# Patient Record
Sex: FEMALE | Race: ASIAN | NOT HISPANIC OR LATINO | URBAN - METROPOLITAN AREA
[De-identification: names, ages, dates, MRNs, and addresses within clinical notes are randomized per-mention and may not be internally consistent; named-entity substitution may affect disease eponyms.]

---

## 2020-01-08 ENCOUNTER — EMERGENCY (EMERGENCY)
Facility: HOSPITAL | Age: 85
LOS: 1 days | Discharge: HOME | End: 2020-01-08
Attending: EMERGENCY MEDICINE | Admitting: EMERGENCY MEDICINE
Payer: MEDICAID

## 2020-01-08 VITALS
TEMPERATURE: 97 F | OXYGEN SATURATION: 100 % | HEART RATE: 62 BPM | DIASTOLIC BLOOD PRESSURE: 67 MMHG | RESPIRATION RATE: 16 BRPM | SYSTOLIC BLOOD PRESSURE: 189 MMHG

## 2020-01-08 DIAGNOSIS — Z95.5 PRESENCE OF CORONARY ANGIOPLASTY IMPLANT AND GRAFT: Chronic | ICD-10-CM

## 2020-01-08 LAB
ALBUMIN SERPL ELPH-MCNC: 4.4 G/DL — SIGNIFICANT CHANGE UP (ref 3.3–5)
ALP SERPL-CCNC: 80 U/L — SIGNIFICANT CHANGE UP (ref 40–120)
ALT FLD-CCNC: 10 U/L — SIGNIFICANT CHANGE UP (ref 4–33)
ANION GAP SERPL CALC-SCNC: 14 MMO/L — SIGNIFICANT CHANGE UP (ref 7–14)
APPEARANCE UR: CLEAR — SIGNIFICANT CHANGE UP
AST SERPL-CCNC: 18 U/L — SIGNIFICANT CHANGE UP (ref 4–32)
BASOPHILS # BLD AUTO: 0.11 K/UL — SIGNIFICANT CHANGE UP (ref 0–0.2)
BASOPHILS NFR BLD AUTO: 1.1 % — SIGNIFICANT CHANGE UP (ref 0–2)
BILIRUB SERPL-MCNC: 0.4 MG/DL — SIGNIFICANT CHANGE UP (ref 0.2–1.2)
BILIRUB UR-MCNC: NEGATIVE — SIGNIFICANT CHANGE UP
BLOOD UR QL VISUAL: NEGATIVE — SIGNIFICANT CHANGE UP
BUN SERPL-MCNC: 16 MG/DL — SIGNIFICANT CHANGE UP (ref 7–23)
CALCIUM SERPL-MCNC: 9.6 MG/DL — SIGNIFICANT CHANGE UP (ref 8.4–10.5)
CHLORIDE SERPL-SCNC: 106 MMOL/L — SIGNIFICANT CHANGE UP (ref 98–107)
CO2 SERPL-SCNC: 22 MMOL/L — SIGNIFICANT CHANGE UP (ref 22–31)
COLOR SPEC: COLORLESS — SIGNIFICANT CHANGE UP
CREAT SERPL-MCNC: 0.71 MG/DL — SIGNIFICANT CHANGE UP (ref 0.5–1.3)
EOSINOPHIL # BLD AUTO: 0.32 K/UL — SIGNIFICANT CHANGE UP (ref 0–0.5)
EOSINOPHIL NFR BLD AUTO: 3.1 % — SIGNIFICANT CHANGE UP (ref 0–6)
GLUCOSE SERPL-MCNC: 102 MG/DL — HIGH (ref 70–99)
GLUCOSE UR-MCNC: NEGATIVE — SIGNIFICANT CHANGE UP
HCT VFR BLD CALC: 35.7 % — SIGNIFICANT CHANGE UP (ref 34.5–45)
HGB BLD-MCNC: 11.6 G/DL — SIGNIFICANT CHANGE UP (ref 11.5–15.5)
IMM GRANULOCYTES NFR BLD AUTO: 0.3 % — SIGNIFICANT CHANGE UP (ref 0–1.5)
KETONES UR-MCNC: NEGATIVE — SIGNIFICANT CHANGE UP
LEUKOCYTE ESTERASE UR-ACNC: NEGATIVE — SIGNIFICANT CHANGE UP
LYMPHOCYTES # BLD AUTO: 2.86 K/UL — SIGNIFICANT CHANGE UP (ref 1–3.3)
LYMPHOCYTES # BLD AUTO: 27.5 % — SIGNIFICANT CHANGE UP (ref 13–44)
MCHC RBC-ENTMCNC: 30.1 PG — SIGNIFICANT CHANGE UP (ref 27–34)
MCHC RBC-ENTMCNC: 32.5 % — SIGNIFICANT CHANGE UP (ref 32–36)
MCV RBC AUTO: 92.7 FL — SIGNIFICANT CHANGE UP (ref 80–100)
MONOCYTES # BLD AUTO: 1.08 K/UL — HIGH (ref 0–0.9)
MONOCYTES NFR BLD AUTO: 10.4 % — SIGNIFICANT CHANGE UP (ref 2–14)
NEUTROPHILS # BLD AUTO: 5.99 K/UL — SIGNIFICANT CHANGE UP (ref 1.8–7.4)
NEUTROPHILS NFR BLD AUTO: 57.6 % — SIGNIFICANT CHANGE UP (ref 43–77)
NITRITE UR-MCNC: NEGATIVE — SIGNIFICANT CHANGE UP
NRBC # FLD: 0.02 K/UL — SIGNIFICANT CHANGE UP (ref 0–0)
PH UR: 7 — SIGNIFICANT CHANGE UP (ref 5–8)
PLATELET # BLD AUTO: 239 K/UL — SIGNIFICANT CHANGE UP (ref 150–400)
PMV BLD: 12.9 FL — SIGNIFICANT CHANGE UP (ref 7–13)
POTASSIUM SERPL-MCNC: 4 MMOL/L — SIGNIFICANT CHANGE UP (ref 3.5–5.3)
POTASSIUM SERPL-SCNC: 4 MMOL/L — SIGNIFICANT CHANGE UP (ref 3.5–5.3)
PROT SERPL-MCNC: 7.9 G/DL — SIGNIFICANT CHANGE UP (ref 6–8.3)
PROT UR-MCNC: NEGATIVE — SIGNIFICANT CHANGE UP
RBC # BLD: 3.85 M/UL — SIGNIFICANT CHANGE UP (ref 3.8–5.2)
RBC # FLD: 13.6 % — SIGNIFICANT CHANGE UP (ref 10.3–14.5)
SODIUM SERPL-SCNC: 142 MMOL/L — SIGNIFICANT CHANGE UP (ref 135–145)
SP GR SPEC: 1.01 — SIGNIFICANT CHANGE UP (ref 1–1.04)
TROPONIN T, HIGH SENSITIVITY: 11 NG/L — SIGNIFICANT CHANGE UP (ref ?–14)
UROBILINOGEN FLD QL: NORMAL — SIGNIFICANT CHANGE UP
WBC # BLD: 10.39 K/UL — SIGNIFICANT CHANGE UP (ref 3.8–10.5)
WBC # FLD AUTO: 10.39 K/UL — SIGNIFICANT CHANGE UP (ref 3.8–10.5)

## 2020-01-08 PROCEDURE — 93010 ELECTROCARDIOGRAM REPORT: CPT

## 2020-01-08 PROCEDURE — 99284 EMERGENCY DEPT VISIT MOD MDM: CPT | Mod: 25

## 2020-01-08 PROCEDURE — 70450 CT HEAD/BRAIN W/O DYE: CPT | Mod: 26

## 2020-01-08 PROCEDURE — 71046 X-RAY EXAM CHEST 2 VIEWS: CPT | Mod: 26

## 2020-01-08 RX ORDER — SODIUM CHLORIDE 9 MG/ML
1000 INJECTION INTRAMUSCULAR; INTRAVENOUS; SUBCUTANEOUS ONCE
Refills: 0 | Status: COMPLETED | OUTPATIENT
Start: 2020-01-08 | End: 2020-01-08

## 2020-01-08 RX ORDER — MECLIZINE HCL 12.5 MG
25 TABLET ORAL ONCE
Refills: 0 | Status: COMPLETED | OUTPATIENT
Start: 2020-01-08 | End: 2020-01-08

## 2020-01-08 RX ADMIN — Medication 25 MILLIGRAM(S): at 18:30

## 2020-01-08 RX ADMIN — SODIUM CHLORIDE 1000 MILLILITER(S): 9 INJECTION INTRAMUSCULAR; INTRAVENOUS; SUBCUTANEOUS at 18:32

## 2020-01-08 NOTE — ED PROVIDER NOTE - PROGRESS NOTE DETAILS
Farnaz, pgy3: CT head negative for acute pathology, rp trop stable, pt sx resolving, stable for dc w/ meclizine ppx

## 2020-01-08 NOTE — ED ADULT TRIAGE NOTE - CHIEF COMPLAINT QUOTE
Hx of CAD, DM. Pt c/o generalized weakness, denies chest pain, SOB, n/v/d, dysuria, fever, chills, cough.

## 2020-01-08 NOTE — ED ADULT NURSE NOTE - NSIMPLEMENTINTERV_GEN_ALL_ED
Implemented All Fall Risk Interventions:  [place] to call system. Call bell, personal items and telephone within reach. Instruct patient to call for assistance. Room bathroom lighting operational. Non-slip footwear when patient is off stretcher. Physically safe environment: no spills, clutter or unnecessary equipment. Stretcher in lowest position, wheels locked, appropriate side rails in place. Provide visual cue, wrist band, yellow gown, etc. Monitor gait and stability. Monitor for mental status changes and reorient to person, place, and time. Review medications for side effects contributing to fall risk. Reinforce activity limits and safety measures with patient and family.

## 2020-01-08 NOTE — ED PROVIDER NOTE - OBJECTIVE STATEMENT
83 y/o F PMH CAD s/p stent x 6, HTN, DM c/o room spinning dizziness since this am. Notes sxs improved, currently asymptomatic laying in bed. Notes sxs worse with movement. Deneis fever, chills, vsion changes, ha, rhinorrhea, sore throat, CP, SOB, cough, abdominal pain, n/v/d, numbness/tingling/weakness.

## 2020-01-08 NOTE — ED ADULT NURSE NOTE - OBJECTIVE STATEMENT
Received pt in room 12, pt A&Ox2-3, unable to recall , per son, pt is intermittently forgetful, respirations even and unlabored b/l. Pt mainly Setsw[place] speaking, requesting son to translate. Per son, pt with generalized weakness, difficulty ambulating since this AM. Also reports high BP at home, systolic 190s at home. Pt also endorsing room spinning. Denies n/v, fever, chills, blurry vision, h/a. Abdomen soft, nondistended, nontender. PMHx diabetes, htn, CAD. Awaiting MD ocampo. Son at bedside. Will continue to monitor.

## 2020-01-08 NOTE — ED PROVIDER NOTE - ATTENDING CONTRIBUTION TO CARE
agree with PA note    "83 y/o F PMH CAD s/p stent x 6, HTN, DM c/o room spinning dizziness since this am. Notes sxs improved, currently asymptomatic laying in bed. Notes sxs worse with movement. Deneis fever, chills, vsion changes, ha, rhinorrhea, sore throat, CP, SOB, cough, abdominal pain, n/v/d, numbness/tingling/weakness."    PE: at present no symptoms; PERRL; CTAB/L; s1 s2 no m/r/g abd soft/NT/ND Neuro: gait stable; no nystagmus; no skew; FTN wnl; no drift    given age of 84 will get head CT but symptoms resolved and no findings of cerebellar pathology so likely will d/c home

## 2020-01-08 NOTE — ED PROVIDER NOTE - NSFOLLOWUPINSTRUCTIONS_ED_ALL_ED_FT
Please follow up with your primary care physician    We are sending medicine to your pharmacy that will help with dizziness if you symptoms return

## 2020-01-08 NOTE — ED PROVIDER NOTE - PATIENT PORTAL LINK FT
You can access the FollowMyHealth Patient Portal offered by [facility] by registering at the following website: [email or web address]. By joining [facility]’s FollowMyHealth portal, you will also be able to view your health information using other applications (apps) compatible with our system.

## 2020-01-09 VITALS
DIASTOLIC BLOOD PRESSURE: 75 MMHG | OXYGEN SATURATION: 100 % | HEART RATE: 60 BPM | RESPIRATION RATE: 18 BRPM | TEMPERATURE: 98 F | SYSTOLIC BLOOD PRESSURE: 182 MMHG

## 2020-01-09 LAB — TROPONIN T, HIGH SENSITIVITY: 8 NG/L — SIGNIFICANT CHANGE UP (ref ?–14)

## 2020-01-09 RX ORDER — MECLIZINE HCL 12.5 MG
1 TABLET ORAL
Qty: 15 | Refills: 0
Start: 2020-01-09 | End: 2020-01-13

## 2020-01-09 NOTE — ED ADULT NURSE REASSESSMENT NOTE - NS ED NURSE REASSESS COMMENT FT1
Patient resting comfortably at this time. Family at bedside to translate. Denies dizziness/HA at this time. Reports still feeling weak. VS as noted. Awaiting further orders, will continue to monitor.

## 2020-01-10 LAB
BACTERIA UR CULT: SIGNIFICANT CHANGE UP
SPECIMEN SOURCE: SIGNIFICANT CHANGE UP

## 2022-08-19 ENCOUNTER — INPATIENT (INPATIENT)
Facility: HOSPITAL | Age: 87
LOS: 3 days | Discharge: HOME | End: 2022-08-23
Attending: STUDENT IN AN ORGANIZED HEALTH CARE EDUCATION/TRAINING PROGRAM | Admitting: STUDENT IN AN ORGANIZED HEALTH CARE EDUCATION/TRAINING PROGRAM

## 2022-08-19 VITALS
TEMPERATURE: 98 F | SYSTOLIC BLOOD PRESSURE: 124 MMHG | OXYGEN SATURATION: 100 % | RESPIRATION RATE: 18 BRPM | DIASTOLIC BLOOD PRESSURE: 64 MMHG | HEART RATE: 69 BPM

## 2022-08-19 DIAGNOSIS — Z95.5 PRESENCE OF CORONARY ANGIOPLASTY IMPLANT AND GRAFT: Chronic | ICD-10-CM

## 2022-08-19 PROCEDURE — 93010 ELECTROCARDIOGRAM REPORT: CPT

## 2022-08-19 PROCEDURE — 99285 EMERGENCY DEPT VISIT HI MDM: CPT | Mod: 25

## 2022-08-19 NOTE — ED ADULT TRIAGE NOTE - TEMPERATURE IN CELSIUS (DEGREES C)
36.6 A-T Advancement Flap Text: The defect edges were debeveled with a #15 scalpel blade.  Given the location of the defect, shape of the defect and the proximity to free margins an A-T advancement flap was deemed most appropriate.  Using a sterile surgical marker, an appropriate advancement flap was drawn incorporating the defect and placing the expected incisions within the relaxed skin tension lines where possible.    The area thus outlined was incised deep to adipose tissue with a #15 scalpel blade.  The skin margins were undermined to an appropriate distance in all directions utilizing iris scissors.

## 2022-08-19 NOTE — ED ADULT TRIAGE NOTE - CHIEF COMPLAINT QUOTE
Pt c/o left-sided chest pain that started 1 hr PTA. Denies dizziness, SOB, headache. PMH CAD, 6x stents, DM2, HTN

## 2022-08-20 DIAGNOSIS — I25.10 ATHEROSCLEROTIC HEART DISEASE OF NATIVE CORONARY ARTERY WITHOUT ANGINA PECTORIS: ICD-10-CM

## 2022-08-20 DIAGNOSIS — Z86.79 PERSONAL HISTORY OF OTHER DISEASES OF THE CIRCULATORY SYSTEM: ICD-10-CM

## 2022-08-20 DIAGNOSIS — I10 ESSENTIAL (PRIMARY) HYPERTENSION: ICD-10-CM

## 2022-08-20 DIAGNOSIS — I21.4 NON-ST ELEVATION (NSTEMI) MYOCARDIAL INFARCTION: ICD-10-CM

## 2022-08-20 DIAGNOSIS — Z29.9 ENCOUNTER FOR PROPHYLACTIC MEASURES, UNSPECIFIED: ICD-10-CM

## 2022-08-20 DIAGNOSIS — E78.5 HYPERLIPIDEMIA, UNSPECIFIED: ICD-10-CM

## 2022-08-20 DIAGNOSIS — E11.9 TYPE 2 DIABETES MELLITUS WITHOUT COMPLICATIONS: ICD-10-CM

## 2022-08-20 LAB
ALBUMIN SERPL ELPH-MCNC: 5 G/DL — SIGNIFICANT CHANGE UP (ref 3.3–5)
ALP SERPL-CCNC: 92 U/L — SIGNIFICANT CHANGE UP (ref 40–120)
ALT FLD-CCNC: 19 U/L — SIGNIFICANT CHANGE UP (ref 4–33)
ANION GAP SERPL CALC-SCNC: 13 MMOL/L — SIGNIFICANT CHANGE UP (ref 7–14)
APTT BLD: 137 SEC — SIGNIFICANT CHANGE UP (ref 27–36.3)
APTT BLD: 26.5 SEC — LOW (ref 27–36.3)
APTT BLD: 54.7 SEC — HIGH (ref 27–36.3)
AST SERPL-CCNC: 84 U/L — HIGH (ref 4–32)
BASOPHILS # BLD AUTO: 0.09 K/UL — SIGNIFICANT CHANGE UP (ref 0–0.2)
BASOPHILS NFR BLD AUTO: 0.8 % — SIGNIFICANT CHANGE UP (ref 0–2)
BILIRUB SERPL-MCNC: 0.4 MG/DL — SIGNIFICANT CHANGE UP (ref 0.2–1.2)
BUN SERPL-MCNC: 14 MG/DL — SIGNIFICANT CHANGE UP (ref 7–23)
CALCIUM SERPL-MCNC: 10.6 MG/DL — HIGH (ref 8.4–10.5)
CHLORIDE SERPL-SCNC: 100 MMOL/L — SIGNIFICANT CHANGE UP (ref 98–107)
CK MB BLD-MCNC: 6.9 % — HIGH (ref 0–2.5)
CK MB CFR SERPL CALC: 19 NG/ML — HIGH
CK SERPL-CCNC: 276 U/L — HIGH (ref 25–170)
CO2 SERPL-SCNC: 25 MMOL/L — SIGNIFICANT CHANGE UP (ref 22–31)
CREAT SERPL-MCNC: 0.84 MG/DL — SIGNIFICANT CHANGE UP (ref 0.5–1.3)
EGFR: 68 ML/MIN/1.73M2 — SIGNIFICANT CHANGE UP
EOSINOPHIL # BLD AUTO: 0.39 K/UL — SIGNIFICANT CHANGE UP (ref 0–0.5)
EOSINOPHIL NFR BLD AUTO: 3.5 % — SIGNIFICANT CHANGE UP (ref 0–6)
GLUCOSE SERPL-MCNC: 136 MG/DL — HIGH (ref 70–99)
HCT VFR BLD CALC: 34.4 % — LOW (ref 34.5–45)
HCT VFR BLD CALC: 39.6 % — SIGNIFICANT CHANGE UP (ref 34.5–45)
HGB BLD-MCNC: 11.6 G/DL — SIGNIFICANT CHANGE UP (ref 11.5–15.5)
HGB BLD-MCNC: 13 G/DL — SIGNIFICANT CHANGE UP (ref 11.5–15.5)
IANC: 5.06 K/UL — SIGNIFICANT CHANGE UP (ref 1.8–7.4)
IMM GRANULOCYTES NFR BLD AUTO: 0.5 % — SIGNIFICANT CHANGE UP (ref 0–1.5)
INR BLD: 1.05 RATIO — SIGNIFICANT CHANGE UP (ref 0.88–1.16)
LYMPHOCYTES # BLD AUTO: 37.3 % — SIGNIFICANT CHANGE UP (ref 13–44)
LYMPHOCYTES # BLD AUTO: 4.16 K/UL — HIGH (ref 1–3.3)
MCHC RBC-ENTMCNC: 30.1 PG — SIGNIFICANT CHANGE UP (ref 27–34)
MCHC RBC-ENTMCNC: 30.1 PG — SIGNIFICANT CHANGE UP (ref 27–34)
MCHC RBC-ENTMCNC: 32.8 GM/DL — SIGNIFICANT CHANGE UP (ref 32–36)
MCHC RBC-ENTMCNC: 33.7 GM/DL — SIGNIFICANT CHANGE UP (ref 32–36)
MCV RBC AUTO: 89.1 FL — SIGNIFICANT CHANGE UP (ref 80–100)
MCV RBC AUTO: 91.7 FL — SIGNIFICANT CHANGE UP (ref 80–100)
MONOCYTES # BLD AUTO: 1.4 K/UL — HIGH (ref 0–0.9)
MONOCYTES NFR BLD AUTO: 12.5 % — SIGNIFICANT CHANGE UP (ref 2–14)
NEUTROPHILS # BLD AUTO: 5.06 K/UL — SIGNIFICANT CHANGE UP (ref 1.8–7.4)
NEUTROPHILS NFR BLD AUTO: 45.4 % — SIGNIFICANT CHANGE UP (ref 43–77)
NRBC # BLD: 0 /100 WBCS — SIGNIFICANT CHANGE UP (ref 0–0)
NRBC # BLD: 0 /100 WBCS — SIGNIFICANT CHANGE UP (ref 0–0)
NRBC # FLD: 0 K/UL — SIGNIFICANT CHANGE UP (ref 0–0)
NRBC # FLD: 0 K/UL — SIGNIFICANT CHANGE UP (ref 0–0)
NT-PROBNP SERPL-SCNC: 305 PG/ML — HIGH
PLATELET # BLD AUTO: 265 K/UL — SIGNIFICANT CHANGE UP (ref 150–400)
PLATELET # BLD AUTO: 285 K/UL — SIGNIFICANT CHANGE UP (ref 150–400)
POTASSIUM SERPL-MCNC: 5.1 MMOL/L — SIGNIFICANT CHANGE UP (ref 3.5–5.3)
POTASSIUM SERPL-SCNC: 5.1 MMOL/L — SIGNIFICANT CHANGE UP (ref 3.5–5.3)
PROT SERPL-MCNC: 9.2 G/DL — HIGH (ref 6–8.3)
PROTHROM AB SERPL-ACNC: 12.2 SEC — SIGNIFICANT CHANGE UP (ref 10.5–13.4)
RBC # BLD: 3.86 M/UL — SIGNIFICANT CHANGE UP (ref 3.8–5.2)
RBC # BLD: 4.32 M/UL — SIGNIFICANT CHANGE UP (ref 3.8–5.2)
RBC # FLD: 13.1 % — SIGNIFICANT CHANGE UP (ref 10.3–14.5)
RBC # FLD: 13.1 % — SIGNIFICANT CHANGE UP (ref 10.3–14.5)
SARS-COV-2 RNA SPEC QL NAA+PROBE: SIGNIFICANT CHANGE UP
SODIUM SERPL-SCNC: 138 MMOL/L — SIGNIFICANT CHANGE UP (ref 135–145)
TROPONIN T, HIGH SENSITIVITY RESULT: 1370 NG/L — CRITICAL HIGH
TROPONIN T, HIGH SENSITIVITY RESULT: 1911 NG/L — CRITICAL HIGH
WBC # BLD: 10.91 K/UL — HIGH (ref 3.8–10.5)
WBC # BLD: 11.16 K/UL — HIGH (ref 3.8–10.5)
WBC # FLD AUTO: 10.91 K/UL — HIGH (ref 3.8–10.5)
WBC # FLD AUTO: 11.16 K/UL — HIGH (ref 3.8–10.5)

## 2022-08-20 PROCEDURE — 99223 1ST HOSP IP/OBS HIGH 75: CPT

## 2022-08-20 PROCEDURE — 93306 TTE W/DOPPLER COMPLETE: CPT | Mod: 26

## 2022-08-20 PROCEDURE — 71045 X-RAY EXAM CHEST 1 VIEW: CPT | Mod: 26

## 2022-08-20 RX ORDER — GLUCAGON INJECTION, SOLUTION 0.5 MG/.1ML
1 INJECTION, SOLUTION SUBCUTANEOUS ONCE
Refills: 0 | Status: DISCONTINUED | OUTPATIENT
Start: 2022-08-20 | End: 2022-08-23

## 2022-08-20 RX ORDER — SODIUM CHLORIDE 9 MG/ML
1000 INJECTION, SOLUTION INTRAVENOUS
Refills: 0 | Status: DISCONTINUED | OUTPATIENT
Start: 2022-08-20 | End: 2022-08-23

## 2022-08-20 RX ORDER — ATENOLOL 25 MG/1
100 TABLET ORAL DAILY
Refills: 0 | Status: DISCONTINUED | OUTPATIENT
Start: 2022-08-20 | End: 2022-08-23

## 2022-08-20 RX ORDER — AMLODIPINE BESYLATE 2.5 MG/1
10 TABLET ORAL DAILY
Refills: 0 | Status: DISCONTINUED | OUTPATIENT
Start: 2022-08-20 | End: 2022-08-23

## 2022-08-20 RX ORDER — TICAGRELOR 90 MG/1
90 TABLET ORAL
Refills: 0 | Status: DISCONTINUED | OUTPATIENT
Start: 2022-08-21 | End: 2022-08-23

## 2022-08-20 RX ORDER — HEPARIN SODIUM 5000 [USP'U]/ML
3800 INJECTION INTRAVENOUS; SUBCUTANEOUS EVERY 6 HOURS
Refills: 0 | Status: DISCONTINUED | OUTPATIENT
Start: 2022-08-20 | End: 2022-08-22

## 2022-08-20 RX ORDER — PANTOPRAZOLE SODIUM 20 MG/1
40 TABLET, DELAYED RELEASE ORAL
Refills: 0 | Status: DISCONTINUED | OUTPATIENT
Start: 2022-08-20 | End: 2022-08-23

## 2022-08-20 RX ORDER — ATORVASTATIN CALCIUM 80 MG/1
40 TABLET, FILM COATED ORAL AT BEDTIME
Refills: 0 | Status: DISCONTINUED | OUTPATIENT
Start: 2022-08-20 | End: 2022-08-20

## 2022-08-20 RX ORDER — DEXTROSE 50 % IN WATER 50 %
12.5 SYRINGE (ML) INTRAVENOUS ONCE
Refills: 0 | Status: DISCONTINUED | OUTPATIENT
Start: 2022-08-20 | End: 2022-08-23

## 2022-08-20 RX ORDER — ASPIRIN/CALCIUM CARB/MAGNESIUM 324 MG
324 TABLET ORAL ONCE
Refills: 0 | Status: COMPLETED | OUTPATIENT
Start: 2022-08-20 | End: 2022-08-20

## 2022-08-20 RX ORDER — INSULIN LISPRO 100/ML
VIAL (ML) SUBCUTANEOUS AT BEDTIME
Refills: 0 | Status: DISCONTINUED | OUTPATIENT
Start: 2022-08-20 | End: 2022-08-23

## 2022-08-20 RX ORDER — ATORVASTATIN CALCIUM 80 MG/1
80 TABLET, FILM COATED ORAL AT BEDTIME
Refills: 0 | Status: DISCONTINUED | OUTPATIENT
Start: 2022-08-20 | End: 2022-08-23

## 2022-08-20 RX ORDER — DEXTROSE 50 % IN WATER 50 %
15 SYRINGE (ML) INTRAVENOUS ONCE
Refills: 0 | Status: DISCONTINUED | OUTPATIENT
Start: 2022-08-20 | End: 2022-08-23

## 2022-08-20 RX ORDER — ISOSORBIDE MONONITRATE 60 MG/1
30 TABLET, EXTENDED RELEASE ORAL DAILY
Refills: 0 | Status: DISCONTINUED | OUTPATIENT
Start: 2022-08-20 | End: 2022-08-23

## 2022-08-20 RX ORDER — DEXTROSE 50 % IN WATER 50 %
25 SYRINGE (ML) INTRAVENOUS ONCE
Refills: 0 | Status: DISCONTINUED | OUTPATIENT
Start: 2022-08-20 | End: 2022-08-23

## 2022-08-20 RX ORDER — HEPARIN SODIUM 5000 [USP'U]/ML
INJECTION INTRAVENOUS; SUBCUTANEOUS
Qty: 25000 | Refills: 0 | Status: DISCONTINUED | OUTPATIENT
Start: 2022-08-20 | End: 2022-08-22

## 2022-08-20 RX ORDER — TICAGRELOR 90 MG/1
180 TABLET ORAL ONCE
Refills: 0 | Status: COMPLETED | OUTPATIENT
Start: 2022-08-20 | End: 2022-08-20

## 2022-08-20 RX ORDER — INSULIN LISPRO 100/ML
VIAL (ML) SUBCUTANEOUS
Refills: 0 | Status: DISCONTINUED | OUTPATIENT
Start: 2022-08-20 | End: 2022-08-23

## 2022-08-20 RX ORDER — LOSARTAN POTASSIUM 100 MG/1
100 TABLET, FILM COATED ORAL DAILY
Refills: 0 | Status: DISCONTINUED | OUTPATIENT
Start: 2022-08-20 | End: 2022-08-23

## 2022-08-20 RX ORDER — HEPARIN SODIUM 5000 [USP'U]/ML
3800 INJECTION INTRAVENOUS; SUBCUTANEOUS ONCE
Refills: 0 | Status: COMPLETED | OUTPATIENT
Start: 2022-08-20 | End: 2022-08-20

## 2022-08-20 RX ORDER — ASPIRIN/CALCIUM CARB/MAGNESIUM 324 MG
81 TABLET ORAL DAILY
Refills: 0 | Status: DISCONTINUED | OUTPATIENT
Start: 2022-08-21 | End: 2022-08-23

## 2022-08-20 RX ADMIN — HEPARIN SODIUM 0 UNIT(S)/HR: 5000 INJECTION INTRAVENOUS; SUBCUTANEOUS at 12:53

## 2022-08-20 RX ADMIN — HEPARIN SODIUM 3800 UNIT(S): 5000 INJECTION INTRAVENOUS; SUBCUTANEOUS at 05:18

## 2022-08-20 RX ADMIN — ATORVASTATIN CALCIUM 80 MILLIGRAM(S): 80 TABLET, FILM COATED ORAL at 22:15

## 2022-08-20 RX ADMIN — TICAGRELOR 180 MILLIGRAM(S): 90 TABLET ORAL at 05:47

## 2022-08-20 RX ADMIN — ISOSORBIDE MONONITRATE 30 MILLIGRAM(S): 60 TABLET, EXTENDED RELEASE ORAL at 11:48

## 2022-08-20 RX ADMIN — Medication 1: at 18:07

## 2022-08-20 RX ADMIN — Medication 324 MILLIGRAM(S): at 03:47

## 2022-08-20 RX ADMIN — Medication 10 MILLIGRAM(S): at 17:10

## 2022-08-20 RX ADMIN — HEPARIN SODIUM 750 UNIT(S)/HR: 5000 INJECTION INTRAVENOUS; SUBCUTANEOUS at 05:20

## 2022-08-20 RX ADMIN — HEPARIN SODIUM 550 UNIT(S)/HR: 5000 INJECTION INTRAVENOUS; SUBCUTANEOUS at 20:49

## 2022-08-20 RX ADMIN — PANTOPRAZOLE SODIUM 40 MILLIGRAM(S): 20 TABLET, DELAYED RELEASE ORAL at 11:48

## 2022-08-20 RX ADMIN — HEPARIN SODIUM 550 UNIT(S)/HR: 5000 INJECTION INTRAVENOUS; SUBCUTANEOUS at 14:02

## 2022-08-20 NOTE — H&P ADULT - NS ATTEND AMEND GEN_ALL_CORE FT
Patient is an 85yo F with PMH of CAD s/p PCI with WAYNE x6 (latest 2019 at [facility]), HTN, HLD, and T2DM who presented with midsternal chest pain.    #NSTEMI - Troponin significantly elevated to 1911, repeat trended downward to 1370. s/p Brilinta load, aspirin load in ED. Heparin gtt for ACS. Start maintenance Brilinta BID and aspirin daily. Statin. Telemetry monitoring. TTE. Cardiology following – planning for further ischemic work-up. Stat ECG and cardiac enzymes if chest pain returns or worsens.   #Mild leukocytosis – likely reactive in setting of ACS – trend for now. Trend fever curve.   #HTN – goal normotension. Adjust for GDMT.  #HLD – high-intensity statin.  #Mild hypercalcemia – trend for now.   #Elevated liver enzymes - AST elevated to 84, ALT normal range at 19 – likely a reflection of ACS; CK also 507 and trended downward to 276  #T2DM – fingersticks qachs, goal 140-180. Weight-based basal and pre-prandial insulin with sliding scale.    Discussed with patient and kevin Rodriguez at bedside.

## 2022-08-20 NOTE — ED ADULT NURSE NOTE - OBJECTIVE STATEMENT
pt a&ox4 ambulatory at baseline with assist c/o chest pain that began this afternoon with no inciting factors. pt states did not taken anything at home. pt on blood thinners (unable to name). abdomen soft and nondistended. pt skin in tact. no swelling to BLE. pt denies sob, nausea, vomiting, dizziness, headache at this time. pt denies sick contacts. pt pmhx 6 stents. pt normal sinus on monitor. 20g to the RAC. labs collected and sent. pt medicated as per md orders. pt respirations even and unlabored with no accessory muscle use. pt in NAD and resting in stretcher.

## 2022-08-20 NOTE — ED PROVIDER NOTE - PROGRESS NOTE DETAILS
Kelsi Manley PGY2: Trop 1911. Pt w/o pain on reassessment. Rpt EKG stable. Cards consulted. ASA/Brilinta/Heparin ordered. Likely will require tele admission.

## 2022-08-20 NOTE — H&P ADULT - PROBLEM SELECTOR PLAN 4
- holding home medications: Metformin 500 mg QD  - insulin coverage sliding scale   - FS AC & HS.  - Diet: Consistent Carbs.  - f/u AM HbA1c.

## 2022-08-20 NOTE — H&P ADULT - ASSESSMENT
85 y/o female with PMH of CAD s/p 6 stents in 11/2019 at [facility], HLD, HTN, DM2 and CHF presents to ED c/o chest pain. Patient admitted for management of NSTEMI.     In ED, EKG x 2 showed SR 66 and then SB 53, ST-T wave abnormalities in I, AVL, V4-6. Troponin 1911,

## 2022-08-20 NOTE — H&P ADULT - HISTORY OF PRESENT ILLNESS
85 y/o female with PMH of CAD s/p 6 stents in 11/2019 at [facility], HLD, HTN, DM2 and CHF presents to ED c/o chest pain. Patient states chest pain was at rest, midsternal and non-radiating. States she had similar chest pain 3 years ago and underwent cardiac cath and received 6 stents. She follows with cardiologist Dr Gladys Valdivia [phone]. Patient denies fever/chills, fatigue, weakness, weight change, abdominal pain, n/d, change in bowel movements, dysuria, change in urination, lower extremity edema, hemiparesis, change in gait, appetite changes, rashes.     In ED, EKG x 2 showed SR 66 and then SB 53, ST-T wave abnormalities in I, AVL, V4-6. Troponin 1911, 
fall precautions

## 2022-08-20 NOTE — H&P ADULT - NSICDXPASTMEDICALHX_GEN_ALL_CORE_FT
PAST MEDICAL HISTORY:  CAD (coronary artery disease) s/p stent x 6    DM (diabetes mellitus)     H/O CHF     HLD (hyperlipidemia)     HTN (hypertension)

## 2022-08-20 NOTE — ED PROVIDER NOTE - ATTENDING CONTRIBUTION TO CARE
87 y/o F with h/o HTN, hyperlipidemia, DM, CAD s/p stents here with chest pain.  Pt is Macedonian-speaking, son at bedside to provide translation per request.  Pt reports L sided non radiating chest pain tonight at rest, approx 1 hour PTA.  No associated cough, fever, sob, back pain, abd pain, n/v, leg pain or swelling.  Did not take any meds PTA.  Well appearing, sitting comfortably in stretcher, awake and alert, nontoxic.  AF/VSS.  Lungs cta bl.  Cards nl S1/S2, RRR, no MRG.  Abd soft ntnd.  No pedal edema or calf tenderness.  No focal neuro deficits.  Plan for ekg, labs incl trop, cxr, asa, admit tele - concern for acs in this high risk patient, sxs not suggestive of PE or aortic catastrophe.  Do not suspect ptx esophageal rupture, r/o infection/pna.

## 2022-08-20 NOTE — CONSULT NOTE ADULT - PROBLEM SELECTOR RECOMMENDATION 9
Troponin 1191  Admit to telemetry  Heparin gtt ACS protocol  Aspirin 324 mg load then Aspirin 81mg daily  Brilinta 180mg load then Brilinta 90mg BID  Lipitor 80mg now then Lipitor 80mg daily  Trend Cardiac Enzymes to include Troponin, CK, CK-MB q8h x 3 with serial ECG  In the event of chest pain STAT troponin, CK, CK-MB and ECG  CBC, BMP, Mag Phos, PT/PTT daily  Hga1c, TSH, Lipid panel, T+S now  TTE to evaluate LV function  NPO for possible ischemic evaluation/cardiac cath  Cardiology to follow

## 2022-08-20 NOTE — H&P ADULT - PROBLEM SELECTOR PLAN 3
Patient with history of CHF on Torsemide from home.  No sign of CHF exacerbation - Pro   Continue w/ BB, ACE,   Strict I&Os, monitor daily weights, 1500 cc fluid restriction, sodium restriction.

## 2022-08-20 NOTE — ED PROVIDER NOTE - NSICDXPASTMEDICALHX_GEN_ALL_CORE_FT
PAST MEDICAL HISTORY:  CAD (coronary artery disease) s/p stent x 6    DM (diabetes mellitus)     HTN (hypertension)

## 2022-08-20 NOTE — H&P ADULT - PROBLEM/PLAN-6
Per Cammy's request, I made a call out to [facility]. Their house supervisor stated there is a wait list and it may be a few days.  She still took the information and stated she would contact the specialist.     BW DISPLAY PLAN FREE TEXT

## 2022-08-20 NOTE — H&P ADULT - PROBLEM SELECTOR PLAN 1
Patient with chest pain- Troponin 1911, , EKG with ST-T wave abnormalities found to have NSTEMI  Cardiology consulted and appreciate recommendations  Admit to telemetry, serial cardiac enzymes, serial EKGs.  NSTEMI ACS hep gtt, Brilinta load  Atorvastatin 80 mg QHS  NPOp MN for cardiac cath as per cardiology  Serial EKGs and cardiac enzymes  If EKG changes TWI/STD/GAY or continued chest pain Call  cards for urgent cath c/s.  Avoid morphine, nitro with inferior ischemia  No caffeine  CXR, TTE, ordered

## 2022-08-20 NOTE — CHART NOTE - NSCHARTNOTEFT_GEN_A_CORE
Spoke to cardiology team- no plan for cardiac cath at this time, so patient can eat. Follow recommendations in consult.

## 2022-08-20 NOTE — H&P ADULT - PROBLEM SELECTOR PLAN 5
- HTN  Stable  - DASH diet  - c/w home meds w/ holding parameters - Atenolol, Amlodipine  - monitor BP & titrate BP meds PRN.

## 2022-08-20 NOTE — ED PROVIDER NOTE - OBJECTIVE STATEMENT
87 y/o F, PMH of HLD, HTN, DM2, CHF, and CAD s/p stents, presents to ED c/o non-exertional, non-radiating, non-pleuritic, midsternal chest pain that began 1 hr pta. Pt denies f/c, SOB, abd pain, n/v/d, diaphoresis, urinary sx, weakness/numbness, lightheadedness/dizziness, LE edema, or any other symptoms at this time.

## 2022-08-20 NOTE — ED ADULT NURSE REASSESSMENT NOTE - NS ED NURSE REASSESS COMMENT FT1
pt a&ox4. pt endorses relief of chest pain after ASA. pt respirations even and unlabored. pt sinus connor on monitor. pt in NAD and resting in stretcher at this time.
pt elevated trop. 2nd access obtained. 20g to the to LAC. heparin started as per ACS protocol (7.5 mL/hr). pt respirations even and unlabored. pt sinus connor on monitor. pt in NAD and resting in stretcher at this time.
report received from night shift RN. pt is A&Ox4 with no complaints at this time. pt is Japanese speaking but can make needs known. comfort measures provided. ambulatory with assist at this time. respirations even and unlabored. remains on monitor, sinus connor noted. heparin running at 7.5 as ordered. VS as noted in flowsheet. report given to [id number] RN.

## 2022-08-20 NOTE — CONSULT NOTE ADULT - SUBJECTIVE AND OBJECTIVE BOX
HISTORY OF PRESENT ILLNESS:      Allergies    No Known Allergies    Intolerances    	    MEDICATIONS:  heparin   Injectable 3800 Unit(s) IV Push every 6 hours PRN  heparin  Infusion.  Unit(s)/Hr IV Continuous <Continuous>                  PAST MEDICAL & SURGICAL HISTORY:  CAD (coronary artery disease)  s/p stent x 6      HTN (hypertension)      DM (diabetes mellitus)      S/P primary angioplasty with coronary stent          FAMILY HISTORY:      SOCIAL HISTORY:    [ ] Non-smoker  [ ] Smoker  [ ] Alcohol    REVIEW OF SYSTEMS:  See HPI, otherwise complete 10 point review of systems negative    PHYSICAL EXAM:  T(C): 36.9 (08-20-22 @ 04:38), Max: 36.9 (08-20-22 @ 04:38)  HR: 56 (08-20-22 @ 04:38) (56 - 98)  BP: 123/60 (08-20-22 @ 04:38) (123/60 - 136/65)  RR: 16 (08-20-22 @ 04:38) (16 - 18)  SpO2: 100% (08-20-22 @ 04:38) (98% - 100%)  Wt(kg): --  I&O's Summary      Appearance: No Acute Distress	  HEENT:  Normal oral mucosa, PERRL, EOMI	  Cardiovascular: Normal S1 S2, No JVD, No murmurs/rubs/gallops  Respiratory: Lungs clear to auscultation bilaterally  Gastrointestinal:  Soft, Non-tender, + BS	  Skin: No rashes, No ecchymoses, No cyanosis	  Neurologic: Non-focal  Extremities: No clubbing, cyanosis or edema  Vascular: Peripheral pulses palpable 2+ bilaterally  Psychiatry: A & O x 3, Mood & affect appropriate    LABS:	 	    CBC Full  -  ( 20 Aug 2022 03:40 )  WBC Count : 11.16 K/uL  Hemoglobin : 13.0 g/dL  Hematocrit : 39.6 %  Platelet Count - Automated : 285 K/uL  Mean Cell Volume : 91.7 fL  Mean Cell Hemoglobin : 30.1 pg  Mean Cell Hemoglobin Concentration : 32.8 gm/dL  Auto Neutrophil # : 5.06 K/uL  Auto Lymphocyte # : 4.16 K/uL  Auto Monocyte # : 1.40 K/uL  Auto Eosinophil # : 0.39 K/uL  Auto Basophil # : 0.09 K/uL  Auto Neutrophil % : 45.4 %  Auto Lymphocyte % : 37.3 %  Auto Monocyte % : 12.5 %  Auto Eosinophil % : 3.5 %  Auto Basophil % : 0.8 %    08-20    138  |  100  |  14  ----------------------------<  136<H>  5.1   |  25  |  0.84    Ca    10.6<H>      20 Aug 2022 03:40    TPro  9.2<H>  /  Alb  5.0  /  TBili  0.4  /  DBili  x   /  AST  84<H>  /  ALT  19  /  AlkPhos  92  08-20      proBNP: Serum Pro-Brain Natriuretic Peptide: 305 pg/mL (08-20 @ 03:40)    Lipid Profile:   HgA1c:   TSH:       CARDIAC MARKERS:            TELEMETRY: 	    ECG:  	  RADIOLOGY:  OTHER: 	    PREVIOUS DIAGNOSTIC TESTING:    [ ] Echocardiogram:  [ ] Catheterization:  [ ] Stress Test:  	  	       HISTORY OF PRESENT ILLNESS:  This is an 86 year old woman with past medical history of CAD WAYNE X 6 (2019 [facility]), HTN, HLD, T2DM    Allergies    No Known Allergies    Intolerances    	    MEDICATIONS:  heparin   Injectable 3800 Unit(s) IV Push every 6 hours PRN  heparin  Infusion.  Unit(s)/Hr IV Continuous <Continuous>                  PAST MEDICAL & SURGICAL HISTORY:  CAD (coronary artery disease)  s/p stent x 6      HTN (hypertension)      DM (diabetes mellitus)      S/P primary angioplasty with coronary stent          FAMILY HISTORY:      SOCIAL HISTORY:    [ ] Non-smoker  [ ] Smoker  [ ] Alcohol    REVIEW OF SYSTEMS:  See HPI, otherwise complete 10 point review of systems negative    PHYSICAL EXAM:  T(C): 36.9 (08-20-22 @ 04:38), Max: 36.9 (08-20-22 @ 04:38)  HR: 56 (08-20-22 @ 04:38) (56 - 98)  BP: 123/60 (08-20-22 @ 04:38) (123/60 - 136/65)  RR: 16 (08-20-22 @ 04:38) (16 - 18)  SpO2: 100% (08-20-22 @ 04:38) (98% - 100%)  Wt(kg): --  I&O's Summary      Appearance: No Acute Distress	  HEENT:  Normal oral mucosa, PERRL, EOMI	  Cardiovascular: Normal S1 S2, No JVD, No murmurs/rubs/gallops  Respiratory: Lungs clear to auscultation bilaterally  Gastrointestinal:  Soft, Non-tender, + BS	  Skin: No rashes, No ecchymoses, No cyanosis	  Neurologic: Non-focal  Extremities: No clubbing, cyanosis or edema  Vascular: Peripheral pulses palpable 2+ bilaterally  Psychiatry: A & O x 3, Mood & affect appropriate    LABS:	 	    CBC Full  -  ( 20 Aug 2022 03:40 )  WBC Count : 11.16 K/uL  Hemoglobin : 13.0 g/dL  Hematocrit : 39.6 %  Platelet Count - Automated : 285 K/uL  Mean Cell Volume : 91.7 fL  Mean Cell Hemoglobin : 30.1 pg  Mean Cell Hemoglobin Concentration : 32.8 gm/dL  Auto Neutrophil # : 5.06 K/uL  Auto Lymphocyte # : 4.16 K/uL  Auto Monocyte # : 1.40 K/uL  Auto Eosinophil # : 0.39 K/uL  Auto Basophil # : 0.09 K/uL  Auto Neutrophil % : 45.4 %  Auto Lymphocyte % : 37.3 %  Auto Monocyte % : 12.5 %  Auto Eosinophil % : 3.5 %  Auto Basophil % : 0.8 %    08-20    138  |  100  |  14  ----------------------------<  136<H>  5.1   |  25  |  0.84    Ca    10.6<H>      20 Aug 2022 03:40    TPro  9.2<H>  /  Alb  5.0  /  TBili  0.4  /  DBili  x   /  AST  84<H>  /  ALT  19  /  AlkPhos  92  08-20      proBNP: Serum Pro-Brain Natriuretic Peptide: 305 pg/mL (08-20 @ 03:40)    Lipid Profile:   HgA1c:   TSH:       CARDIAC MARKERS:            TELEMETRY: 	    ECG:  	  RADIOLOGY:  OTHER: 	    PREVIOUS DIAGNOSTIC TESTING:    [ ] Echocardiogram:  [ ] Catheterization:  [ ] Stress Test:  	  	       HISTORY OF PRESENT ILLNESS:  This is an 86 year old woman with past medical history of CAD WAYNE X 6 (2019 [facility]), HTN, HLD, T2DM presents to ED with chest pain.  Cardiology called on consult for chest pain.  Patient reports developing midsternal chest pressure that is non radiating at 9:30pm while at rest and was relieved after she received aspirin in the ED.  She reports this is similar to chest pain she experienced 3 years ago.  She reports she sees Dr. Gladys Valdivia and last saw her 3 months ago.  She reports she has had previous echocardiogram but is unaware of the results.  On assessment patient is SB 50s ECG shows T wave depressions in lateral leads.  /56, SATing 96% on 2L NC Lung sounds clear in all fields, no JVD, no pedal edema.  Troponins 1191 pBNP 304.  Patient received Aspirin load, Brilinta Load and Heparin.  Patient denies fever, chills, chest pain, SOB, Abdominal pain, N/V/D.    Allergies    No Known Allergies    Intolerances    	    MEDICATIONS:  heparin   Injectable 3800 Unit(s) IV Push every 6 hours PRN  heparin  Infusion.  Unit(s)/Hr IV Continuous <Continuous>                  PAST MEDICAL & SURGICAL HISTORY:  CAD (coronary artery disease)  s/p stent x 6      HTN (hypertension)      DM (diabetes mellitus)      S/P primary angioplasty with coronary stent          FAMILY HISTORY:      SOCIAL HISTORY:    [ ] Non-smoker  [ ] Smoker  [ ] Alcohol    REVIEW OF SYSTEMS:  See HPI, otherwise complete 10 point review of systems negative    PHYSICAL EXAM:  T(C): 36.9 (08-20-22 @ 04:38), Max: 36.9 (08-20-22 @ 04:38)  HR: 56 (08-20-22 @ 04:38) (56 - 98)  BP: 123/60 (08-20-22 @ 04:38) (123/60 - 136/65)  RR: 16 (08-20-22 @ 04:38) (16 - 18)  SpO2: 100% (08-20-22 @ 04:38) (98% - 100%)  Wt(kg): --  I&O's Summary      Appearance: No Acute Distress	  HEENT:  Normal oral mucosa, PERRL, EOMI	  Cardiovascular: Normal S1 S2, No JVD, No murmurs/rubs/gallops  Respiratory: Lungs clear to auscultation bilaterally  Gastrointestinal:  Soft, Non-tender, + BS	  Skin: No rashes, No ecchymoses, No cyanosis	  Neurologic: Non-focal  Extremities: No clubbing, cyanosis or edema  Vascular: Peripheral pulses palpable 2+ bilaterally  Psychiatry: A & O x 3, Mood & affect appropriate    LABS:	 	    CBC Full  -  ( 20 Aug 2022 03:40 )  WBC Count : 11.16 K/uL  Hemoglobin : 13.0 g/dL  Hematocrit : 39.6 %  Platelet Count - Automated : 285 K/uL  Mean Cell Volume : 91.7 fL  Mean Cell Hemoglobin : 30.1 pg  Mean Cell Hemoglobin Concentration : 32.8 gm/dL  Auto Neutrophil # : 5.06 K/uL  Auto Lymphocyte # : 4.16 K/uL  Auto Monocyte # : 1.40 K/uL  Auto Eosinophil # : 0.39 K/uL  Auto Basophil # : 0.09 K/uL  Auto Neutrophil % : 45.4 %  Auto Lymphocyte % : 37.3 %  Auto Monocyte % : 12.5 %  Auto Eosinophil % : 3.5 %  Auto Basophil % : 0.8 %    08-20    138  |  100  |  14  ----------------------------<  136<H>  5.1   |  25  |  0.84    Ca    10.6<H>      20 Aug 2022 03:40    TPro  9.2<H>  /  Alb  5.0  /  TBili  0.4  /  DBili  x   /  AST  84<H>  /  ALT  19  /  AlkPhos  92  08-20      proBNP: Serum Pro-Brain Natriuretic Peptide: 305 pg/mL (08-20 @ 03:40)    Lipid Profile:   HgA1c:   TSH:       CARDIAC MARKERS:            TELEMETRY: 	    ECG:  	  RADIOLOGY:  OTHER: 	    PREVIOUS DIAGNOSTIC TESTING:    [ ] Echocardiogram:  [ ] Catheterization:  [ ] Stress Test:  	  	       HISTORY OF PRESENT ILLNESS:  This is an 86 year old woman with past medical history of CAD WAYNE X 6 (2019 [facility]), HTN, HLD, T2DM presents to ED with chest pain.  Cardiology called on consult for chest pain.  Patient reports developing midsternal chest pressure that is non radiating at 9:30pm while at rest and was relieved after she received aspirin in the ED.  She reports this is similar to chest pain she experienced 3 years ago.  She reports she sees Dr. Gladys Valdivia and last saw her 3 months ago.  She reports she has had previous echocardiogram but is unaware of the results.  On assessment patient is SB 50s ECG shows T wave depressions in lateral leads.  /56, SATing 96% on 2L NC Lung sounds clear in all fields, no JVD, no pedal edema.  Troponins 1191 pBNP 304.  Patient received Aspirin load, Brilinta Load and Heparin.  Patient denies fever, chills, chest pain, SOB, Abdominal pain, N/V/D.    Allergies    No Known Allergies    Intolerances    	    MEDICATIONS:  heparin   Injectable 3800 Unit(s) IV Push every 6 hours PRN  heparin  Infusion.  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 No palpitations  GASTROINTESTINAL: No abdominal or epigastric pain. No nausea, vomiting.  GENITOURINARY: No dysuria, frequency or hematuria  NEUROLOGICAL: No numbness or weakness  SKIN: No itching, burning, rashes, or lesions   All other review of systems is negative unless indicated above.    PHYSICAL EXAM:  T(C): 36.9 (08-20-22 @ 04:38), Max: 36.9 (08-20-22 @ 04:38)  HR: 56 (08-20-22 @ 04:38) (56 - 98)  BP: 123/60 (08-20-22 @ 04:38) (123/60 - 136/65)  RR: 16 (08-20-22 @ 04:38) (16 - 18)  SpO2: 100% (08-20-22 @ 04:38) (98% - 100%)  Wt(kg): --  I&O's Summary      Appearance: No Acute Distress	  HEENT:  Normal oral mucosa, PERRL, EOMI	  Cardiovascular: Bradycardia, No JVD, No murmurs/rubs/gallops  Respiratory: Lungs clear to auscultation bilaterally  Gastrointestinal:  Soft, Non-tender, + BS	  Skin: No rashes, No ecchymoses, No cyanosis	  Neurologic: Non-focal  Extremities: No clubbing, cyanosis or edema  Vascular: Peripheral pulses palpable 2+ bilaterally  Psychiatry: A & O x 3, Mood & affect appropriate    LABS:	 	    CBC Full  -  ( 20 Aug 2022 03:40 )  WBC Count : 11.16 K/uL  Hemoglobin : 13.0 g/dL  Hematocrit : 39.6 %  Platelet Count - Automated : 285 K/uL  Mean Cell Volume : 91.7 fL  Mean Cell Hemoglobin : 30.1 pg  Mean Cell Hemoglobin Concentration : 32.8 gm/dL  Auto Neutrophil # : 5.06 K/uL  Auto Lymphocyte # : 4.16 K/uL  Auto Monocyte # : 1.40 K/uL  Auto Eosinophil # : 0.39 K/uL  Auto Basophil # : 0.09 K/uL  Auto Neutrophil % : 45.4 %  Auto Lymphocyte % : 37.3 %  Auto Monocyte % : 12.5 %  Auto Eosinophil % : 3.5 %  Auto Basophil % : 0.8 %    08-20    138  |  100  |  14  ----------------------------<  136<H>  5.1   |  25  |  0.84    Ca    10.6<H>      20 Aug 2022 03:40    TPro  9.2<H>  /  Alb  5.0  /  TBili  0.4  /  DBili  x   /  AST  84<H>  /  ALT  19  /  AlkPhos  92  08-20      proBNP: Serum Pro-Brain Natriuretic Peptide: 305 pg/mL (08-20 @ 03:40)    Lipid Profile:   HgA1c:   TSH:       CARDIAC MARKERS:            TELEMETRY: 	  SB  ECG:  	SR 66bpm St depression lateral leads  RADIOLOGY:  OTHER:

## 2022-08-20 NOTE — H&P ADULT - PROBLEM SELECTOR PLAN 2
Patient with PMH of CAD s/p 6 stents in 11/2019 at [facility]  Cardiac enzymes Trop 1911,   TTE - ordered  Continue with Aspirin 81 mg  Continue with atorvastatin 80 mg  check lipid profile.

## 2022-08-20 NOTE — ED PROVIDER NOTE - CLINICAL SUMMARY MEDICAL DECISION MAKING FREE TEXT BOX
87 y/o F, PMH of HLD, HTN, DM2, CHF, and CAD s/p stents, presents to ED c/o chest pain 1hr pta.  EKG is non-ischemic.   Plan to r/o ACS. Will check basic labs, trops, bnp, and CXR. Pt is moderate/high risk. Will likely need CDU vs. Admission for stress. Reassess.

## 2022-08-21 ENCOUNTER — TRANSCRIPTION ENCOUNTER (OUTPATIENT)
Age: 87
End: 2022-08-21

## 2022-08-21 LAB
A1C WITH ESTIMATED AVERAGE GLUCOSE RESULT: 6.9 % — HIGH (ref 4–5.6)
ALBUMIN SERPL ELPH-MCNC: 4.7 G/DL — SIGNIFICANT CHANGE UP (ref 3.3–5)
ALP SERPL-CCNC: 90 U/L — SIGNIFICANT CHANGE UP (ref 40–120)
ALT FLD-CCNC: 17 U/L — SIGNIFICANT CHANGE UP (ref 4–33)
ANION GAP SERPL CALC-SCNC: 15 MMOL/L — HIGH (ref 7–14)
APTT BLD: 51.3 SEC — HIGH (ref 27–36.3)
APTT BLD: 58.1 SEC — HIGH (ref 27–36.3)
APTT BLD: 58.3 SEC — HIGH (ref 27–36.3)
AST SERPL-CCNC: 39 U/L — HIGH (ref 4–32)
BILIRUB SERPL-MCNC: 0.5 MG/DL — SIGNIFICANT CHANGE UP (ref 0.2–1.2)
BUN SERPL-MCNC: 16 MG/DL — SIGNIFICANT CHANGE UP (ref 7–23)
CALCIUM SERPL-MCNC: 9.8 MG/DL — SIGNIFICANT CHANGE UP (ref 8.4–10.5)
CHLORIDE SERPL-SCNC: 102 MMOL/L — SIGNIFICANT CHANGE UP (ref 98–107)
CHOLEST SERPL-MCNC: 141 MG/DL — SIGNIFICANT CHANGE UP
CK SERPL-CCNC: 156 U/L — SIGNIFICANT CHANGE UP (ref 25–170)
CO2 SERPL-SCNC: 22 MMOL/L — SIGNIFICANT CHANGE UP (ref 22–31)
CREAT SERPL-MCNC: 0.79 MG/DL — SIGNIFICANT CHANGE UP (ref 0.5–1.3)
EGFR: 74 ML/MIN/1.73M2 — SIGNIFICANT CHANGE UP
ESTIMATED AVERAGE GLUCOSE: 151 — SIGNIFICANT CHANGE UP
GLUCOSE SERPL-MCNC: 135 MG/DL — HIGH (ref 70–99)
HCT VFR BLD CALC: 36.8 % — SIGNIFICANT CHANGE UP (ref 34.5–45)
HDLC SERPL-MCNC: 36 MG/DL — LOW
HGB BLD-MCNC: 12.3 G/DL — SIGNIFICANT CHANGE UP (ref 11.5–15.5)
LIPID PNL WITH DIRECT LDL SERPL: 41 MG/DL — SIGNIFICANT CHANGE UP
MAGNESIUM SERPL-MCNC: 2.2 MG/DL — SIGNIFICANT CHANGE UP (ref 1.6–2.6)
MCHC RBC-ENTMCNC: 29.9 PG — SIGNIFICANT CHANGE UP (ref 27–34)
MCHC RBC-ENTMCNC: 33.4 GM/DL — SIGNIFICANT CHANGE UP (ref 32–36)
MCV RBC AUTO: 89.5 FL — SIGNIFICANT CHANGE UP (ref 80–100)
NON HDL CHOLESTEROL: 105 MG/DL — SIGNIFICANT CHANGE UP
NRBC # BLD: 0 /100 WBCS — SIGNIFICANT CHANGE UP (ref 0–0)
NRBC # FLD: 0 K/UL — SIGNIFICANT CHANGE UP (ref 0–0)
PHOSPHATE SERPL-MCNC: 3.6 MG/DL — SIGNIFICANT CHANGE UP (ref 2.5–4.5)
PLATELET # BLD AUTO: 282 K/UL — SIGNIFICANT CHANGE UP (ref 150–400)
POTASSIUM SERPL-MCNC: 3.5 MMOL/L — SIGNIFICANT CHANGE UP (ref 3.5–5.3)
POTASSIUM SERPL-SCNC: 3.5 MMOL/L — SIGNIFICANT CHANGE UP (ref 3.5–5.3)
PROT SERPL-MCNC: 8.4 G/DL — HIGH (ref 6–8.3)
RBC # BLD: 4.11 M/UL — SIGNIFICANT CHANGE UP (ref 3.8–5.2)
RBC # FLD: 13 % — SIGNIFICANT CHANGE UP (ref 10.3–14.5)
SODIUM SERPL-SCNC: 139 MMOL/L — SIGNIFICANT CHANGE UP (ref 135–145)
TRIGL SERPL-MCNC: 318 MG/DL — HIGH
TROPONIN T, HIGH SENSITIVITY RESULT: 500 NG/L — CRITICAL HIGH
TSH SERPL-MCNC: 13.11 UIU/ML — HIGH (ref 0.27–4.2)
WBC # BLD: 9.89 K/UL — SIGNIFICANT CHANGE UP (ref 3.8–10.5)
WBC # FLD AUTO: 9.89 K/UL — SIGNIFICANT CHANGE UP (ref 3.8–10.5)

## 2022-08-21 PROCEDURE — 99233 SBSQ HOSP IP/OBS HIGH 50: CPT

## 2022-08-21 RX ORDER — TICAGRELOR 90 MG/1
1 TABLET ORAL
Qty: 60 | Refills: 0
Start: 2022-08-21 | End: 2022-09-19

## 2022-08-21 RX ADMIN — Medication 10 MILLIGRAM(S): at 07:55

## 2022-08-21 RX ADMIN — TICAGRELOR 90 MILLIGRAM(S): 90 TABLET ORAL at 17:11

## 2022-08-21 RX ADMIN — AMLODIPINE BESYLATE 10 MILLIGRAM(S): 2.5 TABLET ORAL at 07:55

## 2022-08-21 RX ADMIN — ISOSORBIDE MONONITRATE 30 MILLIGRAM(S): 60 TABLET, EXTENDED RELEASE ORAL at 11:19

## 2022-08-21 RX ADMIN — Medication 1: at 09:34

## 2022-08-21 RX ADMIN — ATORVASTATIN CALCIUM 80 MILLIGRAM(S): 80 TABLET, FILM COATED ORAL at 22:06

## 2022-08-21 RX ADMIN — LOSARTAN POTASSIUM 100 MILLIGRAM(S): 100 TABLET, FILM COATED ORAL at 07:56

## 2022-08-21 RX ADMIN — TICAGRELOR 90 MILLIGRAM(S): 90 TABLET ORAL at 07:55

## 2022-08-21 RX ADMIN — Medication 81 MILLIGRAM(S): at 11:19

## 2022-08-21 RX ADMIN — HEPARIN SODIUM 650 UNIT(S)/HR: 5000 INJECTION INTRAVENOUS; SUBCUTANEOUS at 10:01

## 2022-08-21 RX ADMIN — HEPARIN SODIUM 650 UNIT(S)/HR: 5000 INJECTION INTRAVENOUS; SUBCUTANEOUS at 20:31

## 2022-08-21 RX ADMIN — HEPARIN SODIUM 650 UNIT(S)/HR: 5000 INJECTION INTRAVENOUS; SUBCUTANEOUS at 08:19

## 2022-08-21 RX ADMIN — ATENOLOL 100 MILLIGRAM(S): 25 TABLET ORAL at 07:56

## 2022-08-21 RX ADMIN — HEPARIN SODIUM 650 UNIT(S)/HR: 5000 INJECTION INTRAVENOUS; SUBCUTANEOUS at 16:41

## 2022-08-21 RX ADMIN — PANTOPRAZOLE SODIUM 40 MILLIGRAM(S): 20 TABLET, DELAYED RELEASE ORAL at 07:56

## 2022-08-21 RX ADMIN — HEPARIN SODIUM 650 UNIT(S)/HR: 5000 INJECTION INTRAVENOUS; SUBCUTANEOUS at 03:33

## 2022-08-21 RX ADMIN — Medication 3: at 17:13

## 2022-08-21 NOTE — DISCHARGE NOTE PROVIDER - CARE PROVIDER_API CALL
Dagoberto Preciado)  Cardiovascular Disease; Nuclear Cardiology  [phone] [place] [address]  Phone: [phone]  Fax: [phone]  Follow Up Time:

## 2022-08-21 NOTE — PATIENT PROFILE ADULT - FALL HARM RISK - HARM RISK INTERVENTIONS

## 2022-08-21 NOTE — DISCHARGE NOTE PROVIDER - HOSPITAL COURSE
85 y/o female with PMH of CAD s/p 6 stents in 11/2019 at [facility], HLD, HTN, DM2 and CHF presents to ED c/o chest pain. Patient admitted for management of NSTEMI.    NSTEMI (non-ST elevation myocardial infarction).    Patient with chest pain- Troponin 1911, , EKG with ST-T wave abnormalities found to have NSTEMI  Cardiology following and appreciate recommendations  Monitor on telemetry  C/w ASA, Brilinta, heparin drip, atorvastatin  [facility] 8/22: mLAD 30%, ostial Cx 95%, mid Cx 70%, distal Cx 100%  Ranexa 500mg PO BID started  Troponin now downtrending  TTE reviewed- normal LV systolic function  Okay to d/c home as chest pain was not reproducible with ambulation twice today, and 12-lead EKG showed no acute or ischemic changes     CAD (coronary artery disease).   Patient with PMH of CAD s/p 6 stents in 11/2019 at [facility]  Cardiac enzymes Trop 1911,   TTE reviewed  Continue with Aspirin, Brilinta, atorvastatin 80 mg, Imdur.     H/O CHF.    Patient with history of CHF on Torsemide from home.  No sign of CHF exacerbation - Pro   TTE with normal LV systolic function, grade 1 diastolic dysfunction  Continue w/ BB, losartan, torsemide  monitor daily weights, 1500 cc fluid restriction, sodium restriction.    DM (diabetes mellitus).    - holding home medications: Metformin 500 mg QD  - low insulin coverage sliding scale   - FS AC & HS  - Diet: Consistent Carb  - A1c 6.9%.    HTN (hypertension).   - DASH diet  - c/w home meds w/ holding parameters - Losartan, Amlodipine, Atenolol  - monitor BP & titrate BP meds PRN.    HLD (hyperlipidemia).    Continue with Atorvastatin 80 mg  DASH diet  Lipid panel reviewed.    Patient is medically cleared for discharge home as discussed with Dr. Davidson on 8/23/22  Reviewed discharge medications with patient; All new medications requiring new prescription sent to pharmacy of patients choice. Reviewed need for prescription for previous home medicaitons and new prescriptions sent if requested. Patient in agreement and understands.

## 2022-08-21 NOTE — PROGRESS NOTE ADULT - ASSESSMENT
86F PMH CAD WAYNE X 6 (2019), HTN , HLD, T2DM present with NSTEMI currently chest pain free    #NSTEMI   Troponin 1191-->500  Admit to telemetry  Heparin gtt ACS protocol- please ensure therapeutic PTT  s/p Aspirin 324 mg load, continue Aspirin 81mg daily  s/p Brilinta 180mg load, continue Brilinta 90mg BID  Lipitor 80mg   -continue home and losartan  In the event of chest pain STAT troponin, CK, CK-MB and ECG  CBC, BMP, Mag Phos, PT/PTT daily  TTE 8/20 -grossly normal LV systolic function  -plan for potential LHC tomorrow

## 2022-08-21 NOTE — DISCHARGE NOTE PROVIDER - NSDCACTIVITY_GEN_ALL_CORE
No heavy lifting/straining/Follow Instructions Provided by your Surgical Team No heavy lifting/straining

## 2022-08-21 NOTE — DISCHARGE NOTE PROVIDER - NSDCCPCAREPLAN_GEN_ALL_CORE_FT
PRINCIPAL DISCHARGE DIAGNOSIS  Diagnosis: NSTEMI (non-ST elevation myocardial infarction)  Assessment and Plan of Treatment: You presented to the hospital with chest pain, and you were seen by a cardiologist in the hospital. You were diagnosed with a type of heart attack. Please take all of your medications as prescribed, and follow up with your cardiologist in 1-2 weeks. Please return to the ER if you have chest pain, trouble breathing, palpitations.   No heavy lifting for one week. No strenuous activity for 3 weeks. Monitor site of procedure and notify your doctor for any redness, swelling, discharge/bleeding. No driving for 24 hours. You may shower but no baths or swimming for one week. If chest pain, shortness of breath, or dizziness please return to the emergency room.      SECONDARY DISCHARGE DIAGNOSES  Diagnosis: HTN (hypertension)  Assessment and Plan of Treatment: Continue blood pressure medication regimen as directed. Monitor for any visual changes, headaches or dizziness.  Monitor blood pressure regularly.  Follow up with your primary care provider for further management for high blood pressure.    Diagnosis: HLD (hyperlipidemia)  Assessment and Plan of Treatment: Continue prescribed medications to control your cholesterol levels and a DASH (Low fat/salt) diet. Follow up with your primary care provider upon discharge for further management and monitoring of cholesterol levels.    Diagnosis: CAD (coronary artery disease)  Assessment and Plan of Treatment: Please continue all medications as prescribed for your heart disease and follow up with your cardiologist

## 2022-08-21 NOTE — DISCHARGE NOTE PROVIDER - NSDCMRMEDTOKEN_GEN_ALL_CORE_FT
AMLODIPINE BESYLATE 10MG TABLETS: TAKE 1 TABLET BY MOUTH DAILY  ASPIRIN 81MG CHEWABLE TABLETS:   ATENOLOL 100MG TABLETS:   ATORVASTATIN 40MG TABLETS:   CLOPIDOGREL 75MG TABLETS:   FAMOTIDINE 40MG TABLETS:   ISOSORBIDE MONONITRATE 30MG ER TABS:   LOSARTAN 100MG TABLETS:   METFORMIN 500MG TABLETS:   OYSCO 500/D TABLETS: TAKE 1 TABLET BY MOUTH TWICE DAILY WITH MEALS  ticagrelor 90 mg oral tablet: 1 tab(s) orally 2 times a day  Price check only please  TORSEMIDE 10MG TABLETS: TAKE 1 TABLET BY MOUTH DAILY   AMLODIPINE BESYLATE 10MG TABLETS: TAKE 1 TABLET BY MOUTH DAILY  aspirin 81 mg oral delayed release tablet: 1 tab(s) orally once a day  atenolol 100 mg oral tablet: 1 tab(s) orally once a day  atorvastatin 80 mg oral tablet: 1 tab(s) orally once a day (at bedtime)  isosorbide mononitrate 30 mg oral tablet, extended release: 1 tab(s) orally once a day  LOSARTAN 100MG TABLETS:   METFORMIN 500MG TABLETS:   OYSCO 500/D TABLETS: TAKE 1 TABLET BY MOUTH TWICE DAILY WITH MEALS  pantoprazole 40 mg oral delayed release tablet: 1 tab(s) orally once a day (before a meal)  ranolazine 500 mg oral tablet, extended release: 1 tab(s) orally 2 times a day  ticagrelor 90 mg oral tablet: 1 tab(s) orally 2 times a day  Price check only please  TORSEMIDE 10MG TABLETS: TAKE 1 TABLET BY MOUTH DAILY   AMLODIPINE BESYLATE 10MG TABLETS: TAKE 1 TABLET BY MOUTH DAILY  aspirin 81 mg oral delayed release tablet: 1 tab(s) orally once a day  atenolol 100 mg oral tablet: 1 tab(s) orally once a day  atorvastatin 80 mg oral tablet: 1 tab(s) orally once a day (at bedtime)  isosorbide mononitrate 30 mg oral tablet, extended release: 1 tab(s) orally once a day  losartan 100 mg oral tablet: 1 tab(s) orally once a day  metFORMIN 500 mg oral tablet: 1 tab(s) orally 2 times a day  OYSCO 500/D TABLETS: TAKE 1 TABLET BY MOUTH TWICE DAILY WITH MEALS  pantoprazole 40 mg oral delayed release tablet: 1 tab(s) orally once a day (before a meal)  ranolazine 500 mg oral tablet, extended release: 1 tab(s) orally 2 times a day  TORSEMIDE 10MG TABLETS: TAKE 1 TABLET BY MOUTH DAILY

## 2022-08-21 NOTE — DISCHARGE NOTE PROVIDER - NSDCFUADDINST_GEN_ALL_CORE_FT
No heavy lifting for one week. No strenuous activity for 3 weeks. Monitor site of procedure and notify your doctor for any redness, swelling, discharge/bleeding. No driving for 24 hours. You may shower but no baths or swimming for one week. If chest pain, shortness of breath, or dizziness please return to the emergency room.

## 2022-08-22 LAB
ANION GAP SERPL CALC-SCNC: 10 MMOL/L — SIGNIFICANT CHANGE UP (ref 7–14)
APTT BLD: 56.7 SEC — HIGH (ref 27–36.3)
BLD GP AB SCN SERPL QL: NEGATIVE — SIGNIFICANT CHANGE UP
BUN SERPL-MCNC: 18 MG/DL — SIGNIFICANT CHANGE UP (ref 7–23)
CALCIUM SERPL-MCNC: 9.5 MG/DL — SIGNIFICANT CHANGE UP (ref 8.4–10.5)
CHLORIDE SERPL-SCNC: 105 MMOL/L — SIGNIFICANT CHANGE UP (ref 98–107)
CO2 SERPL-SCNC: 24 MMOL/L — SIGNIFICANT CHANGE UP (ref 22–31)
CREAT SERPL-MCNC: 0.83 MG/DL — SIGNIFICANT CHANGE UP (ref 0.5–1.3)
EGFR: 69 ML/MIN/1.73M2 — SIGNIFICANT CHANGE UP
GLUCOSE BLDC GLUCOMTR-MCNC: 116 MG/DL — HIGH (ref 70–99)
GLUCOSE BLDC GLUCOMTR-MCNC: 144 MG/DL — HIGH (ref 70–99)
GLUCOSE BLDC GLUCOMTR-MCNC: 177 MG/DL — HIGH (ref 70–99)
GLUCOSE SERPL-MCNC: 156 MG/DL — HIGH (ref 70–99)
HCT VFR BLD CALC: 36.4 % — SIGNIFICANT CHANGE UP (ref 34.5–45)
HGB BLD-MCNC: 11.9 G/DL — SIGNIFICANT CHANGE UP (ref 11.5–15.5)
INR BLD: 1.03 RATIO — SIGNIFICANT CHANGE UP (ref 0.88–1.16)
MAGNESIUM SERPL-MCNC: 2.3 MG/DL — SIGNIFICANT CHANGE UP (ref 1.6–2.6)
MCHC RBC-ENTMCNC: 29.6 PG — SIGNIFICANT CHANGE UP (ref 27–34)
MCHC RBC-ENTMCNC: 32.7 GM/DL — SIGNIFICANT CHANGE UP (ref 32–36)
MCV RBC AUTO: 90.5 FL — SIGNIFICANT CHANGE UP (ref 80–100)
NRBC # BLD: 0 /100 WBCS — SIGNIFICANT CHANGE UP (ref 0–0)
NRBC # FLD: 0 K/UL — SIGNIFICANT CHANGE UP (ref 0–0)
PHOSPHATE SERPL-MCNC: 2.9 MG/DL — SIGNIFICANT CHANGE UP (ref 2.5–4.5)
PLATELET # BLD AUTO: 269 K/UL — SIGNIFICANT CHANGE UP (ref 150–400)
POTASSIUM SERPL-MCNC: 3.5 MMOL/L — SIGNIFICANT CHANGE UP (ref 3.5–5.3)
POTASSIUM SERPL-SCNC: 3.5 MMOL/L — SIGNIFICANT CHANGE UP (ref 3.5–5.3)
PROTHROM AB SERPL-ACNC: 11.9 SEC — SIGNIFICANT CHANGE UP (ref 10.5–13.4)
RBC # BLD: 4.02 M/UL — SIGNIFICANT CHANGE UP (ref 3.8–5.2)
RBC # FLD: 13.3 % — SIGNIFICANT CHANGE UP (ref 10.3–14.5)
RH IG SCN BLD-IMP: POSITIVE — SIGNIFICANT CHANGE UP
SODIUM SERPL-SCNC: 139 MMOL/L — SIGNIFICANT CHANGE UP (ref 135–145)
WBC # BLD: 10.21 K/UL — SIGNIFICANT CHANGE UP (ref 3.8–10.5)
WBC # FLD AUTO: 10.21 K/UL — SIGNIFICANT CHANGE UP (ref 3.8–10.5)

## 2022-08-22 PROCEDURE — 99233 SBSQ HOSP IP/OBS HIGH 50: CPT

## 2022-08-22 PROCEDURE — 93458 L HRT ARTERY/VENTRICLE ANGIO: CPT | Mod: 26

## 2022-08-22 RX ORDER — RANOLAZINE 500 MG/1
500 TABLET, FILM COATED, EXTENDED RELEASE ORAL
Refills: 0 | Status: DISCONTINUED | OUTPATIENT
Start: 2022-08-22 | End: 2022-08-23

## 2022-08-22 RX ORDER — RANOLAZINE 500 MG/1
500 TABLET, FILM COATED, EXTENDED RELEASE ORAL
Refills: 0 | Status: DISCONTINUED | OUTPATIENT
Start: 2022-08-22 | End: 2022-08-22

## 2022-08-22 RX ADMIN — TICAGRELOR 90 MILLIGRAM(S): 90 TABLET ORAL at 18:00

## 2022-08-22 RX ADMIN — ATENOLOL 100 MILLIGRAM(S): 25 TABLET ORAL at 05:45

## 2022-08-22 RX ADMIN — Medication 10 MILLIGRAM(S): at 05:45

## 2022-08-22 RX ADMIN — HEPARIN SODIUM 650 UNIT(S)/HR: 5000 INJECTION INTRAVENOUS; SUBCUTANEOUS at 08:22

## 2022-08-22 RX ADMIN — LOSARTAN POTASSIUM 100 MILLIGRAM(S): 100 TABLET, FILM COATED ORAL at 05:44

## 2022-08-22 RX ADMIN — ATORVASTATIN CALCIUM 80 MILLIGRAM(S): 80 TABLET, FILM COATED ORAL at 21:29

## 2022-08-22 RX ADMIN — AMLODIPINE BESYLATE 10 MILLIGRAM(S): 2.5 TABLET ORAL at 05:44

## 2022-08-22 RX ADMIN — TICAGRELOR 90 MILLIGRAM(S): 90 TABLET ORAL at 05:44

## 2022-08-22 RX ADMIN — PANTOPRAZOLE SODIUM 40 MILLIGRAM(S): 20 TABLET, DELAYED RELEASE ORAL at 05:44

## 2022-08-22 NOTE — PROGRESS NOTE ADULT - ASSESSMENT
85 y/o female with PMH of CAD s/p 6 stents in 11/2019 at [facility], HLD, HTN, DM2 and CHF presents to ED c/o chest pain. Patient admitted for management of NSTEMI.

## 2022-08-22 NOTE — PROGRESS NOTE ADULT - ASSESSMENT
86F PMH CAD WAYNE X 6 (2019), HTN , HLD, T2DM present with NSTEMI currently chest pain free  TTE grossly normal  Patient s/p [facility] today, with known stents in cirucmlex, ostial. Plan to medically manage with antianginals; if symptoms with exertion, would be more aggressive and would need complex PCI @ [facility]     #NSTEMI   Troponin 1191-->500  TTE 8/20 -grossly normal LV systolic function  Heparin gtt ACS protocol for 48h until tomorrow   s/p Aspirin 324 mg load, continue Aspirin 81mg daily  s/p Brilinta 180mg load, continue Brilinta 90mg BID  Lipitor 80mg   Start ranexa 500mg bid; Ambulate tomorrow to see if chest pain is reproducible on exertion. If so, would consider transfer to [facility] for consideration complex PCI   -continue home and losartan   86F PMH CAD WAYNE X 6 (2019), HTN , HLD, T2DM present with NSTEMI currently chest pain free  TTE grossly normal  Patient s/p [facility] today, with known stents in cirucmlex, ostial. Plan to medically manage with antianginals; if symptoms with exertion, would be more aggressive and would need complex PCI @ [facility]     #NSTEMI   Troponin 1191-->500  TTE 8/20 -grossly normal LV systolic function  Heparin gtt ACS protocol for 48h until tomorrow   s/p Aspirin 324 mg load, continue Aspirin 81mg daily  s/p Brilinta 180mg load, continue Brilinta 90mg BID  Lipitor 80mg   Start ranexa 500mg bid; Ambulate tomorrow to see if chest pain is reproducible on exertion. If so, would consider transfer to [facility] for consideration complex PCI   -continue home and losartan    This patient was seen and examined personally by me and the plan was discussed with the fellow and/or resident above. Amendments were made as necessary to the above. Agree with the excellent note and plan above. Trending angina symptoms on med mgmt.    Harjeet Bush MD, MPhil, [facility]  Cardiologist, [facility]  ; [facility]  Email: [email or web address] on-service contact/call information, go to [email or web address] and use "[employer]" to login.  Outpatient Cardiology appointments, call [phone] to arrange with a colleague; I do not have outpatient Cardiology clinic.

## 2022-08-22 NOTE — CHART NOTE - NSCHARTNOTEFT_GEN_A_CORE
Nutrition Consult X BMI < 19    Pt 87 yo female with PMHx of CAD s/p 6 stents in 11/2019 at [facility], HLD, HTN, DM2 and CHF presented with chest pain - per chart review. Of note, Pt had similar chest pain 3 years ago and underwent cardiac cath and received 6 stents.  RDN attempted to assess, but Pt out of the unit at time of visit. Pt went down for Cardiac cath, per nurse. Pt NPO past might night reported.   Will reattempt to visit Pt at a later time as able. RDN remains available.

## 2022-08-22 NOTE — CHART NOTE - NSCHARTNOTEFT_GEN_A_CORE
[id number] 86[facility]    Patient status post cath via R groin. Dressing clean, dry, intact. Pulses present. Without hematoma. Patient resting comfortable in nad. Will continue to monitor.

## 2022-08-23 ENCOUNTER — TRANSCRIPTION ENCOUNTER (OUTPATIENT)
Age: 87
End: 2022-08-23

## 2022-08-23 VITALS
RESPIRATION RATE: 18 BRPM | DIASTOLIC BLOOD PRESSURE: 63 MMHG | HEART RATE: 78 BPM | OXYGEN SATURATION: 99 % | TEMPERATURE: 98 F | SYSTOLIC BLOOD PRESSURE: 106 MMHG

## 2022-08-23 LAB
ANION GAP SERPL CALC-SCNC: 14 MMOL/L — SIGNIFICANT CHANGE UP (ref 7–14)
BASOPHILS # BLD AUTO: 0.06 K/UL — SIGNIFICANT CHANGE UP (ref 0–0.2)
BASOPHILS NFR BLD AUTO: 0.6 % — SIGNIFICANT CHANGE UP (ref 0–2)
BUN SERPL-MCNC: 17 MG/DL — SIGNIFICANT CHANGE UP (ref 7–23)
CALCIUM SERPL-MCNC: 9.5 MG/DL — SIGNIFICANT CHANGE UP (ref 8.4–10.5)
CHLORIDE SERPL-SCNC: 107 MMOL/L — SIGNIFICANT CHANGE UP (ref 98–107)
CO2 SERPL-SCNC: 21 MMOL/L — LOW (ref 22–31)
CREAT SERPL-MCNC: 0.89 MG/DL — SIGNIFICANT CHANGE UP (ref 0.5–1.3)
EGFR: 64 ML/MIN/1.73M2 — SIGNIFICANT CHANGE UP
EOSINOPHIL # BLD AUTO: 0.32 K/UL — SIGNIFICANT CHANGE UP (ref 0–0.5)
EOSINOPHIL NFR BLD AUTO: 3.2 % — SIGNIFICANT CHANGE UP (ref 0–6)
GLUCOSE BLDC GLUCOMTR-MCNC: 123 MG/DL — HIGH (ref 70–99)
GLUCOSE BLDC GLUCOMTR-MCNC: 225 MG/DL — HIGH (ref 70–99)
GLUCOSE SERPL-MCNC: 121 MG/DL — HIGH (ref 70–99)
HCT VFR BLD CALC: 36.8 % — SIGNIFICANT CHANGE UP (ref 34.5–45)
HGB BLD-MCNC: 12.1 G/DL — SIGNIFICANT CHANGE UP (ref 11.5–15.5)
IANC: 6.01 K/UL — SIGNIFICANT CHANGE UP (ref 1.8–7.4)
IMM GRANULOCYTES NFR BLD AUTO: 0.6 % — SIGNIFICANT CHANGE UP (ref 0–1.5)
LYMPHOCYTES # BLD AUTO: 2.24 K/UL — SIGNIFICANT CHANGE UP (ref 1–3.3)
LYMPHOCYTES # BLD AUTO: 22.3 % — SIGNIFICANT CHANGE UP (ref 13–44)
MAGNESIUM SERPL-MCNC: 2.2 MG/DL — SIGNIFICANT CHANGE UP (ref 1.6–2.6)
MCHC RBC-ENTMCNC: 30.3 PG — SIGNIFICANT CHANGE UP (ref 27–34)
MCHC RBC-ENTMCNC: 32.9 GM/DL — SIGNIFICANT CHANGE UP (ref 32–36)
MCV RBC AUTO: 92.2 FL — SIGNIFICANT CHANGE UP (ref 80–100)
MONOCYTES # BLD AUTO: 1.35 K/UL — HIGH (ref 0–0.9)
MONOCYTES NFR BLD AUTO: 13.4 % — SIGNIFICANT CHANGE UP (ref 2–14)
NEUTROPHILS # BLD AUTO: 6.01 K/UL — SIGNIFICANT CHANGE UP (ref 1.8–7.4)
NEUTROPHILS NFR BLD AUTO: 59.9 % — SIGNIFICANT CHANGE UP (ref 43–77)
NRBC # BLD: 0 /100 WBCS — SIGNIFICANT CHANGE UP (ref 0–0)
NRBC # FLD: 0 K/UL — SIGNIFICANT CHANGE UP (ref 0–0)
PHOSPHATE SERPL-MCNC: 3.2 MG/DL — SIGNIFICANT CHANGE UP (ref 2.5–4.5)
PLATELET # BLD AUTO: 250 K/UL — SIGNIFICANT CHANGE UP (ref 150–400)
POTASSIUM SERPL-MCNC: 4 MMOL/L — SIGNIFICANT CHANGE UP (ref 3.5–5.3)
POTASSIUM SERPL-SCNC: 4 MMOL/L — SIGNIFICANT CHANGE UP (ref 3.5–5.3)
RBC # BLD: 3.99 M/UL — SIGNIFICANT CHANGE UP (ref 3.8–5.2)
RBC # FLD: 13.7 % — SIGNIFICANT CHANGE UP (ref 10.3–14.5)
SODIUM SERPL-SCNC: 142 MMOL/L — SIGNIFICANT CHANGE UP (ref 135–145)
WBC # BLD: 10.04 K/UL — SIGNIFICANT CHANGE UP (ref 3.8–10.5)
WBC # FLD AUTO: 10.04 K/UL — SIGNIFICANT CHANGE UP (ref 3.8–10.5)

## 2022-08-23 PROCEDURE — 99233 SBSQ HOSP IP/OBS HIGH 50: CPT

## 2022-08-23 PROCEDURE — 99239 HOSP IP/OBS DSCHRG MGMT >30: CPT

## 2022-08-23 RX ORDER — ATENOLOL 25 MG/1
1 TABLET ORAL
Qty: 0 | Refills: 0 | DISCHARGE
Start: 2022-08-23

## 2022-08-23 RX ORDER — PANTOPRAZOLE SODIUM 20 MG/1
1 TABLET, DELAYED RELEASE ORAL
Qty: 30 | Refills: 0
Start: 2022-08-23 | End: 2022-09-21

## 2022-08-23 RX ORDER — METFORMIN HYDROCHLORIDE 850 MG/1
0 TABLET ORAL
Qty: 0 | Refills: 1 | DISCHARGE

## 2022-08-23 RX ORDER — ATORVASTATIN CALCIUM 80 MG/1
1 TABLET, FILM COATED ORAL
Qty: 30 | Refills: 0
Start: 2022-08-23 | End: 2022-09-21

## 2022-08-23 RX ORDER — ATENOLOL 25 MG/1
0 TABLET ORAL
Qty: 0 | Refills: 1 | DISCHARGE

## 2022-08-23 RX ORDER — ASPIRIN/CALCIUM CARB/MAGNESIUM 324 MG
1 TABLET ORAL
Qty: 30 | Refills: 0
Start: 2022-08-23 | End: 2022-09-21

## 2022-08-23 RX ORDER — CLOPIDOGREL BISULFATE 75 MG/1
0 TABLET, FILM COATED ORAL
Qty: 0 | Refills: 0 | DISCHARGE

## 2022-08-23 RX ORDER — ASPIRIN/CALCIUM CARB/MAGNESIUM 324 MG
0 TABLET ORAL
Qty: 0 | Refills: 0 | DISCHARGE

## 2022-08-23 RX ORDER — LOSARTAN POTASSIUM 100 MG/1
0 TABLET, FILM COATED ORAL
Qty: 0 | Refills: 0 | DISCHARGE

## 2022-08-23 RX ORDER — FAMOTIDINE 10 MG/ML
0 INJECTION INTRAVENOUS
Qty: 0 | Refills: 1 | DISCHARGE

## 2022-08-23 RX ORDER — TICAGRELOR 90 MG/1
1 TABLET ORAL
Qty: 60 | Refills: 0
Start: 2022-08-23 | End: 2022-09-21

## 2022-08-23 RX ORDER — RANOLAZINE 500 MG/1
1 TABLET, FILM COATED, EXTENDED RELEASE ORAL
Qty: 60 | Refills: 0
Start: 2022-08-23 | End: 2022-09-21

## 2022-08-23 RX ORDER — ISOSORBIDE MONONITRATE 60 MG/1
0 TABLET, EXTENDED RELEASE ORAL
Qty: 0 | Refills: 0 | DISCHARGE

## 2022-08-23 RX ORDER — ISOSORBIDE MONONITRATE 60 MG/1
1 TABLET, EXTENDED RELEASE ORAL
Qty: 0 | Refills: 0 | DISCHARGE
Start: 2022-08-23

## 2022-08-23 RX ORDER — ATORVASTATIN CALCIUM 80 MG/1
0 TABLET, FILM COATED ORAL
Qty: 0 | Refills: 0 | DISCHARGE

## 2022-08-23 RX ADMIN — RANOLAZINE 500 MILLIGRAM(S): 500 TABLET, FILM COATED, EXTENDED RELEASE ORAL at 12:24

## 2022-08-23 RX ADMIN — AMLODIPINE BESYLATE 10 MILLIGRAM(S): 2.5 TABLET ORAL at 05:07

## 2022-08-23 RX ADMIN — Medication 2: at 09:23

## 2022-08-23 RX ADMIN — LOSARTAN POTASSIUM 100 MILLIGRAM(S): 100 TABLET, FILM COATED ORAL at 05:07

## 2022-08-23 RX ADMIN — Medication 10 MILLIGRAM(S): at 05:08

## 2022-08-23 RX ADMIN — PANTOPRAZOLE SODIUM 40 MILLIGRAM(S): 20 TABLET, DELAYED RELEASE ORAL at 05:08

## 2022-08-23 RX ADMIN — TICAGRELOR 90 MILLIGRAM(S): 90 TABLET ORAL at 17:03

## 2022-08-23 RX ADMIN — ATENOLOL 100 MILLIGRAM(S): 25 TABLET ORAL at 05:08

## 2022-08-23 RX ADMIN — RANOLAZINE 500 MILLIGRAM(S): 500 TABLET, FILM COATED, EXTENDED RELEASE ORAL at 01:11

## 2022-08-23 RX ADMIN — TICAGRELOR 90 MILLIGRAM(S): 90 TABLET ORAL at 05:08

## 2022-08-23 RX ADMIN — Medication 81 MILLIGRAM(S): at 12:24

## 2022-08-23 NOTE — DISCHARGE NOTE NURSING/CASE MANAGEMENT/SOCIAL WORK - NSDCPEFALRISK_GEN_ALL_CORE
For information on Fall & Injury Prevention, visit: [email or web address] OR  [email or web address] OR  https://www.cdc.gov/steadi/patient.html

## 2022-08-23 NOTE — PROGRESS NOTE ADULT - PROBLEM SELECTOR PLAN 2
Patient with PMH of CAD s/p 6 stents in 11/2019 at [facility]  Cardiac enzymes Trop 1911,   TTE reviewed  Continue with Aspirin, Brilinta, atorvastatin 80 mg, Imdur
Patient with PMH of CAD s/p 6 stents in 11/2019 at [facility]  Cardiac enzymes Trop 1911,   TTE - ordered  Continue with Aspirin 81 mg  Continue with atorvastatin 80 mg
Patient with PMH of CAD s/p 6 stents in 11/2019 at [facility]  Cardiac enzymes Trop 1911,   TTE reviewed  Continue with Aspirin, Brilinta, atorvastatin 80 mg, Imdur

## 2022-08-23 NOTE — PROGRESS NOTE ADULT - PROBLEM SELECTOR PLAN 5
- HTN  Stable  - DASH diet  - c/w home meds w/ holding parameters - Atenolol, Amlodipine  - monitor BP & titrate BP meds PRN.
- HTN  Stable  - DASH diet  - c/w home meds w/ holding parameters - Losartan, Amlodipine, Atenolol  - monitor BP & titrate BP meds PRN.
- DASH diet  - c/w home meds w/ holding parameters - Losartan, Amlodipine, Atenolol  - monitor BP & titrate BP meds PRN

## 2022-08-23 NOTE — PROGRESS NOTE ADULT - ASSESSMENT
86F PMH CAD WAYNE X 6 (2019), HTN , HLD, T2DM present with NSTEMI currently chest pain free  TTE grossly normal  Patient s/p LHC today, patent LM, LAD stents, Lcx ostia 90% occlusion and midstent 70%. RCA patent. Plan to medically manage with antianginals; if symptoms with exertion, would be more aggressive and would need complex PCI @ [facility]     #NSTEMI   Troponin 1191-->500  TTE 8/20 -grossly normal LV systolic function  Heparin gtt ACS protocol x48h, can be discontinued today   s/p Aspirin 324 mg load, continue Aspirin 81mg daily  s/p Brilinta 180mg load, continue Brilinta 90mg BID  Lipitor 80mg   Start ranexa 500mg bid; Ambulate today to see if chest pain is reproducible on exertion. If so, would consider transfer to [facility] for consideration complex PCI   -continue home and losartan   86F PMH CAD WAYNE X 6 (2019), HTN , HLD, T2DM present with NSTEMI currently chest pain free  TTE grossly normal  Patient s/p C today, patent LM, LAD stents, Lcx ostia 90% occlusion and midstent 70%. RCA patent. Plan to medically manage with antianginals; if symptoms with exertion, would be more aggressive and would need complex PCI @ [facility]     #NSTEMI   Troponin 1191-->500  TTE 8/20 -grossly normal LV systolic function  Heparin gtt ACS protocol x48h, can be discontinued today   s/p Aspirin 324 mg load, continue Aspirin 81mg daily  s/p Brilinta 180mg load, continue Brilinta 90mg BID  Lipitor 80mg   Start ranexa 500mg bid; Ambulate today to see if chest pain is reproducible on exertion. If so, would consider transfer to [facility] for consideration complex PCI   -continue home and losartan    This patient was seen and examined personally by me and the plan was discussed with the fellow and/or resident above. Amendments were made as necessary to the above. Agree with the excellent note and plan above.     Harjeet Bush MD, MPhil, [facility]  Cardiologist, [facility]  ; [facility] [place]  Email: [email or web address] Surgery on-service contact/call information, go to [email or web address] and use "[employer]" to login.  Outpatient Cardiology appointments, call [phone] to arrange with a colleague; I do not have outpatient Cardiology clinic.

## 2022-08-23 NOTE — PROGRESS NOTE ADULT - PROBLEM SELECTOR PLAN 1
Patient with chest pain- Troponin 1911, , EKG with ST-T wave abnormalities found to have NSTEMI  Cardiology consulted and appreciate recommendations  Admit to telemetry, serial cardiac enzymes, serial EKGs.  NSTEMI ACS hep gtt, Brilinta load  Atorvastatin 80 mg QHS  NPOp MN for cardiac cath as per cardiology  Serial EKGs and cardiac enzymes  If EKG changes TWI/STD/GAY or continued chest pain call cardiology urgently  No caffeine  CXR, TTE, ordered  -cath planned for 8/22
Patient with chest pain- Troponin 1911, , EKG with ST-T wave abnormalities found to have NSTEMI  Cardiology following and appreciate recommendations  Monitor on telemetry  C/w ASA, Brilinta, heparin drip, atorvastatin  [facility] 8/22: mLAD 30%, ostial Cx 95%, mid Cx 70%, distal Cx 100%  Ranexa 500mg PO BID started  Troponin now downtrending  Repeat EKG this morning with no ischemic changes  TTE reviewed- normal LV systolic function  Okay to d/c home today if chest pain is not reproducible with walking this morning
Patient with chest pain- Troponin 1911, , EKG with ST-T wave abnormalities found to have NSTEMI  Cardiology consulted and appreciate recommendations  Monitor on telemetry  C/w ASA, Brilinta, heparin drip, atorvastatin  NPO for cardiac cath today  Troponin now downtrending  If EKG changes TWI/STD/GAY or continued chest pain call cardiology urgently  TTE reviewed- normal LV sytolic function

## 2022-08-23 NOTE — PROGRESS NOTE ADULT - PROBLEM SELECTOR PLAN 7
DVT prophylaxis - Patient on Heparin gtt for NSTEMI
DVT prophylaxis - Patient on Heparin gtt for NSTEMI
DVT prophylaxis: Heparin drip d/alexa  D/c home today

## 2022-08-23 NOTE — PROGRESS NOTE ADULT - SUBJECTIVE AND OBJECTIVE BOX
Brooks Nunez MD  Cardiology Fellow  [phone]  All Cardiology service information can be found 24/7 on [email or web address], password: [email or web address]    Patient seen and examined at bedside.  s/p LHC yesterday, R groin access  Patient with patent LM, LAD stents, Lcx ostia 90% occlusion and midstent 70%. RCA patent  NSR overnight  Plan to trend anginal sx on medical management, and if refractory symptoms would refer to [facility] for complex PCI  Interview conducted in [facility] using phone . I tried to inform patient of the above, and to examine groin site for hematoma/complications, but she deferred until her son gets in later this morning    Review Of Systems: No chest pain, shortness of breath, or palpitations            Current Meds:  amLODIPine   Tablet 10 milliGRAM(s) Oral daily  aspirin enteric coated 81 milliGRAM(s) Oral daily  ATENolol  Tablet 100 milliGRAM(s) Oral daily  atorvastatin 80 milliGRAM(s) Oral at bedtime  dextrose 5%. 1000 milliLiter(s) IV Continuous <Continuous>  dextrose 5%. 1000 milliLiter(s) IV Continuous <Continuous>  dextrose 50% Injectable 25 Gram(s) IV Push once  dextrose 50% Injectable 12.5 Gram(s) IV Push once  dextrose 50% Injectable 25 Gram(s) IV Push once  dextrose Oral Gel 15 Gram(s) Oral once PRN  glucagon  Injectable 1 milliGRAM(s) IntraMuscular once  insulin lispro (ADMELOG) corrective regimen sliding scale   SubCutaneous three times a day before meals  insulin lispro (ADMELOG) corrective regimen sliding scale   SubCutaneous at bedtime  isosorbide   mononitrate ER Tablet (IMDUR) 30 milliGRAM(s) Oral daily  losartan 100 milliGRAM(s) Oral daily  pantoprazole    Tablet 40 milliGRAM(s) Oral before breakfast  ranolazine 500 milliGRAM(s) Oral two times a day  ticagrelor 90 milliGRAM(s) Oral two times a day  torsemide 10 milliGRAM(s) Oral daily      Vitals:  T(F): 98 (08-23), Max: 98.3 (08-22)  HR: 65 (08-23) (58 - 66)  BP: 116/60 (08-23) (116/60 - 126/63)  RR: 20 (08-23)  SpO2: 100% (08-23)  I&O's Summary      Physical Exam:  Appearance: No Acute Distress	  HEENT:  Normal oral mucosa, PERRL, EOMI	  Cardiovascular: Bradycardia, No JVD, No murmurs/rubs/gallops  Respiratory: Lungs clear to auscultation bilaterally  Gastrointestinal:  Soft, Non-tender, + BS	  Skin: No rashes, No ecchymoses, No cyanosis	  Neurologic: Non-focal  Extremities: No clubbing, cyanosis or edema  Vascular: Peripheral pulses palpable 2+ bilaterally  Psychiatry: A & O x 3, Mood & affect appropriate                          11.9   10.21 )-----------( 269      ( 22 Aug 2022 06:41 )             36.4     08-22    139  |  105  |  18  ----------------------------<  156<H>  3.5   |  24  |  0.83    Ca    9.5      22 Aug 2022 06:41  Phos  2.9     08-22  Mg     2.30     08-22      PT/INR - ( 22 Aug 2022 06:41 )   PT: 11.9 sec;   INR: 1.03 ratio         PTT - ( 22 Aug 2022 06:41 )  PTT:56.7 sec  CARDIAC MARKERS ( 21 Aug 2022 02:49 )  500 ng/L / x     / x     / 156 U/L / x     / x      CARDIAC MARKERS ( 20 Aug 2022 11:26 )  1370 ng/L / x     / x     / 276 U/L / x     / 19.0 ng/mL  CARDIAC MARKERS ( 20 Aug 2022 03:40 )  1911 ng/L / x     / x     / 507 U/L / x     / 31.9 ng/mL      Serum Pro-Brain Natriuretic Peptide: 305 pg/mL (08-20 @ 03:40)        TELEMETRY: 	  SB  ECG:  	SR 66bpm St depression lateral leads    DIMENSIONS:  Dimensions:     Normal Values:  LA:     3.7 cm    2.0 - 4.0 cm  Ao:     3.4 cm    2.0 - 3.8 cm  SEPTUM: 0.8 cm    0.6 - 1.2 cm  PWT:  0.8 cm    0.6 - 1.1 cm  LVIDd:  4.3 cm    3.0 - 5.6 cm  LVIDs:  2.8 cm    1.8 - 4.0 cm  Derived Variables:  LVMI: 66 g/m2  RWT: 0.37  Fractional short: 35 %  Ejection Fraction (Modified Rainey Rule): 64 %  ------------------------------------------------------------------------  OBSERVATIONS:  Mitral Valve: Mitral annular calcification, otherwise  normal mitral valve. Mild-moderate mitral regurgitation.  Aortic Root: Normal aortic root.  Aortic Valve: Calcified trileaflet aortic valve with normal  opening. Mild aortic regurgitation.  Left Atrium: Mildly dilated left atrium.  LA volume index =  36 cc/m2.  Left Ventricle: Endocardium not well visualized; grossly  normal left ventricular systolic function. Normal left  ventricular internal dimensions and wall thicknesses. Mild  diastolic dysfunction (Stage I).  Right Heart: Normal right atrium. The right ventricle is  not well visualized; grossly normal right ventricular  systolic function. Normal tricuspid valve. Mild tricuspid  regurgitation. Normal pulmonic valve. Mild pulmonic  regurgitation.  Pericardium/PleuraNormal pericardium with no pericardial  effusion.  ------------------------------------------------------------------------  CONCLUSIONS:  1. Mitral annular calcification, otherwise normal mitral  valve. Mild-moderate mitral regurgitation.  2. Calcified trileaflet aortic valve with normal opening.  Mild aortic regurgitation.  3. Mildly dilated left atrium.  LA volume index = 36 cc/m2.  4. Normal left ventricular internal dimensions and wall  thicknesses.  5. Endocardium not well visualized; grossly normal left  ventricular systolic function.  6. Mild diastolic dysfunction (Stage I).  7. The right ventricle is not well visualized; grossly  normal right ventricular systolic function.  
Patient seen and examined at bedside.    Overnight Events: No acute events overnight. Denies chest pain or SOB      REVIEW OF SYSTEMS:  Constitutional:     [ x] negative [ ] fevers [ ] chills [ ] weight loss [ ] weight gain  HEENT:                  [ x] negative [ ] dry eyes [ ] eye irritation [ ] postnasal drip [ ] nasal congestion  CV:                         [x ] negative  [ ] chest pain [ ] orthopnea [ ] palpitations [ ] murmur  Resp:                     [ x] negative [ ] cough [ ] shortness of breath [ ] dyspnea [ ] wheezing [ ] sputum [ ]hemoptysis  GI:                          [ x] negative [ ] nausea [ ] vomiting [ ] diarrhea [ ] constipation [ ] abd pain [ ] dysphagia   :                        [ x] negative [ ] dysuria [ ] nocturia [ ] hematuria [ ] increased urinary frequency  Musculoskeletal: [ x] negative [ ] back pain [ ] myalgias [ ] arthralgias [ ] fracture  Skin:                       [ x] negative [ ] rash [ ] itch  Neurological:        [ x] negative [ ] headache [ ] dizziness [ ] syncope [ ] weakness [ ] numbness  Psychiatric:           [ x] negative [ ] anxiety [ ] depression  Endocrine:            [ x] negative [ ] diabetes [ ] thyroid problem  Heme/Lymph:      [ x] negative [ ] anemia [ ] bleeding problem  Allergic/Immune: [x ] negative [ ] itchy eyes [ ] nasal discharge [ ] hives [ ] angioedema    [x ] All other systems negative  [ ] Unable to assess ROS due to    Current Meds:  amLODIPine   Tablet 10 milliGRAM(s) Oral daily  aspirin enteric coated 81 milliGRAM(s) Oral daily  ATENolol  Tablet 100 milliGRAM(s) Oral daily  atorvastatin 80 milliGRAM(s) Oral at bedtime  dextrose 5%. 1000 milliLiter(s) IV Continuous <Continuous>  dextrose 5%. 1000 milliLiter(s) IV Continuous <Continuous>  dextrose 50% Injectable 25 Gram(s) IV Push once  dextrose 50% Injectable 12.5 Gram(s) IV Push once  dextrose 50% Injectable 25 Gram(s) IV Push once  dextrose Oral Gel 15 Gram(s) Oral once PRN  glucagon  Injectable 1 milliGRAM(s) IntraMuscular once  heparin   Injectable 3800 Unit(s) IV Push every 6 hours PRN  heparin  Infusion.  Unit(s)/Hr IV Continuous <Continuous>  insulin lispro (ADMELOG) corrective regimen sliding scale   SubCutaneous three times a day before meals  insulin lispro (ADMELOG) corrective regimen sliding scale   SubCutaneous at bedtime  isosorbide   mononitrate ER Tablet (IMDUR) 30 milliGRAM(s) Oral daily  losartan 100 milliGRAM(s) Oral daily  pantoprazole    Tablet 40 milliGRAM(s) Oral before breakfast  ticagrelor 90 milliGRAM(s) Oral two times a day  torsemide 10 milliGRAM(s) Oral daily      PAST MEDICAL & SURGICAL HISTORY:  CAD (coronary artery disease)  s/p stent x 6      HTN (hypertension)      DM (diabetes mellitus)      HLD (hyperlipidemia)      H/O CHF      S/P primary angioplasty with coronary stent          Vitals:  T(F): 98.1 (-), Max: 98.5 ()  HR: 74 () (54 - 74)  BP: 147/65 (-) (113/55 - 147/65)  RR: 18 (-)  SpO2: 99% ()  I&O's Summary      Physical Exam:  Appearance: No acute distress; well appearing  Cardiovascular: RRR, S1, S2, no murmurs, rubs, or gallops; no edema; no JVD  Respiratory: Clear to auscultation bilaterally  Gastrointestinal: soft, non-tender, non-distended with normal bowel sounds  Ext- no edema bilaterally                          12.3   9.89  )-----------( 282      ( 21 Aug 2022 02:49 )             36.8     -    139  |  102  |  16  ----------------------------<  135<H>  3.5   |  22  |  0.79    Ca    9.8      21 Aug 2022 02:49  Phos  3.6       Mg     2.20         TPro  8.4<H>  /  Alb  4.7  /  TBili  0.5  /  DBili  x   /  AST  39<H>  /  ALT  17  /  AlkPhos  90      PT/INR - ( 20 Aug 2022 03:40 )   PT: 12.2 sec;   INR: 1.05 ratio         PTT - ( 21 Aug 2022 02:49 )  PTT:51.3 sec  CARDIAC MARKERS ( 21 Aug 2022 02:49 )  x     / x     / 156 U/L / x     / x      CARDIAC MARKERS ( 20 Aug 2022 11:26 )  x     / x     / 276 U/L / x     / 19.0 ng/mL  CARDIAC MARKERS ( 20 Aug 2022 03:40 )  x     / x     / 507 U/L / x     / 31.9 ng/mL      Serum Pro-Brain Natriuretic Peptide: 305 pg/mL ( @ 03:40)    Total Cholesterol: 141  LDL: --  HDL: 36  T    TTE  CONCLUSIONS:  1. Mitral annular calcification, otherwise normal mitral  valve. Mild-moderate mitral regurgitation.  2. Calcified trileaflet aortic valve with normal opening.  Mild aortic regurgitation.  3. Mildly dilated left atrium.  LA volume index = 36 cc/m2.  4. Normal left ventricular internal dimensions and wall  thicknesses.  5. Endocardium not well visualized; grossly normal left  ventricular systolic function.  6. Mild diastolic dysfunction (Stage I).  7. The right ventricle is not well visualized; grossly  normal right ventricular systolic function.      
Brooks Nunez MD  Cardiology Fellow  [phone]  All Cardiology service information can be found 24/7 on [email or web address], password: [email or web address]    Patient seen and examined at bedside.  TTE with grossly normal LV systolic function  Underwent LHC today - preliminary report w/ known stents in ostium, circumflex  Plan to adjust with antianginal therapies, if chest pain inducible with exertion then would prioritize more complex PCI at [facility] for left main     Review Of Systems: No chest pain, shortness of breath, or palpitations            Current Meds:  amLODIPine   Tablet 10 milliGRAM(s) Oral daily  aspirin enteric coated 81 milliGRAM(s) Oral daily  ATENolol  Tablet 100 milliGRAM(s) Oral daily  atorvastatin 80 milliGRAM(s) Oral at bedtime  dextrose 5%. 1000 milliLiter(s) IV Continuous <Continuous>  dextrose 5%. 1000 milliLiter(s) IV Continuous <Continuous>  dextrose 50% Injectable 25 Gram(s) IV Push once  dextrose 50% Injectable 12.5 Gram(s) IV Push once  dextrose 50% Injectable 25 Gram(s) IV Push once  dextrose Oral Gel 15 Gram(s) Oral once PRN  glucagon  Injectable 1 milliGRAM(s) IntraMuscular once  insulin lispro (ADMELOG) corrective regimen sliding scale   SubCutaneous three times a day before meals  insulin lispro (ADMELOG) corrective regimen sliding scale   SubCutaneous at bedtime  isosorbide   mononitrate ER Tablet (IMDUR) 30 milliGRAM(s) Oral daily  losartan 100 milliGRAM(s) Oral daily  pantoprazole    Tablet 40 milliGRAM(s) Oral before breakfast  ticagrelor 90 milliGRAM(s) Oral two times a day  torsemide 10 milliGRAM(s) Oral daily      Vitals:  T(F): 97.7 (08-22), Max: 98.1 (08-21)  HR: 58 (08-22) (58 - 65)  BP: 126/62 (08-22) (124/59 - 131/54)  RR: 18 (08-22)  SpO2: 100% (08-22)  I&O's Summary      Physical Exam:  Appearance: No Acute Distress	  HEENT:  Normal oral mucosa, PERRL, EOMI	  Cardiovascular: Bradycardia, No JVD, No murmurs/rubs/gallops  Respiratory: Lungs clear to auscultation bilaterally  Gastrointestinal:  Soft, Non-tender, + BS	  Skin: No rashes, No ecchymoses, No cyanosis	  Neurologic: Non-focal  Extremities: No clubbing, cyanosis or edema  Vascular: Peripheral pulses palpable 2+ bilaterally  Psychiatry: A & O x 3, Mood & affect appropriate                          11.9   10.21 )-----------( 269      ( 22 Aug 2022 06:41 )             36.4     08-22    139  |  105  |  18  ----------------------------<  156<H>  3.5   |  24  |  0.83    Ca    9.5      22 Aug 2022 06:41  Phos  2.9     08-22  Mg     2.30     08-22    TPro  8.4<H>  /  Alb  4.7  /  TBili  0.5  /  DBili  x   /  AST  39<H>  /  ALT  17  /  AlkPhos  90  08-21    PT/INR - ( 22 Aug 2022 06:41 )   PT: 11.9 sec;   INR: 1.03 ratio         PTT - ( 22 Aug 2022 06:41 )  PTT:56.7 sec  CARDIAC MARKERS ( 21 Aug 2022 02:49 )  500 ng/L / x     / x     / 156 U/L / x     / x      CARDIAC MARKERS ( 20 Aug 2022 11:26 )  1370 ng/L / x     / x     / 276 U/L / x     / 19.0 ng/mL  CARDIAC MARKERS ( 20 Aug 2022 03:40 )  1911 ng/L / x     / x     / 507 U/L / x     / 31.9 ng/mL      Serum Pro-Brain Natriuretic Peptide: 305 pg/mL (08-20 @ 03:40)          New ECG(s): Personally reviewed    TELEMETRY: 	  SB  ECG:  	SR 66bpm St depression lateral leads    DIMENSIONS:  Dimensions:     Normal Values:  LA:     3.7 cm    2.0 - 4.0 cm  Ao:     3.4 cm    2.0 - 3.8 cm  SEPTUM: 0.8 cm    0.6 - 1.2 cm  PWT:  0.8 cm    0.6 - 1.1 cm  LVIDd:  4.3 cm    3.0 - 5.6 cm  LVIDs:  2.8 cm    1.8 - 4.0 cm  Derived Variables:  LVMI: 66 g/m2  RWT: 0.37  Fractional short: 35 %  Ejection Fraction (Modified Rainey Rule): 64 %  ------------------------------------------------------------------------  OBSERVATIONS:  Mitral Valve: Mitral annular calcification, otherwise  normal mitral valve. Mild-moderate mitral regurgitation.  Aortic Root: Normal aortic root.  Aortic Valve: Calcified trileaflet aortic valve with normal  opening. Mild aortic regurgitation.  Left Atrium: Mildly dilated left atrium.  LA volume index =  36 cc/m2.  Left Ventricle: Endocardium not well visualized; grossly  normal left ventricular systolic function. Normal left  ventricular internal dimensions and wall thicknesses. Mild  diastolic dysfunction (Stage I).  Right Heart: Normal right atrium. The right ventricle is  not well visualized; grossly normal right ventricular  systolic function. Normal tricuspid valve. Mild tricuspid  regurgitation. Normal pulmonic valve. Mild pulmonic  regurgitation.  Pericardium/PleuraNormal pericardium with no pericardial  effusion.  ------------------------------------------------------------------------  CONCLUSIONS:  1. Mitral annular calcification, otherwise normal mitral  valve. Mild-moderate mitral regurgitation.  2. Calcified trileaflet aortic valve with normal opening.  Mild aortic regurgitation.  3. Mildly dilated left atrium.  LA volume index = 36 cc/m2.  4. Normal left ventricular internal dimensions and wall  thicknesses.  5. Endocardium not well visualized; grossly normal left  ventricular systolic function.  6. Mild diastolic dysfunction (Stage I).  7. The right ventricle is not well visualized; grossly  normal right ventricular systolic function.  
Roseanna Davidson  [facility] Medicine  Pager #[phone]  Available on Microsoft Teams    Patient is a 86y old  Female who presents with a chief complaint of Chest pain (23 Aug 2022 07:17)      SUBJECTIVE / OVERNIGHT EVENTS: Patient seen and examined at bedside. Patient's son at bedside. Patient states that this morning while laying in bed she felt some chest discomfort, and felt better once she sit up. Instructed patient and her son to walk around for at least 10-15 minutes this morning to if chest pain returns.     ADDITIONAL REVIEW OF SYSTEMS:    MEDICATIONS  (STANDING):  amLODIPine   Tablet 10 milliGRAM(s) Oral daily  aspirin enteric coated 81 milliGRAM(s) Oral daily  ATENolol  Tablet 100 milliGRAM(s) Oral daily  atorvastatin 80 milliGRAM(s) Oral at bedtime  dextrose 5%. 1000 milliLiter(s) (100 mL/Hr) IV Continuous <Continuous>  dextrose 5%. 1000 milliLiter(s) (50 mL/Hr) IV Continuous <Continuous>  dextrose 50% Injectable 25 Gram(s) IV Push once  dextrose 50% Injectable 12.5 Gram(s) IV Push once  dextrose 50% Injectable 25 Gram(s) IV Push once  glucagon  Injectable 1 milliGRAM(s) IntraMuscular once  insulin lispro (ADMELOG) corrective regimen sliding scale   SubCutaneous three times a day before meals  insulin lispro (ADMELOG) corrective regimen sliding scale   SubCutaneous at bedtime  isosorbide   mononitrate ER Tablet (IMDUR) 30 milliGRAM(s) Oral daily  losartan 100 milliGRAM(s) Oral daily  pantoprazole    Tablet 40 milliGRAM(s) Oral before breakfast  ranolazine 500 milliGRAM(s) Oral two times a day  ticagrelor 90 milliGRAM(s) Oral two times a day  torsemide 10 milliGRAM(s) Oral daily    MEDICATIONS  (PRN):  dextrose Oral Gel 15 Gram(s) Oral once PRN Blood Glucose LESS THAN 70 milliGRAM(s)/deciliter      CAPILLARY BLOOD GLUCOSE      POCT Blood Glucose.: 123 mg/dL (23 Aug 2022 12:47)  POCT Blood Glucose.: 225 mg/dL (23 Aug 2022 09:12)  POCT Blood Glucose.: 177 mg/dL (22 Aug 2022 21:39)  POCT Blood Glucose.: 144 mg/dL (22 Aug 2022 18:32)  POCT Blood Glucose.: 116 mg/dL (22 Aug 2022 15:05)    I&O's Summary      PHYSICAL EXAM:    Vital Signs Last 24 Hrs  T(C): 36.7 (23 Aug 2022 12:20), Max: 36.8 (22 Aug 2022 21:44)  T(F): 98 (23 Aug 2022 12:20), Max: 98.3 (22 Aug 2022 21:44)  HR: 70 (23 Aug 2022 12:20) (63 - 70)  BP: 102/53 (23 Aug 2022 12:20) (102/53 - 126/63)  BP(mean): --  RR: 18 (23 Aug 2022 12:20) (18 - 20)  SpO2: 99% (23 Aug 2022 12:20) (99% - 100%)    Parameters below as of 23 Aug 2022 12:20  Patient On (Oxygen Delivery Method): room air    CONSTITUTIONAL: NAD, well-developed, well-groomed  EYES: Conjunctiva and sclera clear  ENMT: Moist oral mucosa  RESPIRATORY: Normal respiratory effort; lungs are clear to auscultation bilaterally  CARDIOVASCULAR: Regular rate and rhythm, normal S1 and S2, no murmur/rub/gallop; No lower extremity edema  ABDOMEN: Soft, nontender to palpation, normoactive bowel sounds  PSYCH: A+O to person, place, and time; affect appropriate  NEUROLOGY: No focal deficits  SKIN: No rashes; no palpable lesions    LABS:                        12.1   10.04 )-----------( 250      ( 23 Aug 2022 06:03 )             36.8     08-23    142  |  107  |  17  ----------------------------<  121<H>  4.0   |  21<L>  |  0.89    Ca    9.5      23 Aug 2022 06:03  Phos  3.2     08-23  Mg     2.20     08-23      PT/INR - ( 22 Aug 2022 06:41 )   PT: 11.9 sec;   INR: 1.03 ratio         PTT - ( 22 Aug 2022 06:41 )  PTT:56.7 sec      RADIOLOGY & ADDITIONAL TESTS:    Cardiac catheterization 8/22/2022    Diagnostic Findings:     Coronary Angiography   The coronary circulation is right dominant.      LM   Left main artery: Patent stent.      LAD   Proximal left anterior descending: There is a 10 % stenosis within the  stented segment.  Mid left anterior descending: There is a 30 % stenosis in the proximal  third portion of the segment within the stented segment.    CX   Ostial circumflex: There is a 95 % stenosis in the ostium portion of  the segment. JESSICA Flow 3.  Mid circumflex: There is a 70 % stenosis in the middle third portion  of the segment within the stented segment. JESSICA Flow 3.  Distal circumflex: There is a 100 % stenosis after branch to OM2. JESSICA  Flow 0.  Second obtuse marginal: There is an 80 % stenosis in the distal third  portion of the segment. JESSICA Flow 3.    RCA   Right coronary artery: Patent stents in proximal, mid and distal  segments.    Left Heart Cath   Left ventricular function was not assessed.      Results Reviewed: Yes  Imaging Personally Reviewed:  Electrocardiogram Personally Reviewed:    COORDINATION OF CARE:  Care Discussed with Consultants/Other Providers [Y/N]:  Prior or Outpatient Records Reviewed [Y/N]:  
Roseanna Davidson  [facility] Medicine  Pager #[phone]  Available on Microsoft Teams    Patient is a 86y old  Female who presents with a chief complaint of Chest pain (21 Aug 2022 21:09)      SUBJECTIVE / OVERNIGHT EVENTS: Patient seen and examined at bedside. Patient's son at bedside. Patient feels well. Denies chest pain or SOB. Waiting for cardiac cath today. Has been NPO since midnight.    ADDITIONAL REVIEW OF SYSTEMS:    MEDICATIONS  (STANDING):  amLODIPine   Tablet 10 milliGRAM(s) Oral daily  aspirin enteric coated 81 milliGRAM(s) Oral daily  ATENolol  Tablet 100 milliGRAM(s) Oral daily  atorvastatin 80 milliGRAM(s) Oral at bedtime  dextrose 5%. 1000 milliLiter(s) (100 mL/Hr) IV Continuous <Continuous>  dextrose 5%. 1000 milliLiter(s) (50 mL/Hr) IV Continuous <Continuous>  dextrose 50% Injectable 25 Gram(s) IV Push once  dextrose 50% Injectable 12.5 Gram(s) IV Push once  dextrose 50% Injectable 25 Gram(s) IV Push once  glucagon  Injectable 1 milliGRAM(s) IntraMuscular once  insulin lispro (ADMELOG) corrective regimen sliding scale   SubCutaneous three times a day before meals  insulin lispro (ADMELOG) corrective regimen sliding scale   SubCutaneous at bedtime  isosorbide   mononitrate ER Tablet (IMDUR) 30 milliGRAM(s) Oral daily  losartan 100 milliGRAM(s) Oral daily  pantoprazole    Tablet 40 milliGRAM(s) Oral before breakfast  ticagrelor 90 milliGRAM(s) Oral two times a day  torsemide 10 milliGRAM(s) Oral daily    MEDICATIONS  (PRN):  dextrose Oral Gel 15 Gram(s) Oral once PRN Blood Glucose LESS THAN 70 milliGRAM(s)/deciliter      CAPILLARY BLOOD GLUCOSE      POCT Blood Glucose.: 132 mg/dL (22 Aug 2022 10:20)  POCT Blood Glucose.: 150 mg/dL (22 Aug 2022 07:29)  POCT Blood Glucose.: 167 mg/dL (21 Aug 2022 21:06)  POCT Blood Glucose.: 272 mg/dL (21 Aug 2022 17:11)    I&O's Summary      PHYSICAL EXAM:    Vital Signs Last 24 Hrs  T(C): 36.5 (22 Aug 2022 10:20), Max: 36.7 (21 Aug 2022 17:02)  T(F): 97.7 (22 Aug 2022 10:20), Max: 98.1 (21 Aug 2022 17:02)  HR: 58 (22 Aug 2022 10:20) (58 - 65)  BP: 126/62 (22 Aug 2022 10:20) (124/59 - 131/54)  BP(mean): --  RR: 18 (22 Aug 2022 10:20) (18 - 18)  SpO2: 100% (22 Aug 2022 10:20) (100% - 100%)    Parameters below as of 22 Aug 2022 10:20  Patient On (Oxygen Delivery Method): room air    CONSTITUTIONAL: NAD, well-developed, well-groomed  EYES: Conjunctiva and sclera clear  ENMT: Moist oral mucosa  RESPIRATORY: Normal respiratory effort; lungs are clear to auscultation bilaterally  CARDIOVASCULAR: Regular rate and rhythm, normal S1 and S2, no murmur/rub/gallop; No lower extremity edema  ABDOMEN: Soft, nontender to palpation, normoactive bowel sounds  PSYCH: A+O to person, place, and time; affect appropriate  NEUROLOGY: CN 2-12 are intact and symmetric; no gross sensory deficits   SKIN: No rashes; no palpable lesions    LABS:                        11.9   10.21 )-----------( 269      ( 22 Aug 2022 06:41 )             36.4     08-22    139  |  105  |  18  ----------------------------<  156<H>  3.5   |  24  |  0.83    Ca    9.5      22 Aug 2022 06:41  Phos  2.9     08-22  Mg     2.30     08-22    TPro  8.4<H>  /  Alb  4.7  /  TBili  0.5  /  DBili  x   /  AST  39<H>  /  ALT  17  /  AlkPhos  90  08-21    PT/INR - ( 22 Aug 2022 06:41 )   PT: 11.9 sec;   INR: 1.03 ratio         PTT - ( 22 Aug 2022 06:41 )  PTT:56.7 sec  CARDIAC MARKERS ( 21 Aug 2022 02:49 )  x     / x     / 156 U/L / x     / x          RADIOLOGY & ADDITIONAL TESTS: No new imaging  Results Reviewed: Yes  Imaging Personally Reviewed:  Electrocardiogram Personally Reviewed:    COORDINATION OF CARE:  Care Discussed with Consultants/Other Providers [Y/N]:  Prior or Outpatient Records Reviewed [Y/N]:  
Darin Schwartz MD  Hospitalist  [facility]: [phone]  [facility]: [phone]    PATIENT:  SCHUYLER LAWLER  [id number]    CHIEF COMPLAINT:  Patient is a 86y old  Female who presents with a chief complaint of Chest pain (21 Aug 2022 16:15)      INTERVAL HISTORY/OVERNIGHT EVENTS:  Son at bedside and patient requests him to translate. Denies chest pain, SOB, belly pain, n/.v. No bleeding. Troponin downtrending No tele events.    REVIEW OF SYSTEMS:  CONSTITUTIONAL: No new weakness, fevers or chills  EYES/ENT: No visual changes;  No vertigo or throat pain   NECK: No pain or stiffness  RESPIRATORY: No cough, wheezing, hemoptysis; No shortness of breath  CARDIOVASCULAR: No chest pain or palpitations  GASTROINTESTINAL: No abdominal or epigastric pain. No nausea, vomiting, or hematemesis; No diarrhea or constipation. No melena or hematochezia.  GENITOURINARY: No dysuria, frequency or hematuria  NEUROLOGICAL: No new numbness or weakness      MEDICATIONS:  MEDICATIONS  (STANDING):  amLODIPine   Tablet 10 milliGRAM(s) Oral daily  aspirin enteric coated 81 milliGRAM(s) Oral daily  ATENolol  Tablet 100 milliGRAM(s) Oral daily  atorvastatin 80 milliGRAM(s) Oral at bedtime  dextrose 5%. 1000 milliLiter(s) (100 mL/Hr) IV Continuous <Continuous>  dextrose 5%. 1000 milliLiter(s) (50 mL/Hr) IV Continuous <Continuous>  dextrose 50% Injectable 25 Gram(s) IV Push once  dextrose 50% Injectable 12.5 Gram(s) IV Push once  dextrose 50% Injectable 25 Gram(s) IV Push once  glucagon  Injectable 1 milliGRAM(s) IntraMuscular once  heparin  Infusion.  Unit(s)/Hr (7.5 mL/Hr) IV Continuous <Continuous>  insulin lispro (ADMELOG) corrective regimen sliding scale   SubCutaneous three times a day before meals  insulin lispro (ADMELOG) corrective regimen sliding scale   SubCutaneous at bedtime  isosorbide   mononitrate ER Tablet (IMDUR) 30 milliGRAM(s) Oral daily  losartan 100 milliGRAM(s) Oral daily  pantoprazole    Tablet 40 milliGRAM(s) Oral before breakfast  ticagrelor 90 milliGRAM(s) Oral two times a day  torsemide 10 milliGRAM(s) Oral daily    MEDICATIONS  (PRN):  dextrose Oral Gel 15 Gram(s) Oral once PRN Blood Glucose LESS THAN 70 milliGRAM(s)/deciliter  heparin   Injectable 3800 Unit(s) IV Push every 6 hours PRN For aPTT less than 40      ALLERGIES:  Allergies    No Known Allergies    Intolerances        OBJECTIVE:  T(C): 36.7 (21 Aug 2022 17:02), Max: 36.7 (21 Aug 2022 00:14)  T(F): 98.1 (21 Aug 2022 17:02), Max: 98.1 (21 Aug 2022 00:14)  HR: 62 (21 Aug 2022 17:02) (59 - 74)  BP: 131/54 (21 Aug 2022 17:02) (111/66 - 147/65)  RR: 18 (21 Aug 2022 17:02) (18 - 20)  SpO2: 100% (21 Aug 2022 17:02) (98% - 100%)    O2 Parameters below as of 21 Aug 2022 17:02  Patient On (Oxygen Delivery Method): room air    POCT Blood Glucose.: 167 mg/dL (21 Aug 2022 21:06)  POCT Blood Glucose.: 272 mg/dL (21 Aug 2022 17:11)  POCT Blood Glucose.: 120 mg/dL (21 Aug 2022 12:46)  POCT Blood Glucose.: 151 mg/dL (21 Aug 2022 08:49)  POCT Blood Glucose.: 131 mg/dL (20 Aug 2022 22:24)    CAPILLARY BLOOD GLUCOSE      POCT Blood Glucose.: 167 mg/dL (21 Aug 2022 21:06)    I&O's Summary    Daily     Daily     PHYSICAL EXAMINATION:  General: WN/WD NAD  HEENT: PERRL, EOMI, moist mucous membranes  Neurology: A&Ox3, nonfocal, TIMMONS x 4  Respiratory: CTA B/L, normal respiratory effort, no wheezes, crackles, rales  CV: RRR, S1S2, no orthopnea - lies completely flat  Abdominal: Soft, NT, ND +BS  Extremities: No edema, + peripheral pulses    LABS:                          12.3   9.89  )-----------( 282      ( 21 Aug 2022 02:49 )             36.8     08-21    139  |  102  |  16  ----------------------------<  135<H>  3.5   |  22  |  0.79    Ca    9.8      21 Aug 2022 02:49  Phos  3.6     08-21  Mg     2.20     08-21    TPro  8.4<H>  /  Alb  4.7  /  TBili  0.5  /  DBili  x   /  AST  39<H>  /  ALT  17  /  AlkPhos  90  08-21    LIVER FUNCTIONS - ( 21 Aug 2022 02:49 )  Alb: 4.7 g/dL / Pro: 8.4 g/dL / ALK PHOS: 90 U/L / ALT: 17 U/L / AST: 39 U/L / GGT: x           PT/INR - ( 20 Aug 2022 03:40 )   PT: 12.2 sec;   INR: 1.05 ratio         PTT - ( 21 Aug 2022 15:57 )  PTT:58.3 sec  Creatine Kinase, Serum: 156 U/L (08-21 @ 02:49)    CARDIAC MARKERS ( 21 Aug 2022 02:49 )  x     / x     / 156 U/L / x     / x      CARDIAC MARKERS ( 20 Aug 2022 11:26 )  x     / x     / 276 U/L / x     / 19.0 ng/mL  CARDIAC MARKERS ( 20 Aug 2022 03:40 )  x     / x     / 507 U/L / x     / 31.9 ng/mL    TELEMETRY: sinus

## 2022-08-23 NOTE — PROGRESS NOTE ADULT - PROBLEM SELECTOR PLAN 6
Continue with Atorvastatin 80 mg  DASH diet  Lipid panel reviewed
Continue with Atorvastatin 80 mg  DASH diet  Lipid panel reviewed
Continue with Atorvastatin 80 mg  DASH diet  Check lipid profile in am.

## 2022-08-23 NOTE — PROGRESS NOTE ADULT - PROBLEM SELECTOR PLAN 4
- holding home medications: Metformin 500 mg QD  - low insulin coverage sliding scale   - FS AC & HS.  - Diet: Consistent Carb  - A1c 6.9%
- holding home medications: Metformin 500 mg QD  - insulin coverage sliding scale   - FS AC & HS.  - Diet: Consistent Carbs.  - f/u AM HbA1c.
- holding home medications: Metformin 500 mg QD  - low insulin coverage sliding scale   - FS AC & HS  - Diet: Consistent Carb  - A1c 6.9%

## 2022-08-23 NOTE — PROGRESS NOTE ADULT - PROBLEM SELECTOR PLAN 3
Patient with history of CHF on Torsemide from home.  No sign of CHF exacerbation - Pro   TTE with normal LV systolic function, grade 1 diastolic dysfunction  Continue w/ BB, losartan, torsemide  Strict I&Os, monitor daily weights, 1500 cc fluid restriction, sodium restriction.
Patient with history of CHF on Torsemide from home.  No sign of CHF exacerbation - Pro   TTE with normal LV systolic function, grade 1 diastolic dysfunction  Continue w/ BB, losartan, torsemide  Strict I&Os, monitor daily weights, 1500 cc fluid restriction, sodium restriction
Patient with history of CHF on Torsemide from home.  No sign of CHF exacerbation - Pro   Continue w/ BB, ACE,   Strict I&Os, monitor daily weights, 1500 cc fluid restriction, sodium restriction.

## 2022-08-23 NOTE — DISCHARGE NOTE NURSING/CASE MANAGEMENT/SOCIAL WORK - PATIENT PORTAL LINK FT
You can access the FollowMyHealth Patient Portal offered by [facility] by registering at the following website: [email or web address]. By joining [facility]’s FollowMyHealth portal, you will also be able to view your health information using other applications (apps) compatible with our system.

## 2022-08-23 NOTE — PROGRESS NOTE ADULT - ASSESSMENT
85 y/o female with PMH of CAD s/p 6 stents in 11/2019 at [facility], HLD, HTN, DM2 and CHF presents to ED c/o chest pain. Patient admitted for management of NSTEMI.

## 2022-08-27 ENCOUNTER — EMERGENCY (EMERGENCY)
Facility: HOSPITAL | Age: 87
LOS: 1 days | Discharge: HOME | End: 2022-08-27
Attending: STUDENT IN AN ORGANIZED HEALTH CARE EDUCATION/TRAINING PROGRAM
Payer: MEDICAID

## 2022-08-27 VITALS
RESPIRATION RATE: 17 BRPM | DIASTOLIC BLOOD PRESSURE: 57 MMHG | HEART RATE: 61 BPM | SYSTOLIC BLOOD PRESSURE: 121 MMHG | TEMPERATURE: 98 F | OXYGEN SATURATION: 97 %

## 2022-08-27 VITALS
WEIGHT: 130.07 LBS | RESPIRATION RATE: 16 BRPM | OXYGEN SATURATION: 98 % | SYSTOLIC BLOOD PRESSURE: 97 MMHG | HEIGHT: 60 IN | HEART RATE: 50 BPM | DIASTOLIC BLOOD PRESSURE: 62 MMHG

## 2022-08-27 LAB
ALBUMIN SERPL ELPH-MCNC: 3.6 G/DL — SIGNIFICANT CHANGE UP (ref 3.5–5)
ALP SERPL-CCNC: 88 U/L — SIGNIFICANT CHANGE UP (ref 40–120)
ALT FLD-CCNC: 30 U/L DA — SIGNIFICANT CHANGE UP (ref 10–60)
ANION GAP SERPL CALC-SCNC: 10 MMOL/L — SIGNIFICANT CHANGE UP (ref 5–17)
APPEARANCE UR: CLEAR — SIGNIFICANT CHANGE UP
AST SERPL-CCNC: 37 U/L — SIGNIFICANT CHANGE UP (ref 10–40)
BACTERIA # UR AUTO: ABNORMAL /HPF
BASOPHILS # BLD AUTO: 0.1 K/UL — SIGNIFICANT CHANGE UP (ref 0–0.2)
BASOPHILS NFR BLD AUTO: 0.7 % — SIGNIFICANT CHANGE UP (ref 0–2)
BILIRUB SERPL-MCNC: 0.8 MG/DL — SIGNIFICANT CHANGE UP (ref 0.2–1.2)
BILIRUB UR-MCNC: NEGATIVE — SIGNIFICANT CHANGE UP
BUN SERPL-MCNC: 25 MG/DL — HIGH (ref 7–18)
CALCIUM SERPL-MCNC: 10.1 MG/DL — SIGNIFICANT CHANGE UP (ref 8.4–10.5)
CHLORIDE SERPL-SCNC: 101 MMOL/L — SIGNIFICANT CHANGE UP (ref 96–108)
CO2 SERPL-SCNC: 22 MMOL/L — SIGNIFICANT CHANGE UP (ref 22–31)
COLOR SPEC: YELLOW — SIGNIFICANT CHANGE UP
COMMENT - URINE: SIGNIFICANT CHANGE UP
CREAT SERPL-MCNC: 1.49 MG/DL — HIGH (ref 0.5–1.3)
DIFF PNL FLD: ABNORMAL
EGFR: 34 ML/MIN/1.73M2 — LOW
EOSINOPHIL # BLD AUTO: 0.27 K/UL — SIGNIFICANT CHANGE UP (ref 0–0.5)
EOSINOPHIL NFR BLD AUTO: 1.8 % — SIGNIFICANT CHANGE UP (ref 0–6)
EPI CELLS # UR: SIGNIFICANT CHANGE UP /HPF
GLUCOSE SERPL-MCNC: 110 MG/DL — HIGH (ref 70–99)
GLUCOSE UR QL: NEGATIVE — SIGNIFICANT CHANGE UP
HCT VFR BLD CALC: 35.5 % — SIGNIFICANT CHANGE UP (ref 34.5–45)
HGB BLD-MCNC: 11.8 G/DL — SIGNIFICANT CHANGE UP (ref 11.5–15.5)
IMM GRANULOCYTES NFR BLD AUTO: 0.8 % — SIGNIFICANT CHANGE UP (ref 0–1.5)
KETONES UR-MCNC: ABNORMAL
LEUKOCYTE ESTERASE UR-ACNC: ABNORMAL
LYMPHOCYTES # BLD AUTO: 18.2 % — SIGNIFICANT CHANGE UP (ref 13–44)
LYMPHOCYTES # BLD AUTO: 2.77 K/UL — SIGNIFICANT CHANGE UP (ref 1–3.3)
MAGNESIUM SERPL-MCNC: 2.3 MG/DL — SIGNIFICANT CHANGE UP (ref 1.6–2.6)
MANUAL SMEAR VERIFICATION: SIGNIFICANT CHANGE UP
MCHC RBC-ENTMCNC: 30.3 PG — SIGNIFICANT CHANGE UP (ref 27–34)
MCHC RBC-ENTMCNC: 33.2 GM/DL — SIGNIFICANT CHANGE UP (ref 32–36)
MCV RBC AUTO: 91.3 FL — SIGNIFICANT CHANGE UP (ref 80–100)
MONOCYTES # BLD AUTO: 1.89 K/UL — HIGH (ref 0–0.9)
MONOCYTES NFR BLD AUTO: 12.4 % — SIGNIFICANT CHANGE UP (ref 2–14)
NEUTROPHILS # BLD AUTO: 10.1 K/UL — HIGH (ref 1.8–7.4)
NEUTROPHILS NFR BLD AUTO: 66.1 % — SIGNIFICANT CHANGE UP (ref 43–77)
NITRITE UR-MCNC: NEGATIVE — SIGNIFICANT CHANGE UP
NRBC # BLD: 0 /100 WBCS — SIGNIFICANT CHANGE UP (ref 0–0)
PH UR: 8 — SIGNIFICANT CHANGE UP (ref 5–8)
PLAT MORPH BLD: NORMAL — SIGNIFICANT CHANGE UP
PLATELET # BLD AUTO: 268 K/UL — SIGNIFICANT CHANGE UP (ref 150–400)
PLATELET COUNT - ESTIMATE: NORMAL — SIGNIFICANT CHANGE UP
POTASSIUM SERPL-MCNC: 5.9 MMOL/L — HIGH (ref 3.5–5.3)
POTASSIUM SERPL-SCNC: 5.9 MMOL/L — HIGH (ref 3.5–5.3)
PROT SERPL-MCNC: 8.5 G/DL — HIGH (ref 6–8.3)
PROT UR-MCNC: NEGATIVE — SIGNIFICANT CHANGE UP
RBC # BLD: 3.89 M/UL — SIGNIFICANT CHANGE UP (ref 3.8–5.2)
RBC # FLD: 13.5 % — SIGNIFICANT CHANGE UP (ref 10.3–14.5)
RBC BLD AUTO: NORMAL — SIGNIFICANT CHANGE UP
RBC CASTS # UR COMP ASSIST: ABNORMAL /HPF (ref 0–2)
SARS-COV-2 RNA SPEC QL NAA+PROBE: SIGNIFICANT CHANGE UP
SODIUM SERPL-SCNC: 133 MMOL/L — LOW (ref 135–145)
SP GR SPEC: 1.01 — SIGNIFICANT CHANGE UP (ref 1.01–1.02)
TROPONIN I, HIGH SENSITIVITY RESULT: 138.6 NG/L — HIGH
TROPONIN I, HIGH SENSITIVITY RESULT: 162.1 NG/L — HIGH
UROBILINOGEN FLD QL: NEGATIVE — SIGNIFICANT CHANGE UP
WBC # BLD: 15.25 K/UL — HIGH (ref 3.8–10.5)
WBC # FLD AUTO: 15.25 K/UL — HIGH (ref 3.8–10.5)
WBC UR QL: SIGNIFICANT CHANGE UP /HPF (ref 0–5)

## 2022-08-27 PROCEDURE — 87186 SC STD MICRODIL/AGAR DIL: CPT

## 2022-08-27 PROCEDURE — 83735 ASSAY OF MAGNESIUM: CPT

## 2022-08-27 PROCEDURE — 87635 SARS-COV-2 COVID-19 AMP PRB: CPT

## 2022-08-27 PROCEDURE — 82962 GLUCOSE BLOOD TEST: CPT

## 2022-08-27 PROCEDURE — 84484 ASSAY OF TROPONIN QUANT: CPT

## 2022-08-27 PROCEDURE — 99285 EMERGENCY DEPT VISIT HI MDM: CPT | Mod: 25

## 2022-08-27 PROCEDURE — 71046 X-RAY EXAM CHEST 2 VIEWS: CPT | Mod: 26

## 2022-08-27 PROCEDURE — 85025 COMPLETE CBC W/AUTO DIFF WBC: CPT

## 2022-08-27 PROCEDURE — 80053 COMPREHEN METABOLIC PANEL: CPT

## 2022-08-27 PROCEDURE — 87086 URINE CULTURE/COLONY COUNT: CPT

## 2022-08-27 PROCEDURE — 71046 X-RAY EXAM CHEST 2 VIEWS: CPT

## 2022-08-27 PROCEDURE — 93005 ELECTROCARDIOGRAM TRACING: CPT

## 2022-08-27 PROCEDURE — 99285 EMERGENCY DEPT VISIT HI MDM: CPT

## 2022-08-27 PROCEDURE — 36415 COLL VENOUS BLD VENIPUNCTURE: CPT

## 2022-08-27 PROCEDURE — 81001 URINALYSIS AUTO W/SCOPE: CPT

## 2022-08-27 NOTE — ED PROVIDER NOTE - OBJECTIVE STATEMENT
86-year-old female hx of HTN, HLD, CAD w/stents, presenting with dizziness/low blood pressure this morning. Was admitted to [facility] a few weeks ago for NSTEMI, monitored until downtrending and then discharged. Had been doing fine but has not been able to sleep for a few days. This morning while resting she became lightheaded, son took her BP and noted it was 60/40 so brought her here, now it is 107/80, pt's symptoms resolved but she is concerned that she cannot sleep. No chest pain or shortness of breath. No other symptoms.

## 2022-08-27 NOTE — ED PROVIDER NOTE - CLINICAL SUMMARY MEDICAL DECISION MAKING FREE TEXT BOX
86-year-old female hx of HTN, HLD, CAD w/stents, presenting with dizziness/low blood pressure this morning 86-year-old female hx of HTN, HLD, CAD w/stents, presenting with dizziness/low blood pressure this morning now resolved, will infectious/metabolic/cardiac workup and reassess.

## 2022-08-27 NOTE — ED PROVIDER NOTE - CONSTITUTIONAL, MLM
normal... Encourage pt. to perform ADL's and educate him on how to identify safety risk factors. Encourage pt. to remain compliant with medications. Continue with assessment and treatment regimen. Reinforce the importance of adherence to follow up care post DC Reinforce the importance of adherence to follow up care post DC Discuss the discharge planning process with patient and family. Ensure patient understands the discharge process and identifies strategies to help prevent relapse. Assist patient to work on strategies for appropriate outpatient services. Discuss the discharge planning process with patient and family. Ensure patient understands the discharge process and identifies strategies to help prevent relapse. Assist patient to work on strategies for appropriate outpatient services. Encourage pt. to perform ADL's and educate him on how to identify safety risk factors. Encourage pt. to remain compliant with medications. Continue with assessment and treatment regimen. Reinforce the importance of adherence to follow up care post DC Assess pt's readiness for discharge. Pt is ready for discharge, assessent ongoing. Well appearing, awake, alert, oriented to person, place, time/situation and in no apparent distress.

## 2022-08-27 NOTE — ED PROVIDER NOTE - PATIENT PORTAL LINK FT
You can access the FollowMyHealth Patient Portal offered by [facility] by registering at the following website: [email or web address]. By joining [employer]’s FollowMyHealth portal, you will also be able to view your health information using other applications (apps) compatible with our system.

## 2022-09-21 NOTE — ED POST DISCHARGE NOTE - RESULT SUMMARY
+ urine culture, pt called and denies urinary complaints.  pt advised to follow up with PMD or return to ED for any worsening or concerning symptoms.

## 2023-01-05 NOTE — ED ADULT TRIAGE NOTE - WILL THE PATIENT ACCEPT THE PFIZER COVID-19 VACCINE IF ELIGIBLE AND IT IS AVAILABLE?
2023  EMPLOYEE INFORMATION: EMPLOYER INFORMATION:   NAME: Charis Perez [employer]   : 1968    DATE OF INJURY/EVENT: 2022           [facility]   Treating Provider: Emerita Haney PA-C  Time In:  2:17 PM Time Out:  3:20 PM      DIAGNOSIS:   1. Thumb pain, right    2. Right hand pain      STATUS: This injury is determined to be WORK RELATED.    RETURN TO WORK:Employee may return to work with restrictions.   Return Date: 2023            RESTRICTIONS: Restrictions are to be followed at work and at home.  Restrictions are in effect until next follow-up visit.  Motion has significantly improved, strength steady progress with therapy.   Wear thumb spica splint while at work, may start to transition out per comfort level.   No lifting more than 5 lbs with right hand.   May try light gripping, twisting, and turning of the hand but not firm repetition.    Continue with occupational therapy.        TREATMENT PLAN:   Medications for this injury/condition: Continue with medications as prescribed if needed.   Referral/Consult: None  Diagnostic Testing: None       Instructions: Continue with home exercises per therapy.   May use heat as needed.     NEXT RETURN VISIT:  2023 at 2:30pm W/ Emerita Haney PA-C  Thank you for the privilege of providing medical care for this injury/condition.  If there are any questions, please call the occupational health clinic at Dept: [phone].      Electronically signed on 2023 at 3:20 PM by:   Emerita Haney PA-C   [facility]    The physician below agrees with the restrictions placed on the patient by the provider above.  Georges Marcelino MD         No

## 2023-05-18 NOTE — ED ADULT NURSE NOTE - CHIEF COMPLAINT QUOTE
Pt c/o left-sided chest pain that started 1 hr PTA. Denies dizziness, SOB, headache. PMH CAD, 6x stents, DM2, HTN  Drysol Counseling:  I discussed with the patient the risks of drysol/aluminum chloride including but not limited to skin rash, itching, irritation, burning.

## 2024-03-13 NOTE — ED POST DISCHARGE NOTE - REASON FOR FOLLOW-UP
Results:  E (median liver stiffness measurement):  5.2    kPa  CAP (controlled attenuation parameter):  134   dB/m    Interpretation:  This was a technically adequate study. 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    Shmuel Hirsch MD  Hepatology   
Culture Follow-up

## 2024-03-14 ENCOUNTER — INPATIENT (INPATIENT)
Facility: HOSPITAL | Age: 89
LOS: 21 days | Discharge: HOME HEALTH | End: 2024-04-05
Attending: HOSPITALIST | Admitting: HOSPITALIST
Payer: MEDICAID

## 2024-03-14 VITALS
RESPIRATION RATE: 17 BRPM | DIASTOLIC BLOOD PRESSURE: 83 MMHG | SYSTOLIC BLOOD PRESSURE: 143 MMHG | HEART RATE: 116 BPM | OXYGEN SATURATION: 93 % | TEMPERATURE: 101 F

## 2024-03-14 DIAGNOSIS — A41.9 SEPSIS, UNSPECIFIED ORGANISM: ICD-10-CM

## 2024-03-14 DIAGNOSIS — I25.10 ATHEROSCLEROTIC HEART DISEASE OF NATIVE CORONARY ARTERY WITHOUT ANGINA PECTORIS: ICD-10-CM

## 2024-03-14 DIAGNOSIS — J18.9 PNEUMONIA, UNSPECIFIED ORGANISM: ICD-10-CM

## 2024-03-14 DIAGNOSIS — D64.9 ANEMIA, UNSPECIFIED: ICD-10-CM

## 2024-03-14 DIAGNOSIS — Z95.5 PRESENCE OF CORONARY ANGIOPLASTY IMPLANT AND GRAFT: Chronic | ICD-10-CM

## 2024-03-14 DIAGNOSIS — Z86.79 PERSONAL HISTORY OF OTHER DISEASES OF THE CIRCULATORY SYSTEM: ICD-10-CM

## 2024-03-14 DIAGNOSIS — J10.1 INFLUENZA DUE TO OTHER IDENTIFIED INFLUENZA VIRUS WITH OTHER RESPIRATORY MANIFESTATIONS: ICD-10-CM

## 2024-03-14 DIAGNOSIS — E78.5 HYPERLIPIDEMIA, UNSPECIFIED: ICD-10-CM

## 2024-03-14 DIAGNOSIS — E11.9 TYPE 2 DIABETES MELLITUS WITHOUT COMPLICATIONS: ICD-10-CM

## 2024-03-14 DIAGNOSIS — Z91.89 OTHER SPECIFIED PERSONAL RISK FACTORS, NOT ELSEWHERE CLASSIFIED: ICD-10-CM

## 2024-03-14 DIAGNOSIS — Z29.9 ENCOUNTER FOR PROPHYLACTIC MEASURES, UNSPECIFIED: ICD-10-CM

## 2024-03-14 DIAGNOSIS — I10 ESSENTIAL (PRIMARY) HYPERTENSION: ICD-10-CM

## 2024-03-14 LAB
ADD ON TEST-SPECIMEN IN LAB: SIGNIFICANT CHANGE UP
ALBUMIN SERPL ELPH-MCNC: 4.1 G/DL — SIGNIFICANT CHANGE UP (ref 3.3–5)
ALP SERPL-CCNC: 115 U/L — SIGNIFICANT CHANGE UP (ref 40–120)
ALT FLD-CCNC: 71 U/L — HIGH (ref 4–33)
ANION GAP SERPL CALC-SCNC: 13 MMOL/L — SIGNIFICANT CHANGE UP (ref 7–14)
APPEARANCE UR: CLEAR — SIGNIFICANT CHANGE UP
APTT BLD: 32.9 SEC — SIGNIFICANT CHANGE UP (ref 24.5–35.6)
AST SERPL-CCNC: 84 U/L — HIGH (ref 4–32)
BACTERIA # UR AUTO: NEGATIVE /HPF — SIGNIFICANT CHANGE UP
BASE EXCESS BLDV CALC-SCNC: -2.3 MMOL/L — LOW (ref -2–3)
BASOPHILS # BLD AUTO: 0 K/UL — SIGNIFICANT CHANGE UP (ref 0–0.2)
BASOPHILS NFR BLD AUTO: 0 % — SIGNIFICANT CHANGE UP (ref 0–2)
BILIRUB SERPL-MCNC: 0.3 MG/DL — SIGNIFICANT CHANGE UP (ref 0.2–1.2)
BILIRUB UR-MCNC: NEGATIVE — SIGNIFICANT CHANGE UP
BLOOD GAS ARTERIAL COMPREHENSIVE RESULT: SIGNIFICANT CHANGE UP
BLOOD GAS VENOUS COMPREHENSIVE RESULT: SIGNIFICANT CHANGE UP
BUN SERPL-MCNC: 13 MG/DL — SIGNIFICANT CHANGE UP (ref 7–23)
CALCIUM SERPL-MCNC: 8.5 MG/DL — SIGNIFICANT CHANGE UP (ref 8.4–10.5)
CHLORIDE BLDV-SCNC: 103 MMOL/L — SIGNIFICANT CHANGE UP (ref 96–108)
CHLORIDE SERPL-SCNC: 99 MMOL/L — SIGNIFICANT CHANGE UP (ref 98–107)
CO2 BLDV-SCNC: 25.1 MMOL/L — SIGNIFICANT CHANGE UP (ref 22–26)
CO2 SERPL-SCNC: 22 MMOL/L — SIGNIFICANT CHANGE UP (ref 22–31)
COLOR SPEC: YELLOW — SIGNIFICANT CHANGE UP
CREAT SERPL-MCNC: 1.03 MG/DL — SIGNIFICANT CHANGE UP (ref 0.5–1.3)
DIFF PNL FLD: ABNORMAL
EGFR: 53 ML/MIN/1.73M2 — LOW
EOSINOPHIL # BLD AUTO: 0 K/UL — SIGNIFICANT CHANGE UP (ref 0–0.5)
EOSINOPHIL NFR BLD AUTO: 0 % — SIGNIFICANT CHANGE UP (ref 0–6)
FLUAV AG NPH QL: DETECTED
FLUBV AG NPH QL: SIGNIFICANT CHANGE UP
GAS PNL BLDV: 135 MMOL/L — LOW (ref 136–145)
GIANT PLATELETS BLD QL SMEAR: PRESENT — SIGNIFICANT CHANGE UP
GLUCOSE BLDC GLUCOMTR-MCNC: 238 MG/DL — HIGH (ref 70–99)
GLUCOSE BLDC GLUCOMTR-MCNC: 270 MG/DL — HIGH (ref 70–99)
GLUCOSE BLDC GLUCOMTR-MCNC: 273 MG/DL — HIGH (ref 70–99)
GLUCOSE BLDV-MCNC: 266 MG/DL — HIGH (ref 70–99)
GLUCOSE SERPL-MCNC: 260 MG/DL — HIGH (ref 70–99)
GLUCOSE UR QL: >=1000 MG/DL
HCO3 BLDV-SCNC: 24 MMOL/L — SIGNIFICANT CHANGE UP (ref 22–29)
HCT VFR BLD CALC: 32.3 % — LOW (ref 34.5–45)
HCT VFR BLDA CALC: 33 % — LOW (ref 34.5–46.5)
HGB BLD CALC-MCNC: 11.1 G/DL — LOW (ref 11.7–16.1)
HGB BLD-MCNC: 10.9 G/DL — LOW (ref 11.5–15.5)
IANC: 7 K/UL — SIGNIFICANT CHANGE UP (ref 1.8–7.4)
INR BLD: 1.23 RATIO — HIGH (ref 0.85–1.18)
KETONES UR-MCNC: NEGATIVE MG/DL — SIGNIFICANT CHANGE UP
LACTATE BLDV-MCNC: 2.4 MMOL/L — HIGH (ref 0.5–2)
LEUKOCYTE ESTERASE UR-ACNC: NEGATIVE — SIGNIFICANT CHANGE UP
LYMPHOCYTES # BLD AUTO: 18.2 % — SIGNIFICANT CHANGE UP (ref 13–44)
LYMPHOCYTES # BLD AUTO: 2.2 K/UL — SIGNIFICANT CHANGE UP (ref 1–3.3)
MCHC RBC-ENTMCNC: 30.4 PG — SIGNIFICANT CHANGE UP (ref 27–34)
MCHC RBC-ENTMCNC: 33.7 GM/DL — SIGNIFICANT CHANGE UP (ref 32–36)
MCV RBC AUTO: 90 FL — SIGNIFICANT CHANGE UP (ref 80–100)
MONOCYTES # BLD AUTO: 1.42 K/UL — HIGH (ref 0–0.9)
MONOCYTES NFR BLD AUTO: 11.7 % — SIGNIFICANT CHANGE UP (ref 2–14)
NEUTROPHILS # BLD AUTO: 8.48 K/UL — HIGH (ref 1.8–7.4)
NEUTROPHILS NFR BLD AUTO: 67.9 % — SIGNIFICANT CHANGE UP (ref 43–77)
NEUTS BAND # BLD: 2.2 % — SIGNIFICANT CHANGE UP (ref 0–6)
NITRITE UR-MCNC: NEGATIVE — SIGNIFICANT CHANGE UP
PCO2 BLDV: 45 MMHG — SIGNIFICANT CHANGE UP (ref 39–52)
PH BLDV: 7.33 — SIGNIFICANT CHANGE UP (ref 7.32–7.43)
PH UR: 6.5 — SIGNIFICANT CHANGE UP (ref 5–8)
PLAT MORPH BLD: ABNORMAL
PLATELET # BLD AUTO: 174 K/UL — SIGNIFICANT CHANGE UP (ref 150–400)
PLATELET COUNT - ESTIMATE: NORMAL — SIGNIFICANT CHANGE UP
PO2 BLDV: 30 MMHG — SIGNIFICANT CHANGE UP (ref 25–45)
POTASSIUM BLDV-SCNC: 3.4 MMOL/L — LOW (ref 3.5–5.1)
POTASSIUM SERPL-MCNC: 3.6 MMOL/L — SIGNIFICANT CHANGE UP (ref 3.5–5.3)
POTASSIUM SERPL-SCNC: 3.6 MMOL/L — SIGNIFICANT CHANGE UP (ref 3.5–5.3)
PROCALCITONIN SERPL-MCNC: 0.34 NG/ML — HIGH (ref 0.02–0.1)
PROT SERPL-MCNC: 7.7 G/DL — SIGNIFICANT CHANGE UP (ref 6–8.3)
PROT UR-MCNC: 300 MG/DL
PROTHROM AB SERPL-ACNC: 13.7 SEC — HIGH (ref 9.5–13)
RBC # BLD: 3.59 M/UL — LOW (ref 3.8–5.2)
RBC # FLD: 14 % — SIGNIFICANT CHANGE UP (ref 10.3–14.5)
RBC BLD AUTO: NORMAL — SIGNIFICANT CHANGE UP
RBC CASTS # UR COMP ASSIST: 9 /HPF — HIGH (ref 0–4)
RSV RNA NPH QL NAA+NON-PROBE: SIGNIFICANT CHANGE UP
SAO2 % BLDV: 50 % — LOW (ref 67–88)
SARS-COV-2 RNA SPEC QL NAA+PROBE: SIGNIFICANT CHANGE UP
SODIUM SERPL-SCNC: 134 MMOL/L — LOW (ref 135–145)
SP GR SPEC: 1.02 — SIGNIFICANT CHANGE UP (ref 1–1.03)
SQUAMOUS # UR AUTO: 2 /HPF — SIGNIFICANT CHANGE UP (ref 0–5)
TROPONIN T, HIGH SENSITIVITY RESULT: 95 NG/L — CRITICAL HIGH
TSH SERPL-MCNC: 1.6 UIU/ML — SIGNIFICANT CHANGE UP (ref 0.27–4.2)
UROBILINOGEN FLD QL: 0.2 MG/DL — SIGNIFICANT CHANGE UP (ref 0.2–1)
WBC # BLD: 12.1 K/UL — HIGH (ref 3.8–10.5)
WBC # FLD AUTO: 12.1 K/UL — HIGH (ref 3.8–10.5)
WBC UR QL: 2 /HPF — SIGNIFICANT CHANGE UP (ref 0–5)

## 2024-03-14 PROCEDURE — 71045 X-RAY EXAM CHEST 1 VIEW: CPT | Mod: 26

## 2024-03-14 PROCEDURE — 99223 1ST HOSP IP/OBS HIGH 75: CPT

## 2024-03-14 PROCEDURE — 99285 EMERGENCY DEPT VISIT HI MDM: CPT

## 2024-03-14 RX ORDER — LANOLIN ALCOHOL/MO/W.PET/CERES
3 CREAM (GRAM) TOPICAL AT BEDTIME
Refills: 0 | Status: DISCONTINUED | OUTPATIENT
Start: 2024-03-14 | End: 2024-03-16

## 2024-03-14 RX ORDER — RIVAROXABAN 15 MG-20MG
1 KIT ORAL
Refills: 0 | DISCHARGE

## 2024-03-14 RX ORDER — RIVAROXABAN 15 MG-20MG
2.5 KIT ORAL
Refills: 0 | Status: DISCONTINUED | OUTPATIENT
Start: 2024-03-14 | End: 2024-03-17

## 2024-03-14 RX ORDER — CEFTRIAXONE 500 MG/1
1000 INJECTION, POWDER, FOR SOLUTION INTRAMUSCULAR; INTRAVENOUS ONCE
Refills: 0 | Status: COMPLETED | OUTPATIENT
Start: 2024-03-14 | End: 2024-03-14

## 2024-03-14 RX ORDER — SODIUM CHLORIDE 9 MG/ML
1000 INJECTION, SOLUTION INTRAVENOUS
Refills: 0 | Status: DISCONTINUED | OUTPATIENT
Start: 2024-03-14 | End: 2024-03-19

## 2024-03-14 RX ORDER — ACETAMINOPHEN 500 MG
650 TABLET ORAL EVERY 6 HOURS
Refills: 0 | Status: DISCONTINUED | OUTPATIENT
Start: 2024-03-14 | End: 2024-03-22

## 2024-03-14 RX ORDER — SODIUM CHLORIDE 9 MG/ML
1000 INJECTION, SOLUTION INTRAVENOUS ONCE
Refills: 0 | Status: COMPLETED | OUTPATIENT
Start: 2024-03-14 | End: 2024-03-14

## 2024-03-14 RX ORDER — LEVOTHYROXINE SODIUM 125 MCG
1 TABLET ORAL
Refills: 0 | DISCHARGE

## 2024-03-14 RX ORDER — AMLODIPINE BESYLATE 2.5 MG/1
0 TABLET ORAL
Qty: 0 | Refills: 0 | DISCHARGE

## 2024-03-14 RX ORDER — LEVOTHYROXINE SODIUM 125 MCG
25 TABLET ORAL DAILY
Refills: 0 | Status: DISCONTINUED | OUTPATIENT
Start: 2024-03-14 | End: 2024-03-18

## 2024-03-14 RX ORDER — IPRATROPIUM/ALBUTEROL SULFATE 18-103MCG
3 AEROSOL WITH ADAPTER (GRAM) INHALATION EVERY 6 HOURS
Refills: 0 | Status: DISCONTINUED | OUTPATIENT
Start: 2024-03-14 | End: 2024-04-05

## 2024-03-14 RX ORDER — AMLODIPINE BESYLATE 2.5 MG/1
1 TABLET ORAL
Refills: 0 | DISCHARGE

## 2024-03-14 RX ORDER — DEXTROSE 50 % IN WATER 50 %
25 SYRINGE (ML) INTRAVENOUS ONCE
Refills: 0 | Status: DISCONTINUED | OUTPATIENT
Start: 2024-03-14 | End: 2024-03-19

## 2024-03-14 RX ORDER — DEXTROSE 50 % IN WATER 50 %
12.5 SYRINGE (ML) INTRAVENOUS ONCE
Refills: 0 | Status: DISCONTINUED | OUTPATIENT
Start: 2024-03-14 | End: 2024-03-19

## 2024-03-14 RX ORDER — GLUCAGON INJECTION, SOLUTION 0.5 MG/.1ML
1 INJECTION, SOLUTION SUBCUTANEOUS ONCE
Refills: 0 | Status: DISCONTINUED | OUTPATIENT
Start: 2024-03-14 | End: 2024-03-19

## 2024-03-14 RX ORDER — ACETAMINOPHEN 500 MG
1000 TABLET ORAL ONCE
Refills: 0 | Status: COMPLETED | OUTPATIENT
Start: 2024-03-14 | End: 2024-03-14

## 2024-03-14 RX ORDER — AZITHROMYCIN 500 MG/1
500 TABLET, FILM COATED ORAL ONCE
Refills: 0 | Status: COMPLETED | OUTPATIENT
Start: 2024-03-14 | End: 2024-03-14

## 2024-03-14 RX ORDER — LOSARTAN POTASSIUM 100 MG/1
1 TABLET, FILM COATED ORAL
Qty: 0 | Refills: 0 | DISCHARGE

## 2024-03-14 RX ORDER — DEXTROSE 50 % IN WATER 50 %
15 SYRINGE (ML) INTRAVENOUS ONCE
Refills: 0 | Status: DISCONTINUED | OUTPATIENT
Start: 2024-03-14 | End: 2024-03-19

## 2024-03-14 RX ORDER — FAMOTIDINE 10 MG/ML
1 INJECTION INTRAVENOUS
Refills: 0 | DISCHARGE

## 2024-03-14 RX ORDER — INSULIN LISPRO 100/ML
VIAL (ML) SUBCUTANEOUS
Refills: 0 | Status: DISCONTINUED | OUTPATIENT
Start: 2024-03-14 | End: 2024-03-17

## 2024-03-14 RX ORDER — ATORVASTATIN CALCIUM 80 MG/1
80 TABLET, FILM COATED ORAL AT BEDTIME
Refills: 0 | Status: DISCONTINUED | OUTPATIENT
Start: 2024-03-14 | End: 2024-03-26

## 2024-03-14 RX ORDER — IBUPROFEN 200 MG
400 TABLET ORAL ONCE
Refills: 0 | Status: COMPLETED | OUTPATIENT
Start: 2024-03-14 | End: 2024-03-14

## 2024-03-14 RX ORDER — AZITHROMYCIN 500 MG/1
500 TABLET, FILM COATED ORAL EVERY 24 HOURS
Refills: 0 | Status: COMPLETED | OUTPATIENT
Start: 2024-03-15 | End: 2024-03-16

## 2024-03-14 RX ORDER — LOSARTAN POTASSIUM 100 MG/1
100 TABLET, FILM COATED ORAL DAILY
Refills: 0 | Status: DISCONTINUED | OUTPATIENT
Start: 2024-03-14 | End: 2024-03-15

## 2024-03-14 RX ORDER — METFORMIN HYDROCHLORIDE 850 MG/1
1 TABLET ORAL
Qty: 0 | Refills: 1 | DISCHARGE

## 2024-03-14 RX ORDER — FAMOTIDINE 10 MG/ML
20 INJECTION INTRAVENOUS DAILY
Refills: 0 | Status: DISCONTINUED | OUTPATIENT
Start: 2024-03-14 | End: 2024-03-26

## 2024-03-14 RX ORDER — INFLUENZA VIRUS VACCINE 15; 15; 15; 15 UG/.5ML; UG/.5ML; UG/.5ML; UG/.5ML
0.7 SUSPENSION INTRAMUSCULAR ONCE
Refills: 0 | Status: DISCONTINUED | OUTPATIENT
Start: 2024-03-14 | End: 2024-04-05

## 2024-03-14 RX ORDER — RANOLAZINE 500 MG/1
500 TABLET, FILM COATED, EXTENDED RELEASE ORAL
Refills: 0 | Status: DISCONTINUED | OUTPATIENT
Start: 2024-03-14 | End: 2024-03-26

## 2024-03-14 RX ORDER — ATENOLOL 25 MG/1
100 TABLET ORAL DAILY
Refills: 0 | Status: DISCONTINUED | OUTPATIENT
Start: 2024-03-14 | End: 2024-03-17

## 2024-03-14 RX ORDER — CEFTRIAXONE 500 MG/1
1000 INJECTION, POWDER, FOR SOLUTION INTRAMUSCULAR; INTRAVENOUS EVERY 24 HOURS
Refills: 0 | Status: DISCONTINUED | OUTPATIENT
Start: 2024-03-15 | End: 2024-03-17

## 2024-03-14 RX ORDER — ASPIRIN/CALCIUM CARB/MAGNESIUM 324 MG
81 TABLET ORAL DAILY
Refills: 0 | Status: DISCONTINUED | OUTPATIENT
Start: 2024-03-14 | End: 2024-03-19

## 2024-03-14 RX ORDER — ISOSORBIDE MONONITRATE 60 MG/1
30 TABLET, EXTENDED RELEASE ORAL DAILY
Refills: 0 | Status: DISCONTINUED | OUTPATIENT
Start: 2024-03-14 | End: 2024-03-15

## 2024-03-14 RX ADMIN — CEFTRIAXONE 100 MILLIGRAM(S): 500 INJECTION, POWDER, FOR SOLUTION INTRAMUSCULAR; INTRAVENOUS at 11:07

## 2024-03-14 RX ADMIN — Medication 75 MILLIGRAM(S): at 19:03

## 2024-03-14 RX ADMIN — Medication 400 MILLIGRAM(S): at 14:19

## 2024-03-14 RX ADMIN — AZITHROMYCIN 255 MILLIGRAM(S): 500 TABLET, FILM COATED ORAL at 12:16

## 2024-03-14 RX ADMIN — RANOLAZINE 500 MILLIGRAM(S): 500 TABLET, FILM COATED, EXTENDED RELEASE ORAL at 19:03

## 2024-03-14 RX ADMIN — Medication 3: at 18:25

## 2024-03-14 RX ADMIN — SODIUM CHLORIDE 1000 MILLILITER(S): 9 INJECTION, SOLUTION INTRAVENOUS at 10:57

## 2024-03-14 RX ADMIN — Medication 81 MILLIGRAM(S): at 19:03

## 2024-03-14 RX ADMIN — Medication 400 MILLIGRAM(S): at 10:57

## 2024-03-14 NOTE — H&P ADULT - ASSESSMENT
Patient is an 89yo F with PMH significant for CAD s/p PCI x6 11/2019 at [facility], HTN, HLD, T2DM, and HFpEF who presented with fever and generalized weakness of 2 days’ duration, admitted with sepsis and acute hypoxic respiratory failure due to influenza.

## 2024-03-14 NOTE — ED PROVIDER NOTE - CLINICAL SUMMARY MEDICAL DECISION MAKING FREE TEXT BOX
Amber: Consider flu/COVID or bacterial PNA. Check sepsis labs (UA, UCx, BCx, lactate, RVP). Give antipyretics (if needed). Give IVF and ABx. Admit.

## 2024-03-14 NOTE — ED PROVIDER NOTE - ATTENDING CONTRIBUTION TO CARE
I performed a face-to-face evaluation of the patient and performed a history and physical examination. I agree with the history and physical examination. If this was a PA visit, I personally saw the patient with the PA and performed a substantive portion of the visit including all aspects of the medical decision making.    Consider flu/COVID or bacterial PNA. Check sepsis labs (UA, UCx, BCx, lactate, RVP). Give antipyretics (if needed). Give IVF and ABx. Admit.

## 2024-03-14 NOTE — ED ADULT NURSE NOTE - NSFALLRISKINTERV_ED_ALL_ED
Assistance OOB with selected safe patient handling equipment if applicable/Assistance with ambulation/Communicate fall risk and risk factors to all staff, patient, and family/Monitor gait and stability/Provide visual cue: yellow wristband, yellow gown, etc/Reinforce activity limits and safety measures with patient and family/Call bell, personal items and telephone in reach/Instruct patient to call for assistance before getting out of bed/chair/stretcher/Non-slip footwear applied when patient is off stretcher/[place] to call system/Physically safe environment - no spills, clutter or unnecessary equipment/Purposeful Proactive Rounding/Room/bathroom lighting operational, light cord in reach

## 2024-03-14 NOTE — H&P ADULT - PROBLEM SELECTOR PLAN 1
#Influenza A  - requiring supplemental O2 via 2L NC – wean as tolerated  - febrile, tachycardic, with leukocytosis  - CXR: clear lungs  - offer supportive care – caution with IVF in pt with CHF  - start oseltamivir  - given 1 dose each ceftriaxone and azithromycin in ED, but no overt evidence of bacterial PNA – will hold for now – check procalcitonin  - f/u BCx  - trend fever curve #Influenza A  - requiring supplemental O2 via 2L NC – wean as tolerated  - febrile, tachycardic, with leukocytosis  - CXR: clear lungs  - offer supportive care – caution with IVF in pt with CHF  - start oseltamivir  - procalcitonin elevated - will continue empiric CAP coverage  - check MRSA/MSSA PCR, legionella Ag/Cx, strep urine Ag, sputum Cx  - f/u BCx  - trend fever curve

## 2024-03-14 NOTE — H&P ADULT - HISTORY OF PRESENT ILLNESS
Patient is an 87yo F with PMH significant for CAD s/p PCI x6 11/2019 at [facility], HTN, HLD, T2DM, and HFpEF who presented with fever and generalized weakness of 2 days’ duration. She has multiple family members with influenza. She began to develop dyspnea on the morning of presentation.     The patient offers no acute complaints. Son at bedside states she has been having difficulty breathing, weakness, and fever for 1 day.     Vital signs significant for: Tmax 40.2C, HR up to 122, hypertensive up to 163/72, RR 17-20, SpO2 93% on RA initially now 100% on 2L NC  Labs significant for: WBC 12.10, Hb 10.9, Na 134, lactate 2.4, AST 84, ALT 71, TSH 1.60, ABG likely reflects venous blood sample: pH 7.29, pCO2 49, pO2 36. UA: 300 protein, mod blood, 9 RBCs, 2 WBCs, neg bacteria. Influenza A positive.   Imaging significant for: CXR: clear lungs

## 2024-03-14 NOTE — ED PROVIDER NOTE - OBJECTIVE STATEMENT
Amber: DM (metformin), CAD (6 stents). PM non-[facility]. Has 3 family members w/ Flu. P/w F and general weakness 1-2 days. This AM, unable to support her own weight 2/2 general weakness. F to 104 rectally here. SOB this AM. No recent travel. Mild hypoxia here.     Translation by  phone was offered, but patient refused this translation service. Prefers family/friends translate.

## 2024-03-14 NOTE — H&P ADULT - NSHPLABSRESULTS_GEN_ALL_CORE
LABS:                        10.9   12.10 )-----------( 174      ( 14 Mar 2024 10:50 )             32.3     03-14    134<L>  |  99  |  13  ----------------------------<  260<H>  3.6   |  22  |  1.03    Ca    8.5      14 Mar 2024 10:50    TPro  7.7  /  Alb  4.1  /  TBili  0.3  /  DBili  x   /  AST  84<H>  /  ALT  71<H>  /  AlkPhos  115  03-14    PT/INR - ( 14 Mar 2024 10:50 )   PT: 13.7 sec;   INR: 1.23 ratio         PTT - ( 14 Mar 2024 10:50 )  PTT:32.9 sec      Urinalysis Basic - ( 14 Mar 2024 10:58 )    Color: Yellow / Appearance: Clear / S.018 / pH: x  Gluc: x / Ketone: Negative mg/dL  / Bili: Negative / Urobili: 0.2 mg/dL   Blood: x / Protein: 300 mg/dL / Nitrite: Negative   Leuk Esterase: Negative / RBC: 9 /HPF / WBC 2 /HPF   Sq Epi: x / Non Sq Epi: 2 /HPF / Bacteria: Negative /HPF

## 2024-03-14 NOTE — ED PROVIDER NOTE - PROGRESS NOTE DETAILS
Amber:  I independently interpreted the EKG. My interpretation of the EKG: No hyper-acute T waves, , no malignant dysrhythmia, new TWI V3. Hermilo PIMENTEL, EM/IM PGY-3: Pt >95% on RA while awake, however when asleep intermittently desaturates to 88% while snoring, likely with sleep Apnea but unlikely to be hypoxic from her influenza infection, will keep on 2L while sleeping

## 2024-03-14 NOTE — ED PROVIDER NOTE - PHYSICAL EXAMINATION
Ill-appearing, well nourished, drowsy but arousable, oriented to person, place, in no apparent distress.    Airway patent. Neck supple.    Eyes without scleral injection. No jaundice.    Strong pulse.    Respirations unlabored. Mild (B) wheezing.    Abdomen soft, non-tender, no guarding.    MSK: Spine appears normal, range of motion is not limited, no muscle or joint tenderness. No LE edema.     Alert and oriented, no gross motor or sensory deficits.    Skin: normal color for race, warm, dry and intact. No evidence of rash.    No SI/HI.

## 2024-03-14 NOTE — H&P ADULT - TIME BILLING
Time-based billing (NON-critical care).     80 minutes spent on total encounter; more than 50% of the visit was spent counseling and / or coordinating care by the attending physician.  The necessity of the time spent during the encounter on this date of service was due to:     review of laboratory data, radiology results, consultants' recommendations, documentation in [place], discussion with patient/son

## 2024-03-14 NOTE — ED ADULT NURSE NOTE - OBJECTIVE STATEMENT
Pt received to rm 5   , awake and alert, A&OX2-3, ambulatory at baseline. hx of stent , and DM2 on metformin C/o pt Yi speaking son at bedisde to transalte. pt has had fever and flu like symptoms over the past few days. pt son states that family at home is also sick.  pt denies any urinary complanits. pt rectal temp was 104.3 MD tucker man at bedside made aware. pt placed on cardiac monitor. Sinus Tach. MD aware. pt placed on 2L NC , 100% o2 .  Respirations even and unlabored. Denies CP, SOB, N/V, HA, dizziness, palpitations, blurry vision. 20G IV placed to  right AC   . Bed in lowest position, call bell within reach. Safety maintained.

## 2024-03-14 NOTE — H&P ADULT - NSHPPHYSICALEXAM_GEN_ALL_CORE
PHYSICAL EXAM:  Vital Signs Last 24 Hrs  T(C): 37.6 (14 Mar 2024 15:05), Max: 40.2 (14 Mar 2024 11:15)  T(F): 99.6 (14 Mar 2024 15:05), Max: 104.3 (14 Mar 2024 11:15)  HR: 108 (14 Mar 2024 12:33) (108 - 122)  BP: 122/64 (14 Mar 2024 12:33) (122/64 - 163/72)  BP(mean): --  RR: 20 (14 Mar 2024 12:33) (17 - 20)  SpO2: 100% (14 Mar 2024 12:33) (93% - 100%)    Parameters below as of 14 Mar 2024 12:33  Patient On (Oxygen Delivery Method): nasal cannula  O2 Flow (L/min): 2    CONSTITUTIONAL: NAD, well-groomed  EYES: PERRLA; conjunctiva and sclera clear  ENMT: Moist oral mucosa, no pharyngeal injection or exudates; normal dentition  NECK: Supple, no palpable masses; no thyromegaly  RESPIRATORY: Normal respiratory effort; lungs are clear to auscultation bilaterally  CARDIOVASCULAR: Regular rate and rhythm, normal S1 and S2, no murmur/rub/gallop; No lower extremity edema; Peripheral pulses are 2+ bilaterally  ABDOMEN: Nontender to palpation, normoactive bowel sounds, no rebound/guarding; No hepatosplenomegaly  MUSCULOSKELETAL: no clubbing or cyanosis of digits; no joint swelling or tenderness to palpation  PSYCH: not answering orientation questions  NEUROLOGY: not cooperative with neurologic exam, falls back asleep quickly  SKIN: No rashes; no palpable lesions

## 2024-03-14 NOTE — H&P ADULT - PROBLEM SELECTOR PLAN 3
- s/p PCI x6 at [facility] 11/2019  - resume home aspirin, statin, Xarelto 2.5mg po BID  - no acute evidence of ischemia  - check troponin

## 2024-03-15 LAB
A1C WITH ESTIMATED AVERAGE GLUCOSE RESULT: 7.7 % — HIGH (ref 4–5.6)
ALBUMIN SERPL ELPH-MCNC: 3.1 G/DL — LOW (ref 3.3–5)
ALP SERPL-CCNC: 92 U/L — SIGNIFICANT CHANGE UP (ref 40–120)
ALT FLD-CCNC: 43 U/L — HIGH (ref 4–33)
ANION GAP SERPL CALC-SCNC: 11 MMOL/L — SIGNIFICANT CHANGE UP (ref 7–14)
AST SERPL-CCNC: 54 U/L — HIGH (ref 4–32)
BASOPHILS # BLD AUTO: 0.02 K/UL — SIGNIFICANT CHANGE UP (ref 0–0.2)
BASOPHILS NFR BLD AUTO: 0.2 % — SIGNIFICANT CHANGE UP (ref 0–2)
BILIRUB SERPL-MCNC: <0.2 MG/DL — SIGNIFICANT CHANGE UP (ref 0.2–1.2)
BUN SERPL-MCNC: 18 MG/DL — SIGNIFICANT CHANGE UP (ref 7–23)
CALCIUM SERPL-MCNC: 7.9 MG/DL — LOW (ref 8.4–10.5)
CHLORIDE SERPL-SCNC: 104 MMOL/L — SIGNIFICANT CHANGE UP (ref 98–107)
CO2 SERPL-SCNC: 20 MMOL/L — LOW (ref 22–31)
CREAT SERPL-MCNC: 1.02 MG/DL — SIGNIFICANT CHANGE UP (ref 0.5–1.3)
CULTURE RESULTS: SIGNIFICANT CHANGE UP
EGFR: 54 ML/MIN/1.73M2 — LOW
EOSINOPHIL # BLD AUTO: 0.01 K/UL — SIGNIFICANT CHANGE UP (ref 0–0.5)
EOSINOPHIL NFR BLD AUTO: 0.1 % — SIGNIFICANT CHANGE UP (ref 0–6)
ESTIMATED AVERAGE GLUCOSE: 174 — SIGNIFICANT CHANGE UP
FERRITIN SERPL-MCNC: 318 NG/ML — SIGNIFICANT CHANGE UP (ref 13–330)
GLUCOSE BLDC GLUCOMTR-MCNC: 232 MG/DL — HIGH (ref 70–99)
GLUCOSE BLDC GLUCOMTR-MCNC: 264 MG/DL — HIGH (ref 70–99)
GLUCOSE BLDC GLUCOMTR-MCNC: 278 MG/DL — HIGH (ref 70–99)
GLUCOSE BLDC GLUCOMTR-MCNC: 310 MG/DL — HIGH (ref 70–99)
GLUCOSE SERPL-MCNC: 306 MG/DL — HIGH (ref 70–99)
HCT VFR BLD CALC: 31 % — LOW (ref 34.5–45)
HGB BLD-MCNC: 10 G/DL — LOW (ref 11.5–15.5)
IANC: 6.93 K/UL — SIGNIFICANT CHANGE UP (ref 1.8–7.4)
IMM GRANULOCYTES NFR BLD AUTO: 0.7 % — SIGNIFICANT CHANGE UP (ref 0–0.9)
IRON SATN MFR SERPL: 13 UG/DL — LOW (ref 30–160)
IRON SATN MFR SERPL: 6 % — LOW (ref 14–50)
LACTATE BLDV-MCNC: 1.7 MMOL/L — SIGNIFICANT CHANGE UP (ref 0.5–2)
LYMPHOCYTES # BLD AUTO: 1.41 K/UL — SIGNIFICANT CHANGE UP (ref 1–3.3)
LYMPHOCYTES # BLD AUTO: 13.6 % — SIGNIFICANT CHANGE UP (ref 13–44)
MCHC RBC-ENTMCNC: 30.3 PG — SIGNIFICANT CHANGE UP (ref 27–34)
MCHC RBC-ENTMCNC: 32.3 GM/DL — SIGNIFICANT CHANGE UP (ref 32–36)
MCV RBC AUTO: 93.9 FL — SIGNIFICANT CHANGE UP (ref 80–100)
MONOCYTES # BLD AUTO: 1.9 K/UL — HIGH (ref 0–0.9)
MONOCYTES NFR BLD AUTO: 18.4 % — HIGH (ref 2–14)
MRSA PCR RESULT.: SIGNIFICANT CHANGE UP
NEUTROPHILS # BLD AUTO: 6.93 K/UL — SIGNIFICANT CHANGE UP (ref 1.8–7.4)
NEUTROPHILS NFR BLD AUTO: 67 % — SIGNIFICANT CHANGE UP (ref 43–77)
NRBC # BLD: 0 /100 WBCS — SIGNIFICANT CHANGE UP (ref 0–0)
NRBC # FLD: 0 K/UL — SIGNIFICANT CHANGE UP (ref 0–0)
PLATELET # BLD AUTO: 155 K/UL — SIGNIFICANT CHANGE UP (ref 150–400)
POTASSIUM SERPL-MCNC: 3.7 MMOL/L — SIGNIFICANT CHANGE UP (ref 3.5–5.3)
POTASSIUM SERPL-SCNC: 3.7 MMOL/L — SIGNIFICANT CHANGE UP (ref 3.5–5.3)
PROT SERPL-MCNC: 6.4 G/DL — SIGNIFICANT CHANGE UP (ref 6–8.3)
RBC # BLD: 3.3 M/UL — LOW (ref 3.8–5.2)
RBC # FLD: 14.1 % — SIGNIFICANT CHANGE UP (ref 10.3–14.5)
S AUREUS DNA NOSE QL NAA+PROBE: DETECTED
SODIUM SERPL-SCNC: 135 MMOL/L — SIGNIFICANT CHANGE UP (ref 135–145)
SPECIMEN SOURCE: SIGNIFICANT CHANGE UP
TIBC SERPL-MCNC: 215 UG/DL — LOW (ref 220–430)
UIBC SERPL-MCNC: 202 UG/DL — SIGNIFICANT CHANGE UP (ref 110–370)
WBC # BLD: 10.34 K/UL — SIGNIFICANT CHANGE UP (ref 3.8–10.5)
WBC # FLD AUTO: 10.34 K/UL — SIGNIFICANT CHANGE UP (ref 3.8–10.5)

## 2024-03-15 PROCEDURE — 99232 SBSQ HOSP IP/OBS MODERATE 35: CPT

## 2024-03-15 RX ORDER — MUPIROCIN 20 MG/G
1 OINTMENT TOPICAL
Refills: 0 | Status: COMPLETED | OUTPATIENT
Start: 2024-03-15 | End: 2024-03-20

## 2024-03-15 RX ORDER — SODIUM CHLORIDE 9 MG/ML
500 INJECTION, SOLUTION INTRAVENOUS
Refills: 0 | Status: DISCONTINUED | OUTPATIENT
Start: 2024-03-15 | End: 2024-03-15

## 2024-03-15 RX ORDER — INSULIN LISPRO 100/ML
VIAL (ML) SUBCUTANEOUS AT BEDTIME
Refills: 0 | Status: DISCONTINUED | OUTPATIENT
Start: 2024-03-15 | End: 2024-03-17

## 2024-03-15 RX ADMIN — AZITHROMYCIN 255 MILLIGRAM(S): 500 TABLET, FILM COATED ORAL at 00:16

## 2024-03-15 RX ADMIN — Medication 3 MILLILITER(S): at 04:51

## 2024-03-15 RX ADMIN — ATORVASTATIN CALCIUM 80 MILLIGRAM(S): 80 TABLET, FILM COATED ORAL at 00:16

## 2024-03-15 RX ADMIN — Medication 3 MILLILITER(S): at 08:02

## 2024-03-15 RX ADMIN — ATORVASTATIN CALCIUM 80 MILLIGRAM(S): 80 TABLET, FILM COATED ORAL at 21:09

## 2024-03-15 RX ADMIN — Medication 2: at 09:01

## 2024-03-15 RX ADMIN — Medication 3 MILLILITER(S): at 14:57

## 2024-03-15 RX ADMIN — RIVAROXABAN 2.5 MILLIGRAM(S): KIT at 00:16

## 2024-03-15 RX ADMIN — CEFTRIAXONE 100 MILLIGRAM(S): 500 INJECTION, POWDER, FOR SOLUTION INTRAMUSCULAR; INTRAVENOUS at 00:16

## 2024-03-15 RX ADMIN — Medication 30 MILLIGRAM(S): at 18:29

## 2024-03-15 RX ADMIN — RIVAROXABAN 2.5 MILLIGRAM(S): KIT at 17:27

## 2024-03-15 RX ADMIN — Medication 2: at 22:47

## 2024-03-15 RX ADMIN — CEFTRIAXONE 100 MILLIGRAM(S): 500 INJECTION, POWDER, FOR SOLUTION INTRAMUSCULAR; INTRAVENOUS at 23:23

## 2024-03-15 RX ADMIN — Medication 25 MICROGRAM(S): at 05:24

## 2024-03-15 RX ADMIN — Medication 3 MILLILITER(S): at 22:14

## 2024-03-15 RX ADMIN — Medication 3: at 17:26

## 2024-03-15 RX ADMIN — RANOLAZINE 500 MILLIGRAM(S): 500 TABLET, FILM COATED, EXTENDED RELEASE ORAL at 18:29

## 2024-03-15 RX ADMIN — MUPIROCIN 1 APPLICATION(S): 20 OINTMENT TOPICAL at 21:12

## 2024-03-15 RX ADMIN — Medication 10 MILLIGRAM(S): at 05:23

## 2024-03-15 RX ADMIN — LOSARTAN POTASSIUM 100 MILLIGRAM(S): 100 TABLET, FILM COATED ORAL at 05:24

## 2024-03-15 RX ADMIN — ATENOLOL 100 MILLIGRAM(S): 25 TABLET ORAL at 05:24

## 2024-03-15 RX ADMIN — FAMOTIDINE 20 MILLIGRAM(S): 10 INJECTION INTRAVENOUS at 15:27

## 2024-03-15 RX ADMIN — Medication 81 MILLIGRAM(S): at 15:27

## 2024-03-15 RX ADMIN — RANOLAZINE 500 MILLIGRAM(S): 500 TABLET, FILM COATED, EXTENDED RELEASE ORAL at 05:24

## 2024-03-15 RX ADMIN — SODIUM CHLORIDE 100 MILLILITER(S): 9 INJECTION, SOLUTION INTRAVENOUS at 12:14

## 2024-03-15 RX ADMIN — Medication 30 MILLIGRAM(S): at 05:24

## 2024-03-15 NOTE — CHART NOTE - NSCHARTNOTEFT_GEN_A_CORE
Late chart note entry:     Notified by RN patient found sitting on floor with son and PCA at bedside. Upon arrival to room, patient on the floor. Son at bedside states he watched patient lower herself to the floor to sit down. Assisted patient back to bed. No traumatic skin findings observed. Manual BP taken reading 80s/40s mmHg. Per son translation at bedside, patient denies dizziness, lightheadedness. Discussed plan with Dr. Elliott also present at bedside. Stopped Losartan and Torsemide. Started IVF. TTE ordered. Close monitoring of BP discussed with RN.     Notified approx 2hr later by RN patient with new wheezing and complaint of dyspnea. Patient currently satting well (SaO2 >94%) on 1LNC, weaned down from 2L this AM. Rpt BP is 109/55. Stopped IVF. RT called to administer standing Duoneb tx due at that time. Assessed patient at bedside. Posterior lungs with mild faint diffuse wheezing. No significant upper and lower extremity edema noted. Will continue Duonebs ATC and monitor for improvement off IVF. Discussed with Dr. Elliott.     Blanca Santamaria PA-C  Department of Medicine   In-house pager #[phone] Late chart note entry:     Notified by RN patient found sitting on floor with son and PCA at bedside. Upon arrival to room, patient on the floor. Son at bedside states he watched patient lower herself to the floor to sit down. Assisted patient back to bed. No traumatic skin findings observed. Manual BP taken reading 80s/40s mmHg. Per son translation at bedside, patient denies dizziness, lightheadedness. Discussed plan with Dr. Elliott also present at bedside. Stopped Losartan and Torsemide. Started IVF. TTE ordered. Close monitoring of BP discussed with RN. Plan to obtain orthostatics once BP stabilizes.     Notified approx 2hr later by RN patient with new wheezing and complaint of dyspnea. Patient currently satting well (SaO2 >94%) on 1LNC, weaned down from 2L this AM. Rpt BP is 109/55. Stopped IVF. RT called to administer standing Duoneb tx due at that time. Assessed patient at bedside. Posterior lungs with mild faint diffuse wheezing. No significant upper and lower extremity edema noted. Will continue Duonebs ATC and monitor for improvement off IVF. Discussed with Dr. Elliott.     Blanca Santamaria PA-C  Department of Medicine   In-house pager #[phone]

## 2024-03-15 NOTE — PROGRESS NOTE ADULT - ASSESSMENT
Patient is an 87yo F with PMH significant for CAD s/p PCI x6 11/2019 at [facility], HTN, HLD, T2DM, and HFpEF who presented with fever and generalized weakness of 2 days’ duration, admitted with sepsis and acute hypoxic respiratory failure due to influenza.

## 2024-03-15 NOTE — PROGRESS NOTE ADULT - PROBLEM SELECTOR PLAN 1
#Influenza A  - requiring supplemental O2 via 2L NC – wean as tolerated  - febrile, tachycardic, with leukocytosis  - CXR: clear lungs  - offer supportive care – caution with IVF in pt with CHF  - start oseltamivir  - procalcitonin elevated - will continue empiric CAP coverage  - MRSA PCR negative, MSSA PCR positive, f/u legionella Ag/Cx, strep urine Ag, sputum Cx  - f/u BCx  - trend fever curve

## 2024-03-15 NOTE — PROGRESS NOTE ADULT - SUBJECTIVE AND OBJECTIVE BOX
Lionel Elliott MD  [facility] Division of [facility] Medicine  Pager: [phone]  Available via MS Teams    SUBJECTIVE / OVERNIGHT EVENTS:    patient just states she feels weak. Denies all other complaints     MEDICATIONS  (STANDING):  albuterol/ipratropium for Nebulization 3 milliLiter(s) Nebulizer every 6 hours  aspirin enteric coated 81 milliGRAM(s) Oral daily  atenolol  Tablet 100 milliGRAM(s) Oral daily  atorvastatin 80 milliGRAM(s) Oral at bedtime  azithromycin  IVPB 500 milliGRAM(s) IV Intermittent every 24 hours  cefTRIAXone   IVPB 1000 milliGRAM(s) IV Intermittent every 24 hours  dextrose 5%. 1000 milliLiter(s) (50 mL/Hr) IV Continuous <Continuous>  dextrose 5%. 1000 milliLiter(s) (100 mL/Hr) IV Continuous <Continuous>  dextrose 50% Injectable 25 Gram(s) IV Push once  dextrose 50% Injectable 12.5 Gram(s) IV Push once  dextrose 50% Injectable 25 Gram(s) IV Push once  famotidine    Tablet 20 milliGRAM(s) Oral daily  glucagon  Injectable 1 milliGRAM(s) IntraMuscular once  influenza  Vaccine (HIGH DOSE) 0.7 milliLiter(s) IntraMuscular once  insulin lispro (ADMELOG) corrective regimen sliding scale   SubCutaneous three times a day before meals  levothyroxine 25 MICROGram(s) Oral daily  oseltamivir 30 milliGRAM(s) Oral two times a day  ranolazine 500 milliGRAM(s) Oral two times a day  rivaroxaban 2.5 milliGRAM(s) Oral two times a day    MEDICATIONS  (PRN):  acetaminophen     Tablet .. 650 milliGRAM(s) Oral every 6 hours PRN Temp greater or equal to 38C (100.4F), Mild Pain (1 - 3)  dextrose Oral Gel 15 Gram(s) Oral once PRN Blood Glucose LESS THAN 70 milliGRAM(s)/deciliter  melatonin 3 milliGRAM(s) Oral at bedtime PRN Insomnia      I&O's Summary      PHYSICAL EXAM:  Vital Signs Last 24 Hrs  T(C): 37.1 (15 Mar 2024 13:10), Max: 37.4 (14 Mar 2024 17:50)  T(F): 98.7 (15 Mar 2024 13:10), Max: 99.3 (14 Mar 2024 17:50)  HR: 70 (15 Mar 2024 14:57) (63 - 94)  BP: 111/54 (15 Mar 2024 14:13) (101/40 - 132/72)  BP(mean): 68 (14 Mar 2024 21:00) (68 - 68)  RR: 22 (15 Mar 2024 14:13) (18 - 22)  SpO2: 100% (15 Mar 2024 14:57) (96% - 100%)    Parameters below as of 15 Mar 2024 14:57  Patient On (Oxygen Delivery Method): nasal cannula      CONSTITUTIONAL: NAD   EYES: conjunctiva and sclera clear  ENMT: Moist oral mucosa   NECK: Supple   RESPIRATORY: Normal respiratory effort; lungs are clear to auscultation bilaterally  CARDIOVASCULAR: Regular rate and rhythm, normal S1 and S2, no murmur/rub/gallop; Peripheral pulses are 2+ bilaterally  ABDOMEN: Nontender to palpation, normoactive bowel sounds, no rebound/guarding   MUSCULOSKELETAL: No lower extremity edema   PSYCH: A+O to person, place, and time; affect appropriate  NEUROLOGY: moves all extremities   SKIN: No rashes    LABS:                        10.0   10.34 )-----------( 155      ( 15 Mar 2024 06:40 )             31.0     03-15    135  |  104  |  18  ----------------------------<  306<H>  3.7   |  20<L>  |  1.02    Ca    7.9<L>      15 Mar 2024 06:40    TPro  6.4  /  Alb  3.1<L>  /  TBili  <0.2  /  DBili  x   /  AST  54<H>  /  ALT  43<H>  /  AlkPhos  92  03-15    PT/INR - ( 14 Mar 2024 10:50 )   PT: 13.7 sec;   INR: 1.23 ratio         PTT - ( 14 Mar 2024 10:50 )  PTT:32.9 sec      Urinalysis Basic - ( 15 Mar 2024 06:40 )    Color: x / Appearance: x / SG: x / pH: x  Gluc: 306 mg/dL / Ketone: x  / Bili: x / Urobili: x   Blood: x / Protein: x / Nitrite: x   Leuk Esterase: x / RBC: x / WBC x   Sq Epi: x / Non Sq Epi: x / Bacteria: x        Culture - Blood (collected 14 Mar 2024 10:58)  Source: .Blood Blood-Peripheral  Preliminary Report (15 Mar 2024 15:01):    No growth at 24 hours    Culture - Blood (collected 14 Mar 2024 10:40)  Source: .Blood Blood-Peripheral  Preliminary Report (15 Mar 2024 15:01):    No growth at 24 hours          RADIOLOGY & ADDITIONAL TESTS:  Other Results Reviewed Today: BMP with stable Cr, CBC with stable Hg     COORDINATION OF CARE:  Communication: discussed plan of care with ACP

## 2024-03-16 ENCOUNTER — RESULT REVIEW (OUTPATIENT)
Age: 89
End: 2024-03-16

## 2024-03-16 LAB
ALBUMIN SERPL ELPH-MCNC: 3.3 G/DL — SIGNIFICANT CHANGE UP (ref 3.3–5)
ALP SERPL-CCNC: 91 U/L — SIGNIFICANT CHANGE UP (ref 40–120)
ALT FLD-CCNC: 35 U/L — HIGH (ref 4–33)
ANION GAP SERPL CALC-SCNC: 13 MMOL/L — SIGNIFICANT CHANGE UP (ref 7–14)
AST SERPL-CCNC: 46 U/L — HIGH (ref 4–32)
BILIRUB SERPL-MCNC: 0.2 MG/DL — SIGNIFICANT CHANGE UP (ref 0.2–1.2)
BUN SERPL-MCNC: 19 MG/DL — SIGNIFICANT CHANGE UP (ref 7–23)
CALCIUM SERPL-MCNC: 8.3 MG/DL — LOW (ref 8.4–10.5)
CHLORIDE SERPL-SCNC: 104 MMOL/L — SIGNIFICANT CHANGE UP (ref 98–107)
CO2 SERPL-SCNC: 19 MMOL/L — LOW (ref 22–31)
CREAT SERPL-MCNC: 0.95 MG/DL — SIGNIFICANT CHANGE UP (ref 0.5–1.3)
EGFR: 58 ML/MIN/1.73M2 — LOW
GLUCOSE BLDC GLUCOMTR-MCNC: 203 MG/DL — HIGH (ref 70–99)
GLUCOSE BLDC GLUCOMTR-MCNC: 221 MG/DL — HIGH (ref 70–99)
GLUCOSE BLDC GLUCOMTR-MCNC: 242 MG/DL — HIGH (ref 70–99)
GLUCOSE BLDC GLUCOMTR-MCNC: 252 MG/DL — HIGH (ref 70–99)
GLUCOSE SERPL-MCNC: 232 MG/DL — HIGH (ref 70–99)
HCT VFR BLD CALC: 30.2 % — LOW (ref 34.5–45)
HGB BLD-MCNC: 9.8 G/DL — LOW (ref 11.5–15.5)
MAGNESIUM SERPL-MCNC: 2.4 MG/DL — SIGNIFICANT CHANGE UP (ref 1.6–2.6)
MCHC RBC-ENTMCNC: 29.3 PG — SIGNIFICANT CHANGE UP (ref 27–34)
MCHC RBC-ENTMCNC: 32.5 GM/DL — SIGNIFICANT CHANGE UP (ref 32–36)
MCV RBC AUTO: 90.1 FL — SIGNIFICANT CHANGE UP (ref 80–100)
NRBC # BLD: 0 /100 WBCS — SIGNIFICANT CHANGE UP (ref 0–0)
NRBC # FLD: 0 K/UL — SIGNIFICANT CHANGE UP (ref 0–0)
PHOSPHATE SERPL-MCNC: 1.8 MG/DL — LOW (ref 2.5–4.5)
PLATELET # BLD AUTO: 141 K/UL — LOW (ref 150–400)
POTASSIUM SERPL-MCNC: 3.8 MMOL/L — SIGNIFICANT CHANGE UP (ref 3.5–5.3)
POTASSIUM SERPL-SCNC: 3.8 MMOL/L — SIGNIFICANT CHANGE UP (ref 3.5–5.3)
PROT SERPL-MCNC: 6.8 G/DL — SIGNIFICANT CHANGE UP (ref 6–8.3)
RBC # BLD: 3.35 M/UL — LOW (ref 3.8–5.2)
RBC # FLD: 14.3 % — SIGNIFICANT CHANGE UP (ref 10.3–14.5)
SODIUM SERPL-SCNC: 136 MMOL/L — SIGNIFICANT CHANGE UP (ref 135–145)
WBC # BLD: 8.28 K/UL — SIGNIFICANT CHANGE UP (ref 3.8–10.5)
WBC # FLD AUTO: 8.28 K/UL — SIGNIFICANT CHANGE UP (ref 3.8–10.5)

## 2024-03-16 PROCEDURE — 99232 SBSQ HOSP IP/OBS MODERATE 35: CPT

## 2024-03-16 PROCEDURE — 93306 TTE W/DOPPLER COMPLETE: CPT | Mod: 26

## 2024-03-16 RX ORDER — LANOLIN ALCOHOL/MO/W.PET/CERES
6 CREAM (GRAM) TOPICAL AT BEDTIME
Refills: 0 | Status: DISCONTINUED | OUTPATIENT
Start: 2024-03-16 | End: 2024-03-17

## 2024-03-16 RX ORDER — POTASSIUM PHOSPHATE, MONOBASIC POTASSIUM PHOSPHATE, DIBASIC 236; 224 MG/ML; MG/ML
15 INJECTION, SOLUTION INTRAVENOUS ONCE
Refills: 0 | Status: COMPLETED | OUTPATIENT
Start: 2024-03-16 | End: 2024-03-16

## 2024-03-16 RX ADMIN — Medication 3: at 17:49

## 2024-03-16 RX ADMIN — Medication 3 MILLILITER(S): at 15:35

## 2024-03-16 RX ADMIN — ATORVASTATIN CALCIUM 80 MILLIGRAM(S): 80 TABLET, FILM COATED ORAL at 21:04

## 2024-03-16 RX ADMIN — RANOLAZINE 500 MILLIGRAM(S): 500 TABLET, FILM COATED, EXTENDED RELEASE ORAL at 17:51

## 2024-03-16 RX ADMIN — RANOLAZINE 500 MILLIGRAM(S): 500 TABLET, FILM COATED, EXTENDED RELEASE ORAL at 05:16

## 2024-03-16 RX ADMIN — Medication 3 MILLILITER(S): at 21:05

## 2024-03-16 RX ADMIN — Medication 3 MILLILITER(S): at 03:40

## 2024-03-16 RX ADMIN — Medication 25 MICROGRAM(S): at 05:16

## 2024-03-16 RX ADMIN — RIVAROXABAN 2.5 MILLIGRAM(S): KIT at 05:16

## 2024-03-16 RX ADMIN — Medication 2: at 08:28

## 2024-03-16 RX ADMIN — RIVAROXABAN 2.5 MILLIGRAM(S): KIT at 17:50

## 2024-03-16 RX ADMIN — Medication 6 MILLIGRAM(S): at 21:04

## 2024-03-16 RX ADMIN — AZITHROMYCIN 255 MILLIGRAM(S): 500 TABLET, FILM COATED ORAL at 00:01

## 2024-03-16 RX ADMIN — Medication 3 MILLILITER(S): at 08:07

## 2024-03-16 RX ADMIN — Medication 2: at 12:22

## 2024-03-16 RX ADMIN — FAMOTIDINE 20 MILLIGRAM(S): 10 INJECTION INTRAVENOUS at 12:22

## 2024-03-16 RX ADMIN — Medication 81 MILLIGRAM(S): at 12:21

## 2024-03-16 RX ADMIN — MUPIROCIN 1 APPLICATION(S): 20 OINTMENT TOPICAL at 22:20

## 2024-03-16 RX ADMIN — POTASSIUM PHOSPHATE, MONOBASIC POTASSIUM PHOSPHATE, DIBASIC 62.5 MILLIMOLE(S): 236; 224 INJECTION, SOLUTION INTRAVENOUS at 20:55

## 2024-03-16 RX ADMIN — Medication 30 MILLIGRAM(S): at 17:51

## 2024-03-16 RX ADMIN — Medication 30 MILLIGRAM(S): at 05:16

## 2024-03-16 RX ADMIN — ATENOLOL 100 MILLIGRAM(S): 25 TABLET ORAL at 05:16

## 2024-03-16 RX ADMIN — MUPIROCIN 1 APPLICATION(S): 20 OINTMENT TOPICAL at 08:30

## 2024-03-16 NOTE — PROGRESS NOTE ADULT - ASSESSMENT
Patient is an 87yo F with PMH significant for CAD s/p PCI x6 11/2019 at [facility], HTN, HLD, T2DM, and HFpEF who presented with fever and generalized weakness of 2 days’ duration, admitted with sepsis and acute hypoxic respiratory failure due to influenza.

## 2024-03-16 NOTE — PROGRESS NOTE ADULT - PROBLEM SELECTOR PLAN 1
#Influenza A  - febrile, tachycardic, with leukocytosis on admission   - requiring supplemental O2 via 2L NC – wean as tolerated  - CXR: clear lungs  - MRSA PCR negative, MSSA PCR positive  - BCx NGTD   - offer supportive care – caution with IVF in pt with CHF  - continue oseltamivir  - continue ceftriaxone, s/p 3 days of azithro 500 mg daily   - trend fever curve

## 2024-03-16 NOTE — PROGRESS NOTE ADULT - SUBJECTIVE AND OBJECTIVE BOX
Lionel Elliott MD  [facility] Division of [facility] Medicine  Pager: [phone]  Available via MS Teams    SUBJECTIVE / OVERNIGHT EVENTS:    Complaints of cold sores on lips and cough     MEDICATIONS  (STANDING):  albuterol/ipratropium for Nebulization 3 milliLiter(s) Nebulizer every 6 hours  aspirin enteric coated 81 milliGRAM(s) Oral daily  atenolol  Tablet 100 milliGRAM(s) Oral daily  atorvastatin 80 milliGRAM(s) Oral at bedtime  cefTRIAXone   IVPB 1000 milliGRAM(s) IV Intermittent every 24 hours  dextrose 5%. 1000 milliLiter(s) (50 mL/Hr) IV Continuous <Continuous>  dextrose 5%. 1000 milliLiter(s) (100 mL/Hr) IV Continuous <Continuous>  dextrose 50% Injectable 25 Gram(s) IV Push once  dextrose 50% Injectable 12.5 Gram(s) IV Push once  dextrose 50% Injectable 25 Gram(s) IV Push once  famotidine    Tablet 20 milliGRAM(s) Oral daily  glucagon  Injectable 1 milliGRAM(s) IntraMuscular once  influenza  Vaccine (HIGH DOSE) 0.7 milliLiter(s) IntraMuscular once  insulin lispro (ADMELOG) corrective regimen sliding scale   SubCutaneous three times a day before meals  insulin lispro (ADMELOG) corrective regimen sliding scale   SubCutaneous at bedtime  levothyroxine 25 MICROGram(s) Oral daily  mupirocin 2% Ointment 1 Application(s) Both Nostrils two times a day  oseltamivir 30 milliGRAM(s) Oral two times a day  ranolazine 500 milliGRAM(s) Oral two times a day  rivaroxaban 2.5 milliGRAM(s) Oral two times a day    MEDICATIONS  (PRN):  acetaminophen     Tablet .. 650 milliGRAM(s) Oral every 6 hours PRN Temp greater or equal to 38C (100.4F), Mild Pain (1 - 3)  dextrose Oral Gel 15 Gram(s) Oral once PRN Blood Glucose LESS THAN 70 milliGRAM(s)/deciliter  melatonin 3 milliGRAM(s) Oral at bedtime PRN Insomnia      I&O's Summary      PHYSICAL EXAM:  Vital Signs Last 24 Hrs  T(C): 36.8 (16 Mar 2024 11:00), Max: 37 (16 Mar 2024 01:16)  T(F): 98.3 (16 Mar 2024 11:00), Max: 98.6 (16 Mar 2024 01:16)  HR: 62 (16 Mar 2024 11:00) (62 - 74)  BP: 121/45 (16 Mar 2024 11:00) (108/50 - 134/70)  BP(mean): --  RR: 18 (16 Mar 2024 11:00) (18 - 20)  SpO2: 100% (16 Mar 2024 11:00) (91% - 100%)    Parameters below as of 16 Mar 2024 11:00  Patient On (Oxygen Delivery Method): room air      CONSTITUTIONAL: NAD   EYES: conjunctiva and sclera clear  ENMT: Moist oral mucosa   NECK: Supple   RESPIRATORY: rhonchi throughout lung fields   CARDIOVASCULAR: Regular rate and rhythm, normal S1 and S2, no murmur/rub/gallop; Peripheral pulses are 2+ bilaterally  ABDOMEN: Nontender to palpation, normoactive bowel sounds, no rebound/guarding   MUSCULOSKELETAL: No lower extremity edema   PSYCH: A+O to person, place, and time; affect appropriate  NEUROLOGY: moves all extremities   SKIN: No rashes    LABS:                        9.8    8.28  )-----------( 141      ( 16 Mar 2024 05:55 )             30.2     03-16    136  |  104  |  19  ----------------------------<  232<H>  3.8   |  19<L>  |  0.95    Ca    8.3<L>      16 Mar 2024 05:55  Phos  1.8     03-16  Mg     2.40     03-16    TPro  6.8  /  Alb  3.3  /  TBili  0.2  /  DBili  x   /  AST  46<H>  /  ALT  35<H>  /  AlkPhos  91  03-16          Urinalysis Basic - ( 16 Mar 2024 05:55 )    Color: x / Appearance: x / SG: x / pH: x  Gluc: 232 mg/dL / Ketone: x  / Bili: x / Urobili: x   Blood: x / Protein: x / Nitrite: x   Leuk Esterase: x / RBC: x / WBC x   Sq Epi: x / Non Sq Epi: x / Bacteria: x        Culture - Urine (collected 14 Mar 2024 12:06)  Source: Clean Catch Clean Catch (Midstream)  Final Report (15 Mar 2024 23:23):    <10,000 CFU/mL Normal Urogenital Cheli    Culture - Blood (collected 14 Mar 2024 10:58)  Source: .Blood Blood-Peripheral  Preliminary Report (16 Mar 2024 15:11):    No growth at 48 Hours    Culture - Blood (collected 14 Mar 2024 10:40)  Source: .Blood Blood-Peripheral  Preliminary Report (16 Mar 2024 15:11):    No growth at 48 Hours          RADIOLOGY & ADDITIONAL TESTS:  Other Results Reviewed Today: BMP with stable Cr, CBC with stable Hg     COORDINATION OF CARE:  Communication: discussed plan of care with ACP

## 2024-03-17 LAB
ALBUMIN SERPL ELPH-MCNC: 3.7 G/DL — SIGNIFICANT CHANGE UP (ref 3.3–5)
ALP SERPL-CCNC: 103 U/L — SIGNIFICANT CHANGE UP (ref 40–120)
ALT FLD-CCNC: 33 U/L — SIGNIFICANT CHANGE UP (ref 4–33)
ANION GAP SERPL CALC-SCNC: 12 MMOL/L — SIGNIFICANT CHANGE UP (ref 7–14)
APTT BLD: >200 SEC — CRITICAL HIGH (ref 24.5–35.6)
AST SERPL-CCNC: 37 U/L — HIGH (ref 4–32)
BASOPHILS # BLD AUTO: 0.03 K/UL — SIGNIFICANT CHANGE UP (ref 0–0.2)
BASOPHILS NFR BLD AUTO: 0.4 % — SIGNIFICANT CHANGE UP (ref 0–2)
BILIRUB SERPL-MCNC: 0.2 MG/DL — SIGNIFICANT CHANGE UP (ref 0.2–1.2)
BLOOD GAS ARTERIAL COMPREHENSIVE RESULT: SIGNIFICANT CHANGE UP
BLOOD GAS ARTERIAL COMPREHENSIVE RESULT: SIGNIFICANT CHANGE UP
BUN SERPL-MCNC: 17 MG/DL — SIGNIFICANT CHANGE UP (ref 7–23)
CALCIUM SERPL-MCNC: 8.5 MG/DL — SIGNIFICANT CHANGE UP (ref 8.4–10.5)
CHLORIDE SERPL-SCNC: 99 MMOL/L — SIGNIFICANT CHANGE UP (ref 98–107)
CO2 SERPL-SCNC: 22 MMOL/L — SIGNIFICANT CHANGE UP (ref 22–31)
CREAT SERPL-MCNC: 0.84 MG/DL — SIGNIFICANT CHANGE UP (ref 0.5–1.3)
EGFR: 68 ML/MIN/1.73M2 — SIGNIFICANT CHANGE UP
EOSINOPHIL # BLD AUTO: 0.01 K/UL — SIGNIFICANT CHANGE UP (ref 0–0.5)
EOSINOPHIL NFR BLD AUTO: 0.1 % — SIGNIFICANT CHANGE UP (ref 0–6)
GAS PNL BLDA: SIGNIFICANT CHANGE UP
GLUCOSE BLDC GLUCOMTR-MCNC: 228 MG/DL — HIGH (ref 70–99)
GLUCOSE BLDC GLUCOMTR-MCNC: 263 MG/DL — HIGH (ref 70–99)
GLUCOSE BLDC GLUCOMTR-MCNC: 276 MG/DL — HIGH (ref 70–99)
GLUCOSE BLDC GLUCOMTR-MCNC: 326 MG/DL — HIGH (ref 70–99)
GLUCOSE BLDC GLUCOMTR-MCNC: 339 MG/DL — HIGH (ref 70–99)
GLUCOSE SERPL-MCNC: 329 MG/DL — HIGH (ref 70–99)
HCT VFR BLD CALC: 25.8 % — LOW (ref 34.5–45)
HCT VFR BLD CALC: 32.1 % — LOW (ref 34.5–45)
HGB BLD-MCNC: 10.3 G/DL — LOW (ref 11.5–15.5)
HGB BLD-MCNC: 8.9 G/DL — LOW (ref 11.5–15.5)
IANC: 5.41 K/UL — SIGNIFICANT CHANGE UP (ref 1.8–7.4)
IMM GRANULOCYTES NFR BLD AUTO: 0.7 % — SIGNIFICANT CHANGE UP (ref 0–0.9)
LYMPHOCYTES # BLD AUTO: 2.1 K/UL — SIGNIFICANT CHANGE UP (ref 1–3.3)
LYMPHOCYTES # BLD AUTO: 24.7 % — SIGNIFICANT CHANGE UP (ref 13–44)
MAGNESIUM SERPL-MCNC: 2.1 MG/DL — SIGNIFICANT CHANGE UP (ref 1.6–2.6)
MCHC RBC-ENTMCNC: 29.3 PG — SIGNIFICANT CHANGE UP (ref 27–34)
MCHC RBC-ENTMCNC: 29.8 PG — SIGNIFICANT CHANGE UP (ref 27–34)
MCHC RBC-ENTMCNC: 32.1 GM/DL — SIGNIFICANT CHANGE UP (ref 32–36)
MCHC RBC-ENTMCNC: 34.5 GM/DL — SIGNIFICANT CHANGE UP (ref 32–36)
MCV RBC AUTO: 86.3 FL — SIGNIFICANT CHANGE UP (ref 80–100)
MCV RBC AUTO: 91.2 FL — SIGNIFICANT CHANGE UP (ref 80–100)
MONOCYTES # BLD AUTO: 0.89 K/UL — SIGNIFICANT CHANGE UP (ref 0–0.9)
MONOCYTES NFR BLD AUTO: 10.5 % — SIGNIFICANT CHANGE UP (ref 2–14)
NEUTROPHILS # BLD AUTO: 5.41 K/UL — SIGNIFICANT CHANGE UP (ref 1.8–7.4)
NEUTROPHILS NFR BLD AUTO: 63.6 % — SIGNIFICANT CHANGE UP (ref 43–77)
NRBC # BLD: 0 /100 WBCS — SIGNIFICANT CHANGE UP (ref 0–0)
NRBC # BLD: 0 /100 WBCS — SIGNIFICANT CHANGE UP (ref 0–0)
NRBC # FLD: 0 K/UL — SIGNIFICANT CHANGE UP (ref 0–0)
NRBC # FLD: 0.03 K/UL — HIGH (ref 0–0)
PHOSPHATE SERPL-MCNC: 4.6 MG/DL — HIGH (ref 2.5–4.5)
PLATELET # BLD AUTO: 166 K/UL — SIGNIFICANT CHANGE UP (ref 150–400)
PLATELET # BLD AUTO: 175 K/UL — SIGNIFICANT CHANGE UP (ref 150–400)
POTASSIUM SERPL-MCNC: 5.2 MMOL/L — SIGNIFICANT CHANGE UP (ref 3.5–5.3)
POTASSIUM SERPL-SCNC: 5.2 MMOL/L — SIGNIFICANT CHANGE UP (ref 3.5–5.3)
PROT SERPL-MCNC: 7.5 G/DL — SIGNIFICANT CHANGE UP (ref 6–8.3)
RBC # BLD: 2.99 M/UL — LOW (ref 3.8–5.2)
RBC # BLD: 3.52 M/UL — LOW (ref 3.8–5.2)
RBC # FLD: 14 % — SIGNIFICANT CHANGE UP (ref 10.3–14.5)
RBC # FLD: 14.3 % — SIGNIFICANT CHANGE UP (ref 10.3–14.5)
SODIUM SERPL-SCNC: 133 MMOL/L — LOW (ref 135–145)
WBC # BLD: 7.32 K/UL — SIGNIFICANT CHANGE UP (ref 3.8–10.5)
WBC # BLD: 8.5 K/UL — SIGNIFICANT CHANGE UP (ref 3.8–10.5)
WBC # FLD AUTO: 7.32 K/UL — SIGNIFICANT CHANGE UP (ref 3.8–10.5)
WBC # FLD AUTO: 8.5 K/UL — SIGNIFICANT CHANGE UP (ref 3.8–10.5)

## 2024-03-17 PROCEDURE — 99291 CRITICAL CARE FIRST HOUR: CPT | Mod: GC

## 2024-03-17 PROCEDURE — 71045 X-RAY EXAM CHEST 1 VIEW: CPT | Mod: 26

## 2024-03-17 RX ORDER — PROPOFOL 10 MG/ML
50 INJECTION, EMULSION INTRAVENOUS
Qty: 500 | Refills: 0 | Status: DISCONTINUED | OUTPATIENT
Start: 2024-03-17 | End: 2024-03-17

## 2024-03-17 RX ORDER — PIPERACILLIN AND TAZOBACTAM 4; .5 G/20ML; G/20ML
3.38 INJECTION, POWDER, LYOPHILIZED, FOR SOLUTION INTRAVENOUS EVERY 8 HOURS
Refills: 0 | Status: DISCONTINUED | OUTPATIENT
Start: 2024-03-17 | End: 2024-03-22

## 2024-03-17 RX ORDER — CHLORHEXIDINE GLUCONATE 213 G/1000ML
15 SOLUTION TOPICAL EVERY 12 HOURS
Refills: 0 | Status: DISCONTINUED | OUTPATIENT
Start: 2024-03-17 | End: 2024-03-25

## 2024-03-17 RX ORDER — FENTANYL CITRATE 50 UG/ML
1 INJECTION INTRAVENOUS
Qty: 2500 | Refills: 0 | Status: DISCONTINUED | OUTPATIENT
Start: 2024-03-17 | End: 2024-03-17

## 2024-03-17 RX ORDER — PIPERACILLIN AND TAZOBACTAM 4; .5 G/20ML; G/20ML
3.38 INJECTION, POWDER, LYOPHILIZED, FOR SOLUTION INTRAVENOUS ONCE
Refills: 0 | Status: COMPLETED | OUTPATIENT
Start: 2024-03-17 | End: 2024-03-17

## 2024-03-17 RX ORDER — FENTANYL CITRATE 50 UG/ML
100 INJECTION INTRAVENOUS ONCE
Refills: 0 | Status: DISCONTINUED | OUTPATIENT
Start: 2024-03-17 | End: 2024-03-17

## 2024-03-17 RX ORDER — PROPOFOL 10 MG/ML
10 INJECTION, EMULSION INTRAVENOUS
Qty: 1000 | Refills: 0 | Status: DISCONTINUED | OUTPATIENT
Start: 2024-03-17 | End: 2024-03-22

## 2024-03-17 RX ORDER — HEPARIN SODIUM 5000 [USP'U]/ML
INJECTION INTRAVENOUS; SUBCUTANEOUS
Qty: 25000 | Refills: 0 | Status: DISCONTINUED | OUTPATIENT
Start: 2024-03-17 | End: 2024-03-20

## 2024-03-17 RX ORDER — INSULIN LISPRO 100/ML
VIAL (ML) SUBCUTANEOUS EVERY 6 HOURS
Refills: 0 | Status: DISCONTINUED | OUTPATIENT
Start: 2024-03-17 | End: 2024-04-01

## 2024-03-17 RX ORDER — FUROSEMIDE 40 MG
40 TABLET ORAL ONCE
Refills: 0 | Status: COMPLETED | OUTPATIENT
Start: 2024-03-17 | End: 2024-03-17

## 2024-03-17 RX ORDER — HUMAN INSULIN 100 [IU]/ML
5 INJECTION, SUSPENSION SUBCUTANEOUS EVERY 6 HOURS
Refills: 0 | Status: DISCONTINUED | OUTPATIENT
Start: 2024-03-17 | End: 2024-03-19

## 2024-03-17 RX ORDER — HEPARIN SODIUM 5000 [USP'U]/ML
4500 INJECTION INTRAVENOUS; SUBCUTANEOUS EVERY 6 HOURS
Refills: 0 | Status: DISCONTINUED | OUTPATIENT
Start: 2024-03-17 | End: 2024-03-22

## 2024-03-17 RX ORDER — NOREPINEPHRINE BITARTRATE/D5W 8 MG/250ML
0.05 PLASTIC BAG, INJECTION (ML) INTRAVENOUS
Qty: 8 | Refills: 0 | Status: DISCONTINUED | OUTPATIENT
Start: 2024-03-17 | End: 2024-03-20

## 2024-03-17 RX ORDER — HEPARIN SODIUM 5000 [USP'U]/ML
2000 INJECTION INTRAVENOUS; SUBCUTANEOUS EVERY 6 HOURS
Refills: 0 | Status: DISCONTINUED | OUTPATIENT
Start: 2024-03-17 | End: 2024-03-22

## 2024-03-17 RX ORDER — FENTANYL CITRATE 50 UG/ML
5 INJECTION INTRAVENOUS
Qty: 2500 | Refills: 0 | Status: DISCONTINUED | OUTPATIENT
Start: 2024-03-17 | End: 2024-03-19

## 2024-03-17 RX ADMIN — PROPOFOL 3.43 MICROGRAM(S)/KG/MIN: 10 INJECTION, EMULSION INTRAVENOUS at 08:30

## 2024-03-17 RX ADMIN — PIPERACILLIN AND TAZOBACTAM 25 GRAM(S): 4; .5 INJECTION, POWDER, LYOPHILIZED, FOR SOLUTION INTRAVENOUS at 06:20

## 2024-03-17 RX ADMIN — Medication 3 MILLILITER(S): at 09:34

## 2024-03-17 RX ADMIN — Medication 5.36 MICROGRAM(S)/KG/MIN: at 08:31

## 2024-03-17 RX ADMIN — FENTANYL CITRATE 100 MICROGRAM(S): 50 INJECTION INTRAVENOUS at 04:53

## 2024-03-17 RX ADMIN — FENTANYL CITRATE 5.72 MICROGRAM(S)/KG/HR: 50 INJECTION INTRAVENOUS at 05:47

## 2024-03-17 RX ADMIN — HEPARIN SODIUM 0 UNIT(S)/HR: 5000 INJECTION INTRAVENOUS; SUBCUTANEOUS at 17:04

## 2024-03-17 RX ADMIN — CHLORHEXIDINE GLUCONATE 15 MILLILITER(S): 213 SOLUTION TOPICAL at 17:56

## 2024-03-17 RX ADMIN — HEPARIN SODIUM 900 UNIT(S)/HR: 5000 INJECTION INTRAVENOUS; SUBCUTANEOUS at 19:19

## 2024-03-17 RX ADMIN — PIPERACILLIN AND TAZOBACTAM 200 GRAM(S): 4; .5 INJECTION, POWDER, LYOPHILIZED, FOR SOLUTION INTRAVENOUS at 04:32

## 2024-03-17 RX ADMIN — Medication 1 APPLICATION(S): at 11:29

## 2024-03-17 RX ADMIN — Medication 30 MILLIGRAM(S): at 17:57

## 2024-03-17 RX ADMIN — FENTANYL CITRATE 5.72 MICROGRAM(S)/KG/HR: 50 INJECTION INTRAVENOUS at 08:31

## 2024-03-17 RX ADMIN — ATORVASTATIN CALCIUM 80 MILLIGRAM(S): 80 TABLET, FILM COATED ORAL at 21:58

## 2024-03-17 RX ADMIN — HUMAN INSULIN 5 UNIT(S): 100 INJECTION, SUSPENSION SUBCUTANEOUS at 17:57

## 2024-03-17 RX ADMIN — FENTANYL CITRATE 5.72 MICROGRAM(S)/KG/HR: 50 INJECTION INTRAVENOUS at 20:30

## 2024-03-17 RX ADMIN — MUPIROCIN 1 APPLICATION(S): 20 OINTMENT TOPICAL at 09:15

## 2024-03-17 RX ADMIN — PIPERACILLIN AND TAZOBACTAM 25 GRAM(S): 4; .5 INJECTION, POWDER, LYOPHILIZED, FOR SOLUTION INTRAVENOUS at 13:38

## 2024-03-17 RX ADMIN — Medication 2: at 17:58

## 2024-03-17 RX ADMIN — PROPOFOL 3.43 MICROGRAM(S)/KG/MIN: 10 INJECTION, EMULSION INTRAVENOUS at 19:18

## 2024-03-17 RX ADMIN — FENTANYL CITRATE 100 MICROGRAM(S): 50 INJECTION INTRAVENOUS at 05:47

## 2024-03-17 RX ADMIN — Medication 3 MILLILITER(S): at 02:01

## 2024-03-17 RX ADMIN — CHLORHEXIDINE GLUCONATE 15 MILLILITER(S): 213 SOLUTION TOPICAL at 05:47

## 2024-03-17 RX ADMIN — FENTANYL CITRATE 5.72 MICROGRAM(S)/KG/HR: 50 INJECTION INTRAVENOUS at 19:18

## 2024-03-17 RX ADMIN — Medication 30 MILLIGRAM(S): at 05:48

## 2024-03-17 RX ADMIN — Medication 5.36 MICROGRAM(S)/KG/MIN: at 19:18

## 2024-03-17 RX ADMIN — Medication 4: at 05:48

## 2024-03-17 RX ADMIN — MUPIROCIN 1 APPLICATION(S): 20 OINTMENT TOPICAL at 21:59

## 2024-03-17 RX ADMIN — FENTANYL CITRATE 100 MICROGRAM(S): 50 INJECTION INTRAVENOUS at 04:31

## 2024-03-17 RX ADMIN — PIPERACILLIN AND TAZOBACTAM 25 GRAM(S): 4; .5 INJECTION, POWDER, LYOPHILIZED, FOR SOLUTION INTRAVENOUS at 21:58

## 2024-03-17 RX ADMIN — HEPARIN SODIUM 900 UNIT(S)/HR: 5000 INJECTION INTRAVENOUS; SUBCUTANEOUS at 18:06

## 2024-03-17 RX ADMIN — Medication 40 MILLIGRAM(S): at 02:52

## 2024-03-17 RX ADMIN — CEFTRIAXONE 100 MILLIGRAM(S): 500 INJECTION, POWDER, FOR SOLUTION INTRAMUSCULAR; INTRAVENOUS at 01:46

## 2024-03-17 RX ADMIN — Medication 3: at 11:28

## 2024-03-17 RX ADMIN — Medication 3 MILLILITER(S): at 22:24

## 2024-03-17 RX ADMIN — Medication 81 MILLIGRAM(S): at 11:29

## 2024-03-17 RX ADMIN — Medication 3 MILLILITER(S): at 16:01

## 2024-03-17 RX ADMIN — HEPARIN SODIUM 1100 UNIT(S)/HR: 5000 INJECTION INTRAVENOUS; SUBCUTANEOUS at 10:00

## 2024-03-17 RX ADMIN — FAMOTIDINE 20 MILLIGRAM(S): 10 INJECTION INTRAVENOUS at 11:29

## 2024-03-17 RX ADMIN — FENTANYL CITRATE 100 MICROGRAM(S): 50 INJECTION INTRAVENOUS at 06:12

## 2024-03-17 NOTE — PROGRESS NOTE ADULT - ATTENDING COMMENTS
88 year old female hypoxemic respiratory failure in setting of influenza.  Intubated for increased work of breathing and concern for superimposed pneumonia.  Continue vent support. SAT/SBT tolerated.  Continue Zosyn. MRSA negative.  Unclear rationale for Xarelto - unable to crush so will transition to heparin until clarification obtained 88 year old female hypoxemic respiratory failure in setting of influenza.  Intubated for increased work of breathing and concern for superimposed pneumonia.  Continue vent support. SAT/SBT tolerated.  Continue Zosyn. MRSA negative.  Unclear rationale for Xarelto - unable to crush so will transition to heparin until clarification obtained.  Check TTE - new LV dysfunction on POCUS.

## 2024-03-17 NOTE — RAPID RESPONSE TEAM SUMMARY - NSADDTLFINDINGSRRT_GEN_ALL_CORE
- CXR STAT: no increased vascular markings  - gurgling respirationns despite suction  - No pedal edema appreciated b/l

## 2024-03-17 NOTE — CHART NOTE - NSCHARTNOTEFT_GEN_A_CORE
ICU Vital Signs Last 24 Hrs  T(C): 36.6 (16 Mar 2024 21:02), Max: 36.8 (16 Mar 2024 05:16)  T(F): 97.9 (16 Mar 2024 21:02), Max: 98.3 (16 Mar 2024 05:16)  HR: 71 (16 Mar 2024 21:02) (62 - 73)  BP: 149/60 (16 Mar 2024 21:02) (121/45 - 149/60)  BP(mean): --  ABP: --  ABP(mean): --  RR: 18 (16 Mar 2024 21:02) (18 - 19)  SpO2: 95% (16 Mar 2024 21:10) (91% - 100%)    O2 Parameters below as of 17 Mar 2024 02:01  Patient On (Oxygen Delivery Method): nasal cannula Medicine Subsequent Hospital Care Note- ACP    CC:   Hypoxia    HPI/Subjective: Notified by RN that patient was desaturating while on NC with expiratory wheezing.   -------------------------------------------------------------------------------------------------------------------------------------------------  Vital Signs:  Vital Signs Last 24 Hrs  T(C): 36.7 (03-17-24 @ 01:00), Max: 36.8 (03-16-24 @ 05:16)  T(F): 98.1 (03-17-24 @ 01:00), Max: 98.3 (03-16-24 @ 05:16)  HR: 55 (03-17-24 @ 03:11) (55 - 75)  BP: 79/44 (03-17-24 @ 03:11) (79/44 - 149/60)  RR: 15 (03-17-24 @ 03:11) (15 - 19)  SpO2: 99% (03-17-24 @ 03:11) (86% - 100%) on (O2)    Telemetry/Alarms:  General: Mildly distressed  Neurology: Awake,  Respiratory: wheezing, crakles,   CV: RRR, S1S2,   Extremities: No edema,     Incisions:   Tubes:  Relevant labs, radiology and Medications reviewed                        9.8  8.28 )-----------( 141      ( 16 Mar 2024 05:55 )             30.2     03-16    136  |  104  |  19  ----------------------------<  232<H>  3.8   |  19<L>  |  0.95    Ca    8.3<L>      16 Mar 2024 05:55  Phos  1.8     03-16  Mg     2.40     03-16    TPro  6.8  /  Alb  3.3  /  TBili  0.2  /  DBili  x   /  AST  46<H>  /  ALT  35<H>  /  AlkPhos  91  03-16      MEDICATIONS  (STANDING):  albuterol/ipratropium for Nebulization 3 milliLiter(s) Nebulizer every 6 hours  aspirin enteric coated 81 milliGRAM(s) Oral daily  atenolol  Tablet 100 milliGRAM(s) Oral daily  atorvastatin 80 milliGRAM(s) Oral at bedtime  dextrose 5%. 1000 milliLiter(s) (50 mL/Hr) IV Continuous <Continuous>  dextrose 5%. 1000 milliLiter(s) (100 mL/Hr) IV Continuous <Continuous>  dextrose 50% Injectable 12.5 Gram(s) IV Push once  dextrose 50% Injectable 25 Gram(s) IV Push once  dextrose 50% Injectable 25 Gram(s) IV Push once  famotidine    Tablet 20 milliGRAM(s) Oral daily  glucagon  Injectable 1 milliGRAM(s) IntraMuscular once  influenza  Vaccine (HIGH DOSE) 0.7 milliLiter(s) IntraMuscular once  insulin lispro (ADMELOG) corrective regimen sliding scale   SubCutaneous three times a day before meals  insulin lispro (ADMELOG) corrective regimen sliding scale   SubCutaneous at bedtime  levothyroxine 25 MICROGram(s) Oral daily  melatonin 6 milliGRAM(s) Oral at bedtime  mupirocin 2% Ointment 1 Application(s) Both Nostrils two times a day  oseltamivir 30 milliGRAM(s) Oral two times a day  piperacillin/tazobactam IVPB. 3.375 Gram(s) IV Intermittent once  piperacillin/tazobactam IVPB.- 3.375 Gram(s) IV Intermittent once  piperacillin/tazobactam IVPB.. 3.375 Gram(s) IV Intermittent every 8 hours  propofol Infusion 10 MICROgram(s)/kG/Min (3.43 mL/Hr) IV Continuous <Continuous>  ranolazine 500 milliGRAM(s) Oral two times a day  rivaroxaban 2.5 milliGRAM(s) Oral two times a day    MEDICATIONS  (PRN):  acetaminophen     Tablet .. 650 milliGRAM(s) Oral every 6 hours PRN Temp greater or equal to 38C (100.4F), Mild Pain (1 - 3)  benzonatate 100 milliGRAM(s) Oral three times a day PRN Cough  dextrose Oral Gel 15 Gram(s) Oral once PRN Blood Glucose LESS THAN 70 milliGRAM(s)/deciliter  guaiFENesin Oral Liquid (Sugar-Free) 100 milliGRAM(s) Oral every 6 hours PRN Cough    I&O's Summary    I reviewed the above lab results, tests, telemetry, and  EKG interpretation. .  Assessment  88y Female  w/ PAST MEDICAL & SURGICAL HISTORY:  CAD (coronary artery disease)  s/p stent x 6      HTN (hypertension)      DM (diabetes mellitus)      HLD (hyperlipidemia)      H/O CHF      S/P primary angioplasty with coronary stent      admitted with complaints of Patient is a 88y old  Female who presents with a chief complaint of influenza, sepsis, hypoxia  now with increase work of breathing.     PLAN  1) duoneb  2) chest Xray  3) Lasix IVP 40mg X 1    Disposition: Full Code, plan discussed with son at bedside.

## 2024-03-17 NOTE — RAPID RESPONSE TEAM SUMMARY - NSMEDICATIONSRRT_GEN_ALL_CORE
- Intubation planned  - Etomidate, Succinylcholine terrell-intubation  - Propofol, Fentanyl 100ug post-intubation

## 2024-03-17 NOTE — CHART NOTE - NSCHARTNOTEFT_GEN_A_CORE
MICU ACCEPT NOTE    CHIEF COMPLAINT: Patient is a 88y old  Female who presents with a chief complaint of influenza, sepsis, hypoxia (16 Mar 2024 16:18)      HPI:  Patient is an 87yo F with PMH significant for CAD s/p PCI x6 11/2019 at [facility], HTN, HLD, T2DM, and HFpEF who presented with fever and generalized weakness of 2 days’ duration. She has multiple family members with influenza. She began to develop dyspnea on the morning of presentation.     The patient offers no acute complaints. Son at bedside states she has been having difficulty breathing, weakness, and fever for 1 day.     Vital signs significant for: Tmax 40.2C, HR up to 122, hypertensive up to 163/72, RR 17-20, SpO2 93% on RA initially now 100% on 2L NC  Labs significant for: WBC 12.10, Hb 10.9, Na 134, lactate 2.4, AST 84, ALT 71, TSH 1.60, ABG likely reflects venous blood sample: pH 7.29, pCO2 49, pO2 36. UA: 300 protein, mod blood, 9 RBCs, 2 WBCs, neg bacteria. Influenza A positive.   Imaging significant for: CXR: clear lungs   (14 Mar 2024 17:00)      PAST MEDICAL & SURGICAL HISTORY:  CAD (coronary artery disease)  s/p stent x 6      HTN (hypertension)      DM (diabetes mellitus)      HLD (hyperlipidemia)      H/O CHF      S/P primary angioplasty with coronary stent          FAMILY HISTORY:  No pertinent family history in first degree relatives        SOCIAL HISTORY:  Smoking:   Substance Use:   EtOH Use:   Advance Directives:     MEDICATIONS  (STANDING):  albuterol/ipratropium for Nebulization 3 milliLiter(s) Nebulizer every 6 hours  aspirin enteric coated 81 milliGRAM(s) Oral daily  atenolol  Tablet 100 milliGRAM(s) Oral daily  atorvastatin 80 milliGRAM(s) Oral at bedtime  dextrose 5%. 1000 milliLiter(s) (50 mL/Hr) IV Continuous <Continuous>  dextrose 5%. 1000 milliLiter(s) (100 mL/Hr) IV Continuous <Continuous>  dextrose 50% Injectable 12.5 Gram(s) IV Push once  dextrose 50% Injectable 25 Gram(s) IV Push once  dextrose 50% Injectable 25 Gram(s) IV Push once  famotidine    Tablet 20 milliGRAM(s) Oral daily  glucagon  Injectable 1 milliGRAM(s) IntraMuscular once  influenza  Vaccine (HIGH DOSE) 0.7 milliLiter(s) IntraMuscular once  insulin lispro (ADMELOG) corrective regimen sliding scale   SubCutaneous three times a day before meals  insulin lispro (ADMELOG) corrective regimen sliding scale   SubCutaneous at bedtime  levothyroxine 25 MICROGram(s) Oral daily  melatonin 6 milliGRAM(s) Oral at bedtime  mupirocin 2% Ointment 1 Application(s) Both Nostrils two times a day  oseltamivir 30 milliGRAM(s) Oral two times a day  piperacillin/tazobactam IVPB. 3.375 Gram(s) IV Intermittent once  piperacillin/tazobactam IVPB.- 3.375 Gram(s) IV Intermittent once  piperacillin/tazobactam IVPB.. 3.375 Gram(s) IV Intermittent every 8 hours  propofol Infusion 10 MICROgram(s)/kG/Min (3.43 mL/Hr) IV Continuous <Continuous>  ranolazine 500 milliGRAM(s) Oral two times a day  rivaroxaban 2.5 milliGRAM(s) Oral two times a day    MEDICATIONS  (PRN):  acetaminophen     Tablet .. 650 milliGRAM(s) Oral every 6 hours PRN Temp greater or equal to 38C (100.4F), Mild Pain (1 - 3)  benzonatate 100 milliGRAM(s) Oral three times a day PRN Cough  dextrose Oral Gel 15 Gram(s) Oral once PRN Blood Glucose LESS THAN 70 milliGRAM(s)/deciliter  guaiFENesin Oral Liquid (Sugar-Free) 100 milliGRAM(s) Oral every 6 hours PRN Cough      Allergies    No Known Allergies    Intolerances        REVIEW OF SYSTEMS:    CONSTITUTIONAL:  No weakness, fevers   EYES/ENT:  No visual changes; no facial pain, no hearring difficulties   NECK/SPINE:  No pain or stiffness  RESPIRATORY:  No cough, wheezing, hemoptysis; No shortness of breath  CARDIOVASCULAR:  No chest pain or palpitations  GASTROINTESTINAL:  Tolerates food well. No abdominal or epigastric pain. No nausea/vomiting ; No diarrhea/constipation. No melena/hematochezia.  GENITOURINARY:  No dysuria, frequency or hematuria  MUSCULOSKELETAL: No concerns with ambulation, No calf tenderness, no calf swelling   NEUROLOGICAL:  No numbness/weakness  PSYCH: Mood is appropriate   SKIN:  No itching, rashes  [ ] All other systems negative  [ ] Unable to assess ROS because ________    OBJECTIVE:  ICU Vital Signs Last 24 Hrs  T(C): 36.7 (17 Mar 2024 01:00), Max: 36.8 (16 Mar 2024 05:16)  T(F): 98.1 (17 Mar 2024 01:00), Max: 98.3 (16 Mar 2024 05:16)  HR: 55 (17 Mar 2024 03:11) (55 - 75)  BP: 79/44 (17 Mar 2024 03:11) (79/44 - 149/60)  BP(mean): 53 (17 Mar 2024 03:11) (53 - 53)  ABP: --  ABP(mean): --  RR: 15 (17 Mar 2024 03:11) (15 - 19)  SpO2: 99% (17 Mar 2024 03:11) (86% - 100%)    O2 Parameters below as of 17 Mar 2024 03:11  Patient On (Oxygen Delivery Method): ventilator    O2 Concentration (%): 100          CAPILLARY BLOOD GLUCOSE      POCT Blood Glucose.: 326 mg/dL (17 Mar 2024 02:19)      PHYSICAL EXAM:  GENERAL: No acute distress, resting comfortably   HEAD:  Atraumatic, normocephalic  EYES: Eye movements are appropriate, pupils responsive to light/accomodation, conjunctiva and sclera clear  NECK: Supple, no swelling , JVD not appreciated   HEART: Regular rate and rhythm, no murmurs or other pathological heart sounds   LUNGS: Unlabored respirations. Clear to auscultation bilaterally, no crackles, wheezing, or rhonchi  ABDOMEN: Soft, nontender/ nondistended, +BS  EXTREMITIES: +2 pulses, moves appropriately w /5 strength, no edema   NEURO:  A&Ox3, CNs not assessed  PSYCH: Affect is appropriate   SKIN: No rashes or lesions   LINES:     LABS:                        10.3   8.50  )-----------( 175      ( 17 Mar 2024 02:49 )             32.1     Hgb Trend: 10.3<--, 9.8<--, 10.0<--, 10.9<--  03-16    136  |  104  |  19  ----------------------------<  232<H>  3.8   |  19<L>  |  0.95    Ca    8.3<L>      16 Mar 2024 05:55  Phos  1.8     03-16  Mg     2.40     03-16    TPro  6.8  /  Alb  3.3  /  TBili  0.2  /  DBili  x   /  AST  46<H>  /  ALT  35<H>  /  AlkPhos  91  03-16    Creatinine Trend: 0.95<--, 1.02<--, 1.03<--    Urinalysis Basic - ( 16 Mar 2024 05:55 )    Color: x / Appearance: x / SG: x / pH: x  Gluc: 232 mg/dL / Ketone: x  / Bili: x / Urobili: x   Blood: x / Protein: x / Nitrite: x   Leuk Esterase: x / RBC: x / WBC x   Sq Epi: x / Non Sq Epi: x / Bacteria: x        Venous Blood Gas:  03-15 @ 06:40  --/--/--/--/--  VBG Lactate: 1.7      MICROBIOLOGY:     RADIOLOGY & ADDITIONAL TESTS:    ASSESSMENT  EPI LAWLER is a 88y female with PMH *** in the hospital for 3d transferred to the MICU for ***.    =====Neurologic=====  Patient is AOx   - sedation:   - Titrate to RAAS -1-0    =====Cardiovascular=====  #SepticShock   Patient was admitted with a WBC ( ) and pressures of ( ) They recieved ( ) fluids and started on ( ).     Plan  - Titrate pressors for MAP > 65  - pressor:   - Cont. ab as below     =====Pulmonary=====  #AHRF (PNA)  Shortness of breath ISO of WBC. Most likely in the setting of a infectious PNA. Can also consider aspiration.  Chest X-ray showed ( )     - ventilation status:    =====GI=====  #Gastroenteritis    #Transaminitis  Elevation in transaminese in a cholestatic pattern. Most likely in the setting of sepsis. Can also consider acute pathology such as cholestitis.     Plan  - Order RUQ ultrasound  - Continue to monitor     =====Renal/=====  #Electrolytes  - fuchs:    =====Infectious Disease=====  Sepsis  WBC was ( ) on admission with a fever ( )  tmax. All in the setting of (Symptoms). Chest xray was significant for ( ), CT was noted for ( _ In terms of sources they include ( ). Patient recieved ( AB) in the ED. Blood cultures.   Antibiotic course :  Blood cultures:   MRSA:     - Continue with Antibiotic (Pip tazo, meropenum, ertapenum, ceftriaxone etc)   - FU blood cultures   - Continue shock treatment as above     =====Endocrine=====  No concerns presently.     =====Heme/Onc=====  - DVT PPX: Heparin 5000 units subq   /  Lovenox 40mg  / SCDs   / Holding     =====MICUGeneral=====  Lines:    Drips:    IVF   O2:     AB   Diet:    Pain:   DVT: MICU ACCEPT NOTE    CHIEF COMPLAINT:   MICU Consulted for : Worsening hypoxemia requiring intubation     HPI:  Patient is an 87yo F with PMH significant for CAD s/p PCI x6 11/2019 at [facility], HTN, HLD, T2DM, and HFpEF who presented with fever and generalized weakness of 2 days’ duration. She has multiple family members with influenza. She began to develop dyspnea on the morning of presentation. The patient offers no acute complaints. Son at bedside states she has been having difficulty breathing, weakness, and fever for 1 day. Vital signs significant for: Tmax 40.2C, HR up to 122, hypertensive up to 163/72, RR 17-20, SpO2 93% on RA initially now 100% on 2L NC  Labs significant for: WBC 12.10, Hb 10.9, Na 134, lactate 2.4, AST 84, ALT 71, TSH 1.60, ABG likely reflects venous blood sample: pH 7.29, pCO2 49, pO2 36. UA: 300 protein, mod blood, 9 RBCs, 2 WBCs, neg bacteria. Influenza A positive.   Imaging significant for: CXR: clear lungs    MICU      PAST MEDICAL & SURGICAL HISTORY:  CAD (coronary artery disease)  s/p stent x 6      HTN (hypertension)      DM (diabetes mellitus)      HLD (hyperlipidemia)      H/O CHF      S/P primary angioplasty with coronary stent          FAMILY HISTORY:  No pertinent family history in first degree relatives        SOCIAL HISTORY:  Smoking:   Substance Use:   EtOH Use:  k  Advance Directives:     MEDICATIONS  (STANDING):  albuterol/ipratropium for Nebulization 3 milliLiter(s) Nebulizer every 6 hours  aspirin enteric coated 81 milliGRAM(s) Oral daily  atenolol  Tablet 100 milliGRAM(s) Oral daily  atorvastatin 80 milliGRAM(s) Oral at bedtime  dextrose 5%. 1000 milliLiter(s) (50 mL/Hr) IV Continuous <Continuous>  dextrose 5%. 1000 milliLiter(s) (100 mL/Hr) IV Continuous <Continuous>  dextrose 50% Injectable 12.5 Gram(s) IV Push once  dextrose 50% Injectable 25 Gram(s) IV Push once  dextrose 50% Injectable 25 Gram(s) IV Push once  famotidine    Tablet 20 milliGRAM(s) Oral daily  glucagon  Injectable 1 milliGRAM(s) IntraMuscular once  influenza  Vaccine (HIGH DOSE) 0.7 milliLiter(s) IntraMuscular once  insulin lispro (ADMELOG) corrective regimen sliding scale   SubCutaneous three times a day before meals  insulin lispro (ADMELOG) corrective regimen sliding scale   SubCutaneous at bedtime  levothyroxine 25 MICROGram(s) Oral daily  melatonin 6 milliGRAM(s) Oral at bedtime  mupirocin 2% Ointment 1 Application(s) Both Nostrils two times a day  oseltamivir 30 milliGRAM(s) Oral two times a day  piperacillin/tazobactam IVPB. 3.375 Gram(s) IV Intermittent once  piperacillin/tazobactam IVPB.- 3.375 Gram(s) IV Intermittent once  piperacillin/tazobactam IVPB.. 3.375 Gram(s) IV Intermittent every 8 hours  propofol Infusion 10 MICROgram(s)/kG/Min (3.43 mL/Hr) IV Continuous <Continuous>  ranolazine 500 milliGRAM(s) Oral two times a day  rivaroxaban 2.5 milliGRAM(s) Oral two times a day    MEDICATIONS  (PRN):  acetaminophen     Tablet .. 650 milliGRAM(s) Oral every 6 hours PRN Temp greater or equal to 38C (100.4F), Mild Pain (1 - 3)  benzonatate 100 milliGRAM(s) Oral three times a day PRN Cough  dextrose Oral Gel 15 Gram(s) Oral once PRN Blood Glucose LESS THAN 70 milliGRAM(s)/deciliter  guaiFENesin Oral Liquid (Sugar-Free) 100 milliGRAM(s) Oral every 6 hours PRN Cough      Allergies    No Known Allergies    Intolerances        REVIEW OF SYSTEMS:    CONSTITUTIONAL:  No weakness, fevers   EYES/ENT:  No visual changes; no facial pain, no hearring difficulties   NECK/SPINE:  No pain or stiffness  RESPIRATORY:  No cough, wheezing, hemoptysis; No shortness of breath  CARDIOVASCULAR:  No chest pain or palpitations  GASTROINTESTINAL:  Tolerates food well. No abdominal or epigastric pain. No nausea/vomiting ; No diarrhea/constipation. No melena/hematochezia.  GENITOURINARY:  No dysuria, frequency or hematuria  MUSCULOSKELETAL: No concerns with ambulation, No calf tenderness, no calf swelling   NEUROLOGICAL:  No numbness/weakness  PSYCH: Mood is appropriate   SKIN:  No itching, rashes  [ ] All other systems negative  [ ] Unable to assess ROS because ________    OBJECTIVE:  ICU Vital Signs Last 24 Hrs  T(C): 36.7 (17 Mar 2024 01:00), Max: 36.8 (16 Mar 2024 05:16)  T(F): 98.1 (17 Mar 2024 01:00), Max: 98.3 (16 Mar 2024 05:16)  HR: 55 (17 Mar 2024 03:11) (55 - 75)  BP: 79/44 (17 Mar 2024 03:11) (79/44 - 149/60)  BP(mean): 53 (17 Mar 2024 03:11) (53 - 53)  ABP: --  ABP(mean): --  RR: 15 (17 Mar 2024 03:11) (15 - 19)  SpO2: 99% (17 Mar 2024 03:11) (86% - 100%)    O2 Parameters below as of 17 Mar 2024 03:11  Patient On (Oxygen Delivery Method): ventilator    O2 Concentration (%): 100          CAPILLARY BLOOD GLUCOSE      POCT Blood Glucose.: 326 mg/dL (17 Mar 2024 02:19)      PHYSICAL EXAM:  GENERAL: No acute distress, resting comfortably   HEAD:  Atraumatic, normocephalic  EYES: Eye movements are appropriate, pupils responsive to light/accomodation, conjunctiva and sclera clear  NECK: Supple, no swelling , JVD not appreciated   HEART: Regular rate and rhythm, no murmurs or other pathological heart sounds   LUNGS: Unlabored respirations. Clear to auscultation bilaterally, no crackles, wheezing, or rhonchi  ABDOMEN: Soft, nontender/ nondistended, +BS  EXTREMITIES: +2 pulses, moves appropriately w /5 strength, no edema   NEURO:  A&Ox3, CNs not assessed  PSYCH: Affect is appropriate   SKIN: No rashes or lesions   LINES:     LABS:                        10.3   8.50  )-----------( 175      ( 17 Mar 2024 02:49 )             32.1     Hgb Trend: 10.3<--, 9.8<--, 10.0<--, 10.9<--  03-16    136  |  104  |  19  ----------------------------<  232<H>  3.8   |  19<L>  |  0.95    Ca    8.3<L>      16 Mar 2024 05:55  Phos  1.8     03-16  Mg     2.40     03-16    TPro  6.8  /  Alb  3.3  /  TBili  0.2  /  DBili  x   /  AST  46<H>  /  ALT  35<H>  /  AlkPhos  91  03-16    Creatinine Trend: 0.95<--, 1.02<--, 1.03<--    Urinalysis Basic - ( 16 Mar 2024 05:55 )    Color: x / Appearance: x / SG: x / pH: x  Gluc: 232 mg/dL / Ketone: x  / Bili: x / Urobili: x   Blood: x / Protein: x / Nitrite: x   Leuk Esterase: x / RBC: x / WBC x   Sq Epi: x / Non Sq Epi: x / Bacteria: x        Venous Blood Gas:  03-15 @ 06:40  --/--/--/--/--  VBG Lactate: 1.7      MICROBIOLOGY:     RADIOLOGY & ADDITIONAL TESTS:    ASSESSMENT  EPI LAWLER is a 88y female with PMH *** in the hospital for 3d transferred to the MICU for ***.    =====Neurologic=====  Patient is AOx   - sedation:   - Titrate to RAAS -1-0    =====Cardiovascular=====  #SepticShock   Patient was admitted with a WBC ( ) and pressures of ( ) They recieved ( ) fluids and started on ( ).     Plan  - Titrate pressors for MAP > 65  - pressor:   - Cont. ab as below     =====Pulmonary=====  #AHRF (PNA)  Shortness of breath ISO of WBC. Most likely in the setting of a infectious PNA. Can also consider aspiration.  Chest X-ray showed ( )     - ventilation status:    =====GI=====  #Gastroenteritis    #Transaminitis  Elevation in transaminese in a cholestatic pattern. Most likely in the setting of sepsis. Can also consider acute pathology such as cholestitis.     Plan  - Order RUQ ultrasound  - Continue to monitor     =====Renal/=====  #Electrolytes  - fuchs:    =====Infectious Disease=====  Sepsis  WBC was ( ) on admission with a fever ( )  tmax. All in the setting of (Symptoms). Chest xray was significant for ( ), CT was noted for ( _ In terms of sources they include ( ). Patient recieved ( AB) in the ED. Blood cultures.   Antibiotic course :  Blood cultures:   MRSA:     - Continue with Antibiotic (Pip tazo, meropenum, ertapenum, ceftriaxone etc)   - FU blood cultures   - Continue shock treatment as above     =====Endocrine=====  No concerns presently.     =====Heme/Onc=====  - DVT PPX: Heparin 5000 units subq   /  Lovenox 40mg  / SCDs   / Holding     =====MICUGeneral=====  Lines:    Drips:    IVF   O2:     AB   Diet:    Pain:   DVT: MICU ACCEPT NOTE    CHIEF COMPLAINT: AHRF Sepsis 2/2 influenza   MICU Consulted for : Worsening hypoxemia requiring intubation     HPI:  Patient is an 89yo F with PMH significant for CAD s/p PCI x6 11/2019 at [facility], HTN, HLD, T2DM, and HFpEF who presented with fever and generalized weakness of 2 days’ duration. She has multiple family members with influenza. She began to develop dyspnea on the morning of presentation. The patient offers no acute complaints. Son at bedside states she has been having difficulty breathing, weakness, and fever for 1 day. Vital signs significant for: Tmax 40.2C, HR up to 122, hypertensive up to 163/72, RR 17-20, SpO2 93% on RA initially now 100% on 2L NC  Labs significant for: WBC 12.10, Hb 10.9, Na 134, lactate 2.4, AST 84, ALT 71, TSH 1.60, ABG likely reflects venous blood sample: pH 7.29, pCO2 49, pO2 36. UA: 300 protein, mod blood, 9 RBCs, 2 WBCs, neg bacteria. Influenza A positive.   Imaging significant for: CXR: clear lungs    MICU      PAST MEDICAL & SURGICAL HISTORY:  CAD (coronary artery disease)  s/p stent x 6      HTN (hypertension)      DM (diabetes mellitus)      HLD (hyperlipidemia)      H/O CHF      S/P primary angioplasty with coronary stent          FAMILY HISTORY:  No pertinent family history in first degree relatives        SOCIAL HISTORY:  Smoking:   Substance Use:   EtOH Use:  k  Advance Directives:     MEDICATIONS  (STANDING):  albuterol/ipratropium for Nebulization 3 milliLiter(s) Nebulizer every 6 hours  aspirin enteric coated 81 milliGRAM(s) Oral daily  atenolol  Tablet 100 milliGRAM(s) Oral daily  atorvastatin 80 milliGRAM(s) Oral at bedtime  dextrose 5%. 1000 milliLiter(s) (50 mL/Hr) IV Continuous <Continuous>  dextrose 5%. 1000 milliLiter(s) (100 mL/Hr) IV Continuous <Continuous>  dextrose 50% Injectable 12.5 Gram(s) IV Push once  dextrose 50% Injectable 25 Gram(s) IV Push once  dextrose 50% Injectable 25 Gram(s) IV Push once  famotidine    Tablet 20 milliGRAM(s) Oral daily  glucagon  Injectable 1 milliGRAM(s) IntraMuscular once  influenza  Vaccine (HIGH DOSE) 0.7 milliLiter(s) IntraMuscular once  insulin lispro (ADMELOG) corrective regimen sliding scale   SubCutaneous three times a day before meals  insulin lispro (ADMELOG) corrective regimen sliding scale   SubCutaneous at bedtime  levothyroxine 25 MICROGram(s) Oral daily  melatonin 6 milliGRAM(s) Oral at bedtime  mupirocin 2% Ointment 1 Application(s) Both Nostrils two times a day  oseltamivir 30 milliGRAM(s) Oral two times a day  piperacillin/tazobactam IVPB. 3.375 Gram(s) IV Intermittent once  piperacillin/tazobactam IVPB.- 3.375 Gram(s) IV Intermittent once  piperacillin/tazobactam IVPB.. 3.375 Gram(s) IV Intermittent every 8 hours  propofol Infusion 10 MICROgram(s)/kG/Min (3.43 mL/Hr) IV Continuous <Continuous>  ranolazine 500 milliGRAM(s) Oral two times a day  rivaroxaban 2.5 milliGRAM(s) Oral two times a day    MEDICATIONS  (PRN):  acetaminophen     Tablet .. 650 milliGRAM(s) Oral every 6 hours PRN Temp greater or equal to 38C (100.4F), Mild Pain (1 - 3)  benzonatate 100 milliGRAM(s) Oral three times a day PRN Cough  dextrose Oral Gel 15 Gram(s) Oral once PRN Blood Glucose LESS THAN 70 milliGRAM(s)/deciliter  guaiFENesin Oral Liquid (Sugar-Free) 100 milliGRAM(s) Oral every 6 hours PRN Cough      Allergies    No Known Allergies    Intolerances        REVIEW OF SYSTEMS:  [ ] Unable to assess ROS because intubated    OBJECTIVE:  ICU Vital Signs Last 24 Hrs  T(C): 36.7 (17 Mar 2024 01:00), Max: 36.8 (16 Mar 2024 05:16)  T(F): 98.1 (17 Mar 2024 01:00), Max: 98.3 (16 Mar 2024 05:16)  HR: 55 (17 Mar 2024 03:11) (55 - 75)  BP: 79/44 (17 Mar 2024 03:11) (79/44 - 149/60)  BP(mean): 53 (17 Mar 2024 03:11) (53 - 53)  ABP: --  ABP(mean): --  RR: 15 (17 Mar 2024 03:11) (15 - 19)  SpO2: 99% (17 Mar 2024 03:11) (86% - 100%)    O2 Parameters below as of 17 Mar 2024 03:11  Patient On (Oxygen Delivery Method): ventilator    O2 Concentration (%): 100    CAPILLARY BLOOD GLUCOSE      POCT Blood Glucose.: 326 mg/dL (17 Mar 2024 02:19)    PHYSICAL EXAM:  GENERAL: No acute distress, resting comfortably   HEAD:  Atraumatic, normocephalic  EYES: Eye movements are appropriate, pupils responsive to light/accomodation, conjunctiva and sclera clear  NECK: Supple, no swelling , JVD not appreciated   HEART: Regular rate and rhythm, no murmurs or other pathological heart sounds   LUNGS: Unlabored respirations. Clear to auscultation bilaterally, no crackles, wheezing, or rhonchi  ABDOMEN: Soft, nontender/ nondistended, +BS  EXTREMITIES: +2 pulses, moves appropriately w /5 strength, no edema   NEURO:  A&Ox3, CNs not assessed  PSYCH: Affect is appropriate   SKIN: No rashes or lesions   LINES:     LABS:                        10.3   8.50  )-----------( 175      ( 17 Mar 2024 02:49 )             32.1     Hgb Trend: 10.3<--, 9.8<--, 10.0<--, 10.9<--  03-16    136  |  104  |  19  ----------------------------<  232<H>  3.8   |  19<L>  |  0.95    Ca    8.3<L>      16 Mar 2024 05:55  Phos  1.8     03-16  Mg     2.40     03-16    TPro  6.8  /  Alb  3.3  /  TBili  0.2  /  DBili  x   /  AST  46<H>  /  ALT  35<H>  /  AlkPhos  91  03-16    Creatinine Trend: 0.95<--, 1.02<--, 1.03<--    Urinalysis Basic - ( 16 Mar 2024 05:55 )    Color: x / Appearance: x / SG: x / pH: x  Gluc: 232 mg/dL / Ketone: x  / Bili: x / Urobili: x   Blood: x / Protein: x / Nitrite: x   Leuk Esterase: x / RBC: x / WBC x   Sq Epi: x / Non Sq Epi: x / Bacteria: x        Venous Blood Gas:  03-15 @ 06:40  --/--/--/--/--  VBG Lactate: 1.7      MICROBIOLOGY:     RADIOLOGY & ADDITIONAL TESTS:    ASSESSMENT  EPI LAWLER is a 88y female with PMH *** in the hospital for 3d transferred to the MICU for ***.    =====Neurologic=====  Patient is AOx 0 iso intubation  - sedation: Prop/fent  - Titrate to RAAS -1-0    =====Cardiovascular=====  #SepticShock   Patient was admitted with a WBC ( ) and pressures of ( ) They recieved ( ) fluids and started on ( ).     Plan  - Titrate pressors for MAP > 65  - pressor:   - Cont. ab as below     =====Pulmonary=====  #AHRF (PNA)  Shortness of breath ISO of WBC. Most likely in the setting of a infectious PNA. Can also consider aspiration.  Chest X-ray showed ( )     - ventilation status:    =====GI=====  #Gastroenteritis    #Transaminitis  Elevation in transaminese in a cholestatic pattern. Most likely in the setting of sepsis. Can also consider acute pathology such as cholestitis.     Plan  - Order RUQ ultrasound  - Continue to monitor     =====Renal/=====  #Electrolytes  - fuchs:    =====Infectious Disease=====  Sepsis  WBC was ( ) on admission with a fever ( )  tmax. All in the setting of (Symptoms). Chest xray was significant for ( ), CT was noted for ( _ In terms of sources they include ( ). Patient recieved ( AB) in the ED. Blood cultures.   Antibiotic course :  Blood cultures:   MRSA:     - Continue with Antibiotic (Pip tazo, meropenum, ertapenum, ceftriaxone etc)   - FU blood cultures   - Continue shock treatment as above     =====Endocrine=====  No concerns presently.     =====Heme/Onc=====  - DVT PPX: Heparin 5000 units subq   /  Lovenox 40mg  / SCDs   / Holding     =====MICUGeneral=====  Lines:    Drips:    IVF   O2:     AB   Diet:    Pain:   DVT: MICU ACCEPT NOTE    CHIEF COMPLAINT: AHRF Sepsis 2/2 influenza   MICU Consulted for : Worsening hypoxemia requiring intubation     HPI:  Patient is an 89yo F with PMH significant for CAD s/p PCI x6 11/2019 at [facility], HTN, HLD, T2DM, and HFpEF who presented with fever and generalized weakness of 2 days’ duration. She has multiple family members with influenza. She began to develop dyspnea on the morning of presentation. The patient offers no acute complaints. Son at bedside states she has been having difficulty breathing, weakness, and fever for 1 day. Vital signs significant for: Tmax 40.2C, HR up to 122, hypertensive up to 163/72, RR 17-20, SpO2 93% on RA initially now 100% on 2L NC  Labs significant for: WBC 12.10, Hb 10.9, Na 134, lactate 2.4, AST 84, ALT 71, TSH 1.60, ABG likely reflects venous blood sample: pH 7.29, pCO2 49, pO2 36. UA: 300 protein, mod blood, 9 RBCs, 2 WBCs, neg bacteria. Influenza A positive.   Imaging significant for: CXR: clear lungs    MICU      PAST MEDICAL & SURGICAL HISTORY:  CAD (coronary artery disease)  s/p stent x 6      HTN (hypertension)  DM (diabetes mellitus)  HLD (hyperlipidemia)  H/O CHF  S/P primary angioplasty with coronary stent    FAMILY HISTORY:  No pertinent family history in first degree relatives    SOCIAL HISTORY:  Smoking:   Substance Use:   EtOH Use:  k  Advance Directives:     MEDICATIONS  (STANDING):  albuterol/ipratropium for Nebulization 3 milliLiter(s) Nebulizer every 6 hours  aspirin enteric coated 81 milliGRAM(s) Oral daily  atenolol  Tablet 100 milliGRAM(s) Oral daily  atorvastatin 80 milliGRAM(s) Oral at bedtime  dextrose 5%. 1000 milliLiter(s) (50 mL/Hr) IV Continuous <Continuous>  dextrose 5%. 1000 milliLiter(s) (100 mL/Hr) IV Continuous <Continuous>  dextrose 50% Injectable 12.5 Gram(s) IV Push once  dextrose 50% Injectable 25 Gram(s) IV Push once  dextrose 50% Injectable 25 Gram(s) IV Push once  famotidine    Tablet 20 milliGRAM(s) Oral daily  glucagon  Injectable 1 milliGRAM(s) IntraMuscular once  influenza  Vaccine (HIGH DOSE) 0.7 milliLiter(s) IntraMuscular once  insulin lispro (ADMELOG) corrective regimen sliding scale   SubCutaneous three times a day before meals  insulin lispro (ADMELOG) corrective regimen sliding scale   SubCutaneous at bedtime  levothyroxine 25 MICROGram(s) Oral daily  melatonin 6 milliGRAM(s) Oral at bedtime  mupirocin 2% Ointment 1 Application(s) Both Nostrils two times a day  oseltamivir 30 milliGRAM(s) Oral two times a day  piperacillin/tazobactam IVPB. 3.375 Gram(s) IV Intermittent once  piperacillin/tazobactam IVPB.- 3.375 Gram(s) IV Intermittent once  piperacillin/tazobactam IVPB.. 3.375 Gram(s) IV Intermittent every 8 hours  propofol Infusion 10 MICROgram(s)/kG/Min (3.43 mL/Hr) IV Continuous <Continuous>  ranolazine 500 milliGRAM(s) Oral two times a day  rivaroxaban 2.5 milliGRAM(s) Oral two times a day    MEDICATIONS  (PRN):  acetaminophen     Tablet .. 650 milliGRAM(s) Oral every 6 hours PRN Temp greater or equal to 38C (100.4F), Mild Pain (1 - 3)  benzonatate 100 milliGRAM(s) Oral three times a day PRN Cough  dextrose Oral Gel 15 Gram(s) Oral once PRN Blood Glucose LESS THAN 70 milliGRAM(s)/deciliter  guaiFENesin Oral Liquid (Sugar-Free) 100 milliGRAM(s) Oral every 6 hours PRN Cough      Allergies    No Known Allergies    Intolerances        REVIEW OF SYSTEMS:  [ ] Unable to assess ROS because intubated    OBJECTIVE:  ICU Vital Signs Last 24 Hrs  T(C): 36.7 (17 Mar 2024 01:00), Max: 36.8 (16 Mar 2024 05:16)  T(F): 98.1 (17 Mar 2024 01:00), Max: 98.3 (16 Mar 2024 05:16)  HR: 55 (17 Mar 2024 03:11) (55 - 75)  BP: 79/44 (17 Mar 2024 03:11) (79/44 - 149/60)  BP(mean): 53 (17 Mar 2024 03:11) (53 - 53)  ABP: --  ABP(mean): --  RR: 15 (17 Mar 2024 03:11) (15 - 19)  SpO2: 99% (17 Mar 2024 03:11) (86% - 100%)    O2 Parameters below as of 17 Mar 2024 03:11  Patient On (Oxygen Delivery Method): ventilator    O2 Concentration (%): 100    CAPILLARY BLOOD GLUCOSE      POCT Blood Glucose.: 326 mg/dL (17 Mar 2024 02:19)    PHYSICAL EXAM:  GENERAL: No acute distress, resting comfortably   HEAD:  Atraumatic, normocephalic  EYES: Eye movements are appropriate, pupils responsive to light/accomodation, conjunctiva and sclera clear  NECK: Supple, no swelling , JVD not appreciated   HEART: Regular rate and rhythm, no murmurs or other pathological heart sounds   LUNGS: Unlabored respirations. Clear to auscultation bilaterally, no crackles, wheezing, or rhonchi  ABDOMEN: Soft, nontender/ nondistended, +BS  EXTREMITIES: +2 pulses, moves appropriately w /5 strength, no edema   NEURO:  A&Ox3, CNs not assessed  PSYCH: Affect is appropriate   SKIN: No rashes or lesions   LINES:     LABS:                        10.3   8.50  )-----------( 175      ( 17 Mar 2024 02:49 )             32.1     Hgb Trend: 10.3<--, 9.8<--, 10.0<--, 10.9<--  03-16    136  |  104  |  19  ----------------------------<  232<H>  3.8   |  19<L>  |  0.95    Ca    8.3<L>      16 Mar 2024 05:55  Phos  1.8     03-16  Mg     2.40     03-16    TPro  6.8  /  Alb  3.3  /  TBili  0.2  /  DBili  x   /  AST  46<H>  /  ALT  35<H>  /  AlkPhos  91  03-16    Creatinine Trend: 0.95<--, 1.02<--, 1.03<--    Urinalysis Basic - ( 16 Mar 2024 05:55 )    Color: x / Appearance: x / SG: x / pH: x  Gluc: 232 mg/dL / Ketone: x  / Bili: x / Urobili: x   Blood: x / Protein: x / Nitrite: x   Leuk Esterase: x / RBC: x / WBC x   Sq Epi: x / Non Sq Epi: x / Bacteria: x        Venous Blood Gas:  03-15 @ 06:40  --/--/--/--/--  VBG Lactate: 1.7      MICROBIOLOGY:     RADIOLOGY & ADDITIONAL TESTS:    ASSESSMENT  EPI LAWLER is a 88y female with PMH *** in the hospital for 3d transferred to the MICU for ***.    =====Neurologic=====  Patient is AOx 0 iso intubation  - sedation: Prop/fent  - Titrate to RAAS -1-0    =====Cardiovascular=====  #SepticShock   Patient was admitted with a WBC 12, pressures prior to MICU were 79/44, They received fluids and started on LEVO    Plan  - Titrate pressors for MAP > 65  - pressor: Levofed   - Cont. ab as below     =====Pulmonary=====  #AHRF (PNA)  Shortness of breath ISO of WBC. Most likely in the setting of Influenza, can also consider secondary PNA .  Chest X-ray showed no acute findings   - ventilation status: Volume Control:     =====GI=====  #Transaminitis  Elevation in transaminase in a hepatic pattern. Most likely in the setting of sepsis. Can also consider acute pathology such as cholestitis.   Plan    - Continue to monitor     =====Renal/=====  No concerns presently     =====Infectious Disease=====  Sepsis  WBC was (12) on admission with out fever. In addition presented with low blood pressures. All in the setting of respiratory distress, cough etc. Chest xray not remarkable, In terms of sources they include influenza, secondary pneumonia, aspiration pna, etc. Patient received ( AB) in the ED. Blood cultures were drawn   Antibiotic course :  Blood cultures: NGTD  MRSA:     - Continue with Antibiotic (Pip tazo, meropenum, ertapenum, ceftriaxone etc)   - FU blood cultures   - Continue shock treatment as above     =====Endocrine=====  No concerns presently.     =====Heme/Onc=====  - DVT PPX: Heparin 5000 units subq   /  Lovenox 40mg  / SCDs   / Holding     =====MICUGeneral=====  Lines:    Drips:    IVF   O2:     AB   Diet:    Pain:   DVT: MICU ACCEPT NOTE    CHIEF COMPLAINT: AHRF Sepsis 2/2 influenza   MICU Consulted for : Worsening hypoxemia requiring intubation     HPI:  Patient is an 89yo F with PMH significant for CAD s/p PCI x6 11/2019 at [facility], HTN, HLD, T2DM, and HFpEF who presented with fever and generalized weakness of 2 days’ duration. She has multiple family members with influenza. She began to develop dyspnea on the morning of presentation. The patient offers no acute complaints. Son at bedside states she has been having difficulty breathing, weakness, and fever for 1 day. Vital signs significant for: Tmax 40.2C, HR up to 122, hypertensive up to 163/72, RR 17-20, SpO2 93% on RA initially now 100% on 2L NC  Labs significant for: WBC 12.10, Hb 10.9, Na 134, lactate 2.4, AST 84, ALT 71, TSH 1.60, ABG likely reflects venous blood sample: pH 7.29, pCO2 49, pO2 36. UA: 300 protein, mod blood, 9 RBCs, 2 WBCs, neg bacteria. Influenza A positive.   Imaging significant for: CXR: clear lungs    MICU      PAST MEDICAL & SURGICAL HISTORY:  CAD (coronary artery disease)  s/p stent x 6      HTN (hypertension)  DM (diabetes mellitus)  HLD (hyperlipidemia)  H/O CHF  S/P primary angioplasty with coronary stent    FAMILY HISTORY:  No pertinent family history in first degree relatives    SOCIAL HISTORY:  Smoking:   Substance Use:   EtOH Use:  k  Advance Directives:     MEDICATIONS  (STANDING):  albuterol/ipratropium for Nebulization 3 milliLiter(s) Nebulizer every 6 hours  aspirin enteric coated 81 milliGRAM(s) Oral daily  atenolol  Tablet 100 milliGRAM(s) Oral daily  atorvastatin 80 milliGRAM(s) Oral at bedtime  dextrose 5%. 1000 milliLiter(s) (50 mL/Hr) IV Continuous <Continuous>  dextrose 5%. 1000 milliLiter(s) (100 mL/Hr) IV Continuous <Continuous>  dextrose 50% Injectable 12.5 Gram(s) IV Push once  dextrose 50% Injectable 25 Gram(s) IV Push once  dextrose 50% Injectable 25 Gram(s) IV Push once  famotidine    Tablet 20 milliGRAM(s) Oral daily  glucagon  Injectable 1 milliGRAM(s) IntraMuscular once  influenza  Vaccine (HIGH DOSE) 0.7 milliLiter(s) IntraMuscular once  insulin lispro (ADMELOG) corrective regimen sliding scale   SubCutaneous three times a day before meals  insulin lispro (ADMELOG) corrective regimen sliding scale   SubCutaneous at bedtime  levothyroxine 25 MICROGram(s) Oral daily  melatonin 6 milliGRAM(s) Oral at bedtime  mupirocin 2% Ointment 1 Application(s) Both Nostrils two times a day  oseltamivir 30 milliGRAM(s) Oral two times a day  piperacillin/tazobactam IVPB. 3.375 Gram(s) IV Intermittent once  piperacillin/tazobactam IVPB.- 3.375 Gram(s) IV Intermittent once  piperacillin/tazobactam IVPB.. 3.375 Gram(s) IV Intermittent every 8 hours  propofol Infusion 10 MICROgram(s)/kG/Min (3.43 mL/Hr) IV Continuous <Continuous>  ranolazine 500 milliGRAM(s) Oral two times a day  rivaroxaban 2.5 milliGRAM(s) Oral two times a day    MEDICATIONS  (PRN):  acetaminophen     Tablet .. 650 milliGRAM(s) Oral every 6 hours PRN Temp greater or equal to 38C (100.4F), Mild Pain (1 - 3)  benzonatate 100 milliGRAM(s) Oral three times a day PRN Cough  dextrose Oral Gel 15 Gram(s) Oral once PRN Blood Glucose LESS THAN 70 milliGRAM(s)/deciliter  guaiFENesin Oral Liquid (Sugar-Free) 100 milliGRAM(s) Oral every 6 hours PRN Cough      Allergies    No Known Allergies    Intolerances        REVIEW OF SYSTEMS:  [ ] Unable to assess ROS because intubated    OBJECTIVE:  ICU Vital Signs Last 24 Hrs  T(C): 36.7 (17 Mar 2024 01:00), Max: 36.8 (16 Mar 2024 05:16)  T(F): 98.1 (17 Mar 2024 01:00), Max: 98.3 (16 Mar 2024 05:16)  HR: 55 (17 Mar 2024 03:11) (55 - 75)  BP: 79/44 (17 Mar 2024 03:11) (79/44 - 149/60)  BP(mean): 53 (17 Mar 2024 03:11) (53 - 53)  ABP: --  ABP(mean): --  RR: 15 (17 Mar 2024 03:11) (15 - 19)  SpO2: 99% (17 Mar 2024 03:11) (86% - 100%)    O2 Parameters below as of 17 Mar 2024 03:11  Patient On (Oxygen Delivery Method): ventilator    O2 Concentration (%): 100    CAPILLARY BLOOD GLUCOSE      POCT Blood Glucose.: 326 mg/dL (17 Mar 2024 02:19)    PHYSICAL EXAM:  GENERAL: No acute distress, resting comfortably   HEAD:  Atraumatic, normocephalic  EYES: Eye movements are appropriate, pupils responsive to light/accomodation, conjunctiva and sclera clear  NECK: Supple, no swelling , JVD not appreciated   HEART: Regular rate and rhythm, no murmurs or other pathological heart sounds   LUNGS: Unlabored respirations. Clear to auscultation bilaterally, no crackles, wheezing, or rhonchi  ABDOMEN: Soft, nontender/ nondistended, +BS  EXTREMITIES: +2 pulses, moves appropriately w /5 strength, no edema   NEURO:  A&Ox3, CNs not assessed  PSYCH: Affect is appropriate   SKIN: No rashes or lesions   LINES:     LABS:                        10.3   8.50  )-----------( 175      ( 17 Mar 2024 02:49 )             32.1     Hgb Trend: 10.3<--, 9.8<--, 10.0<--, 10.9<--  03-16    136  |  104  |  19  ----------------------------<  232<H>  3.8   |  19<L>  |  0.95    Ca    8.3<L>      16 Mar 2024 05:55  Phos  1.8     03-16  Mg     2.40     03-16    TPro  6.8  /  Alb  3.3  /  TBili  0.2  /  DBili  x   /  AST  46<H>  /  ALT  35<H>  /  AlkPhos  91  03-16    Creatinine Trend: 0.95<--, 1.02<--, 1.03<--    Urinalysis Basic - ( 16 Mar 2024 05:55 )    Color: x / Appearance: x / SG: x / pH: x  Gluc: 232 mg/dL / Ketone: x  / Bili: x / Urobili: x   Blood: x / Protein: x / Nitrite: x   Leuk Esterase: x / RBC: x / WBC x   Sq Epi: x / Non Sq Epi: x / Bacteria: x        Venous Blood Gas:  03-15 @ 06:40  --/--/--/--/--  VBG Lactate: 1.7      MICROBIOLOGY:     RADIOLOGY & ADDITIONAL TESTS:    ASSESSMENT  Patient is a 88 year old female with a medical history of HFpef, CAD (s/p multiple stents), HTN/HLD/DM2,     =====Neurologic=====  Patient is AOx 0 iso intubation  - sedation: Prop/fent  - Titrate to RAAS -1-0    =====Cardiovascular=====  #HfPEF    #CAD    =====Pulmonary=====  #AHRF (PNA)  Shortness of breath ISO of WBC. Most likely in the setting of Influenza, can also consider secondary PNA .  Chest X-ray showed no acute findings   - ventilation status: Volume Control:     =====GI=====  #Transaminitis  Elevation in transaminase in a hepatic pattern. Most likely in the setting of sepsis. Can also consider acute pathology such as cholestitis.   Plan    - Continue to monitor     =====Renal/=====  No concerns presently     =====Infectious Disease=====  Sepsis  WBC was (12) on admission with out fever. In addition presented with low blood pressures. All in the setting of respiratory distress, cough etc. Chest xray not remarkable, In terms of sources they include influenza, secondary pneumonia, aspiration pna, etc. Patient received ( AB) in the ED. Blood cultures were drawn   Antibiotic course :  Blood cultures: NGTD  MRSA:     - Continue with Antibiotic (Pip tazo, meropenum, ertapenum, ceftriaxone etc)   - FU blood cultures     =====Endocrine=====  No concerns presently.     =====Heme/Onc=====  - DVT PPX: Heparin 5000 units subq   /  Lovenox 40mg  / SCDs   / Holding     =====MICUGeneral=====  Lines:    Drips:    IVF   O2:     AB   Diet:    Pain:   DVT: MICU ACCEPT NOTE    CHIEF COMPLAINT: AHRF Sepsis 2/2 influenza   MICU Consulted for : Worsening hypoxemia requiring intubation     HPI:  Patient is an 87yo F with PMH significant for CAD s/p PCI x6 11/2019 at [facility], HTN, HLD, T2DM, and HFpEF who presented with fever and generalized weakness of 2 days’ duration. She has multiple family members with influenza. She began to develop dyspnea on the morning of presentation. The patient offers no acute complaints. Son at bedside states she has been having difficulty breathing, weakness, and fever for 1 day. Vital signs significant for: Tmax 40.2C, HR up to 122, hypertensive up to 163/72, RR 17-20, SpO2 93% on RA initially now 100% on 2L NC  Labs significant for: WBC 12.10, Hb 10.9, Na 134, lactate 2.4, AST 84, ALT 71, TSH 1.60, ABG likely reflects venous blood sample: pH 7.29, pCO2 49, pO2 36. UA: 300 protein, mod blood, 9 RBCs, 2 WBCs, neg bacteria. Influenza A positive.   Imaging significant for: CXR: clear lungs    MICU      PAST MEDICAL & SURGICAL HISTORY:  CAD (coronary artery disease)  s/p stent x 6      HTN (hypertension)  DM (diabetes mellitus)  HLD (hyperlipidemia)  H/O CHF  S/P primary angioplasty with coronary stent    FAMILY HISTORY:  No pertinent family history in first degree relatives    SOCIAL HISTORY:  Smoking:   Substance Use:   EtOH Use:  k  Advance Directives:     MEDICATIONS  (STANDING):  albuterol/ipratropium for Nebulization 3 milliLiter(s) Nebulizer every 6 hours  aspirin enteric coated 81 milliGRAM(s) Oral daily  atenolol  Tablet 100 milliGRAM(s) Oral daily  atorvastatin 80 milliGRAM(s) Oral at bedtime  dextrose 5%. 1000 milliLiter(s) (50 mL/Hr) IV Continuous <Continuous>  dextrose 5%. 1000 milliLiter(s) (100 mL/Hr) IV Continuous <Continuous>  dextrose 50% Injectable 12.5 Gram(s) IV Push once  dextrose 50% Injectable 25 Gram(s) IV Push once  dextrose 50% Injectable 25 Gram(s) IV Push once  famotidine    Tablet 20 milliGRAM(s) Oral daily  glucagon  Injectable 1 milliGRAM(s) IntraMuscular once  influenza  Vaccine (HIGH DOSE) 0.7 milliLiter(s) IntraMuscular once  insulin lispro (ADMELOG) corrective regimen sliding scale   SubCutaneous three times a day before meals  insulin lispro (ADMELOG) corrective regimen sliding scale   SubCutaneous at bedtime  levothyroxine 25 MICROGram(s) Oral daily  melatonin 6 milliGRAM(s) Oral at bedtime  mupirocin 2% Ointment 1 Application(s) Both Nostrils two times a day  oseltamivir 30 milliGRAM(s) Oral two times a day  piperacillin/tazobactam IVPB. 3.375 Gram(s) IV Intermittent once  piperacillin/tazobactam IVPB.- 3.375 Gram(s) IV Intermittent once  piperacillin/tazobactam IVPB.. 3.375 Gram(s) IV Intermittent every 8 hours  propofol Infusion 10 MICROgram(s)/kG/Min (3.43 mL/Hr) IV Continuous <Continuous>  ranolazine 500 milliGRAM(s) Oral two times a day  rivaroxaban 2.5 milliGRAM(s) Oral two times a day    MEDICATIONS  (PRN):  acetaminophen     Tablet .. 650 milliGRAM(s) Oral every 6 hours PRN Temp greater or equal to 38C (100.4F), Mild Pain (1 - 3)  benzonatate 100 milliGRAM(s) Oral three times a day PRN Cough  dextrose Oral Gel 15 Gram(s) Oral once PRN Blood Glucose LESS THAN 70 milliGRAM(s)/deciliter  guaiFENesin Oral Liquid (Sugar-Free) 100 milliGRAM(s) Oral every 6 hours PRN Cough      Allergies    No Known Allergies    Intolerances        REVIEW OF SYSTEMS:  [ ] Unable to assess ROS because intubated    OBJECTIVE:  ICU Vital Signs Last 24 Hrs  T(C): 36.7 (17 Mar 2024 01:00), Max: 36.8 (16 Mar 2024 05:16)  T(F): 98.1 (17 Mar 2024 01:00), Max: 98.3 (16 Mar 2024 05:16)  HR: 55 (17 Mar 2024 03:11) (55 - 75)  BP: 79/44 (17 Mar 2024 03:11) (79/44 - 149/60)  BP(mean): 53 (17 Mar 2024 03:11) (53 - 53)  ABP: --  ABP(mean): --  RR: 15 (17 Mar 2024 03:11) (15 - 19)  SpO2: 99% (17 Mar 2024 03:11) (86% - 100%)    O2 Parameters below as of 17 Mar 2024 03:11  Patient On (Oxygen Delivery Method): ventilator    O2 Concentration (%): 100    CAPILLARY BLOOD GLUCOSE      POCT Blood Glucose.: 326 mg/dL (17 Mar 2024 02:19)    PHYSICAL EXAM:  GENERAL: No acute distress, resting comfortably   HEAD:  Atraumatic, normocephalic  EYES: Eye movements are appropriate, pupils responsive to light/accomodation, conjunctiva and sclera clear  NECK: Supple, no swelling , JVD not appreciated   HEART: Regular rate and rhythm, no murmurs or other pathological heart sounds   LUNGS: Unlabored respirations. Clear to auscultation bilaterally, no crackles, wheezing, or rhonchi  ABDOMEN: Soft, nontender/ nondistended, +BS  EXTREMITIES: +2 pulses, moves appropriately w /5 strength, no edema   NEURO:  A&Ox3, CNs not assessed  PSYCH: Affect is appropriate   SKIN: No rashes or lesions   LINES:     LABS:                        10.3   8.50  )-----------( 175      ( 17 Mar 2024 02:49 )             32.1     Hgb Trend: 10.3<--, 9.8<--, 10.0<--, 10.9<--  03-16    136  |  104  |  19  ----------------------------<  232<H>  3.8   |  19<L>  |  0.95    Ca    8.3<L>      16 Mar 2024 05:55  Phos  1.8     03-16  Mg     2.40     03-16    TPro  6.8  /  Alb  3.3  /  TBili  0.2  /  DBili  x   /  AST  46<H>  /  ALT  35<H>  /  AlkPhos  91  03-16    Creatinine Trend: 0.95<--, 1.02<--, 1.03<--    Urinalysis Basic - ( 16 Mar 2024 05:55 )    Color: x / Appearance: x / SG: x / pH: x  Gluc: 232 mg/dL / Ketone: x  / Bili: x / Urobili: x   Blood: x / Protein: x / Nitrite: x   Leuk Esterase: x / RBC: x / WBC x   Sq Epi: x / Non Sq Epi: x / Bacteria: x        Venous Blood Gas:  03-15 @ 06:40  --/--/--/--/--  VBG Lactate: 1.7      MICROBIOLOGY:     RADIOLOGY & ADDITIONAL TESTS:    ASSESSMENT  Patient is a 88 year old female with a medical history of HFpef, CAD (s/p multiple stents), HTN/HLD/DM2 who was admitted for AHRF 2/2 influenza, rapid called for resp distress and intubated, now in MICU for AHRF and AMS work up.     =====Neurologic=====  Patient is AOx 0 iso intubation  - sedation: Prop/fent  - Titrate to RAAS -1-0    =====Cardiovascular=====  Cardio  HFpef  - 57% EF 3/16  - Home medications:     #CAD  Home medications:   - why is she on rivaxoraban     =====Pulmonary=====  #AHRF (PNA)  Shortness of breath ISO of WBC. Most likely in the setting of Influenza, can also consider secondary PNA .  Chest X-ray showed no acute findings   - ventilation status: Volume Control:   intubated on 317  vent settings  - empiric antibiotics  - txt    =====GI=====  Nutrition through OG tube   NPO w/meds   Can advance diet to tube feeds when appropriate     =====Renal/=====  Given indewelling catheter, monitor Is and OS    =====Infectious Disease=====  Sepsis  WBC was (12) on admission with out fever. In addition presented with low blood pressures. All in the setting of respiratory distress, cough etc. Chest xray not remarkable, In terms of sources they include influenza, secondary pneumonia, aspiration pna, etc. Patient received ( AB) in the ED. Blood cultures were drawn   Antibiotic course :  Blood cultures: NGTD  MRSA:     - Continue with Antibiotic (Pip tazo, meropenum, ertapenum, ceftriaxone etc)   - FU blood cultures     =====Endocrine=====    #DM2  A1c  home meds:   Sliding scale q6.     =====Heme/Onc=====  - DVT PPX: Heparin 5000 units subq   /  Lovenox 40mg  / SCDs   / Holding     =====MICUGeneral=====  Lines:    Drips:    IVF   O2:     AB   Diet:    Pain:   DVT: MICU ACCEPT NOTE    CHIEF COMPLAINT: AHRF Sepsis 2/2 influenza   MICU Consulted for : Worsening hypoxemia requiring intubation     HPI:  Patient is an 89yo F with PMH significant for CAD s/p PCI x6 11/2019 at [facility], HTN, HLD, T2DM, and HFpEF who presented with fever and generalized weakness of 2 days’ duration. She has multiple family members with influenza. She began to develop dyspnea on the morning of presentation. The patient offers no acute complaints. Son at bedside states she has been having difficulty breathing, weakness, and fever for 1 day. Vital signs significant for: Tmax 40.2C, HR up to 122, hypertensive up to 163/72, RR 17-20, SpO2 93% on RA initially now 100% on 2L NC  Labs significant for: WBC 12.10, Hb 10.9, Na 134, lactate 2.4, AST 84, ALT 71, TSH 1.60, ABG likely reflects venous blood sample: pH 7.29, pCO2 49, pO2 36. UA: 300 protein, mod blood, 9 RBCs, 2 WBCs, neg bacteria. Influenza A positive.   Imaging significant for: CXR: clear lungs    MICU      PAST MEDICAL & SURGICAL HISTORY:  CAD (coronary artery disease)  s/p stent x 6      HTN (hypertension)  DM (diabetes mellitus)  HLD (hyperlipidemia)  H/O CHF  S/P primary angioplasty with coronary stent    FAMILY HISTORY:  No pertinent family history in first degree relatives    SOCIAL HISTORY:  Smoking:   Substance Use:   EtOH Use:  k  Advance Directives:     MEDICATIONS  (STANDING):  albuterol/ipratropium for Nebulization 3 milliLiter(s) Nebulizer every 6 hours  aspirin enteric coated 81 milliGRAM(s) Oral daily  atenolol  Tablet 100 milliGRAM(s) Oral daily  atorvastatin 80 milliGRAM(s) Oral at bedtime  dextrose 5%. 1000 milliLiter(s) (50 mL/Hr) IV Continuous <Continuous>  dextrose 5%. 1000 milliLiter(s) (100 mL/Hr) IV Continuous <Continuous>  dextrose 50% Injectable 12.5 Gram(s) IV Push once  dextrose 50% Injectable 25 Gram(s) IV Push once  dextrose 50% Injectable 25 Gram(s) IV Push once  famotidine    Tablet 20 milliGRAM(s) Oral daily  glucagon  Injectable 1 milliGRAM(s) IntraMuscular once  influenza  Vaccine (HIGH DOSE) 0.7 milliLiter(s) IntraMuscular once  insulin lispro (ADMELOG) corrective regimen sliding scale   SubCutaneous three times a day before meals  insulin lispro (ADMELOG) corrective regimen sliding scale   SubCutaneous at bedtime  levothyroxine 25 MICROGram(s) Oral daily  melatonin 6 milliGRAM(s) Oral at bedtime  mupirocin 2% Ointment 1 Application(s) Both Nostrils two times a day  oseltamivir 30 milliGRAM(s) Oral two times a day  piperacillin/tazobactam IVPB. 3.375 Gram(s) IV Intermittent once  piperacillin/tazobactam IVPB.- 3.375 Gram(s) IV Intermittent once  piperacillin/tazobactam IVPB.. 3.375 Gram(s) IV Intermittent every 8 hours  propofol Infusion 10 MICROgram(s)/kG/Min (3.43 mL/Hr) IV Continuous <Continuous>  ranolazine 500 milliGRAM(s) Oral two times a day  rivaroxaban 2.5 milliGRAM(s) Oral two times a day    MEDICATIONS  (PRN):  acetaminophen     Tablet .. 650 milliGRAM(s) Oral every 6 hours PRN Temp greater or equal to 38C (100.4F), Mild Pain (1 - 3)  benzonatate 100 milliGRAM(s) Oral three times a day PRN Cough  dextrose Oral Gel 15 Gram(s) Oral once PRN Blood Glucose LESS THAN 70 milliGRAM(s)/deciliter  guaiFENesin Oral Liquid (Sugar-Free) 100 milliGRAM(s) Oral every 6 hours PRN Cough      Allergies    No Known Allergies    Intolerances        REVIEW OF SYSTEMS:  [ ] Unable to assess ROS because intubated    OBJECTIVE:  ICU Vital Signs Last 24 Hrs  T(C): 36.7 (17 Mar 2024 01:00), Max: 36.8 (16 Mar 2024 05:16)  T(F): 98.1 (17 Mar 2024 01:00), Max: 98.3 (16 Mar 2024 05:16)  HR: 55 (17 Mar 2024 03:11) (55 - 75)  BP: 79/44 (17 Mar 2024 03:11) (79/44 - 149/60)  BP(mean): 53 (17 Mar 2024 03:11) (53 - 53)  ABP: --  ABP(mean): --  RR: 15 (17 Mar 2024 03:11) (15 - 19)  SpO2: 99% (17 Mar 2024 03:11) (86% - 100%)    O2 Parameters below as of 17 Mar 2024 03:11  Patient On (Oxygen Delivery Method): ventilator    O2 Concentration (%): 100    CAPILLARY BLOOD GLUCOSE      POCT Blood Glucose.: 326 mg/dL (17 Mar 2024 02:19)    PHYSICAL EXAM:  GENERAL: No acute distress, resting comfortably   HEAD:  Atraumatic, normocephalic  EYES: Eye movements are appropriate, pupils responsive to light/accomodation, conjunctiva and sclera clear  NECK: Supple, no swelling , JVD not appreciated   HEART: Regular rate and rhythm, no murmurs or other pathological heart sounds   LUNGS: Unlabored respirations. Clear to auscultation bilaterally, no crackles, wheezing, or rhonchi  ABDOMEN: Soft, nontender/ nondistended, +BS  EXTREMITIES: +2 pulses, moves appropriately w /5 strength, no edema   NEURO:  A&Ox3, CNs not assessed  PSYCH: Affect is appropriate   SKIN: No rashes or lesions   LINES:     LABS:                        10.3   8.50  )-----------( 175      ( 17 Mar 2024 02:49 )             32.1     Hgb Trend: 10.3<--, 9.8<--, 10.0<--, 10.9<--  03-16    136  |  104  |  19  ----------------------------<  232<H>  3.8   |  19<L>  |  0.95    Ca    8.3<L>      16 Mar 2024 05:55  Phos  1.8     03-16  Mg     2.40     03-16    TPro  6.8  /  Alb  3.3  /  TBili  0.2  /  DBili  x   /  AST  46<H>  /  ALT  35<H>  /  AlkPhos  91  03-16    Creatinine Trend: 0.95<--, 1.02<--, 1.03<--    Urinalysis Basic - ( 16 Mar 2024 05:55 )    Color: x / Appearance: x / SG: x / pH: x  Gluc: 232 mg/dL / Ketone: x  / Bili: x / Urobili: x   Blood: x / Protein: x / Nitrite: x   Leuk Esterase: x / RBC: x / WBC x   Sq Epi: x / Non Sq Epi: x / Bacteria: x        Venous Blood Gas:  03-15 @ 06:40  --/--/--/--/--  VBG Lactate: 1.7      MICROBIOLOGY:     RADIOLOGY & ADDITIONAL TESTS:    ASSESSMENT  Patient is a 88 year old female with a medical history of HFpef, CAD (s/p multiple stents), HTN/HLD/DM2 who was admitted for AHRF 2/2 influenza, rapid called for resp distress and intubated, now in MICU for AHRF and AMS work up.     =====Neurologic=====  Patient is AOx 0 iso intubation  - sedation: Prop/fent  - Titrate to RAAS -1-0    =====Cardiovascular=====  Cardio  HFpef  - 57% EF 3/16  - Home medications:     #CAD  Home medications:   - why is she on rivaxoraban     =====Pulmonary=====  #AHRF (PNA)  Shortness of breath ISO of WBC. Most likely in the setting of Influenza, can also consider secondary PNA .  Chest X-ray showed no acute findings   - ventilation status: Volume Control:   intubated on 317  vent settings  - empiric antibiotics  - txt    =====GI=====  Nutrition through OG tube   NPO w/meds   Can advance diet to tube feeds when appropriate     =====Renal/=====  Given indewelling catheter, monitor Is and OS    =====Infectious Disease=====  Sepsis  WBC was (12) on admission with out fever. In addition presented with low blood pressures. All in the setting of respiratory distress, cough etc. Chest xray not remarkable, In terms of sources they include influenza, secondary pneumonia, aspiration pna, etc. Patient received ( AB) in the ED. Blood cultures were drawn   Antibiotic course :  Blood cultures: NGTD  MRSA:     - Continue with Antibiotic (Pip tazo, meropenum, ertapenum, ceftriaxone etc)   - FU blood cultures     =====Endocrine=====    #DM2  A1c  home meds:   Sliding scale q6.     =====Heme/Onc=====  - DVT PPX: Heparin 5000 units subq   /  Lovenox 40mg  / SCDs   / Holding     =====MICUGeneral=====  Lines:    Drips:    IVF   O2:     AB   Diet:    Pain:   DVT:        88 year old female with a medical history of HFpef, CAD (s/p multiple stents), HTN/HLD/DM2 p/w hypoxia likely 2/2 influenza. Patient s/p rrt for worsening hypoxemia and AMS requiring intubation.   hypoxic respiratory failure in the setting of influenza and possible aspiration PNA given patient with increased secretions  vent support,  titrate FiO2 to maintain sat >92, serial ABG  sedated with propofol and fentanyl  shock likely vasoplegic vs septic, levophed to maintain MAP>65  pancx, continue Tamiflu, start zosyn for possible aspiration, trend lactate  MRSA swab, legionella, sputum cx, procalcitonin  TTE, trend CE  patient with CAD and multiple stents continue ASA, will clarity Xarelto  OGT , TF as tolerated, RISS, FS   fuchs strict I and O   overall guarded prognosis   Full code  DVT ppx Xarelto    critical care time 35 min  adrianna moore MICU ACCEPT NOTE    CHIEF COMPLAINT: AHRF Sepsis 2/2 influenza   MICU Consulted for : Worsening hypoxemia requiring intubation     HPI:  Patient is an 87yo F with PMH significant for CAD s/p PCI x6 11/2019 at [facility], HTN, HLD, T2DM, and HFpEF who presented with fever and generalized weakness of 2 days’ duration. She has multiple family members with influenza. She began to develop dyspnea on the morning of presentation. The patient offers no acute complaints. Son at bedside states she has been having difficulty breathing, weakness, and fever for 1 day. Vital signs significant for: Tmax 40.2C, HR up to 122, hypertensive up to 163/72, RR 17-20, SpO2 93% on RA initially now 100% on 2L NC  Labs significant for: WBC 12.10, Hb 10.9, Na 134, lactate 2.4, AST 84, ALT 71, TSH 1.60, ABG likely reflects venous blood sample: pH 7.29, pCO2 49, pO2 36. UA: 300 protein, mod blood, 9 RBCs, 2 WBCs, neg bacteria. Influenza A positive.   Imaging significant for: CXR: clear lungs    MICU consulted due to hypoxia and worsening SOB, patient had accessory muscle use       PAST MEDICAL & SURGICAL HISTORY:  CAD (coronary artery disease)  s/p stent x 6  HTN (hypertension)  DM (diabetes mellitus)  HLD (hyperlipidemia)  H/O CHF  S/P primary angioplasty with coronary stent    FAMILY HISTORY:  No pertinent family history in first degree relatives    SOCIAL HISTORY:  Smoking:   Substance Use:   EtOH Use:  k  Advance Directives:     MEDICATIONS  (STANDING):  albuterol/ipratropium for Nebulization 3 milliLiter(s) Nebulizer every 6 hours  aspirin enteric coated 81 milliGRAM(s) Oral daily  atenolol  Tablet 100 milliGRAM(s) Oral daily  atorvastatin 80 milliGRAM(s) Oral at bedtime  dextrose 5%. 1000 milliLiter(s) (50 mL/Hr) IV Continuous <Continuous>  dextrose 5%. 1000 milliLiter(s) (100 mL/Hr) IV Continuous <Continuous>  dextrose 50% Injectable 12.5 Gram(s) IV Push once  dextrose 50% Injectable 25 Gram(s) IV Push once  dextrose 50% Injectable 25 Gram(s) IV Push once  famotidine    Tablet 20 milliGRAM(s) Oral daily  glucagon  Injectable 1 milliGRAM(s) IntraMuscular once  influenza  Vaccine (HIGH DOSE) 0.7 milliLiter(s) IntraMuscular once  insulin lispro (ADMELOG) corrective regimen sliding scale   SubCutaneous three times a day before meals  insulin lispro (ADMELOG) corrective regimen sliding scale   SubCutaneous at bedtime  levothyroxine 25 MICROGram(s) Oral daily  melatonin 6 milliGRAM(s) Oral at bedtime  mupirocin 2% Ointment 1 Application(s) Both Nostrils two times a day  oseltamivir 30 milliGRAM(s) Oral two times a day  piperacillin/tazobactam IVPB. 3.375 Gram(s) IV Intermittent once  piperacillin/tazobactam IVPB.- 3.375 Gram(s) IV Intermittent once  piperacillin/tazobactam IVPB.. 3.375 Gram(s) IV Intermittent every 8 hours  propofol Infusion 10 MICROgram(s)/kG/Min (3.43 mL/Hr) IV Continuous <Continuous>  ranolazine 500 milliGRAM(s) Oral two times a day  rivaroxaban 2.5 milliGRAM(s) Oral two times a day    MEDICATIONS  (PRN):  acetaminophen     Tablet .. 650 milliGRAM(s) Oral every 6 hours PRN Temp greater or equal to 38C (100.4F), Mild Pain (1 - 3)  benzonatate 100 milliGRAM(s) Oral three times a day PRN Cough  dextrose Oral Gel 15 Gram(s) Oral once PRN Blood Glucose LESS THAN 70 milliGRAM(s)/deciliter  guaiFENesin Oral Liquid (Sugar-Free) 100 milliGRAM(s) Oral every 6 hours PRN Cough      Allergies    No Known Allergies    Intolerances        REVIEW OF SYSTEMS:  [ ] Unable to assess ROS because intubated    OBJECTIVE:  ICU Vital Signs Last 24 Hrs  T(C): 36.7 (17 Mar 2024 01:00), Max: 36.8 (16 Mar 2024 05:16)  T(F): 98.1 (17 Mar 2024 01:00), Max: 98.3 (16 Mar 2024 05:16)  HR: 55 (17 Mar 2024 03:11) (55 - 75)  BP: 79/44 (17 Mar 2024 03:11) (79/44 - 149/60)  BP(mean): 53 (17 Mar 2024 03:11) (53 - 53)  ABP: --  ABP(mean): --  RR: 15 (17 Mar 2024 03:11) (15 - 19)  SpO2: 99% (17 Mar 2024 03:11) (86% - 100%)    O2 Parameters below as of 17 Mar 2024 03:11  Patient On (Oxygen Delivery Method): ventilator    O2 Concentration (%): 100    CAPILLARY BLOOD GLUCOSE      POCT Blood Glucose.: 326 mg/dL (17 Mar 2024 02:19)    PHYSICAL EXAM:  GENERAL: Elderly female, intubated   HEAD:  Atraumatic, normocephalic  EYES: Did not assess   NECK: Supple, no swelling , JVD not appreciated   HEART: Difficult to appreciate due to patient's wheezing   LUNGS: Exp wheeze   ABDOMEN: Soft, nontender/ nondistended, +BS  EXTREMITIES: +2 pulses,  NEURO:  Intubated and sedated     SKIN: No rashes or lesions   LINES: LEVOphed     LABS:                        10.3   8.50  )-----------( 175      ( 17 Mar 2024 02:49 )             32.1     Hgb Trend: 10.3<--, 9.8<--, 10.0<--, 10.9<--  03-16    136  |  104  |  19  ----------------------------<  232<H>  3.8   |  19<L>  |  0.95    Ca    8.3<L>      16 Mar 2024 05:55  Phos  1.8     03-16  Mg     2.40     03-16    TPro  6.8  /  Alb  3.3  /  TBili  0.2  /  DBili  x   /  AST  46<H>  /  ALT  35<H>  /  AlkPhos  91  03-16    Creatinine Trend: 0.95<--, 1.02<--, 1.03<--    Urinalysis Basic - ( 16 Mar 2024 05:55 )    Color: x / Appearance: x / SG: x / pH: x  Gluc: 232 mg/dL / Ketone: x  / Bili: x / Urobili: x   Blood: x / Protein: x / Nitrite: x   Leuk Esterase: x / RBC: x / WBC x   Sq Epi: x / Non Sq Epi: x / Bacteria: x        Venous Blood Gas:  03-15 @ 06:40  --/--/--/--/--  VBG Lactate: 1.7      MICROBIOLOGY:     RADIOLOGY & ADDITIONAL TESTS:    ASSESSMENT  Patient is a 88 year old female with a medical history of HFpef, CAD (s/p multiple stents), HTN/HLD/DM2 who was admitted for AHRF 2/2 influenza, rapid called for resp distress and intubated, now in MICU for AHRF and AMS work up.     =====Neurologic=====  Patient is AOx 0 iso intubation  - sedation: Prop/fent  - Titrate to RAAS -1-0    =====Cardiovascular=====  Cardio  HFpef  - 57% EF 3/16  - Home medications:     #CAD  Home medications:   - why is she on rivaxoraban     =====Pulmonary=====  #AHRF (PNA)  Shortness of breath ISO of WBC. Most likely in the setting of Influenza, can also consider secondary PNA .  Chest X-ray showed no acute findings   - ventilation status: Volume Control:   intubated on 317  vent settings  - empiric antibiotics  - txt    =====GI=====  Nutrition through OG tube   NPO w/meds   Can advance diet to tube feeds when appropriate     =====Renal/=====  Given indewelling catheter, monitor Is and OS    =====Infectious Disease=====  Sepsis  WBC was (12) on admission with out fever. In addition presented with low blood pressures. All in the setting of respiratory distress, cough etc. Chest xray not remarkable, In terms of sources they include influenza, secondary pneumonia, aspiration pna, etc. Patient received ( AB) in the ED. Blood cultures were drawn   Antibiotic course :  Blood cultures: NGTD  MRSA:     - Continue with Antibiotic (Pip tazo, meropenum, ertapenum, ceftriaxone etc)   - FU blood cultures     =====Endocrine=====    #DM2  A1c  home meds:   Sliding scale q6.     =====Heme/Onc=====  - DVT PPX: Heparin 5000 units subq   /  Lovenox 40mg  / SCDs   / Holding     =====MICUGeneral=====  Lines:    Drips:    IVF   O2:     AB   Diet:    Pain:   DVT:        88 year old female with a medical history of HFpef, CAD (s/p multiple stents), HTN/HLD/DM2 p/w hypoxia likely 2/2 influenza. Patient s/p rrt for worsening hypoxemia and AMS requiring intubation.   hypoxic respiratory failure in the setting of influenza and possible aspiration PNA given patient with increased secretions  vent support,  titrate FiO2 to maintain sat >92, serial ABG  sedated with propofol and fentanyl  shock likely vasoplegic vs septic, levophed to maintain MAP>65  pancx, continue Tamiflu, start zosyn for possible aspiration, trend lactate  MRSA swab, legionella, sputum cx, procalcitonin  TTE, trend CE  patient with CAD and multiple stents continue ASA, will clarity Xarelto  OGT , TF as tolerated, RISS, FS   fuchs strict I and O   overall guarded prognosis   Full code  DVT ppx Xarelto    critical care time 35 min  adrianna moore MICU ACCEPT NOTE    CHIEF COMPLAINT: AHRF Sepsis 2/2 influenza   MICU Consulted for : Worsening hypoxemia requiring intubation     HPI:  Patient is an 87yo F with PMH significant for CAD s/p PCI x6 11/2019 at [facility], HTN, HLD, T2DM, and HFpEF who presented with fever and generalized weakness of 2 days’ duration. She has multiple family members with influenza. She began to develop dyspnea on the morning of presentation. The patient offers no acute complaints. Son at bedside states she has been having difficulty breathing, weakness, and fever for 1 day. Vital signs significant for: Tmax 40.2C, HR up to 122, hypertensive up to 163/72, RR 17-20, SpO2 93% on RA initially now 100% on 2L NC  Labs significant for: WBC 12.10, Hb 10.9, Na 134, lactate 2.4, AST 84, ALT 71, TSH 1.60, ABG likely reflects venous blood sample: pH 7.29, pCO2 49, pO2 36. UA: 300 protein, mod blood, 9 RBCs, 2 WBCs, neg bacteria. Influenza A positive.   Imaging significant for: CXR: clear lungs    MICU consulted due to hypoxia and worsening SOB, patient had accessory muscle use       PAST MEDICAL & SURGICAL HISTORY:  CAD (coronary artery disease)  s/p stent x 6  HTN (hypertension)  DM (diabetes mellitus)  HLD (hyperlipidemia)  H/O CHF  S/P primary angioplasty with coronary stent    FAMILY HISTORY:  No pertinent family history in first degree relatives    SOCIAL HISTORY:  Smoking:   Substance Use:   EtOH Use:  k  Advance Directives:     MEDICATIONS  (STANDING):  albuterol/ipratropium for Nebulization 3 milliLiter(s) Nebulizer every 6 hours  aspirin enteric coated 81 milliGRAM(s) Oral daily  atenolol  Tablet 100 milliGRAM(s) Oral daily  atorvastatin 80 milliGRAM(s) Oral at bedtime  dextrose 5%. 1000 milliLiter(s) (50 mL/Hr) IV Continuous <Continuous>  dextrose 5%. 1000 milliLiter(s) (100 mL/Hr) IV Continuous <Continuous>  dextrose 50% Injectable 12.5 Gram(s) IV Push once  dextrose 50% Injectable 25 Gram(s) IV Push once  dextrose 50% Injectable 25 Gram(s) IV Push once  famotidine    Tablet 20 milliGRAM(s) Oral daily  glucagon  Injectable 1 milliGRAM(s) IntraMuscular once  influenza  Vaccine (HIGH DOSE) 0.7 milliLiter(s) IntraMuscular once  insulin lispro (ADMELOG) corrective regimen sliding scale   SubCutaneous three times a day before meals  insulin lispro (ADMELOG) corrective regimen sliding scale   SubCutaneous at bedtime  levothyroxine 25 MICROGram(s) Oral daily  melatonin 6 milliGRAM(s) Oral at bedtime  mupirocin 2% Ointment 1 Application(s) Both Nostrils two times a day  oseltamivir 30 milliGRAM(s) Oral two times a day  piperacillin/tazobactam IVPB. 3.375 Gram(s) IV Intermittent once  piperacillin/tazobactam IVPB.- 3.375 Gram(s) IV Intermittent once  piperacillin/tazobactam IVPB.. 3.375 Gram(s) IV Intermittent every 8 hours  propofol Infusion 10 MICROgram(s)/kG/Min (3.43 mL/Hr) IV Continuous <Continuous>  ranolazine 500 milliGRAM(s) Oral two times a day  rivaroxaban 2.5 milliGRAM(s) Oral two times a day    MEDICATIONS  (PRN):  acetaminophen     Tablet .. 650 milliGRAM(s) Oral every 6 hours PRN Temp greater or equal to 38C (100.4F), Mild Pain (1 - 3)  benzonatate 100 milliGRAM(s) Oral three times a day PRN Cough  dextrose Oral Gel 15 Gram(s) Oral once PRN Blood Glucose LESS THAN 70 milliGRAM(s)/deciliter  guaiFENesin Oral Liquid (Sugar-Free) 100 milliGRAM(s) Oral every 6 hours PRN Cough      Allergies    No Known Allergies  Intolerances      REVIEW OF SYSTEMS:  [ ] Unable to assess ROS because intubated    OBJECTIVE:  ICU Vital Signs Last 24 Hrs  T(C): 36.7 (17 Mar 2024 01:00), Max: 36.8 (16 Mar 2024 05:16)  T(F): 98.1 (17 Mar 2024 01:00), Max: 98.3 (16 Mar 2024 05:16)  HR: 55 (17 Mar 2024 03:11) (55 - 75)  BP: 79/44 (17 Mar 2024 03:11) (79/44 - 149/60)  BP(mean): 53 (17 Mar 2024 03:11) (53 - 53)  ABP: --  ABP(mean): --  RR: 15 (17 Mar 2024 03:11) (15 - 19)  SpO2: 99% (17 Mar 2024 03:11) (86% - 100%)    O2 Parameters below as of 17 Mar 2024 03:11  Patient On (Oxygen Delivery Method): ventilator    O2 Concentration (%): 100    CAPILLARY BLOOD GLUCOSE      POCT Blood Glucose.: 326 mg/dL (17 Mar 2024 02:19)    PHYSICAL EXAM:  GENERAL: Elderly female, intubated   HEAD:  Atraumatic, normocephalic  EYES: Did not assess   NECK: Supple, no swelling , JVD not appreciated   HEART: Difficult to appreciate due to patient's wheezing   LUNGS: Exp wheeze   ABDOMEN: Soft, nontender/ nondistended, +BS  EXTREMITIES: +2 pulses,  NEURO:  Intubated and sedated     SKIN: No rashes or lesions   LINES: LEVOphed     LABS:                        10.3   8.50  )-----------( 175      ( 17 Mar 2024 02:49 )             32.1     Hgb Trend: 10.3<--, 9.8<--, 10.0<--, 10.9<--  03-16    136  |  104  |  19  ----------------------------<  232<H>  3.8   |  19<L>  |  0.95    Ca    8.3<L>      16 Mar 2024 05:55  Phos  1.8     03-16  Mg     2.40     03-16    TPro  6.8  /  Alb  3.3  /  TBili  0.2  /  DBili  x   /  AST  46<H>  /  ALT  35<H>  /  AlkPhos  91  03-16    Creatinine Trend: 0.95<--, 1.02<--, 1.03<--    Urinalysis Basic - ( 16 Mar 2024 05:55 )    Color: x / Appearance: x / SG: x / pH: x  Gluc: 232 mg/dL / Ketone: x  / Bili: x / Urobili: x   Blood: x / Protein: x / Nitrite: x   Leuk Esterase: x / RBC: x / WBC x   Sq Epi: x / Non Sq Epi: x / Bacteria: x        Venous Blood Gas:  03-15 @ 06:40  --/--/--/--/--  VBG Lactate: 1.7      MICROBIOLOGY:     RADIOLOGY & ADDITIONAL TESTS:    ASSESSMENT  Patient is a 88 year old female with a medical history of HFpef, CAD (s/p multiple stents), HTN/HLD/DM2 who was admitted for AHRF 2/2 influenza, rapid called for resp distress and intubated, now in MICU for AHRF and AMS work up.     =====Neurologic=====  Patient is AOx 0 iso intubation  - sedation: Prop/fent  - Titrate to RAAS -1-0    =====Cardiovascular=====    #HFpef  - 57% EF 3/16  - Home medications: atenolol 100mg ,torsemide 10mg daily    #CAD  Home medications: Aspirin 81mgAtortavastin 80mg  - why is she on rivaroxaban ?    #HTN  Home medications : Amlodipine 5mg Daily Losartan 100mg   - continue home medications     =====Pulmonary=====  #AHRF (PNA)  Shortness of breath ISO of WBC. Most likely in the setting of Influenza, can also consider secondary PNA .  Chest X-ray showed no acute findings   - ventilation status: Volume Control:   intubated on 317  vent settings: Volume Control 60% FiO2  Peep 8  - empiric antibiotics :zosyn   - txt    =====GI=====  Nutrition through OG tube   NPO w/meds   Can advance diet to tube feeds when appropriate     =====Renal/=====  Given indwelling catheter, monitor Is and OS    =====Infectious Disease=====  Sepsis  WBC was (12) on admission with out fever. In addition presented with low blood pressures. All in the setting of respiratory distress, cough etc. Chest xray not remarkable, In terms of sources they include influenza, secondary pneumonia, aspiration pna, etc. Patient received ( AB) in the ED. Blood cultures were drawn   Antibiotic course :  Blood cultures: NGTD  MRSA:     - Continue with Antibiotic (Zosyn)   - FU blood cultures     =====Endocrine=====    #DM2  A1c  home meds:   Sliding scale q6.     =====Heme/Onc=====  - DVT PPX: Heparin 5000 units subq   /  Lovenox 40mg  / SCDs   / Holding     =====MICUGeneral=====  Lines:    Drips:    IVF   O2:     AB   Diet:    Pain:   DVT:        88 year old female with a medical history of HFpef, CAD (s/p multiple stents), HTN/HLD/DM2 p/w hypoxia likely 2/2 influenza. Patient s/p rrt for worsening hypoxemia and AMS requiring intubation.   hypoxic respiratory failure in the setting of influenza and possible aspiration PNA given patient with increased secretions  vent support,  titrate FiO2 to maintain sat >92, serial ABG  sedated with propofol and fentanyl  shock likely vasoplegic vs septic, levophed to maintain MAP>65  pancx, continue Tamiflu, start zosyn for possible aspiration, trend lactate  MRSA swab, legionella, sputum cx, procalcitonin  TTE, trend CE  patient with CAD and multiple stents continue ASA, will clarity Xarelto  OGT , TF as tolerated, RISS, FS   fuchs strict I and O   overall guarded prognosis   Full code  DVT ppx Xarelto    critical care time 35 min  adrianna moore MICU ACCEPT NOTE    CHIEF COMPLAINT: AHRF Sepsis 2/2 influenza   MICU Consulted for : Worsening hypoxemia requiring intubation     HPI:  Patient is an 89yo F with PMH significant for CAD s/p PCI x6 11/2019 at [facility], HTN, HLD, T2DM, and HFpEF who presented with fever and generalized weakness of 2 days’ duration. She has multiple family members with influenza. She began to develop dyspnea on the morning of presentation. The patient offers no acute complaints. Son at bedside states she has been having difficulty breathing, weakness, and fever for 1 day. Vital signs significant for: Tmax 40.2C, HR up to 122, hypertensive up to 163/72, RR 17-20, SpO2 93% on RA initially now 100% on 2L NC  Labs significant for: WBC 12.10, Hb 10.9, Na 134, lactate 2.4, AST 84, ALT 71, TSH 1.60, ABG likely reflects venous blood sample: pH 7.29, pCO2 49, pO2 36. UA: 300 protein, mod blood, 9 RBCs, 2 WBCs, neg bacteria. Influenza A positive.   Imaging significant for: CXR: clear lungs    MICU consulted due to hypoxia and worsening SOB, patient had accessory muscle use     PAST MEDICAL & SURGICAL HISTORY:  CAD (coronary artery disease)  s/p stent x 6  HTN (hypertension)  DM (diabetes mellitus)  HLD (hyperlipidemia)  H/O CHF  S/P primary angioplasty with coronary stent    FAMILY HISTORY:  No pertinent family history in first degree relatives    SOCIAL HISTORY:  Smoking:   Substance Use:   EtOH Use:  k  Advance Directives:     MEDICATIONS  (STANDING):  albuterol/ipratropium for Nebulization 3 milliLiter(s) Nebulizer every 6 hours  aspirin enteric coated 81 milliGRAM(s) Oral daily  atenolol  Tablet 100 milliGRAM(s) Oral daily  atorvastatin 80 milliGRAM(s) Oral at bedtime  dextrose 5%. 1000 milliLiter(s) (50 mL/Hr) IV Continuous <Continuous>  dextrose 5%. 1000 milliLiter(s) (100 mL/Hr) IV Continuous <Continuous>  dextrose 50% Injectable 12.5 Gram(s) IV Push once  dextrose 50% Injectable 25 Gram(s) IV Push once  dextrose 50% Injectable 25 Gram(s) IV Push once  famotidine    Tablet 20 milliGRAM(s) Oral daily  glucagon  Injectable 1 milliGRAM(s) IntraMuscular once  influenza  Vaccine (HIGH DOSE) 0.7 milliLiter(s) IntraMuscular once  insulin lispro (ADMELOG) corrective regimen sliding scale   SubCutaneous three times a day before meals  insulin lispro (ADMELOG) corrective regimen sliding scale   SubCutaneous at bedtime  levothyroxine 25 MICROGram(s) Oral daily  melatonin 6 milliGRAM(s) Oral at bedtime  mupirocin 2% Ointment 1 Application(s) Both Nostrils two times a day  oseltamivir 30 milliGRAM(s) Oral two times a day  piperacillin/tazobactam IVPB. 3.375 Gram(s) IV Intermittent once  piperacillin/tazobactam IVPB.- 3.375 Gram(s) IV Intermittent once  piperacillin/tazobactam IVPB.. 3.375 Gram(s) IV Intermittent every 8 hours  propofol Infusion 10 MICROgram(s)/kG/Min (3.43 mL/Hr) IV Continuous <Continuous>  ranolazine 500 milliGRAM(s) Oral two times a day  rivaroxaban 2.5 milliGRAM(s) Oral two times a day    MEDICATIONS  (PRN):  acetaminophen     Tablet .. 650 milliGRAM(s) Oral every 6 hours PRN Temp greater or equal to 38C (100.4F), Mild Pain (1 - 3)  benzonatate 100 milliGRAM(s) Oral three times a day PRN Cough  dextrose Oral Gel 15 Gram(s) Oral once PRN Blood Glucose LESS THAN 70 milliGRAM(s)/deciliter  guaiFENesin Oral Liquid (Sugar-Free) 100 milliGRAM(s) Oral every 6 hours PRN Cough      Allergies    No Known Allergies  Intolerances      REVIEW OF SYSTEMS:  [ ] Unable to assess ROS because intubated    OBJECTIVE:  ICU Vital Signs Last 24 Hrs  T(C): 36.7 (17 Mar 2024 01:00), Max: 36.8 (16 Mar 2024 05:16)  T(F): 98.1 (17 Mar 2024 01:00), Max: 98.3 (16 Mar 2024 05:16)  HR: 55 (17 Mar 2024 03:11) (55 - 75)  BP: 79/44 (17 Mar 2024 03:11) (79/44 - 149/60)  BP(mean): 53 (17 Mar 2024 03:11) (53 - 53)  ABP: --  ABP(mean): --  RR: 15 (17 Mar 2024 03:11) (15 - 19)  SpO2: 99% (17 Mar 2024 03:11) (86% - 100%)    O2 Parameters below as of 17 Mar 2024 03:11  Patient On (Oxygen Delivery Method): ventilator    O2 Concentration (%): 100    CAPILLARY BLOOD GLUCOSE      POCT Blood Glucose.: 326 mg/dL (17 Mar 2024 02:19)    PHYSICAL EXAM:  GENERAL: Elderly female, intubated   HEAD:  Atraumatic, normocephalic  EYES: Did not assess   NECK: Supple, no swelling , JVD not appreciated   HEART: Difficult to appreciate due to patient's wheezing   LUNGS: Exp wheeze   ABDOMEN: Soft, nontender/ nondistended, +BS  EXTREMITIES: +2 pulses,  NEURO:  Intubated and sedated     SKIN: No rashes or lesions   LINES: LEVOphed     LABS:                        10.3   8.50  )-----------( 175      ( 17 Mar 2024 02:49 )             32.1     Hgb Trend: 10.3<--, 9.8<--, 10.0<--, 10.9<--  03-16    136  |  104  |  19  ----------------------------<  232<H>  3.8   |  19<L>  |  0.95    Ca    8.3<L>      16 Mar 2024 05:55  Phos  1.8     03-16  Mg     2.40     03-16    TPro  6.8  /  Alb  3.3  /  TBili  0.2  /  DBili  x   /  AST  46<H>  /  ALT  35<H>  /  AlkPhos  91  03-16    Creatinine Trend: 0.95<--, 1.02<--, 1.03<--    Urinalysis Basic - ( 16 Mar 2024 05:55 )    Color: x / Appearance: x / SG: x / pH: x  Gluc: 232 mg/dL / Ketone: x  / Bili: x / Urobili: x   Blood: x / Protein: x / Nitrite: x   Leuk Esterase: x / RBC: x / WBC x   Sq Epi: x / Non Sq Epi: x / Bacteria: x        Venous Blood Gas:  03-15 @ 06:40  --/--/--/--/--  VBG Lactate: 1.7      MICROBIOLOGY:     RADIOLOGY & ADDITIONAL TESTS:    ASSESSMENT  Patient is a 88 year old female with a medical history of HFpef, CAD (s/p multiple stents), HTN/HLD/DM2 who was admitted for AHRF 2/2 influenza, rapid called for resp distress and intubated, now in MICU for AHRF and AMS work up.     =====Neurologic=====  Patient is AOx 0 iso intubation  - sedation: Prop/fent  - Titrate to RAAS -1-0    =====Cardiovascular=====    #HFpef  - 57% EF 3/16  - Home medications: atenolol 100mg ,torsemide 10mg daily  - POCUS exam noted for new reduced lvef  - holding patient's atenolol in setting of bradycardia     #CAD  Home medications: Aspirin 81mgAtortavastin 80mg  - why is she on rivaroxaban ?: Need to fu with family    #HTN  Home medications : Amlodipine 5mg Daily Losartan 100mg   - continue home medications     =====Pulmonary=====  #AHRF (PNA)  Shortness of breath ISO of WBC. Most likely in the setting of Influenza, can also consider secondary PNA .  Chest X-ray showed no acute findings   - ventilation status: Volume Control:   intubated on 317  vent settings: Volume Control 60% FiO2  Peep 8  - empiric antibiotics :zosyn   - txt    =====GI=====  Nutrition through OG tube   NPO w/meds   Can advance diet to tube feeds when appropriate     =====Renal/=====  Given indwelling catheter, monitor Is and OS    =====Infectious Disease=====  Sepsis  WBC was (12) on admission with out fever. In addition presented with low blood pressures. All in the setting of respiratory distress, cough etc. Chest xray not remarkable, In terms of sources they include influenza, secondary pneumonia, aspiration pna, etc. Patient received ( AB) in the ED. Blood cultures were drawn   Antibiotic course :  Blood cultures: NGTD    - Continue with Antibiotic (Zosyn)   - FU blood cultures     =====Endocrine=====    #DM2  A1c  home meds:   Sliding scale q6.     =====Heme/Onc=====  - DVT PPX: Heparin DRIP     =====MICUGeneral=====  Lines:  Left a-line, peripherals  Drips: Levo, propophol, fent   O2:  Volume control   AB: Zosyn   Diet: Pending  DVT:Heparin dip         88 year old female with a medical history of HFpef, CAD (s/p multiple stents), HTN/HLD/DM2 p/w hypoxia likely 2/2 influenza. Patient s/p rrt for worsening hypoxemia and AMS requiring intubation.   hypoxic respiratory failure in the setting of influenza and possible aspiration PNA given patient with increased secretions  vent support,  titrate FiO2 to maintain sat >92, serial ABG  sedated with propofol and fentanyl  shock likely vasoplegic vs septic, levophed to maintain MAP>65  pancx, continue Tamiflu, start zosyn for possible aspiration, trend lactate  MRSA swab, legionella, sputum cx, procalcitonin  TTE, trend CE  patient with CAD and multiple stents continue ASA, will clarity Xarelto  OGT , TF as tolerated, RISS, FS   fuchs strict I and O   overall guarded prognosis   Full code  DVT ppx Xarelto    critical care time 35 min  adrianna moore

## 2024-03-17 NOTE — PROGRESS NOTE ADULT - SUBJECTIVE AND OBJECTIVE BOX
Benjamin Cabrera MD  Internal Medicine   PGY3    OVERNIGHT EVENTS:    SUBJECTIVE: Patient seen and examined at bedside. Patient denies fever, chills, SOB, chest pain, N/V/D.    OBJECTIVE:    VITAL SIGNS:  ICU Vital Signs Last 24 Hrs  T(C): 35.8 (17 Mar 2024 04:00), Max: 36.8 (16 Mar 2024 11:00)  T(F): 96.5 (17 Mar 2024 04:00), Max: 98.3 (16 Mar 2024 11:00)  HR: 50 (17 Mar 2024 06:42) (49 - 75)  BP: 120/50 (17 Mar 2024 06:00) (79/44 - 153/65)  BP(mean): 69 (17 Mar 2024 06:00) (53 - 128)  ABP: 119/48 (17 Mar 2024 06:42) (119/48 - 159/56)  ABP(mean): 73 (17 Mar 2024 06:42) (72 - 93)  RR: 14 (17 Mar 2024 06:42) (14 - 19)  SpO2: 96% (17 Mar 2024 06:42) (86% - 100%)    O2 Parameters below as of 17 Mar 2024 06:00  Patient On (Oxygen Delivery Method): ventilator    O2 Concentration (%): 40      Mode: AC/ CMV (Assist Control/ Continuous Mandatory Ventilation), RR (machine): 16, TV (machine): 450, FiO2: 50, PEEP: 8, ITime: 0.89, MAP: 9, PIP: 28    03-16 @ 07:01  -  03-17 @ 07:00  --------------------------------------------------------  IN: 363.3 mL / OUT: 1520 mL / NET: -1156.7 mL        =================PHYSICAL EXAM=================    GENERAL: Laying comfortably, NAD  EYES: EOMI, PERRL, no scleral icterus  NECK: No JVD  LUNG: Clear to auscultation bilaterally; No wheeze, crackles or rhonci  HEART: Regular rate and rhythm; No murmurs, rubs, or gallops  ABDOMEN: Soft, Nontender, Nondistended  EXTREMITIES:  No LE edema, 2+ Peripheral Pulses, No clubbing, cyanosis, or edema  PSYCH: AAOx3  NEUROLOGY: non-focal, strength 5/5 in all extremities, sensation intact  SKIN: No rashes or lesions    =================================================    LABS:                        10.3   8.50  )-----------( 175      ( 17 Mar 2024 02:49 )             32.1     03-17    133<L>  |  99  |  17  ----------------------------<  329<H>  5.2   |  22  |  0.84    Ca    8.5      17 Mar 2024 02:49  Phos  4.6     03-17  Mg     2.10     03-17    TPro  7.5  /  Alb  3.7  /  TBili  0.2  /  DBili  x   /  AST  37<H>  /  ALT  33  /  AlkPhos  103  03-17      Urinalysis Basic - ( 17 Mar 2024 02:49 )    Color: x / Appearance: x / SG: x / pH: x  Gluc: 329 mg/dL / Ketone: x  / Bili: x / Urobili: x   Blood: x / Protein: x / Nitrite: x   Leuk Esterase: x / RBC: x / WBC x   Sq Epi: x / Non Sq Epi: x / Bacteria: x        CAPILLARY BLOOD GLUCOSE      POCT Blood Glucose.: 339 mg/dL (17 Mar 2024 05:46)        Mode: AC/ CMV (Assist Control/ Continuous Mandatory Ventilation), RR (machine): 16, TV (machine): 450, FiO2: 50, PEEP: 8, ITime: 0.89, MAP: 9, PIP: 28         MEDICATIONS:  MEDICATIONS  (STANDING):  albuterol/ipratropium for Nebulization 3 milliLiter(s) Nebulizer every 6 hours  aspirin enteric coated 81 milliGRAM(s) Oral daily  atorvastatin 80 milliGRAM(s) Oral at bedtime  chlorhexidine 0.12% Liquid 15 milliLiter(s) Oral Mucosa every 12 hours  dextrose 5%. 1000 milliLiter(s) (100 mL/Hr) IV Continuous <Continuous>  dextrose 5%. 1000 milliLiter(s) (50 mL/Hr) IV Continuous <Continuous>  dextrose 50% Injectable 25 Gram(s) IV Push once  dextrose 50% Injectable 12.5 Gram(s) IV Push once  dextrose 50% Injectable 25 Gram(s) IV Push once  famotidine    Tablet 20 milliGRAM(s) Oral daily  fentaNYL   Infusion. 1 MICROgram(s)/kG/Hr (5.72 mL/Hr) IV Continuous <Continuous>  glucagon  Injectable 1 milliGRAM(s) IntraMuscular once  influenza  Vaccine (HIGH DOSE) 0.7 milliLiter(s) IntraMuscular once  insulin lispro (ADMELOG) corrective regimen sliding scale   SubCutaneous every 6 hours  levothyroxine 25 MICROGram(s) Oral daily  mupirocin 2% Ointment 1 Application(s) Both Nostrils two times a day  norepinephrine Infusion 0.05 MICROgram(s)/kG/Min (5.36 mL/Hr) IV Continuous <Continuous>  oseltamivir 30 milliGRAM(s) Oral two times a day  piperacillin/tazobactam IVPB.. 3.375 Gram(s) IV Intermittent every 8 hours  propofol Infusion 10 MICROgram(s)/kG/Min (3.43 mL/Hr) IV Continuous <Continuous>  ranolazine 500 milliGRAM(s) Oral two times a day  rivaroxaban 2.5 milliGRAM(s) Oral two times a day    MEDICATIONS  (PRN):  acetaminophen     Tablet .. 650 milliGRAM(s) Oral every 6 hours PRN Temp greater or equal to 38C (100.4F), Mild Pain (1 - 3)  dextrose Oral Gel 15 Gram(s) Oral once PRN Blood Glucose LESS THAN 70 milliGRAM(s)/deciliter      ALLERGIES:  Allergies    No Known Allergies    Intolerances            RADIOLOGY & ADDITIONAL TESTS: Reviewed. Benjamin Cabrera MD  Internal Medicine   PGY3    OVERNIGHT EVENTS:    SUBJECTIVE: Patient seen and examined at bedside. Patient is intubated and sedated.     OBJECTIVE:    VITAL SIGNS:  ICU Vital Signs Last 24 Hrs  T(C): 35.8 (17 Mar 2024 04:00), Max: 36.8 (16 Mar 2024 11:00)  T(F): 96.5 (17 Mar 2024 04:00), Max: 98.3 (16 Mar 2024 11:00)  HR: 50 (17 Mar 2024 06:42) (49 - 75)  BP: 120/50 (17 Mar 2024 06:00) (79/44 - 153/65)  BP(mean): 69 (17 Mar 2024 06:00) (53 - 128)  ABP: 119/48 (17 Mar 2024 06:42) (119/48 - 159/56)  ABP(mean): 73 (17 Mar 2024 06:42) (72 - 93)  RR: 14 (17 Mar 2024 06:42) (14 - 19)  SpO2: 96% (17 Mar 2024 06:42) (86% - 100%)    O2 Parameters below as of 17 Mar 2024 06:00  Patient On (Oxygen Delivery Method): ventilator    O2 Concentration (%): 40      Mode: AC/ CMV (Assist Control/ Continuous Mandatory Ventilation), RR (machine): 16, TV (machine): 450, FiO2: 50, PEEP: 8, ITime: 0.89, MAP: 9, PIP: 28    03-16 @ 07:01  -  03-17 @ 07:00  --------------------------------------------------------  IN: 363.3 mL / OUT: 1520 mL / NET: -1156.7 mL        =================PHYSICAL EXAM=================    GENERAL: Elderly female, intubated   HEAD:  Atraumatic, normocephalic  NECK: Supple, no swelling , JVD not appreciated   HEART: Difficult to appreciate due to patient's wheezing   LUNGS: Coarse breath sounds B/L   ABDOMEN: Soft, nontender/ nondistended, +BS  EXTREMITIES: +2 pulses,  NEURO:  Intubated and sedated     SKIN: No rashes or lesions     =================================================    LABS:                        10.3   8.50  )-----------( 175      ( 17 Mar 2024 02:49 )             32.1     03-17    133<L>  |  99  |  17  ----------------------------<  329<H>  5.2   |  22  |  0.84    Ca    8.5      17 Mar 2024 02:49  Phos  4.6     03-17  Mg     2.10     03-17    TPro  7.5  /  Alb  3.7  /  TBili  0.2  /  DBili  x   /  AST  37<H>  /  ALT  33  /  AlkPhos  103  03-17      Urinalysis Basic - ( 17 Mar 2024 02:49 )    Color: x / Appearance: x / SG: x / pH: x  Gluc: 329 mg/dL / Ketone: x  / Bili: x / Urobili: x   Blood: x / Protein: x / Nitrite: x   Leuk Esterase: x / RBC: x / WBC x   Sq Epi: x / Non Sq Epi: x / Bacteria: x        CAPILLARY BLOOD GLUCOSE      POCT Blood Glucose.: 339 mg/dL (17 Mar 2024 05:46)        Mode: AC/ CMV (Assist Control/ Continuous Mandatory Ventilation), RR (machine): 16, TV (machine): 450, FiO2: 50, PEEP: 8, ITime: 0.89, MAP: 9, PIP: 28         MEDICATIONS:  MEDICATIONS  (STANDING):  albuterol/ipratropium for Nebulization 3 milliLiter(s) Nebulizer every 6 hours  aspirin enteric coated 81 milliGRAM(s) Oral daily  atorvastatin 80 milliGRAM(s) Oral at bedtime  chlorhexidine 0.12% Liquid 15 milliLiter(s) Oral Mucosa every 12 hours  dextrose 5%. 1000 milliLiter(s) (100 mL/Hr) IV Continuous <Continuous>  dextrose 5%. 1000 milliLiter(s) (50 mL/Hr) IV Continuous <Continuous>  dextrose 50% Injectable 25 Gram(s) IV Push once  dextrose 50% Injectable 12.5 Gram(s) IV Push once  dextrose 50% Injectable 25 Gram(s) IV Push once  famotidine    Tablet 20 milliGRAM(s) Oral daily  fentaNYL   Infusion. 1 MICROgram(s)/kG/Hr (5.72 mL/Hr) IV Continuous <Continuous>  glucagon  Injectable 1 milliGRAM(s) IntraMuscular once  influenza  Vaccine (HIGH DOSE) 0.7 milliLiter(s) IntraMuscular once  insulin lispro (ADMELOG) corrective regimen sliding scale   SubCutaneous every 6 hours  levothyroxine 25 MICROGram(s) Oral daily  mupirocin 2% Ointment 1 Application(s) Both Nostrils two times a day  norepinephrine Infusion 0.05 MICROgram(s)/kG/Min (5.36 mL/Hr) IV Continuous <Continuous>  oseltamivir 30 milliGRAM(s) Oral two times a day  piperacillin/tazobactam IVPB.. 3.375 Gram(s) IV Intermittent every 8 hours  propofol Infusion 10 MICROgram(s)/kG/Min (3.43 mL/Hr) IV Continuous <Continuous>  ranolazine 500 milliGRAM(s) Oral two times a day  rivaroxaban 2.5 milliGRAM(s) Oral two times a day    MEDICATIONS  (PRN):  acetaminophen     Tablet .. 650 milliGRAM(s) Oral every 6 hours PRN Temp greater or equal to 38C (100.4F), Mild Pain (1 - 3)  dextrose Oral Gel 15 Gram(s) Oral once PRN Blood Glucose LESS THAN 70 milliGRAM(s)/deciliter      ALLERGIES:  Allergies    No Known Allergies    Intolerances            RADIOLOGY & ADDITIONAL TESTS: Reviewed.

## 2024-03-17 NOTE — PROGRESS NOTE ADULT - ASSESSMENT
88 year old female with a medical history of HFpef, CAD (s/p multiple stents), HTN/HLD/DM2 who was admitted for AHRF 2/2 influenza, rapid called for resp distress and intubated, now in MICU for AHRF and AMS work up.     =====Neurologic=====  Patient is AOx 0 iso intubation  - sedation: Prop/fent  - wean sedation as able     =====Cardiovascular=====  #Shock   - Likely vasoplegic iso of sepsis   - On levophed gtt , titrate for MAP > 65   - C/w Abx     #HFpef  - 57% EF 3/16  - Home medications: atenolol 100mg ,torsemide 10mg daily. Being held currently iso of shock   - POCUS exam noted for new reduced lvef  - holding patient's atenolol in setting of bradycardia     #CAD  Home medications: Aspirin 81mgAtortavastin 80mg  - why is she on rivaroxaban ?: Need to fu with family. Will keep on heparin gtt for now     #HTN  Home medications : Amlodipine 5mg Daily Losartan 100mg   - continue home medications     =====Pulmonary=====  #AHRF (PNA)  Shortness of breath ISO of WBC. Most likely in the setting of Influenza, can also consider secondary PNA .  Chest X-ray showed no acute findings   - ventilation status: Volume Control:   intubated on 3/17  - empiric antibiotics :zosyn   - C/ Tamiflu (3/14-      =====GI=====  Currently receiving tube feeds through OGT     =====Renal/=====  indwelling catheter, monitor Is and OS    =====Infectious Disease=====  Sepsis  WBC was (12) on admission with out fever. In addition presented with low blood pressures. All in the setting of respiratory distress, cough etc. Chest xray not remarkable, In terms of sources they include influenza, secondary pneumonia, aspiration pna, etc. Patient received ( AB) in the ED. Blood cultures were drawn   Blood cultures: NGTD    - Continue with Antibiotic (Zosyn)   - FU blood cultures   - C/ Tamiflu (3/14-    =====Endocrine=====    #DM2  Sliding scale q6.   Humulin sq    =====Heme/Onc=====  - DVT PPX: Heparin DRIP     =====MICUGeneral=====  Lines:  Left a-line, peripherals  Drips: Levo, propophol, fent   O2:  Volume control   AB: Zosyn   Diet: Pending  DVT:Heparin dip

## 2024-03-17 NOTE — RAPID RESPONSE TEAM SUMMARY - NSSITUATIONBACKGROUNDRRT_GEN_ALL_CORE
88F with h/o CAD s/p PCI x6 11/2019 at [facility], HTN, HLD, T2DM, and HFpEF for whom RRT is called for hypoxia and increased WOB. Patient found with increased WOB, supraclavicular retractions, belly breathing. Naso-tracheal suction performed, minimal secretions appreciated. Pt sternal rubbed, with moaning/groaning but not following commands purposefully. Suspect possibly aspiration component. O2 responsive to NRB, but intermittently low - could be ARDS from Flu, but CXR doesn't look terrible. Intubate for airway protection ico AMS; discussed this plan with family who agree that intubation is w/i patient's GOC.

## 2024-03-17 NOTE — PROCEDURE NOTE - ADDITIONAL PROCEDURE DETAILS
20G 5.71CM long, AccuCath extended dwell IV catheter placed under dynamic US guidance in right cephalic vein with dark nonpulsatile blood return.  Catheter was flushed afterwards without any resistance or resulting extravasation.  IV catheter confirmed in compressible vein after insertion.
18G 5.71CM long, AccuCath extended dwell IV catheter placed under dynamic US guidance in left forearm with dark nonpulsatile blood return.  Catheter was flushed afterwards without any resistance or resulting extravasation.  IV catheter confirmed in compressible vein after insertion.

## 2024-03-17 NOTE — CHART NOTE - NSCHARTNOTEFT_GEN_A_CORE
Critically ill patient requiring ABG to determine respiratory status.  This was an emergent procedure.  Patients radial artery was visualized with US and cleaned with alcohol pad.   23g x 3/4" x 12" butterfly needed was inserted into the left radial artery with pulsatile flash.  One attempt was performed.  3cc of blood was obtained from patient.  Gauze pad was placed over site, needle withdrawn and firm pressure was held until complete hemostasis was achieved and band-aid was applied.  Patient tolerated procedure well with no complications.

## 2024-03-18 LAB
ADD ON TEST-SPECIMEN IN LAB: SIGNIFICANT CHANGE UP
ALBUMIN SERPL ELPH-MCNC: 3.2 G/DL — LOW (ref 3.3–5)
ALP SERPL-CCNC: 92 U/L — SIGNIFICANT CHANGE UP (ref 40–120)
ALT FLD-CCNC: 25 U/L — SIGNIFICANT CHANGE UP (ref 4–33)
ANION GAP SERPL CALC-SCNC: 12 MMOL/L — SIGNIFICANT CHANGE UP (ref 7–14)
ANISOCYTOSIS BLD QL: SLIGHT — SIGNIFICANT CHANGE UP
APTT BLD: 113.9 SEC — HIGH (ref 24.5–35.6)
APTT BLD: 194.2 SEC — CRITICAL HIGH (ref 24.5–35.6)
APTT BLD: 88.2 SEC — HIGH (ref 24.5–35.6)
AST SERPL-CCNC: 33 U/L — HIGH (ref 4–32)
BASOPHILS # BLD AUTO: 0 K/UL — SIGNIFICANT CHANGE UP (ref 0–0.2)
BASOPHILS NFR BLD AUTO: 0 % — SIGNIFICANT CHANGE UP (ref 0–2)
BILIRUB SERPL-MCNC: 0.3 MG/DL — SIGNIFICANT CHANGE UP (ref 0.2–1.2)
BLOOD GAS ARTERIAL - LYTES,HGB,ICA,LACT RESULT: SIGNIFICANT CHANGE UP
BLOOD GAS ARTERIAL - LYTES,HGB,ICA,LACT RESULT: SIGNIFICANT CHANGE UP
BUN SERPL-MCNC: 16 MG/DL — SIGNIFICANT CHANGE UP (ref 7–23)
BURR CELLS BLD QL SMEAR: PRESENT — SIGNIFICANT CHANGE UP
CA-I BLD-SCNC: 1.11 MMOL/L — LOW (ref 1.15–1.29)
CALCIUM SERPL-MCNC: 8.3 MG/DL — LOW (ref 8.4–10.5)
CHLORIDE SERPL-SCNC: 102 MMOL/L — SIGNIFICANT CHANGE UP (ref 98–107)
CO2 SERPL-SCNC: 22 MMOL/L — SIGNIFICANT CHANGE UP (ref 22–31)
CREAT SERPL-MCNC: 0.96 MG/DL — SIGNIFICANT CHANGE UP (ref 0.5–1.3)
DACRYOCYTES BLD QL SMEAR: SLIGHT — SIGNIFICANT CHANGE UP
EGFR: 58 ML/MIN/1.73M2 — LOW
EOSINOPHIL # BLD AUTO: 0.3 K/UL — SIGNIFICANT CHANGE UP (ref 0–0.5)
EOSINOPHIL NFR BLD AUTO: 3.8 % — SIGNIFICANT CHANGE UP (ref 0–6)
GIANT PLATELETS BLD QL SMEAR: PRESENT — SIGNIFICANT CHANGE UP
GLUCOSE BLDC GLUCOMTR-MCNC: 189 MG/DL — HIGH (ref 70–99)
GLUCOSE BLDC GLUCOMTR-MCNC: 221 MG/DL — HIGH (ref 70–99)
GLUCOSE BLDC GLUCOMTR-MCNC: 239 MG/DL — HIGH (ref 70–99)
GLUCOSE SERPL-MCNC: 269 MG/DL — HIGH (ref 70–99)
HCT VFR BLD CALC: 25.9 % — LOW (ref 34.5–45)
HGB BLD-MCNC: 8.9 G/DL — LOW (ref 11.5–15.5)
IANC: 4.05 K/UL — SIGNIFICANT CHANGE UP (ref 1.8–7.4)
INR BLD: 1.2 RATIO — HIGH (ref 0.85–1.18)
LYMPHOCYTES # BLD AUTO: 1.56 K/UL — SIGNIFICANT CHANGE UP (ref 1–3.3)
LYMPHOCYTES # BLD AUTO: 19.8 % — SIGNIFICANT CHANGE UP (ref 13–44)
MAGNESIUM SERPL-MCNC: 2 MG/DL — SIGNIFICANT CHANGE UP (ref 1.6–2.6)
MCHC RBC-ENTMCNC: 30.1 PG — SIGNIFICANT CHANGE UP (ref 27–34)
MCHC RBC-ENTMCNC: 34.4 GM/DL — SIGNIFICANT CHANGE UP (ref 32–36)
MCV RBC AUTO: 87.5 FL — SIGNIFICANT CHANGE UP (ref 80–100)
MONOCYTES # BLD AUTO: 0.22 K/UL — SIGNIFICANT CHANGE UP (ref 0–0.9)
MONOCYTES NFR BLD AUTO: 2.8 % — SIGNIFICANT CHANGE UP (ref 2–14)
NEUTROPHILS # BLD AUTO: 5.73 K/UL — SIGNIFICANT CHANGE UP (ref 1.8–7.4)
NEUTROPHILS NFR BLD AUTO: 71.7 % — SIGNIFICANT CHANGE UP (ref 43–77)
NEUTS BAND # BLD: 1 % — SIGNIFICANT CHANGE UP (ref 0–6)
OVALOCYTES BLD QL SMEAR: SLIGHT — SIGNIFICANT CHANGE UP
PHOSPHATE SERPL-MCNC: 2.9 MG/DL — SIGNIFICANT CHANGE UP (ref 2.5–4.5)
PLAT MORPH BLD: NORMAL — SIGNIFICANT CHANGE UP
PLATELET # BLD AUTO: 166 K/UL — SIGNIFICANT CHANGE UP (ref 150–400)
PLATELET COUNT - ESTIMATE: NORMAL — SIGNIFICANT CHANGE UP
POIKILOCYTOSIS BLD QL AUTO: SLIGHT — SIGNIFICANT CHANGE UP
POLYCHROMASIA BLD QL SMEAR: SLIGHT — SIGNIFICANT CHANGE UP
POTASSIUM SERPL-MCNC: 3.1 MMOL/L — LOW (ref 3.5–5.3)
POTASSIUM SERPL-SCNC: 3.1 MMOL/L — LOW (ref 3.5–5.3)
PROT SERPL-MCNC: 6.6 G/DL — SIGNIFICANT CHANGE UP (ref 6–8.3)
PROTHROM AB SERPL-ACNC: 13.5 SEC — HIGH (ref 9.5–13)
RBC # BLD: 2.96 M/UL — LOW (ref 3.8–5.2)
RBC # FLD: 14 % — SIGNIFICANT CHANGE UP (ref 10.3–14.5)
RBC BLD AUTO: ABNORMAL
SMUDGE CELLS # BLD: PRESENT — SIGNIFICANT CHANGE UP
SODIUM SERPL-SCNC: 136 MMOL/L — SIGNIFICANT CHANGE UP (ref 135–145)
TROPONIN T, HIGH SENSITIVITY RESULT: 107 NG/L — CRITICAL HIGH
VARIANT LYMPHS # BLD: 0.9 % — SIGNIFICANT CHANGE UP (ref 0–6)
WBC # BLD: 7.88 K/UL — SIGNIFICANT CHANGE UP (ref 3.8–10.5)
WBC # FLD AUTO: 7.88 K/UL — SIGNIFICANT CHANGE UP (ref 3.8–10.5)

## 2024-03-18 PROCEDURE — 99291 CRITICAL CARE FIRST HOUR: CPT | Mod: GC

## 2024-03-18 PROCEDURE — 93308 TTE F-UP OR LMTD: CPT | Mod: 26,GC

## 2024-03-18 PROCEDURE — 76604 US EXAM CHEST: CPT | Mod: 26,GC

## 2024-03-18 RX ORDER — POTASSIUM CHLORIDE 20 MEQ
40 PACKET (EA) ORAL ONCE
Refills: 0 | Status: COMPLETED | OUTPATIENT
Start: 2024-03-18 | End: 2024-03-18

## 2024-03-18 RX ORDER — POLYETHYLENE GLYCOL 3350 17 G/17G
17 POWDER, FOR SOLUTION ORAL
Refills: 0 | Status: DISCONTINUED | OUTPATIENT
Start: 2024-03-18 | End: 2024-04-05

## 2024-03-18 RX ORDER — POTASSIUM PHOSPHATE, MONOBASIC POTASSIUM PHOSPHATE, DIBASIC 236; 224 MG/ML; MG/ML
15 INJECTION, SOLUTION INTRAVENOUS ONCE
Refills: 0 | Status: COMPLETED | OUTPATIENT
Start: 2024-03-18 | End: 2024-03-18

## 2024-03-18 RX ORDER — POTASSIUM CHLORIDE 20 MEQ
10 PACKET (EA) ORAL
Refills: 0 | Status: COMPLETED | OUTPATIENT
Start: 2024-03-18 | End: 2024-03-18

## 2024-03-18 RX ORDER — SENNA PLUS 8.6 MG/1
2 TABLET ORAL AT BEDTIME
Refills: 0 | Status: DISCONTINUED | OUTPATIENT
Start: 2024-03-18 | End: 2024-04-04

## 2024-03-18 RX ORDER — LEVOTHYROXINE SODIUM 125 MCG
20 TABLET ORAL AT BEDTIME
Refills: 0 | Status: DISCONTINUED | OUTPATIENT
Start: 2024-03-18 | End: 2024-04-05

## 2024-03-18 RX ORDER — MULTIVIT-MIN/FERROUS GLUCONATE 9 MG/15 ML
1 LIQUID (ML) ORAL DAILY
Refills: 0 | Status: DISCONTINUED | OUTPATIENT
Start: 2024-03-18 | End: 2024-03-18

## 2024-03-18 RX ORDER — POTASSIUM CHLORIDE 20 MEQ
20 PACKET (EA) ORAL
Refills: 0 | Status: DISCONTINUED | OUTPATIENT
Start: 2024-03-18 | End: 2024-03-18

## 2024-03-18 RX ORDER — CHLORHEXIDINE GLUCONATE 213 G/1000ML
1 SOLUTION TOPICAL DAILY
Refills: 0 | Status: DISCONTINUED | OUTPATIENT
Start: 2024-03-18 | End: 2024-04-05

## 2024-03-18 RX ADMIN — Medication 3 MILLILITER(S): at 23:13

## 2024-03-18 RX ADMIN — Medication 5.36 MICROGRAM(S)/KG/MIN: at 19:55

## 2024-03-18 RX ADMIN — HEPARIN SODIUM 600 UNIT(S)/HR: 5000 INJECTION INTRAVENOUS; SUBCUTANEOUS at 10:11

## 2024-03-18 RX ADMIN — HEPARIN SODIUM 0 UNIT(S)/HR: 5000 INJECTION INTRAVENOUS; SUBCUTANEOUS at 00:57

## 2024-03-18 RX ADMIN — POLYETHYLENE GLYCOL 3350 17 GRAM(S): 17 POWDER, FOR SOLUTION ORAL at 13:14

## 2024-03-18 RX ADMIN — SENNA PLUS 2 TABLET(S): 8.6 TABLET ORAL at 21:50

## 2024-03-18 RX ADMIN — Medication 81 MILLIGRAM(S): at 11:43

## 2024-03-18 RX ADMIN — MUPIROCIN 1 APPLICATION(S): 20 OINTMENT TOPICAL at 21:48

## 2024-03-18 RX ADMIN — PIPERACILLIN AND TAZOBACTAM 25 GRAM(S): 4; .5 INJECTION, POWDER, LYOPHILIZED, FOR SOLUTION INTRAVENOUS at 05:49

## 2024-03-18 RX ADMIN — MUPIROCIN 1 APPLICATION(S): 20 OINTMENT TOPICAL at 08:16

## 2024-03-18 RX ADMIN — HUMAN INSULIN 5 UNIT(S): 100 INJECTION, SUSPENSION SUBCUTANEOUS at 00:19

## 2024-03-18 RX ADMIN — Medication 650 MILLIGRAM(S): at 08:15

## 2024-03-18 RX ADMIN — Medication 2: at 11:45

## 2024-03-18 RX ADMIN — ATORVASTATIN CALCIUM 80 MILLIGRAM(S): 80 TABLET, FILM COATED ORAL at 21:48

## 2024-03-18 RX ADMIN — Medication 100 MILLIEQUIVALENT(S): at 02:59

## 2024-03-18 RX ADMIN — HUMAN INSULIN 5 UNIT(S): 100 INJECTION, SUSPENSION SUBCUTANEOUS at 17:52

## 2024-03-18 RX ADMIN — PIPERACILLIN AND TAZOBACTAM 25 GRAM(S): 4; .5 INJECTION, POWDER, LYOPHILIZED, FOR SOLUTION INTRAVENOUS at 21:48

## 2024-03-18 RX ADMIN — HEPARIN SODIUM 600 UNIT(S)/HR: 5000 INJECTION INTRAVENOUS; SUBCUTANEOUS at 19:56

## 2024-03-18 RX ADMIN — PROPOFOL 3.43 MICROGRAM(S)/KG/MIN: 10 INJECTION, EMULSION INTRAVENOUS at 19:55

## 2024-03-18 RX ADMIN — Medication 5.36 MICROGRAM(S)/KG/MIN: at 08:15

## 2024-03-18 RX ADMIN — Medication 30 MILLIGRAM(S): at 17:51

## 2024-03-18 RX ADMIN — HUMAN INSULIN 5 UNIT(S): 100 INJECTION, SUSPENSION SUBCUTANEOUS at 11:44

## 2024-03-18 RX ADMIN — FENTANYL CITRATE 5.72 MICROGRAM(S)/KG/HR: 50 INJECTION INTRAVENOUS at 19:55

## 2024-03-18 RX ADMIN — Medication 3 MILLILITER(S): at 03:41

## 2024-03-18 RX ADMIN — FENTANYL CITRATE 5.72 MICROGRAM(S)/KG/HR: 50 INJECTION INTRAVENOUS at 08:14

## 2024-03-18 RX ADMIN — Medication 650 MILLIGRAM(S): at 12:00

## 2024-03-18 RX ADMIN — Medication 100 MILLIEQUIVALENT(S): at 01:54

## 2024-03-18 RX ADMIN — Medication 40 MILLIEQUIVALENT(S): at 01:54

## 2024-03-18 RX ADMIN — HEPARIN SODIUM 700 UNIT(S)/HR: 5000 INJECTION INTRAVENOUS; SUBCUTANEOUS at 01:59

## 2024-03-18 RX ADMIN — Medication 20 MICROGRAM(S): at 21:49

## 2024-03-18 RX ADMIN — Medication 3 MILLILITER(S): at 15:53

## 2024-03-18 RX ADMIN — Medication 3: at 00:18

## 2024-03-18 RX ADMIN — Medication 30 MILLIGRAM(S): at 05:49

## 2024-03-18 RX ADMIN — Medication 1 APPLICATION(S): at 11:43

## 2024-03-18 RX ADMIN — FAMOTIDINE 20 MILLIGRAM(S): 10 INJECTION INTRAVENOUS at 11:43

## 2024-03-18 RX ADMIN — POTASSIUM PHOSPHATE, MONOBASIC POTASSIUM PHOSPHATE, DIBASIC 62.5 MILLIMOLE(S): 236; 224 INJECTION, SOLUTION INTRAVENOUS at 01:54

## 2024-03-18 RX ADMIN — PIPERACILLIN AND TAZOBACTAM 25 GRAM(S): 4; .5 INJECTION, POWDER, LYOPHILIZED, FOR SOLUTION INTRAVENOUS at 13:14

## 2024-03-18 RX ADMIN — Medication 2: at 17:51

## 2024-03-18 RX ADMIN — Medication 100 MILLIEQUIVALENT(S): at 04:22

## 2024-03-18 RX ADMIN — CHLORHEXIDINE GLUCONATE 15 MILLILITER(S): 213 SOLUTION TOPICAL at 17:50

## 2024-03-18 RX ADMIN — HEPARIN SODIUM 600 UNIT(S)/HR: 5000 INJECTION INTRAVENOUS; SUBCUTANEOUS at 17:50

## 2024-03-18 RX ADMIN — PROPOFOL 3.43 MICROGRAM(S)/KG/MIN: 10 INJECTION, EMULSION INTRAVENOUS at 08:14

## 2024-03-18 RX ADMIN — HUMAN INSULIN 5 UNIT(S): 100 INJECTION, SUSPENSION SUBCUTANEOUS at 06:29

## 2024-03-18 RX ADMIN — Medication 3 MILLILITER(S): at 10:04

## 2024-03-18 RX ADMIN — CHLORHEXIDINE GLUCONATE 15 MILLILITER(S): 213 SOLUTION TOPICAL at 05:49

## 2024-03-18 RX ADMIN — Medication 1: at 06:29

## 2024-03-18 NOTE — DIETITIAN INITIAL EVALUATION ADULT - PERTINENT MEDS FT
MEDICATIONS  (STANDING):  albuterol/ipratropium for Nebulization 3 milliLiter(s) Nebulizer every 6 hours  aspirin enteric coated 81 milliGRAM(s) Oral daily  atorvastatin 80 milliGRAM(s) Oral at bedtime  chlorhexidine 0.12% Liquid 15 milliLiter(s) Oral Mucosa every 12 hours  dextrose 5%. 1000 milliLiter(s) (100 mL/Hr) IV Continuous <Continuous>  dextrose 5%. 1000 milliLiter(s) (50 mL/Hr) IV Continuous <Continuous>  dextrose 50% Injectable 25 Gram(s) IV Push once  dextrose 50% Injectable 25 Gram(s) IV Push once  dextrose 50% Injectable 12.5 Gram(s) IV Push once  famotidine    Tablet 20 milliGRAM(s) Oral daily  fentaNYL   Infusion..... 5 MICROgram(s)/kG/Hr (5.72 mL/Hr) IV Continuous <Continuous>  glucagon  Injectable 1 milliGRAM(s) IntraMuscular once  heparin  Infusion.  Unit(s)/Hr (11 mL/Hr) IV Continuous <Continuous>  influenza  Vaccine (HIGH DOSE) 0.7 milliLiter(s) IntraMuscular once  insulin lispro (ADMELOG) corrective regimen sliding scale   SubCutaneous every 6 hours  insulin NPH human recombinant 5 Unit(s) SubCutaneous every 6 hours  levothyroxine Injectable 20 MICROGram(s) IV Push at bedtime  mupirocin 2% Ointment 1 Application(s) Both Nostrils two times a day  norepinephrine Infusion 0.05 MICROgram(s)/kG/Min (5.36 mL/Hr) IV Continuous <Continuous>  oseltamivir 30 milliGRAM(s) Oral two times a day  petrolatum Ophthalmic Ointment 1 Application(s) Both EYES daily  piperacillin/tazobactam IVPB.. 3.375 Gram(s) IV Intermittent every 8 hours  polyethylene glycol 3350 17 Gram(s) Oral two times a day  propofol Infusion 10 MICROgram(s)/kG/Min (3.43 mL/Hr) IV Continuous <Continuous>  ranolazine 500 milliGRAM(s) Oral two times a day  senna 2 Tablet(s) Oral at bedtime    MEDICATIONS  (PRN):  acetaminophen     Tablet .. 650 milliGRAM(s) Oral every 6 hours PRN Temp greater or equal to 38C (100.4F), Mild Pain (1 - 3)  dextrose Oral Gel 15 Gram(s) Oral once PRN Blood Glucose LESS THAN 70 milliGRAM(s)/deciliter  heparin   Injectable 2000 Unit(s) IV Push every 6 hours PRN For aPTT between 40 - 57  heparin   Injectable 4500 Unit(s) IV Push every 6 hours PRN For aPTT less than 40

## 2024-03-18 NOTE — DIETITIAN INITIAL EVALUATION ADULT - SIGNS/SYMPTOMS
Current enteral regimen suboptimally meets estimated protein requirements. Persistently elevated glucose levels.

## 2024-03-18 NOTE — DIETITIAN INITIAL EVALUATION ADULT - ADD RECOMMEND
--- Monitor tolerance to TF, GI status, electrolytes, glucose  --- Implement bowel regimen   --- Order multivitamin

## 2024-03-18 NOTE — PROGRESS NOTE ADULT - ATTENDING COMMENTS
1. Acute hypoxemic respiratory failure.? from influenza Fio2 requirement decreasing.   Pt on zosyn for possible bacterial infection. CXR without clear infiltrate. Continue  Tamiflu.  Taper down sedation. Trials of PS as tolerated.    2. Cardiac. H/O CAD. EKG without acute ST t wave abnormalities. Troponin  elevated. Trend troponin.  POCUS ef mildly decrease LV systolic function.     On AC at home. Unclear etiology. Need more info. Now on insulin drip.  3. Hypotension. titrate Levophed for MAP> 65. Follow up blood cx.  Continue  Zosyn and Tamiflu.    4. DVT prophylaxis On heparin drip    5. GOC: Full code.

## 2024-03-18 NOTE — DIETITIAN INITIAL EVALUATION ADULT - REASON FOR ADMISSION
87 y/o F HTN, HLD, CAD s/p PCI, DM, & HFrEF admitted with sepsis and acute hypoxic respiratory failure in setting of influenza A positive.  S/p RRT (3/17) for hypoxia, now s/p intubation.

## 2024-03-18 NOTE — DIETITIAN INITIAL EVALUATION ADULT - PROBLEM SELECTOR PLAN 1
#Influenza A  - requiring supplemental O2 via 2L NC – wean as tolerated  - febrile, tachycardic, with leukocytosis  - CXR: clear lungs  - offer supportive care – caution with IVF in pt with CHF  - start oseltamivir  - procalcitonin elevated - will continue empiric CAP coverage  - check MRSA/MSSA PCR, legionella Ag/Cx, strep urine Ag, sputum Cx  - f/u BCx  - trend fever curve

## 2024-03-18 NOTE — DIETITIAN INITIAL EVALUATION ADULT - OTHER INFO
Spoke with RN.  Pt. remains intubated/sedated with Fentanyl and Propofol.  ~454 non-nutritive lipid calories to be provided by Propofol in 24hrs @ current rate of 17.2mL/hr.     Glucerna 1.2 observed @ prescribed goal of 40mL/hr.    Current enteral regimen + Propofol will exceed estimated calorie needs, & suboptimally meet estimated protein needs.  Suggest changing enteral formula to VitalHP to more appropriately meet energy/nutrient needs.    Pt. without any noted/reported intolerance to TF @ this time (no vomiting/diarrhea or abdominal distention).  + Constipation reported.... no bowel movement since admit (at least 4d).  Bowel regimen suggested.        Will inform providers of nutrition recommendations.

## 2024-03-18 NOTE — DIETITIAN INITIAL EVALUATION ADULT - ETIOLOGY
Endocrine dysfunction  (Hx of DM), steroid use, metabolic causes in setting of critical illness Increased protein needs in setting of critical illness

## 2024-03-18 NOTE — DIETITIAN INITIAL EVALUATION ADULT - PERTINENT LABORATORY DATA
03-18    136  |  102  |  16  ----------------------------<  269<H>  3.1<L>   |  22  |  0.96    Ca    8.3<L>      18 Mar 2024 00:05  Phos  2.9     03-18  Mg     2.00     03-18    CAPILLARY BLOOD GLUCOSE    POCT Blood Glucose.: 221 mg/dL (18 Mar 2024 11:42)  POCT Blood Glucose.: 189 mg/dL (18 Mar 2024 05:55)  POCT Blood Glucose.: 263 mg/dL (17 Mar 2024 23:54)  POCT Blood Glucose.: 228 mg/dL (17 Mar 2024 17:55)    A1C with Estimated Average Glucose Result: 7.7 % (03-15-24 @ 06:40)

## 2024-03-18 NOTE — CHART NOTE - NSCHARTNOTEFT_GEN_A_CORE
: Faizan Agudelo    INDICATION: Respiratory Failure, Shock    PROCEDURE:  [x ] LIMITED ECHO  [x ] LIMITED CHEST  [ ] LIMITED RETROPERITONEAL  [ ] LIMITED ABDOMINAL  [ ] LIMITED DVT  [ ] NEEDLE GUIDANCE VASCULAR  [ ] NEEDLE GUIDANCE THORACENTESIS  [ ] NEEDLE GUIDANCE PARACENTESIS  [ ] NEEDLE GUIDANCE PERICARDIOCENTESIS  [ ] OTHER    FINDINGS:  Lungs: A line predominance. Lung sliding b/l. Trace pleural effusions b/l  Heart: Grossly reduced LVSF with possible septal akinesis, LV > RV. No pericardial effusion. IVC indeterminant.    INTERPRETATION:  Normal aeration pattern.  Mildly reduced EF with echographic evidence of SWMA.   Likely combined picture of septic shock with component of cardiogenic shock,    Images uploaded on [employer] : Kilo Templeton    INDICATION: Respiratory Failure, Shock    PROCEDURE:  [x ] LIMITED ECHO  [x ] LIMITED CHEST  [ ] LIMITED RETROPERITONEAL  [ ] LIMITED ABDOMINAL  [ ] LIMITED DVT  [ ] NEEDLE GUIDANCE VASCULAR  [ ] NEEDLE GUIDANCE THORACENTESIS  [ ] NEEDLE GUIDANCE PARACENTESIS  [ ] NEEDLE GUIDANCE PERICARDIOCENTESIS  [ ] OTHER    FINDINGS:  Lungs: A line predominance. Lung sliding b/l. Trace pleural effusions b/l  Heart: Mildly reduced LVSF with possible septal akinesis, LV > RV. No pericardial effusion. IVC indeterminant.    INTERPRETATION:  Normal aeration pattern.  Mildly reduced EF       Images uploaded on [employer]    I have assisted and supervised entire procedure.     Kilo Portillo MD

## 2024-03-18 NOTE — PROGRESS NOTE ADULT - SUBJECTIVE AND OBJECTIVE BOX
**INCOMPLETE**    INTERVAL HPI/OVERNIGHT EVENTS:    SUBJECTIVE: Patient seen and examined at bedside. Intubated, sedated, ROS cannot be obtained.       VITAL SIGNS:  ICU Vital Signs Last 24 Hrs  T(C): 37.4 (18 Mar 2024 04:00), Max: 37.4 (18 Mar 2024 04:00)  T(F): 99.4 (18 Mar 2024 04:00), Max: 99.4 (18 Mar 2024 04:00)  HR: 72 (18 Mar 2024 07:00) (44 - 84)  BP: --  BP(mean): --  ABP: 105/41 (18 Mar 2024 07:00) (85/43 - 141/55)  ABP(mean): 64 (18 Mar 2024 07:00) (56 - 107)  RR: 16 (18 Mar 2024 07:00) (14 - 16)  SpO2: 94% (18 Mar 2024 07:00) (90% - 100%)    O2 Parameters below as of 18 Mar 2024 07:00  Patient On (Oxygen Delivery Method): ventilator    O2 Concentration (%): 40      Mode: AC/ CMV (Assist Control/ Continuous Mandatory Ventilation), RR (machine): 16, TV (machine): 420, FiO2: 30, PEEP: 5, ITime: 0.9, MAP: 10, PIP: 27  Plateau pressure:   P/F ratio:     03-17 @ 07:01  -  03-18 @ 07:00  --------------------------------------------------------  IN: 2495.2 mL / OUT: 1185 mL / NET: 1310.2 mL      CAPILLARY BLOOD GLUCOSE      POCT Blood Glucose.: 189 mg/dL (18 Mar 2024 05:55)    ECG:    PHYSICAL EXAM:    GENERAL: Elderly female, intubated   HEAD:  Atraumatic, normocephalic  NECK: Supple, no swelling , JVD not appreciated   HEART: Difficult to appreciate due to patient's wheezing   LUNGS: Coarse breath sounds B/L   ABDOMEN: Soft, nontender/ nondistended, +BS  EXTREMITIES: +2 pulses,  NEURO:  Intubated and sedated   SKIN: No rashes or lesions     MEDICATIONS:  MEDICATIONS  (STANDING):  albuterol/ipratropium for Nebulization 3 milliLiter(s) Nebulizer every 6 hours  aspirin enteric coated 81 milliGRAM(s) Oral daily  atorvastatin 80 milliGRAM(s) Oral at bedtime  chlorhexidine 0.12% Liquid 15 milliLiter(s) Oral Mucosa every 12 hours  dextrose 5%. 1000 milliLiter(s) (50 mL/Hr) IV Continuous <Continuous>  dextrose 5%. 1000 milliLiter(s) (100 mL/Hr) IV Continuous <Continuous>  dextrose 50% Injectable 25 Gram(s) IV Push once  dextrose 50% Injectable 12.5 Gram(s) IV Push once  dextrose 50% Injectable 25 Gram(s) IV Push once  famotidine    Tablet 20 milliGRAM(s) Oral daily  fentaNYL   Infusion..... 5 MICROgram(s)/kG/Hr (5.72 mL/Hr) IV Continuous <Continuous>  glucagon  Injectable 1 milliGRAM(s) IntraMuscular once  heparin  Infusion.  Unit(s)/Hr (11 mL/Hr) IV Continuous <Continuous>  influenza  Vaccine (HIGH DOSE) 0.7 milliLiter(s) IntraMuscular once  insulin lispro (ADMELOG) corrective regimen sliding scale   SubCutaneous every 6 hours  insulin NPH human recombinant 5 Unit(s) SubCutaneous every 6 hours  levothyroxine Injectable 20 MICROGram(s) IV Push at bedtime  mupirocin 2% Ointment 1 Application(s) Both Nostrils two times a day  norepinephrine Infusion 0.05 MICROgram(s)/kG/Min (5.36 mL/Hr) IV Continuous <Continuous>  oseltamivir 30 milliGRAM(s) Oral two times a day  petrolatum Ophthalmic Ointment 1 Application(s) Both EYES daily  piperacillin/tazobactam IVPB.. 3.375 Gram(s) IV Intermittent every 8 hours  propofol Infusion 10 MICROgram(s)/kG/Min (3.43 mL/Hr) IV Continuous <Continuous>  ranolazine 500 milliGRAM(s) Oral two times a day    MEDICATIONS  (PRN):  acetaminophen     Tablet .. 650 milliGRAM(s) Oral every 6 hours PRN Temp greater or equal to 38C (100.4F), Mild Pain (1 - 3)  dextrose Oral Gel 15 Gram(s) Oral once PRN Blood Glucose LESS THAN 70 milliGRAM(s)/deciliter  heparin   Injectable 4500 Unit(s) IV Push every 6 hours PRN For aPTT less than 40  heparin   Injectable 2000 Unit(s) IV Push every 6 hours PRN For aPTT between 40 - 57      ALLERGIES:  Allergies    No Known Allergies    Intolerances        LABS:                        8.9    7.88  )-----------( 166      ( 18 Mar 2024 00:05 )             25.9     03-18    136  |  102  |  16  ----------------------------<  269<H>  3.1<L>   |  22  |  0.96    Ca    8.3<L>      18 Mar 2024 00:05  Phos  2.9     03-18  Mg     2.00     03-18    TPro  6.6  /  Alb  3.2<L>  /  TBili  0.3  /  DBili  x   /  AST  33<H>  /  ALT  25  /  AlkPhos  92  03-18    PT/INR - ( 18 Mar 2024 00:05 )   PT: 13.5 sec;   INR: 1.20 ratio         PTT - ( 18 Mar 2024 00:05 )  PTT:194.2 sec  Urinalysis Basic - ( 18 Mar 2024 00:05 )    Color: x / Appearance: x / SG: x / pH: x  Gluc: 269 mg/dL / Ketone: x  / Bili: x / Urobili: x   Blood: x / Protein: x / Nitrite: x   Leuk Esterase: x / RBC: x / WBC x   Sq Epi: x / Non Sq Epi: x / Bacteria: x        RADIOLOGY & ADDITIONAL TESTS: Reviewed. INTERVAL HPI/OVERNIGHT EVENTS:    SUBJECTIVE: Patient seen and examined at bedside. Intubated, sedated, ROS cannot be obtained.       VITAL SIGNS:  ICU Vital Signs Last 24 Hrs  T(C): 37.4 (18 Mar 2024 04:00), Max: 37.4 (18 Mar 2024 04:00)  T(F): 99.4 (18 Mar 2024 04:00), Max: 99.4 (18 Mar 2024 04:00)  HR: 72 (18 Mar 2024 07:00) (44 - 84)  BP: --  BP(mean): --  ABP: 105/41 (18 Mar 2024 07:00) (85/43 - 141/55)  ABP(mean): 64 (18 Mar 2024 07:00) (56 - 107)  RR: 16 (18 Mar 2024 07:00) (14 - 16)  SpO2: 94% (18 Mar 2024 07:00) (90% - 100%)    O2 Parameters below as of 18 Mar 2024 07:00  Patient On (Oxygen Delivery Method): ventilator    O2 Concentration (%): 40      Mode: AC/ CMV (Assist Control/ Continuous Mandatory Ventilation), RR (machine): 16, TV (machine): 420, FiO2: 30, PEEP: 5, ITime: 0.9, MAP: 10, PIP: 27  Plateau pressure:   P/F ratio:     03-17 @ 07:01  -  03-18 @ 07:00  --------------------------------------------------------  IN: 2495.2 mL / OUT: 1185 mL / NET: 1310.2 mL      CAPILLARY BLOOD GLUCOSE      POCT Blood Glucose.: 189 mg/dL (18 Mar 2024 05:55)    ECG:    PHYSICAL EXAM:    GENERAL: Elderly female, intubated   HEAD:  Atraumatic, normocephalic  NECK: Supple, no swelling , JVD not appreciated   HEART: Difficult to appreciate due to patient's wheezing   LUNGS: Coarse breath sounds B/L   ABDOMEN: Soft, nontender/ nondistended, +BS  EXTREMITIES: +2 pulses,  NEURO:  Intubated and sedated   SKIN: No rashes or lesions     MEDICATIONS:  MEDICATIONS  (STANDING):  albuterol/ipratropium for Nebulization 3 milliLiter(s) Nebulizer every 6 hours  aspirin enteric coated 81 milliGRAM(s) Oral daily  atorvastatin 80 milliGRAM(s) Oral at bedtime  chlorhexidine 0.12% Liquid 15 milliLiter(s) Oral Mucosa every 12 hours  dextrose 5%. 1000 milliLiter(s) (50 mL/Hr) IV Continuous <Continuous>  dextrose 5%. 1000 milliLiter(s) (100 mL/Hr) IV Continuous <Continuous>  dextrose 50% Injectable 25 Gram(s) IV Push once  dextrose 50% Injectable 12.5 Gram(s) IV Push once  dextrose 50% Injectable 25 Gram(s) IV Push once  famotidine    Tablet 20 milliGRAM(s) Oral daily  fentaNYL   Infusion..... 5 MICROgram(s)/kG/Hr (5.72 mL/Hr) IV Continuous <Continuous>  glucagon  Injectable 1 milliGRAM(s) IntraMuscular once  heparin  Infusion.  Unit(s)/Hr (11 mL/Hr) IV Continuous <Continuous>  influenza  Vaccine (HIGH DOSE) 0.7 milliLiter(s) IntraMuscular once  insulin lispro (ADMELOG) corrective regimen sliding scale   SubCutaneous every 6 hours  insulin NPH human recombinant 5 Unit(s) SubCutaneous every 6 hours  levothyroxine Injectable 20 MICROGram(s) IV Push at bedtime  mupirocin 2% Ointment 1 Application(s) Both Nostrils two times a day  norepinephrine Infusion 0.05 MICROgram(s)/kG/Min (5.36 mL/Hr) IV Continuous <Continuous>  oseltamivir 30 milliGRAM(s) Oral two times a day  petrolatum Ophthalmic Ointment 1 Application(s) Both EYES daily  piperacillin/tazobactam IVPB.. 3.375 Gram(s) IV Intermittent every 8 hours  propofol Infusion 10 MICROgram(s)/kG/Min (3.43 mL/Hr) IV Continuous <Continuous>  ranolazine 500 milliGRAM(s) Oral two times a day    MEDICATIONS  (PRN):  acetaminophen     Tablet .. 650 milliGRAM(s) Oral every 6 hours PRN Temp greater or equal to 38C (100.4F), Mild Pain (1 - 3)  dextrose Oral Gel 15 Gram(s) Oral once PRN Blood Glucose LESS THAN 70 milliGRAM(s)/deciliter  heparin   Injectable 4500 Unit(s) IV Push every 6 hours PRN For aPTT less than 40  heparin   Injectable 2000 Unit(s) IV Push every 6 hours PRN For aPTT between 40 - 57      ALLERGIES:  Allergies    No Known Allergies    Intolerances        LABS:                        8.9    7.88  )-----------( 166      ( 18 Mar 2024 00:05 )             25.9     03-18    136  |  102  |  16  ----------------------------<  269<H>  3.1<L>   |  22  |  0.96    Ca    8.3<L>      18 Mar 2024 00:05  Phos  2.9     03-18  Mg     2.00     03-18    TPro  6.6  /  Alb  3.2<L>  /  TBili  0.3  /  DBili  x   /  AST  33<H>  /  ALT  25  /  AlkPhos  92  03-18    PT/INR - ( 18 Mar 2024 00:05 )   PT: 13.5 sec;   INR: 1.20 ratio         PTT - ( 18 Mar 2024 00:05 )  PTT:194.2 sec  Urinalysis Basic - ( 18 Mar 2024 00:05 )    Color: x / Appearance: x / SG: x / pH: x  Gluc: 269 mg/dL / Ketone: x  / Bili: x / Urobili: x   Blood: x / Protein: x / Nitrite: x   Leuk Esterase: x / RBC: x / WBC x   Sq Epi: x / Non Sq Epi: x / Bacteria: x        RADIOLOGY & ADDITIONAL TESTS: Reviewed.

## 2024-03-18 NOTE — PROGRESS NOTE ADULT - ASSESSMENT
88 year old female with a medical history of HFpef, CAD (s/p multiple stents), HTN/HLD/DM2 who was admitted for AHRF 2/2 influenza, rapid called for resp distress and intubated, now in MICU for AHRF and AMS work up.     =====Neurologic=====  Patient is AOx 0 iso intubation  - sedation: Prop/fent  - wean sedation as able     =====Cardiovascular=====  #Shock   - Likely vasoplegic iso of sepsis   - On levophed gtt , titrate for MAP > 65   - C/w Abx     #HFpef  - 57% EF 3/16  - Home medications: atenolol 100mg ,torsemide 10mg daily. Being held currently iso of shock   - POCUS exam noted for new reduced lvef  - holding patient's atenolol in setting of bradycardia     #CAD  Home medications: Aspirin 81mgAtortavastin 80mg  - why is she on rivaroxaban ?: Need to fu with family. Will keep on heparin gtt for now     #HTN  Home medications : Amlodipine 5mg Daily Losartan 100mg   - continue home medications     =====Pulmonary=====  #AHRF (PNA)  Shortness of breath ISO of WBC. Most likely in the setting of Influenza, can also consider secondary PNA .  Chest X-ray showed no acute findings   - ventilation status: Volume Control:   intubated on 3/17  - empiric antibiotics :zosyn   - C/ Tamiflu (3/14-      =====GI=====  Currently receiving tube feeds through OGT     =====Renal/=====  indwelling catheter, monitor Is and OS    =====Infectious Disease=====  Sepsis  WBC was (12) on admission with out fever. In addition presented with low blood pressures. All in the setting of respiratory distress, cough etc. Chest xray not remarkable, In terms of sources they include influenza, secondary pneumonia, aspiration pna, etc. Patient received ( AB) in the ED. Blood cultures were drawn   Blood cultures: NGTD    - Continue with Antibiotic (Zosyn)   - FU blood cultures   - C/ Tamiflu (3/14-    =====Endocrine=====    #DM2  Sliding scale q6.   Humulin sq    =====Heme/Onc=====  - DVT PPX: Heparin DRIP     =====MICUGeneral=====  Lines:  Left a-line, peripherals  Drips: Levo, propophol, fent   O2:  Volume control   AB: Zosyn   Diet: Pending  DVT:Heparin dip    88 year old female with a medical history of HFpef, CAD (s/p multiple stents), HTN/HLD/DM2 who was admitted for AHRF 2/2 influenza, rapid called for resp distress and intubated, now in MICU for AHRF and AMS work up.     =====Neurologic=====  Patient is AOx 0 iso intubation  - sedation: Prop/fent  - wean sedation as able in prep for SBT    =====Cardiovascular=====  #Shock   - Likely vasoplegic iso of sepsis   - On levophed gtt , titrate for MAP > 65   - C/w Abx     #HFpef  - 57% EF 3/16  - Home medications: atenolol 100mg ,torsemide 10mg daily. Being held currently iso of shock   - POCUS exam noted for new reduced lvef  - holding patient's atenolol in setting of bradycardia     #CAD  Home medications: Aspirin 81mgAtortavastin 80mg  - why is she on rivaroxaban ?: Need to fu with family. Will keep on heparin gtt for now     #HTN  Home medications : Amlodipine 5mg Daily Losartan 100mg   - continue home medications     =====Pulmonary=====  #AHRF (PNA)  Shortness of breath ISO of WBC. Most likely in the setting of Influenza, can also consider secondary PNA .  Chest X-ray showed no acute findings   - ventilation status: Volume Control:   intubated on 3/17  - empiric antibiotics :zosyn   - C/ Tamiflu (3/14-  - SBT in the next 24h      =====GI=====  Currently receiving tube feeds through OGT     =====Renal/=====  indwelling catheter, monitor Is and OS    =====Infectious Disease=====  Sepsis  WBC was (12) on admission with out fever. In addition presented with low blood pressures. All in the setting of respiratory distress, cough etc. Chest xray not remarkable, In terms of sources they include influenza, secondary pneumonia, aspiration pna, etc. Patient received ( AB) in the ED. Blood cultures were drawn   Blood cultures: NGTD    - Continue with Antibiotic (Zosyn)   - FU blood cultures   - C/ Tamiflu (3/14-    =====Endocrine=====    #DM2  Sliding scale q6.   Humulin sq    =====Heme/Onc=====  - DVT PPX: Heparin DRIP     =====MICUGeneral=====  Lines:  Left a-line, peripherals  Drips: Levo, propophol, fent   O2:  Volume control   AB: Zosyn   Diet: Pending  DVT:Heparin dip

## 2024-03-18 NOTE — DIETITIAN INITIAL EVALUATION ADULT - ENTERAL
--- D/C Glucerna 1.2  --- Change to VitalHP @ goal of 35mL/hr x 24hrs     provides:  840mL total volume  840 kcals (+ Propofol KCals)  73g protein  702mL free water

## 2024-03-18 NOTE — DIETITIAN INITIAL EVALUATION ADULT - ENERGY INTAKE
Pt. previously on PO diet, although unable to assess adequacy of PO energy intake prior to enteral nutrition (3/17), upon review of flow sheets.

## 2024-03-18 NOTE — DIETITIAN INITIAL EVALUATION ADULT - PROBLEM SELECTOR PLAN 3
- s/p PCI x6 at [facility] 11/2019  - resume home aspirin, statin, Xarelto 2.5mg po BID  - no acute evidence of ischemia  - check troponin

## 2024-03-18 NOTE — DIETITIAN INITIAL EVALUATION ADULT - NS FNS DIET ORDER
Diet, NPO with Tube Feed:   Tube Feeding Modality: Orogastric  Glucerna 1.2 Brant (GLUCERNARTH)  Total Volume for 24 Hours (mL): 960  Continuous  Starting Tube Feed Rate {mL per Hour}: 10  Increase Tube Feed Rate by (mL): 4     Every 4 hours  Until Goal Tube Feed Rate (mL per Hour): 40  Tube Feed Duration (in Hours): 24  Tube Feed Start Time: 06:00  Tube Feed Stop Time: 22:00 (03-17-24 @ 09:00) [Active]      960mL total volume  1152 KCals  58g protein

## 2024-03-19 LAB
ADD ON TEST-SPECIMEN IN LAB: SIGNIFICANT CHANGE UP
ALBUMIN SERPL ELPH-MCNC: 2.9 G/DL — LOW (ref 3.3–5)
ALP SERPL-CCNC: 104 U/L — SIGNIFICANT CHANGE UP (ref 40–120)
ALT FLD-CCNC: 24 U/L — SIGNIFICANT CHANGE UP (ref 4–33)
ANION GAP SERPL CALC-SCNC: 11 MMOL/L — SIGNIFICANT CHANGE UP (ref 7–14)
APTT BLD: 75.8 SEC — HIGH (ref 24.5–35.6)
AST SERPL-CCNC: 46 U/L — HIGH (ref 4–32)
BASOPHILS # BLD AUTO: 0.04 K/UL — SIGNIFICANT CHANGE UP (ref 0–0.2)
BASOPHILS NFR BLD AUTO: 0.3 % — SIGNIFICANT CHANGE UP (ref 0–2)
BILIRUB SERPL-MCNC: 0.4 MG/DL — SIGNIFICANT CHANGE UP (ref 0.2–1.2)
BLOOD GAS ARTERIAL - LYTES,HGB,ICA,LACT RESULT: SIGNIFICANT CHANGE UP
BUN SERPL-MCNC: 11 MG/DL — SIGNIFICANT CHANGE UP (ref 7–23)
CA-I BLD-SCNC: 1.13 MMOL/L — LOW (ref 1.15–1.29)
CALCIUM SERPL-MCNC: 8.1 MG/DL — LOW (ref 8.4–10.5)
CHLORIDE SERPL-SCNC: 104 MMOL/L — SIGNIFICANT CHANGE UP (ref 98–107)
CO2 SERPL-SCNC: 20 MMOL/L — LOW (ref 22–31)
CREAT SERPL-MCNC: 0.87 MG/DL — SIGNIFICANT CHANGE UP (ref 0.5–1.3)
CULTURE RESULTS: SIGNIFICANT CHANGE UP
CULTURE RESULTS: SIGNIFICANT CHANGE UP
EGFR: 65 ML/MIN/1.73M2 — SIGNIFICANT CHANGE UP
EOSINOPHIL # BLD AUTO: 0.11 K/UL — SIGNIFICANT CHANGE UP (ref 0–0.5)
EOSINOPHIL NFR BLD AUTO: 0.9 % — SIGNIFICANT CHANGE UP (ref 0–6)
GLUCOSE BLDC GLUCOMTR-MCNC: 236 MG/DL — HIGH (ref 70–99)
GLUCOSE BLDC GLUCOMTR-MCNC: 240 MG/DL — HIGH (ref 70–99)
GLUCOSE BLDC GLUCOMTR-MCNC: 285 MG/DL — HIGH (ref 70–99)
GLUCOSE BLDC GLUCOMTR-MCNC: 304 MG/DL — HIGH (ref 70–99)
GLUCOSE SERPL-MCNC: 272 MG/DL — HIGH (ref 70–99)
HCT VFR BLD CALC: 26 % — LOW (ref 34.5–45)
HGB BLD-MCNC: 8.5 G/DL — LOW (ref 11.5–15.5)
IANC: 7.43 K/UL — HIGH (ref 1.8–7.4)
IMM GRANULOCYTES NFR BLD AUTO: 2.7 % — HIGH (ref 0–0.9)
INR BLD: 1.13 RATIO — SIGNIFICANT CHANGE UP (ref 0.85–1.18)
LYMPHOCYTES # BLD AUTO: 24.9 % — SIGNIFICANT CHANGE UP (ref 13–44)
LYMPHOCYTES # BLD AUTO: 3.18 K/UL — SIGNIFICANT CHANGE UP (ref 1–3.3)
MAGNESIUM SERPL-MCNC: 2 MG/DL — SIGNIFICANT CHANGE UP (ref 1.6–2.6)
MCHC RBC-ENTMCNC: 29.5 PG — SIGNIFICANT CHANGE UP (ref 27–34)
MCHC RBC-ENTMCNC: 32.7 GM/DL — SIGNIFICANT CHANGE UP (ref 32–36)
MCV RBC AUTO: 90.3 FL — SIGNIFICANT CHANGE UP (ref 80–100)
MONOCYTES # BLD AUTO: 1.67 K/UL — HIGH (ref 0–0.9)
MONOCYTES NFR BLD AUTO: 13.1 % — SIGNIFICANT CHANGE UP (ref 2–14)
NEUTROPHILS # BLD AUTO: 7.43 K/UL — HIGH (ref 1.8–7.4)
NEUTROPHILS NFR BLD AUTO: 58.1 % — SIGNIFICANT CHANGE UP (ref 43–77)
NRBC # BLD: 1 /100 WBCS — HIGH (ref 0–0)
NRBC # FLD: 0.13 K/UL — HIGH (ref 0–0)
PHOSPHATE SERPL-MCNC: 2.9 MG/DL — SIGNIFICANT CHANGE UP (ref 2.5–4.5)
PLATELET # BLD AUTO: 193 K/UL — SIGNIFICANT CHANGE UP (ref 150–400)
POTASSIUM SERPL-MCNC: 4.3 MMOL/L — SIGNIFICANT CHANGE UP (ref 3.5–5.3)
POTASSIUM SERPL-SCNC: 4.3 MMOL/L — SIGNIFICANT CHANGE UP (ref 3.5–5.3)
PROT SERPL-MCNC: 6.1 G/DL — SIGNIFICANT CHANGE UP (ref 6–8.3)
PROTHROM AB SERPL-ACNC: 12.6 SEC — SIGNIFICANT CHANGE UP (ref 9.5–13)
RBC # BLD: 2.88 M/UL — LOW (ref 3.8–5.2)
RBC # FLD: 14.5 % — SIGNIFICANT CHANGE UP (ref 10.3–14.5)
SODIUM SERPL-SCNC: 135 MMOL/L — SIGNIFICANT CHANGE UP (ref 135–145)
SPECIMEN SOURCE: SIGNIFICANT CHANGE UP
SPECIMEN SOURCE: SIGNIFICANT CHANGE UP
TROPONIN T, HIGH SENSITIVITY RESULT: 73 NG/L — CRITICAL HIGH
WBC # BLD: 12.78 K/UL — HIGH (ref 3.8–10.5)
WBC # FLD AUTO: 12.78 K/UL — HIGH (ref 3.8–10.5)

## 2024-03-19 PROCEDURE — 70450 CT HEAD/BRAIN W/O DYE: CPT | Mod: 26

## 2024-03-19 PROCEDURE — 99291 CRITICAL CARE FIRST HOUR: CPT | Mod: GC

## 2024-03-19 PROCEDURE — 93308 TTE F-UP OR LMTD: CPT | Mod: 26,GC

## 2024-03-19 PROCEDURE — 76604 US EXAM CHEST: CPT | Mod: 26,GC

## 2024-03-19 RX ORDER — METOPROLOL TARTRATE 50 MG
2.5 TABLET ORAL ONCE
Refills: 0 | Status: COMPLETED | OUTPATIENT
Start: 2024-03-19 | End: 2024-03-19

## 2024-03-19 RX ORDER — HUMAN INSULIN 100 [IU]/ML
7 INJECTION, SUSPENSION SUBCUTANEOUS EVERY 6 HOURS
Refills: 0 | Status: DISCONTINUED | OUTPATIENT
Start: 2024-03-19 | End: 2024-03-20

## 2024-03-19 RX ORDER — SODIUM,POTASSIUM PHOSPHATES 278-250MG
1 POWDER IN PACKET (EA) ORAL ONCE
Refills: 0 | Status: COMPLETED | OUTPATIENT
Start: 2024-03-19 | End: 2024-03-19

## 2024-03-19 RX ORDER — ASPIRIN/CALCIUM CARB/MAGNESIUM 324 MG
81 TABLET ORAL DAILY
Refills: 0 | Status: DISCONTINUED | OUTPATIENT
Start: 2024-03-19 | End: 2024-04-05

## 2024-03-19 RX ORDER — AMLODIPINE BESYLATE 2.5 MG/1
5 TABLET ORAL DAILY
Refills: 0 | Status: DISCONTINUED | OUTPATIENT
Start: 2024-03-19 | End: 2024-03-22

## 2024-03-19 RX ORDER — ATENOLOL 25 MG/1
100 TABLET ORAL DAILY
Refills: 0 | Status: DISCONTINUED | OUTPATIENT
Start: 2024-03-19 | End: 2024-03-22

## 2024-03-19 RX ORDER — ISOSORBIDE MONONITRATE 60 MG/1
30 TABLET, EXTENDED RELEASE ORAL DAILY
Refills: 0 | Status: DISCONTINUED | OUTPATIENT
Start: 2024-03-19 | End: 2024-03-22

## 2024-03-19 RX ORDER — DEXMEDETOMIDINE HYDROCHLORIDE IN 0.9% SODIUM CHLORIDE 4 UG/ML
0.1 INJECTION INTRAVENOUS
Qty: 400 | Refills: 0 | Status: DISCONTINUED | OUTPATIENT
Start: 2024-03-19 | End: 2024-03-20

## 2024-03-19 RX ORDER — MIDAZOLAM HYDROCHLORIDE 1 MG/ML
4 INJECTION, SOLUTION INTRAMUSCULAR; INTRAVENOUS ONCE
Refills: 0 | Status: DISCONTINUED | OUTPATIENT
Start: 2024-03-19 | End: 2024-03-19

## 2024-03-19 RX ORDER — FUROSEMIDE 40 MG
40 TABLET ORAL ONCE
Refills: 0 | Status: COMPLETED | OUTPATIENT
Start: 2024-03-19 | End: 2024-03-19

## 2024-03-19 RX ORDER — MULTIVIT-MIN/FERROUS GLUCONATE 9 MG/15 ML
15 LIQUID (ML) ORAL DAILY
Refills: 0 | Status: DISCONTINUED | OUTPATIENT
Start: 2024-03-19 | End: 2024-04-05

## 2024-03-19 RX ADMIN — MIDAZOLAM HYDROCHLORIDE 4 MILLIGRAM(S): 1 INJECTION, SOLUTION INTRAMUSCULAR; INTRAVENOUS at 16:00

## 2024-03-19 RX ADMIN — Medication 3: at 00:41

## 2024-03-19 RX ADMIN — Medication 40 MILLIGRAM(S): at 14:27

## 2024-03-19 RX ADMIN — HUMAN INSULIN 7 UNIT(S): 100 INJECTION, SUSPENSION SUBCUTANEOUS at 17:49

## 2024-03-19 RX ADMIN — Medication 1 PACKET(S): at 04:36

## 2024-03-19 RX ADMIN — Medication 1 TABLET(S): at 12:11

## 2024-03-19 RX ADMIN — POLYETHYLENE GLYCOL 3350 17 GRAM(S): 17 POWDER, FOR SOLUTION ORAL at 05:18

## 2024-03-19 RX ADMIN — PROPOFOL 3.43 MICROGRAM(S)/KG/MIN: 10 INJECTION, EMULSION INTRAVENOUS at 19:00

## 2024-03-19 RX ADMIN — SENNA PLUS 2 TABLET(S): 8.6 TABLET ORAL at 21:26

## 2024-03-19 RX ADMIN — Medication 2: at 12:18

## 2024-03-19 RX ADMIN — AMLODIPINE BESYLATE 5 MILLIGRAM(S): 2.5 TABLET ORAL at 15:50

## 2024-03-19 RX ADMIN — Medication 2.5 MILLIGRAM(S): at 11:32

## 2024-03-19 RX ADMIN — Medication 650 MILLIGRAM(S): at 08:37

## 2024-03-19 RX ADMIN — PROPOFOL 3.43 MICROGRAM(S)/KG/MIN: 10 INJECTION, EMULSION INTRAVENOUS at 10:08

## 2024-03-19 RX ADMIN — Medication 1 APPLICATION(S): at 13:09

## 2024-03-19 RX ADMIN — Medication 2: at 06:14

## 2024-03-19 RX ADMIN — Medication 20 MICROGRAM(S): at 22:16

## 2024-03-19 RX ADMIN — HEPARIN SODIUM 600 UNIT(S)/HR: 5000 INJECTION INTRAVENOUS; SUBCUTANEOUS at 00:41

## 2024-03-19 RX ADMIN — Medication 650 MILLIGRAM(S): at 09:30

## 2024-03-19 RX ADMIN — Medication 3 MILLILITER(S): at 03:57

## 2024-03-19 RX ADMIN — PIPERACILLIN AND TAZOBACTAM 25 GRAM(S): 4; .5 INJECTION, POWDER, LYOPHILIZED, FOR SOLUTION INTRAVENOUS at 21:26

## 2024-03-19 RX ADMIN — Medication 3 MILLILITER(S): at 15:23

## 2024-03-19 RX ADMIN — PIPERACILLIN AND TAZOBACTAM 25 GRAM(S): 4; .5 INJECTION, POWDER, LYOPHILIZED, FOR SOLUTION INTRAVENOUS at 13:09

## 2024-03-19 RX ADMIN — Medication 3 MILLILITER(S): at 21:17

## 2024-03-19 RX ADMIN — FAMOTIDINE 20 MILLIGRAM(S): 10 INJECTION INTRAVENOUS at 12:11

## 2024-03-19 RX ADMIN — Medication 4: at 17:49

## 2024-03-19 RX ADMIN — POLYETHYLENE GLYCOL 3350 17 GRAM(S): 17 POWDER, FOR SOLUTION ORAL at 17:33

## 2024-03-19 RX ADMIN — ATORVASTATIN CALCIUM 80 MILLIGRAM(S): 80 TABLET, FILM COATED ORAL at 21:26

## 2024-03-19 RX ADMIN — RANOLAZINE 500 MILLIGRAM(S): 500 TABLET, FILM COATED, EXTENDED RELEASE ORAL at 17:33

## 2024-03-19 RX ADMIN — Medication 81 MILLIGRAM(S): at 12:11

## 2024-03-19 RX ADMIN — HUMAN INSULIN 7 UNIT(S): 100 INJECTION, SUSPENSION SUBCUTANEOUS at 23:45

## 2024-03-19 RX ADMIN — CHLORHEXIDINE GLUCONATE 1 APPLICATION(S): 213 SOLUTION TOPICAL at 12:10

## 2024-03-19 RX ADMIN — Medication 30 MILLIGRAM(S): at 05:16

## 2024-03-19 RX ADMIN — CHLORHEXIDINE GLUCONATE 15 MILLILITER(S): 213 SOLUTION TOPICAL at 17:33

## 2024-03-19 RX ADMIN — MUPIROCIN 1 APPLICATION(S): 20 OINTMENT TOPICAL at 09:42

## 2024-03-19 RX ADMIN — Medication 3 MILLILITER(S): at 09:32

## 2024-03-19 RX ADMIN — PIPERACILLIN AND TAZOBACTAM 25 GRAM(S): 4; .5 INJECTION, POWDER, LYOPHILIZED, FOR SOLUTION INTRAVENOUS at 05:18

## 2024-03-19 RX ADMIN — Medication 2: at 23:46

## 2024-03-19 RX ADMIN — CHLORHEXIDINE GLUCONATE 15 MILLILITER(S): 213 SOLUTION TOPICAL at 05:16

## 2024-03-19 RX ADMIN — MUPIROCIN 1 APPLICATION(S): 20 OINTMENT TOPICAL at 21:27

## 2024-03-19 NOTE — CHART NOTE - NSCHARTNOTEFT_GEN_A_CORE
: Faizan Agudelo     INDICATION: Respiratory Failure, Shock    PROCEDURE:  [x ] LIMITED ECHO  [x ] LIMITED CHEST  [ ] LIMITED RETROPERITONEAL  [ ] LIMITED ABDOMINAL  [ ] LIMITED DVT  [ ] NEEDLE GUIDANCE VASCULAR  [ ] NEEDLE GUIDANCE THORACENTESIS  [ ] NEEDLE GUIDANCE PARACENTESIS  [ ] NEEDLE GUIDANCE PERICARDIOCENTESIS  [ ] OTHER    FINDINGS:  Lungs: A line predominance. Lung sliding b/l. Trace pleural effusions b/l  Heart: Mildly reduced LVSF with possible septal akinesis, LV > RV. No pericardial effusion. IVC indeterminant.    INTERPRETATION:  Normal aeration pattern.  Mildly reduced EF : Kilo Templeton    INDICATION: Respiratory Failure, Shock    PROCEDURE:  [x ] LIMITED ECHO  [x ] LIMITED CHEST  [ ] LIMITED RETROPERITONEAL  [ ] LIMITED ABDOMINAL  [ ] LIMITED DVT  [ ] NEEDLE GUIDANCE VASCULAR  [ ] NEEDLE GUIDANCE THORACENTESIS  [ ] NEEDLE GUIDANCE PARACENTESIS  [ ] NEEDLE GUIDANCE PERICARDIOCENTESIS  [ ] OTHER    FINDINGS:  Lungs: A line predominance. Lung sliding b/l. Trace pleural effusions b/l  Heart: Mildly reduced LVSF with possible septal akinesis, LV > RV. No pericardial effusion. IVC indeterminant.    INTERPRETATION:  Normal aeration pattern.  Mildly reduced EF    I have assisted and supervised entire procedure.     Kilo Portillo MD

## 2024-03-19 NOTE — PROGRESS NOTE ADULT - SUBJECTIVE AND OBJECTIVE BOX
**INCOMPLETE**    INTERVAL HPI/OVERNIGHT EVENTS:    SUBJECTIVE: Patient seen and examined at bedside. Intubated, sedated, ROS cannot be obtained.       VITAL SIGNS:  ICU Vital Signs Last 24 Hrs  T(C): 38.3 (19 Mar 2024 08:00), Max: 38.3 (19 Mar 2024 08:00)  T(F): 100.9 (19 Mar 2024 08:00), Max: 100.9 (19 Mar 2024 08:00)  HR: 92 (19 Mar 2024 11:40) (66 - 110)  BP: --  BP(mean): --  ABP: 93/44 (19 Mar 2024 11:40) (93/44 - 164/113)  ABP(mean): 62 (19 Mar 2024 11:40) (62 - 132)  RR: 16 (19 Mar 2024 11:40) (16 - 19)  SpO2: 95% (19 Mar 2024 11:40) (94% - 100%)    O2 Parameters below as of 19 Mar 2024 11:40  Patient On (Oxygen Delivery Method): ventilator    O2 Concentration (%): 40      Mode: AC/ CMV (Assist Control/ Continuous Mandatory Ventilation), RR (machine): 16, TV (machine): 20, FiO2: 40, PEEP: 5, ITime: 0.89, MAP: 10, PIP: 33  Plateau pressure:   P/F ratio:     03-18 @ 07:01  -  03-19 @ 07:00  --------------------------------------------------------  IN: 2029.2 mL / OUT: 935 mL / NET: 1094.2 mL    03-19 @ 07:01  -  03-19 @ 12:59  --------------------------------------------------------  IN: 38.6 mL / OUT: 120 mL / NET: -81.4 mL      CAPILLARY BLOOD GLUCOSE      POCT Blood Glucose.: 236 mg/dL (19 Mar 2024 11:55)    ECG:    PHYSICAL EXAM:    GENERAL: Elderly female, intubated   HEAD:  Atraumatic, normocephalic  NECK: Supple, no swelling , JVD not appreciated   HEART: Difficult to appreciate due to patient's wheezing   LUNGS: Coarse breath sounds B/L   ABDOMEN: Soft, nontender/ nondistended, +BS  EXTREMITIES: +2 pulses,  NEURO:  Intubated and sedated   SKIN: No rashes or lesions     MEDICATIONS:  MEDICATIONS  (STANDING):  albuterol/ipratropium for Nebulization 3 milliLiter(s) Nebulizer every 6 hours  aspirin  chewable 81 milliGRAM(s) Oral daily  atorvastatin 80 milliGRAM(s) Oral at bedtime  chlorhexidine 0.12% Liquid 15 milliLiter(s) Oral Mucosa every 12 hours  chlorhexidine 2% Cloths 1 Application(s) Topical daily  dexMEDEtomidine Infusion 0.1 MICROgram(s)/kG/Hr (1.43 mL/Hr) IV Continuous <Continuous>  dextrose 5%. 1000 milliLiter(s) (50 mL/Hr) IV Continuous <Continuous>  dextrose 5%. 1000 milliLiter(s) (100 mL/Hr) IV Continuous <Continuous>  dextrose 50% Injectable 25 Gram(s) IV Push once  dextrose 50% Injectable 12.5 Gram(s) IV Push once  dextrose 50% Injectable 25 Gram(s) IV Push once  famotidine    Tablet 20 milliGRAM(s) Oral daily  glucagon  Injectable 1 milliGRAM(s) IntraMuscular once  heparin  Infusion.  Unit(s)/Hr (11 mL/Hr) IV Continuous <Continuous>  influenza  Vaccine (HIGH DOSE) 0.7 milliLiter(s) IntraMuscular once  insulin lispro (ADMELOG) corrective regimen sliding scale   SubCutaneous every 6 hours  insulin NPH human recombinant 7 Unit(s) SubCutaneous every 6 hours  levothyroxine Injectable 20 MICROGram(s) IV Push at bedtime  multivitamin 1 Tablet(s) Oral daily  mupirocin 2% Ointment 1 Application(s) Both Nostrils two times a day  norepinephrine Infusion 0.05 MICROgram(s)/kG/Min (5.36 mL/Hr) IV Continuous <Continuous>  petrolatum Ophthalmic Ointment 1 Application(s) Both EYES daily  piperacillin/tazobactam IVPB.. 3.375 Gram(s) IV Intermittent every 8 hours  polyethylene glycol 3350 17 Gram(s) Oral two times a day  propofol Infusion 10 MICROgram(s)/kG/Min (3.43 mL/Hr) IV Continuous <Continuous>  ranolazine 500 milliGRAM(s) Oral two times a day  senna 2 Tablet(s) Oral at bedtime    MEDICATIONS  (PRN):  acetaminophen     Tablet .. 650 milliGRAM(s) Oral every 6 hours PRN Temp greater or equal to 38C (100.4F), Mild Pain (1 - 3)  dextrose Oral Gel 15 Gram(s) Oral once PRN Blood Glucose LESS THAN 70 milliGRAM(s)/deciliter  heparin   Injectable 4500 Unit(s) IV Push every 6 hours PRN For aPTT less than 40  heparin   Injectable 2000 Unit(s) IV Push every 6 hours PRN For aPTT between 40 - 57      ALLERGIES:  Allergies    No Known Allergies    Intolerances        LABS:                        8.5    12.78 )-----------( 193      ( 19 Mar 2024 00:05 )             26.0     03-19    135  |  104  |  11  ----------------------------<  272<H>  4.3   |  20<L>  |  0.87    Ca    8.1<L>      19 Mar 2024 00:05  Phos  2.9     03-19  Mg     2.00     03-19    TPro  6.1  /  Alb  2.9<L>  /  TBili  0.4  /  DBili  x   /  AST  46<H>  /  ALT  24  /  AlkPhos  104  03-19    PT/INR - ( 19 Mar 2024 00:05 )   PT: 12.6 sec;   INR: 1.13 ratio         PTT - ( 19 Mar 2024 00:05 )  PTT:75.8 sec  Urinalysis Basic - ( 19 Mar 2024 00:05 )    Color: x / Appearance: x / SG: x / pH: x  Gluc: 272 mg/dL / Ketone: x  / Bili: x / Urobili: x   Blood: x / Protein: x / Nitrite: x   Leuk Esterase: x / RBC: x / WBC x   Sq Epi: x / Non Sq Epi: x / Bacteria: x        RADIOLOGY & ADDITIONAL TESTS: Reviewed. INTERVAL HPI/OVERNIGHT EVENTS:    SUBJECTIVE: Patient seen and examined at bedside. Intubated, sedated, ROS cannot be obtained.       VITAL SIGNS:  ICU Vital Signs Last 24 Hrs  T(C): 38.3 (19 Mar 2024 08:00), Max: 38.3 (19 Mar 2024 08:00)  T(F): 100.9 (19 Mar 2024 08:00), Max: 100.9 (19 Mar 2024 08:00)  HR: 92 (19 Mar 2024 11:40) (66 - 110)  ABP: 93/44 (19 Mar 2024 11:40) (93/44 - 164/113)  ABP(mean): 62 (19 Mar 2024 11:40) (62 - 132)  RR: 16 (19 Mar 2024 11:40) (16 - 19)  SpO2: 95% (19 Mar 2024 11:40) (94% - 100%)    O2 Parameters below as of 19 Mar 2024 11:40  Patient On (Oxygen Delivery Method): ventilator    O2 Concentration (%): 40      Mode: AC/ CMV (Assist Control/ Continuous Mandatory Ventilation), RR (machine): 16, TV (machine): 20, FiO2: 40, PEEP: 5, ITime: 0.89, MAP: 10, PIP: 33  Plateau pressure:   P/F ratio:     03-18 @ 07:01  -  03-19 @ 07:00  --------------------------------------------------------  IN: 2029.2 mL / OUT: 935 mL / NET: 1094.2 mL    03-19 @ 07:01  -  03-19 @ 12:59  --------------------------------------------------------  IN: 38.6 mL / OUT: 120 mL / NET: -81.4 mL      CAPILLARY BLOOD GLUCOSE      POCT Blood Glucose.: 236 mg/dL (19 Mar 2024 11:55)    ECG:    PHYSICAL EXAM:    GENERAL: Elderly female, intubated   HEAD:  Atraumatic, normocephalic  NECK: Supple, no swelling , JVD not appreciated   HEART: Difficult to appreciate due to patient's wheezing   LUNGS: Coarse breath sounds B/L   ABDOMEN: Soft, nontender/ nondistended, +BS  EXTREMITIES: +2 pulses,  NEURO:  Intubated and sedated   SKIN: No rashes or lesions     MEDICATIONS:  MEDICATIONS  (STANDING):  albuterol/ipratropium for Nebulization 3 milliLiter(s) Nebulizer every 6 hours  aspirin  chewable 81 milliGRAM(s) Oral daily  atorvastatin 80 milliGRAM(s) Oral at bedtime  chlorhexidine 0.12% Liquid 15 milliLiter(s) Oral Mucosa every 12 hours  chlorhexidine 2% Cloths 1 Application(s) Topical daily  dexMEDEtomidine Infusion 0.1 MICROgram(s)/kG/Hr (1.43 mL/Hr) IV Continuous <Continuous>  dextrose 5%. 1000 milliLiter(s) (50 mL/Hr) IV Continuous <Continuous>  dextrose 5%. 1000 milliLiter(s) (100 mL/Hr) IV Continuous <Continuous>  dextrose 50% Injectable 25 Gram(s) IV Push once  dextrose 50% Injectable 12.5 Gram(s) IV Push once  dextrose 50% Injectable 25 Gram(s) IV Push once  famotidine    Tablet 20 milliGRAM(s) Oral daily  glucagon  Injectable 1 milliGRAM(s) IntraMuscular once  heparin  Infusion.  Unit(s)/Hr (11 mL/Hr) IV Continuous <Continuous>  influenza  Vaccine (HIGH DOSE) 0.7 milliLiter(s) IntraMuscular once  insulin lispro (ADMELOG) corrective regimen sliding scale   SubCutaneous every 6 hours  insulin NPH human recombinant 7 Unit(s) SubCutaneous every 6 hours  levothyroxine Injectable 20 MICROGram(s) IV Push at bedtime  multivitamin 1 Tablet(s) Oral daily  mupirocin 2% Ointment 1 Application(s) Both Nostrils two times a day  norepinephrine Infusion 0.05 MICROgram(s)/kG/Min (5.36 mL/Hr) IV Continuous <Continuous>  petrolatum Ophthalmic Ointment 1 Application(s) Both EYES daily  piperacillin/tazobactam IVPB.. 3.375 Gram(s) IV Intermittent every 8 hours  polyethylene glycol 3350 17 Gram(s) Oral two times a day  propofol Infusion 10 MICROgram(s)/kG/Min (3.43 mL/Hr) IV Continuous <Continuous>  ranolazine 500 milliGRAM(s) Oral two times a day  senna 2 Tablet(s) Oral at bedtime    MEDICATIONS  (PRN):  acetaminophen     Tablet .. 650 milliGRAM(s) Oral every 6 hours PRN Temp greater or equal to 38C (100.4F), Mild Pain (1 - 3)  dextrose Oral Gel 15 Gram(s) Oral once PRN Blood Glucose LESS THAN 70 milliGRAM(s)/deciliter  heparin   Injectable 4500 Unit(s) IV Push every 6 hours PRN For aPTT less than 40  heparin   Injectable 2000 Unit(s) IV Push every 6 hours PRN For aPTT between 40 - 57      ALLERGIES:  Allergies    No Known Allergies    Intolerances        LABS:                        8.5    12.78 )-----------( 193      ( 19 Mar 2024 00:05 )             26.0     03-19    135  |  104  |  11  ----------------------------<  272<H>  4.3   |  20<L>  |  0.87    Ca    8.1<L>      19 Mar 2024 00:05  Phos  2.9     03-19  Mg     2.00     03-19    TPro  6.1  /  Alb  2.9<L>  /  TBili  0.4  /  DBili  x   /  AST  46<H>  /  ALT  24  /  AlkPhos  104  03-19    PT/INR - ( 19 Mar 2024 00:05 )   PT: 12.6 sec;   INR: 1.13 ratio         PTT - ( 19 Mar 2024 00:05 )  PTT:75.8 sec  Urinalysis Basic - ( 19 Mar 2024 00:05 )    Color: x / Appearance: x / SG: x / pH: x  Gluc: 272 mg/dL / Ketone: x  / Bili: x / Urobili: x   Blood: x / Protein: x / Nitrite: x   Leuk Esterase: x / RBC: x / WBC x   Sq Epi: x / Non Sq Epi: x / Bacteria: x        RADIOLOGY & ADDITIONAL TESTS: Reviewed.

## 2024-03-19 NOTE — PROGRESS NOTE ADULT - ATTENDING COMMENTS
1. Acute hypoxemic respiratory failure.? from influenza Fio2 requirement decreasing.   Pt on zosyn for possible bacterial infection. CXR without clear infiltrate. Continue  Tamiflu.  Taper down sedation. Trials of PS as tolerated.    2. Cardiac. H/O CAD. EKG without acute ST t wave abnormalities. Troponin  elevated. Trend troponin.  POCUS ef mildly decrease LV systolic function.     On AC at home. Unclear etiology. Need more info. Now on insulin drip.  3. Hypotension. titrate Levophed for MAP> 65. Follow up blood cx.  Continue  Zosyn and Tamiflu.    4. DVT prophylaxis On heparin drip    5Neuro. Off sedation not responding appropriately. Check CT head.? LP    6. GOC: Full code.

## 2024-03-19 NOTE — PROGRESS NOTE ADULT - ASSESSMENT
88 year old female with a medical history of HFpef, CAD (s/p multiple stents), HTN/HLD/DM2 who was admitted for AHRF 2/2 influenza, rapid called for resp distress and intubated, now in MICU for AHRF and AMS work up.     =====Neurologic=====  Patient is AOx 0 iso intubation  - sedation: Prop/fent  - wean sedation as able in prep for SBT    =====Cardiovascular=====  #Shock   - Likely vasoplegic iso of sepsis   - On levophed gtt , titrate for MAP > 65   - C/w Abx     #HFpef  - 57% EF 3/16  - Home medications: atenolol 100mg ,torsemide 10mg daily. Being held currently iso of shock   - POCUS exam noted for new reduced lvef  - holding patient's atenolol in setting of bradycardia     #CAD  Home medications: Aspirin 81mgAtortavastin 80mg  - why is she on rivaroxaban ?: Need to fu with family. Will keep on heparin gtt for now     #HTN  Home medications : Amlodipine 5mg Daily Losartan 100mg   - continue home medications     =====Pulmonary=====  #AHRF (PNA)  Shortness of breath ISO of WBC. Most likely in the setting of Influenza, can also consider secondary PNA .  Chest X-ray showed no acute findings   - ventilation status: Volume Control:   intubated on 3/17  - empiric antibiotics :zosyn   - C/ Tamiflu (3/14-  - SBT in the next 24h      =====GI=====  Currently receiving tube feeds through OGT     =====Renal/=====  indwelling catheter, monitor Is and OS    =====Infectious Disease=====  Sepsis  WBC was (12) on admission with out fever. In addition presented with low blood pressures. All in the setting of respiratory distress, cough etc. Chest xray not remarkable, In terms of sources they include influenza, secondary pneumonia, aspiration pna, etc. Patient received ( AB) in the ED. Blood cultures were drawn   Blood cultures: NGTD    - Continue with Antibiotic (Zosyn)   - FU blood cultures   - C/ Tamiflu (3/14-    =====Endocrine=====    #DM2  Sliding scale q6.   Humulin sq    =====Heme/Onc=====  - DVT PPX: Heparin DRIP     =====MICUGeneral=====  Lines:  Left a-line, peripherals  Drips: Levo, propophol, fent   O2:  Volume control   AB: Zosyn   Diet: Pending  DVT:Heparin dip      88F with HFpEF, CAD (s/p multiple stents), HTN, HLD, DM2 admitted for acute hypoxemic respiratory failure requiring intubation from influenza    NEUROLOGIC    - wean sedation as able to prepare for extubation    CARDIOVASCULAR    #HFpEF  #CAD  #HTN  - re-introduce home Isordil, amlodipine, atenolol today given hypertension  - re-introduce other home meds as able  - clarify why patient is on rivaroxaban at home (?AFib)    PULMONARY    #AHRF  Likely 2/2 flu, less suspicious for superimposed bacterial process  - daily SBT  - extubate in the next day or two if with successful SBTs      GI    - continue tube feeds    RENAL    - no issues    ID    #Flu  - continue Tamiflu  - continue Zosyn, consider 3-5 day course as low concern for superimposed bacterial process    ENDOCRINE    - continue with NPH and sliding scale    HEME    #DVT ppx  - continue heparin ggt

## 2024-03-20 LAB
ALBUMIN SERPL ELPH-MCNC: 2.8 G/DL — LOW (ref 3.3–5)
ALP SERPL-CCNC: 136 U/L — HIGH (ref 40–120)
ALT FLD-CCNC: 27 U/L — SIGNIFICANT CHANGE UP (ref 4–33)
ANION GAP SERPL CALC-SCNC: 14 MMOL/L — SIGNIFICANT CHANGE UP (ref 7–14)
APPEARANCE CSF: CLEAR — SIGNIFICANT CHANGE UP
APPEARANCE SPUN FLD: COLORLESS — SIGNIFICANT CHANGE UP
APTT BLD: 51.6 SEC — HIGH (ref 24.5–35.6)
APTT BLD: 56.2 SEC — HIGH (ref 24.5–35.6)
AST SERPL-CCNC: 45 U/L — HIGH (ref 4–32)
BASOPHILS # BLD AUTO: 0.04 K/UL — SIGNIFICANT CHANGE UP (ref 0–0.2)
BASOPHILS NFR BLD AUTO: 0.3 % — SIGNIFICANT CHANGE UP (ref 0–2)
BILIRUB SERPL-MCNC: 0.6 MG/DL — SIGNIFICANT CHANGE UP (ref 0.2–1.2)
BLOOD GAS ARTERIAL - LYTES,HGB,ICA,LACT RESULT: SIGNIFICANT CHANGE UP
BUN SERPL-MCNC: 13 MG/DL — SIGNIFICANT CHANGE UP (ref 7–23)
CALCIUM SERPL-MCNC: 8.4 MG/DL — SIGNIFICANT CHANGE UP (ref 8.4–10.5)
CHLORIDE SERPL-SCNC: 99 MMOL/L — SIGNIFICANT CHANGE UP (ref 98–107)
CO2 SERPL-SCNC: 21 MMOL/L — LOW (ref 22–31)
COLOR CSF: COLORLESS — SIGNIFICANT CHANGE UP
CREAT SERPL-MCNC: 0.96 MG/DL — SIGNIFICANT CHANGE UP (ref 0.5–1.3)
EGFR: 58 ML/MIN/1.73M2 — LOW
EOSINOPHIL # BLD AUTO: 0.22 K/UL — SIGNIFICANT CHANGE UP (ref 0–0.5)
EOSINOPHIL NFR BLD AUTO: 1.5 % — SIGNIFICANT CHANGE UP (ref 0–6)
GLUCOSE BLDC GLUCOMTR-MCNC: 182 MG/DL — HIGH (ref 70–99)
GLUCOSE BLDC GLUCOMTR-MCNC: 193 MG/DL — HIGH (ref 70–99)
GLUCOSE BLDC GLUCOMTR-MCNC: 219 MG/DL — HIGH (ref 70–99)
GLUCOSE BLDC GLUCOMTR-MCNC: 226 MG/DL — HIGH (ref 70–99)
GLUCOSE BLDC GLUCOMTR-MCNC: 282 MG/DL — HIGH (ref 70–99)
GLUCOSE CSF-MCNC: 120 MG/DL — HIGH (ref 40–70)
GLUCOSE SERPL-MCNC: 240 MG/DL — HIGH (ref 70–99)
GRAM STN FLD: SIGNIFICANT CHANGE UP
HCT VFR BLD CALC: 24.8 % — LOW (ref 34.5–45)
HGB BLD-MCNC: 8.1 G/DL — LOW (ref 11.5–15.5)
IANC: 9.61 K/UL — HIGH (ref 1.8–7.4)
IMM GRANULOCYTES NFR BLD AUTO: 7.5 % — HIGH (ref 0–0.9)
INR BLD: 1.14 RATIO — SIGNIFICANT CHANGE UP (ref 0.85–1.18)
LDH CSF L TO P-CCNC: <10 U/L — SIGNIFICANT CHANGE UP
LDH FLD-CCNC: <10 U/L — SIGNIFICANT CHANGE UP
LYMPHOCYTES # BLD AUTO: 1.59 K/UL — SIGNIFICANT CHANGE UP (ref 1–3.3)
LYMPHOCYTES # BLD AUTO: 11.2 % — LOW (ref 13–44)
LYMPHOCYTES # CSF: 44 % — SIGNIFICANT CHANGE UP
MAGNESIUM SERPL-MCNC: 2.2 MG/DL — SIGNIFICANT CHANGE UP (ref 1.6–2.6)
MCHC RBC-ENTMCNC: 29.7 PG — SIGNIFICANT CHANGE UP (ref 27–34)
MCHC RBC-ENTMCNC: 32.7 GM/DL — SIGNIFICANT CHANGE UP (ref 32–36)
MCV RBC AUTO: 90.8 FL — SIGNIFICANT CHANGE UP (ref 80–100)
MONOCYTES # BLD AUTO: 1.67 K/UL — HIGH (ref 0–0.9)
MONOCYTES NFR BLD AUTO: 11.8 % — SIGNIFICANT CHANGE UP (ref 2–14)
MONOS+MACROS NFR CSF: 56 % — SIGNIFICANT CHANGE UP
NEUTROPHILS # BLD AUTO: 9.61 K/UL — HIGH (ref 1.8–7.4)
NEUTROPHILS # CSF: 0 % — SIGNIFICANT CHANGE UP
NEUTROPHILS NFR BLD AUTO: 67.7 % — SIGNIFICANT CHANGE UP (ref 43–77)
NIGHT BLUE STAIN TISS: SIGNIFICANT CHANGE UP
NRBC # BLD: 0 /100 WBCS — SIGNIFICANT CHANGE UP (ref 0–0)
NRBC # FLD: 0.12 K/UL — HIGH (ref 0–0)
NRBC NFR CSF: 1 CELLS/UL — SIGNIFICANT CHANGE UP (ref 0–5)
PHOSPHATE SERPL-MCNC: 4.1 MG/DL — SIGNIFICANT CHANGE UP (ref 2.5–4.5)
PLATELET # BLD AUTO: 256 K/UL — SIGNIFICANT CHANGE UP (ref 150–400)
POTASSIUM SERPL-MCNC: 4.5 MMOL/L — SIGNIFICANT CHANGE UP (ref 3.5–5.3)
POTASSIUM SERPL-SCNC: 4.5 MMOL/L — SIGNIFICANT CHANGE UP (ref 3.5–5.3)
PROT CSF-MCNC: 24 MG/DL — SIGNIFICANT CHANGE UP (ref 15–45)
PROT SERPL-MCNC: 6.3 G/DL — SIGNIFICANT CHANGE UP (ref 6–8.3)
PROTHROM AB SERPL-ACNC: 12.8 SEC — SIGNIFICANT CHANGE UP (ref 9.5–13)
RBC # BLD: 2.73 M/UL — LOW (ref 3.8–5.2)
RBC # CSF: 77 CELLS/UL — HIGH (ref 0–0)
RBC # FLD: 14.6 % — HIGH (ref 10.3–14.5)
SODIUM SERPL-SCNC: 134 MMOL/L — LOW (ref 135–145)
SPECIMEN SOURCE: SIGNIFICANT CHANGE UP
SPECIMEN SOURCE: SIGNIFICANT CHANGE UP
TOTAL CELLS COUNTED, SPINAL FLUID: 9 CELLS — SIGNIFICANT CHANGE UP
TUBE TYPE: SIGNIFICANT CHANGE UP
WBC # BLD: 14.2 K/UL — HIGH (ref 3.8–10.5)
WBC # FLD AUTO: 14.2 K/UL — HIGH (ref 3.8–10.5)

## 2024-03-20 PROCEDURE — 93308 TTE F-UP OR LMTD: CPT | Mod: 26,GC

## 2024-03-20 PROCEDURE — 62270 DX LMBR SPI PNXR: CPT | Mod: GC,59

## 2024-03-20 PROCEDURE — 95720 EEG PHY/QHP EA INCR W/VEEG: CPT

## 2024-03-20 PROCEDURE — 99291 CRITICAL CARE FIRST HOUR: CPT | Mod: GC,25

## 2024-03-20 PROCEDURE — 76604 US EXAM CHEST: CPT | Mod: 26,GC

## 2024-03-20 RX ORDER — HEPARIN SODIUM 5000 [USP'U]/ML
INJECTION INTRAVENOUS; SUBCUTANEOUS
Qty: 25000 | Refills: 0 | Status: DISCONTINUED | OUTPATIENT
Start: 2024-03-20 | End: 2024-03-22

## 2024-03-20 RX ORDER — HUMAN INSULIN 100 [IU]/ML
8 INJECTION, SUSPENSION SUBCUTANEOUS EVERY 6 HOURS
Refills: 0 | Status: DISCONTINUED | OUTPATIENT
Start: 2024-03-20 | End: 2024-03-21

## 2024-03-20 RX ADMIN — HUMAN INSULIN 7 UNIT(S): 100 INJECTION, SUSPENSION SUBCUTANEOUS at 05:29

## 2024-03-20 RX ADMIN — Medication 15 MILLILITER(S): at 13:22

## 2024-03-20 RX ADMIN — PIPERACILLIN AND TAZOBACTAM 25 GRAM(S): 4; .5 INJECTION, POWDER, LYOPHILIZED, FOR SOLUTION INTRAVENOUS at 21:54

## 2024-03-20 RX ADMIN — Medication 3 MILLILITER(S): at 15:53

## 2024-03-20 RX ADMIN — PIPERACILLIN AND TAZOBACTAM 25 GRAM(S): 4; .5 INJECTION, POWDER, LYOPHILIZED, FOR SOLUTION INTRAVENOUS at 05:28

## 2024-03-20 RX ADMIN — CHLORHEXIDINE GLUCONATE 15 MILLILITER(S): 213 SOLUTION TOPICAL at 05:30

## 2024-03-20 RX ADMIN — AMLODIPINE BESYLATE 5 MILLIGRAM(S): 2.5 TABLET ORAL at 05:29

## 2024-03-20 RX ADMIN — PROPOFOL 3.43 MICROGRAM(S)/KG/MIN: 10 INJECTION, EMULSION INTRAVENOUS at 09:43

## 2024-03-20 RX ADMIN — PIPERACILLIN AND TAZOBACTAM 25 GRAM(S): 4; .5 INJECTION, POWDER, LYOPHILIZED, FOR SOLUTION INTRAVENOUS at 13:23

## 2024-03-20 RX ADMIN — Medication 2: at 18:01

## 2024-03-20 RX ADMIN — Medication 20 MICROGRAM(S): at 21:54

## 2024-03-20 RX ADMIN — Medication 1: at 13:12

## 2024-03-20 RX ADMIN — HEPARIN SODIUM 700 UNIT(S)/HR: 5000 INJECTION INTRAVENOUS; SUBCUTANEOUS at 03:28

## 2024-03-20 RX ADMIN — HEPARIN SODIUM 800 UNIT(S)/HR: 5000 INJECTION INTRAVENOUS; SUBCUTANEOUS at 11:13

## 2024-03-20 RX ADMIN — CHLORHEXIDINE GLUCONATE 1 APPLICATION(S): 213 SOLUTION TOPICAL at 13:20

## 2024-03-20 RX ADMIN — Medication 81 MILLIGRAM(S): at 12:06

## 2024-03-20 RX ADMIN — POLYETHYLENE GLYCOL 3350 17 GRAM(S): 17 POWDER, FOR SOLUTION ORAL at 05:29

## 2024-03-20 RX ADMIN — HUMAN INSULIN 8 UNIT(S): 100 INJECTION, SUSPENSION SUBCUTANEOUS at 18:01

## 2024-03-20 RX ADMIN — Medication 1 APPLICATION(S): at 13:30

## 2024-03-20 RX ADMIN — PROPOFOL 3.43 MICROGRAM(S)/KG/MIN: 10 INJECTION, EMULSION INTRAVENOUS at 19:43

## 2024-03-20 RX ADMIN — Medication 3 MILLILITER(S): at 10:51

## 2024-03-20 RX ADMIN — Medication 3 MILLILITER(S): at 03:23

## 2024-03-20 RX ADMIN — ATENOLOL 100 MILLIGRAM(S): 25 TABLET ORAL at 05:29

## 2024-03-20 RX ADMIN — FAMOTIDINE 20 MILLIGRAM(S): 10 INJECTION INTRAVENOUS at 13:23

## 2024-03-20 RX ADMIN — SENNA PLUS 2 TABLET(S): 8.6 TABLET ORAL at 21:54

## 2024-03-20 RX ADMIN — POLYETHYLENE GLYCOL 3350 17 GRAM(S): 17 POWDER, FOR SOLUTION ORAL at 17:07

## 2024-03-20 RX ADMIN — MUPIROCIN 1 APPLICATION(S): 20 OINTMENT TOPICAL at 09:43

## 2024-03-20 RX ADMIN — HEPARIN SODIUM 1100 UNIT(S)/HR: 5000 INJECTION INTRAVENOUS; SUBCUTANEOUS at 17:05

## 2024-03-20 RX ADMIN — HEPARIN SODIUM 1100 UNIT(S)/HR: 5000 INJECTION INTRAVENOUS; SUBCUTANEOUS at 19:43

## 2024-03-20 RX ADMIN — Medication 3 MILLILITER(S): at 20:54

## 2024-03-20 RX ADMIN — CHLORHEXIDINE GLUCONATE 15 MILLILITER(S): 213 SOLUTION TOPICAL at 17:07

## 2024-03-20 RX ADMIN — Medication 2 MILLIGRAM(S): at 04:05

## 2024-03-20 RX ADMIN — Medication 1: at 05:30

## 2024-03-20 RX ADMIN — HUMAN INSULIN 8 UNIT(S): 100 INJECTION, SUSPENSION SUBCUTANEOUS at 13:13

## 2024-03-20 RX ADMIN — RANOLAZINE 500 MILLIGRAM(S): 500 TABLET, FILM COATED, EXTENDED RELEASE ORAL at 17:07

## 2024-03-20 RX ADMIN — ATORVASTATIN CALCIUM 80 MILLIGRAM(S): 80 TABLET, FILM COATED ORAL at 21:54

## 2024-03-20 NOTE — PROGRESS NOTE ADULT - SUBJECTIVE AND OBJECTIVE BOX
INTERVAL HPI/OVERNIGHT EVENTS:    SUBJECTIVE: Patient seen and examined at bedside. Intubated, sedated, ROS cannot be obtained.       VITAL SIGNS:  ICU Vital Signs Last 24 Hrs  T(C): 37.3 (20 Mar 2024 12:00), Max: 37.8 (20 Mar 2024 04:00)  T(F): 99.1 (20 Mar 2024 12:00), Max: 100 (20 Mar 2024 04:00)  HR: 73 (20 Mar 2024 12:00) (65 - 109)  ABP: 113/51 (20 Mar 2024 12:00) (97/45 - 203/95)  ABP(mean): 73 (20 Mar 2024 12:00) (63 - 143)  RR: 16 (20 Mar 2024 12:00) (16 - 20)  SpO2: 97% (20 Mar 2024 12:00) (95% - 100%)    O2 Parameters below as of 20 Mar 2024 12:00  Patient On (Oxygen Delivery Method): ventilator, AC 16  PEEP 5    O2 Concentration (%): 40      Mode: AC/ CMV (Assist Control/ Continuous Mandatory Ventilation), RR (machine): 16, TV (machine): 420, FiO2: 40, PEEP: 5, ITime: 0.75, MAP: 10, PIP: 29  Plateau pressure:   P/F ratio:     03-19 @ 07:01  -  03-20 @ 07:00  --------------------------------------------------------  IN: 1181.8 mL / OUT: 1233 mL / NET: -51.2 mL    03-20 @ 07:01  -  03-20 @ 13:06  --------------------------------------------------------  IN: 344.7 mL / OUT: 142 mL / NET: 202.7 mL      CAPILLARY BLOOD GLUCOSE      POCT Blood Glucose.: 193 mg/dL (20 Mar 2024 12:17)    ECG:    PHYSICAL EXAM:    GENERAL: Elderly female, intubated   HEAD:  Atraumatic, normocephalic  NECK: Supple, no swelling , JVD not appreciated   HEART: rrr  LUNGS: Coarse breath sounds B/L   ABDOMEN: Soft, nontender/ nondistended, +BS  EXTREMITIES: +2 pulses,  NEURO:  Intubated and sedated   SKIN: No rashes or lesions     MEDICATIONS:  MEDICATIONS  (STANDING):  albuterol/ipratropium for Nebulization 3 milliLiter(s) Nebulizer every 6 hours  amLODIPine   Tablet 5 milliGRAM(s) Oral daily  aspirin  chewable 81 milliGRAM(s) Oral daily  atenolol  Tablet 100 milliGRAM(s) Oral daily  atorvastatin 80 milliGRAM(s) Oral at bedtime  chlorhexidine 0.12% Liquid 15 milliLiter(s) Oral Mucosa every 12 hours  chlorhexidine 2% Cloths 1 Application(s) Topical daily  dexMEDEtomidine Infusion 0.1 MICROgram(s)/kG/Hr (1.43 mL/Hr) IV Continuous <Continuous>  famotidine    Tablet 20 milliGRAM(s) Oral daily  influenza  Vaccine (HIGH DOSE) 0.7 milliLiter(s) IntraMuscular once  insulin lispro (ADMELOG) corrective regimen sliding scale   SubCutaneous every 6 hours  insulin NPH human recombinant 8 Unit(s) SubCutaneous every 6 hours  isosorbide   mononitrate ER Tablet (IMDUR) 30 milliGRAM(s) Oral daily  levothyroxine Injectable 20 MICROGram(s) IV Push at bedtime  multivitamin/minerals/iron Oral Solution (CENTRUM) 15 milliLiter(s) Oral daily  petrolatum Ophthalmic Ointment 1 Application(s) Both EYES daily  piperacillin/tazobactam IVPB.. 3.375 Gram(s) IV Intermittent every 8 hours  polyethylene glycol 3350 17 Gram(s) Oral two times a day  propofol Infusion 10 MICROgram(s)/kG/Min (3.43 mL/Hr) IV Continuous <Continuous>  ranolazine 500 milliGRAM(s) Oral two times a day  senna 2 Tablet(s) Oral at bedtime    MEDICATIONS  (PRN):  acetaminophen     Tablet .. 650 milliGRAM(s) Oral every 6 hours PRN Temp greater or equal to 38C (100.4F), Mild Pain (1 - 3)  heparin   Injectable 4500 Unit(s) IV Push every 6 hours PRN For aPTT less than 40  heparin   Injectable 2000 Unit(s) IV Push every 6 hours PRN For aPTT between 40 - 57      ALLERGIES:  Allergies    No Known Allergies    Intolerances        LABS:                        8.1    14.20 )-----------( 256      ( 20 Mar 2024 00:20 )             24.8     03-20    134<L>  |  99  |  13  ----------------------------<  240<H>  4.5   |  21<L>  |  0.96    Ca    8.4      20 Mar 2024 00:20  Phos  4.1     03-20  Mg     2.20     03-20    TPro  6.3  /  Alb  2.8<L>  /  TBili  0.6  /  DBili  x   /  AST  45<H>  /  ALT  27  /  AlkPhos  136<H>  03-20    PT/INR - ( 20 Mar 2024 00:20 )   PT: 12.8 sec;   INR: 1.14 ratio         PTT - ( 20 Mar 2024 09:48 )  PTT:56.2 sec  Urinalysis Basic - ( 20 Mar 2024 00:20 )    Color: x / Appearance: x / SG: x / pH: x  Gluc: 240 mg/dL / Ketone: x  / Bili: x / Urobili: x   Blood: x / Protein: x / Nitrite: x   Leuk Esterase: x / RBC: x / WBC x   Sq Epi: x / Non Sq Epi: x / Bacteria: x        RADIOLOGY & ADDITIONAL TESTS: Reviewed.

## 2024-03-20 NOTE — CHART NOTE - NSCHARTNOTEFT_GEN_A_CORE
At 0400 3/20/24 AM, patient had R upward gaze with repetitive twitching motion of her RUE, and an increase in her SBP to 200s. Symptoms lasted ~ seconds.    Ativan 2mg x1 IVP given, propofol gtt increased from 30 to 50.    CT-head from earlier today per primary team without any acute changes. EEG ordered, to be started today during the day. May consider transitioning from propofol to precedex at that time.    Will sign out to day MICU team.

## 2024-03-20 NOTE — PROGRESS NOTE ADULT - ASSESSMENT
88F with HFpEF, CAD (s/p multiple stents), HTN, HLD, DM2 admitted for acute hypoxemic respiratory failure requiring intubation from influenza    NEUROLOGIC    #Seizures  With left lateral gaze deviation and tonic clonic jerking of extremities when off sedation  CT head negative of acute intracranial process or mass  - LP today  - Zosyn for meningitis coverage  - vEEG - if with epileptiform activity, neuro consult  - continue sedatives    CARDIOVASCULAR    #HFpEF  #CAD  #HTN  - continue home antihypertensives  - re-introduce other home meds as able  - clarify why patient is on rivaroxaban at home (?AFib)    PULMONARY    #Acute hypoxemic respiratory failure  Likely in the setting of altered mental status from possible seizure activity vs. less likely pneumonia (not much evidence for the latter)  - continue with mechanical ventilation until mental status improves  - daily awakening/breathing trials    GI  - continue tube feeds    RENAL  - no issues    ID    #Flu  #?Meningitis  - continue Tamiflu  - continue Zosyn to cover to possible meningitis      ENDOCRINE    - continue with NPH and sliding scale    HEME    #DVT ppx  - continue heparin ggt

## 2024-03-20 NOTE — CHART NOTE - NSCHARTNOTEFT_GEN_A_CORE
EEG preliminary read (not final) on the initial recording hour(s) = >1 hr    No seizures captured.  Rare left and right posterior quadrant epileptiform discharges indicating risk of focal-onset seizures from these regions.      Final report to follow tomorrow morning after completion of study.    [facility] EEG Reading Room Ph#: [phone]  Epilepsy Answering Service after 5PM and before 8:30AM: Ph#: [phone]

## 2024-03-20 NOTE — CHART NOTE - NSCHARTNOTEFT_GEN_A_CORE
: Faizan Agudelo     INDICATION: Respiratory Failure    PROCEDURE:  [x ] LIMITED ECHO  [x ] LIMITED CHEST  [ ] LIMITED RETROPERITONEAL  [ ] LIMITED ABDOMINAL  [ ] LIMITED DVT  [ ] NEEDLE GUIDANCE VASCULAR  [ ] NEEDLE GUIDANCE THORACENTESIS  [ ] NEEDLE GUIDANCE PARACENTESIS  [ ] NEEDLE GUIDANCE PERICARDIOCENTESIS  [ ] OTHER    FINDINGS:  Lungs: A line predominance. Lung sliding b/l. Trace pleural effusions b/l  Heart: Mildly reduced LVSF with possible septal akinesis, LV > RV. No pericardial effusion. IVC indeterminant.    INTERPRETATION:  Normal aeration pattern.  Mildly reduced EF. : Kilo Templeton MD    INDICATION: Respiratory Failure    PROCEDURE:  [x ] LIMITED ECHO  [x ] LIMITED CHEST  [ ] LIMITED RETROPERITONEAL  [ ] LIMITED ABDOMINAL  [ ] LIMITED DVT  [ ] NEEDLE GUIDANCE VASCULAR  [ ] NEEDLE GUIDANCE THORACENTESIS  [ ] NEEDLE GUIDANCE PARACENTESIS  [ ] NEEDLE GUIDANCE PERICARDIOCENTESIS  [ ] OTHER    FINDINGS:  Lungs: A line predominance. Lung sliding b/l. Trace pleural effusions b/l  Heart: Mildly reduced LVSF with possible septal akinesis, LV > RV. No pericardial effusion. IVC indeterminant.    INTERPRETATION:  Normal aeration pattern.  Mildly reduced EF.    I have assisted and supervised entire procedure.     Kilo Portillo MD

## 2024-03-20 NOTE — PROGRESS NOTE ADULT - ATTENDING COMMENTS
1. Acute hypoxemic respiratory failure.? from influenza Fio2 requirement decreasing.   Pt on zosyn for possible bacterial infection. CXR without clear infiltrate. Continue  Tamiflu.  Taper down sedation. Trials of PS as tolerated.    2. Cardiac. H/O CAD. EKG without acute ST t wave abnormalities. Troponin  elevated. Trend troponin.  POCUS ef mildly decrease LV systolic function.     On AC at home. Unclear etiology. Need more info. Now on insulin drip.  3. Hypotension. titrate Levophed for MAP> 65. Follow up blood cx.  Continue  Zosyn and Tamiflu.    4. DVT prophylaxis On heparin drip    5Neuro. Off sedation not responding appropriately.  CT head without acute changes. ? seizure like activity with L eye gaze and shaking Leg. Check EEG. LP     6. GOC: Full code.

## 2024-03-21 LAB
ALBUMIN SERPL ELPH-MCNC: 3 G/DL — LOW (ref 3.3–5)
ALP SERPL-CCNC: 190 U/L — HIGH (ref 40–120)
ALT FLD-CCNC: 33 U/L — SIGNIFICANT CHANGE UP (ref 4–33)
ANION GAP SERPL CALC-SCNC: 12 MMOL/L — SIGNIFICANT CHANGE UP (ref 7–14)
APTT BLD: 83.1 SEC — HIGH (ref 24.5–35.6)
APTT BLD: 97.3 SEC — HIGH (ref 24.5–35.6)
AST SERPL-CCNC: 57 U/L — HIGH (ref 4–32)
BASE EXCESS BLDV CALC-SCNC: -0.4 MMOL/L — SIGNIFICANT CHANGE UP (ref -2–3)
BASE EXCESS BLDV CALC-SCNC: 0.2 MMOL/L — SIGNIFICANT CHANGE UP (ref -2–3)
BILIRUB SERPL-MCNC: 0.6 MG/DL — SIGNIFICANT CHANGE UP (ref 0.2–1.2)
BLOOD GAS VENOUS COMPREHENSIVE RESULT: SIGNIFICANT CHANGE UP
BLOOD GAS VENOUS COMPREHENSIVE RESULT: SIGNIFICANT CHANGE UP
BUN SERPL-MCNC: 17 MG/DL — SIGNIFICANT CHANGE UP (ref 7–23)
CA-I BLD-SCNC: 1.07 MMOL/L — LOW (ref 1.15–1.29)
CALCIUM SERPL-MCNC: 8.7 MG/DL — SIGNIFICANT CHANGE UP (ref 8.4–10.5)
CHLORIDE BLDV-SCNC: 100 MMOL/L — SIGNIFICANT CHANGE UP (ref 96–108)
CHLORIDE BLDV-SCNC: 99 MMOL/L — SIGNIFICANT CHANGE UP (ref 96–108)
CHLORIDE SERPL-SCNC: 97 MMOL/L — LOW (ref 98–107)
CO2 BLDV-SCNC: 25.8 MMOL/L — SIGNIFICANT CHANGE UP (ref 22–26)
CO2 BLDV-SCNC: 26.1 MMOL/L — HIGH (ref 22–26)
CO2 SERPL-SCNC: 21 MMOL/L — LOW (ref 22–31)
CREAT SERPL-MCNC: 0.98 MG/DL — SIGNIFICANT CHANGE UP (ref 0.5–1.3)
CSF PCR RESULT: SIGNIFICANT CHANGE UP
EGFR: 56 ML/MIN/1.73M2 — LOW
GAS PNL BLDV: 131 MMOL/L — LOW (ref 136–145)
GAS PNL BLDV: 132 MMOL/L — LOW (ref 136–145)
GLUCOSE BLDC GLUCOMTR-MCNC: 238 MG/DL — HIGH (ref 70–99)
GLUCOSE BLDC GLUCOMTR-MCNC: 246 MG/DL — HIGH (ref 70–99)
GLUCOSE BLDC GLUCOMTR-MCNC: 269 MG/DL — HIGH (ref 70–99)
GLUCOSE BLDC GLUCOMTR-MCNC: 321 MG/DL — HIGH (ref 70–99)
GLUCOSE BLDV-MCNC: 253 MG/DL — HIGH (ref 70–99)
GLUCOSE BLDV-MCNC: 271 MG/DL — HIGH (ref 70–99)
GLUCOSE SERPL-MCNC: 253 MG/DL — HIGH (ref 70–99)
HCO3 BLDV-SCNC: 25 MMOL/L — SIGNIFICANT CHANGE UP (ref 22–29)
HCO3 BLDV-SCNC: 25 MMOL/L — SIGNIFICANT CHANGE UP (ref 22–29)
HCT VFR BLD CALC: 28.6 % — LOW (ref 34.5–45)
HCT VFR BLDA CALC: 26 % — LOW (ref 34.5–46.5)
HCT VFR BLDA CALC: 28 % — LOW (ref 34.5–46.5)
HGB BLD CALC-MCNC: 8.8 G/DL — LOW (ref 11.7–16.1)
HGB BLD CALC-MCNC: 9.4 G/DL — LOW (ref 11.7–16.1)
HGB BLD-MCNC: 9.2 G/DL — LOW (ref 11.5–15.5)
INR BLD: 1.15 RATIO — SIGNIFICANT CHANGE UP (ref 0.85–1.18)
LACTATE BLDV-MCNC: 1.1 MMOL/L — SIGNIFICANT CHANGE UP (ref 0.5–2)
LACTATE BLDV-MCNC: 1.3 MMOL/L — SIGNIFICANT CHANGE UP (ref 0.5–2)
MAGNESIUM SERPL-MCNC: 2.3 MG/DL — SIGNIFICANT CHANGE UP (ref 1.6–2.6)
MCHC RBC-ENTMCNC: 29 PG — SIGNIFICANT CHANGE UP (ref 27–34)
MCHC RBC-ENTMCNC: 32.2 GM/DL — SIGNIFICANT CHANGE UP (ref 32–36)
MCV RBC AUTO: 90.2 FL — SIGNIFICANT CHANGE UP (ref 80–100)
NRBC # BLD: 0 /100 WBCS — SIGNIFICANT CHANGE UP (ref 0–0)
NRBC # FLD: 0.09 K/UL — HIGH (ref 0–0)
PCO2 BLDV: 38 MMHG — LOW (ref 39–52)
PCO2 BLDV: 42 MMHG — SIGNIFICANT CHANGE UP (ref 39–52)
PH BLDV: 7.38 — SIGNIFICANT CHANGE UP (ref 7.32–7.43)
PH BLDV: 7.42 — SIGNIFICANT CHANGE UP (ref 7.32–7.43)
PHOSPHATE SERPL-MCNC: 3.5 MG/DL — SIGNIFICANT CHANGE UP (ref 2.5–4.5)
PLATELET # BLD AUTO: 328 K/UL — SIGNIFICANT CHANGE UP (ref 150–400)
PO2 BLDV: 44 MMHG — SIGNIFICANT CHANGE UP (ref 25–45)
PO2 BLDV: 65 MMHG — HIGH (ref 25–45)
POTASSIUM BLDV-SCNC: 4.3 MMOL/L — SIGNIFICANT CHANGE UP (ref 3.5–5.1)
POTASSIUM BLDV-SCNC: 4.5 MMOL/L — SIGNIFICANT CHANGE UP (ref 3.5–5.1)
POTASSIUM SERPL-MCNC: 4.5 MMOL/L — SIGNIFICANT CHANGE UP (ref 3.5–5.3)
POTASSIUM SERPL-SCNC: 4.5 MMOL/L — SIGNIFICANT CHANGE UP (ref 3.5–5.3)
PROT SERPL-MCNC: 7.2 G/DL — SIGNIFICANT CHANGE UP (ref 6–8.3)
PROTHROM AB SERPL-ACNC: 12.8 SEC — SIGNIFICANT CHANGE UP (ref 9.5–13)
RBC # BLD: 3.17 M/UL — LOW (ref 3.8–5.2)
RBC # FLD: 14.6 % — HIGH (ref 10.3–14.5)
SAO2 % BLDV: 68.5 % — SIGNIFICANT CHANGE UP (ref 67–88)
SAO2 % BLDV: 91.5 % — HIGH (ref 67–88)
SODIUM SERPL-SCNC: 130 MMOL/L — LOW (ref 135–145)
WBC # BLD: 17.61 K/UL — HIGH (ref 3.8–10.5)
WBC # FLD AUTO: 17.61 K/UL — HIGH (ref 3.8–10.5)

## 2024-03-21 PROCEDURE — 76604 US EXAM CHEST: CPT | Mod: 26,GC

## 2024-03-21 PROCEDURE — 71045 X-RAY EXAM CHEST 1 VIEW: CPT | Mod: 26

## 2024-03-21 PROCEDURE — 93308 TTE F-UP OR LMTD: CPT | Mod: 26,GC

## 2024-03-21 PROCEDURE — 99291 CRITICAL CARE FIRST HOUR: CPT | Mod: GC

## 2024-03-21 PROCEDURE — 95720 EEG PHY/QHP EA INCR W/VEEG: CPT

## 2024-03-21 PROCEDURE — 99223 1ST HOSP IP/OBS HIGH 75: CPT | Mod: GC

## 2024-03-21 RX ORDER — HUMAN INSULIN 100 [IU]/ML
10 INJECTION, SUSPENSION SUBCUTANEOUS EVERY 6 HOURS
Refills: 0 | Status: DISCONTINUED | OUTPATIENT
Start: 2024-03-21 | End: 2024-03-21

## 2024-03-21 RX ORDER — DEXMEDETOMIDINE HYDROCHLORIDE IN 0.9% SODIUM CHLORIDE 4 UG/ML
0.5 INJECTION INTRAVENOUS
Qty: 400 | Refills: 0 | Status: DISCONTINUED | OUTPATIENT
Start: 2024-03-21 | End: 2024-03-21

## 2024-03-21 RX ORDER — HUMAN INSULIN 100 [IU]/ML
13 INJECTION, SUSPENSION SUBCUTANEOUS EVERY 6 HOURS
Refills: 0 | Status: DISCONTINUED | OUTPATIENT
Start: 2024-03-21 | End: 2024-03-22

## 2024-03-21 RX ORDER — LEVETIRACETAM 250 MG/1
2500 TABLET, FILM COATED ORAL ONCE
Refills: 0 | Status: COMPLETED | OUTPATIENT
Start: 2024-03-21 | End: 2024-03-21

## 2024-03-21 RX ORDER — LEVETIRACETAM 250 MG/1
1000 TABLET, FILM COATED ORAL
Refills: 0 | Status: DISCONTINUED | OUTPATIENT
Start: 2024-03-21 | End: 2024-03-23

## 2024-03-21 RX ORDER — PROPOFOL 10 MG/ML
10 INJECTION, EMULSION INTRAVENOUS
Qty: 1000 | Refills: 0 | Status: DISCONTINUED | OUTPATIENT
Start: 2024-03-21 | End: 2024-03-21

## 2024-03-21 RX ADMIN — HUMAN INSULIN 10 UNIT(S): 100 INJECTION, SUSPENSION SUBCUTANEOUS at 17:15

## 2024-03-21 RX ADMIN — Medication 650 MILLIGRAM(S): at 08:38

## 2024-03-21 RX ADMIN — HEPARIN SODIUM 1100 UNIT(S)/HR: 5000 INJECTION INTRAVENOUS; SUBCUTANEOUS at 07:55

## 2024-03-21 RX ADMIN — HEPARIN SODIUM 1100 UNIT(S)/HR: 5000 INJECTION INTRAVENOUS; SUBCUTANEOUS at 01:07

## 2024-03-21 RX ADMIN — SENNA PLUS 2 TABLET(S): 8.6 TABLET ORAL at 21:29

## 2024-03-21 RX ADMIN — HUMAN INSULIN 8 UNIT(S): 100 INJECTION, SUSPENSION SUBCUTANEOUS at 05:27

## 2024-03-21 RX ADMIN — CHLORHEXIDINE GLUCONATE 15 MILLILITER(S): 213 SOLUTION TOPICAL at 05:26

## 2024-03-21 RX ADMIN — Medication 3 MILLILITER(S): at 07:45

## 2024-03-21 RX ADMIN — Medication 650 MILLIGRAM(S): at 09:15

## 2024-03-21 RX ADMIN — Medication 3 MILLILITER(S): at 02:35

## 2024-03-21 RX ADMIN — AMLODIPINE BESYLATE 5 MILLIGRAM(S): 2.5 TABLET ORAL at 05:26

## 2024-03-21 RX ADMIN — Medication 4: at 17:15

## 2024-03-21 RX ADMIN — Medication 2: at 11:44

## 2024-03-21 RX ADMIN — Medication 3: at 00:16

## 2024-03-21 RX ADMIN — POLYETHYLENE GLYCOL 3350 17 GRAM(S): 17 POWDER, FOR SOLUTION ORAL at 17:00

## 2024-03-21 RX ADMIN — Medication 2: at 05:27

## 2024-03-21 RX ADMIN — DEXMEDETOMIDINE HYDROCHLORIDE IN 0.9% SODIUM CHLORIDE 7.15 MICROGRAM(S)/KG/HR: 4 INJECTION INTRAVENOUS at 07:55

## 2024-03-21 RX ADMIN — HEPARIN SODIUM 1100 UNIT(S)/HR: 5000 INJECTION INTRAVENOUS; SUBCUTANEOUS at 19:18

## 2024-03-21 RX ADMIN — PIPERACILLIN AND TAZOBACTAM 25 GRAM(S): 4; .5 INJECTION, POWDER, LYOPHILIZED, FOR SOLUTION INTRAVENOUS at 05:26

## 2024-03-21 RX ADMIN — RANOLAZINE 500 MILLIGRAM(S): 500 TABLET, FILM COATED, EXTENDED RELEASE ORAL at 05:26

## 2024-03-21 RX ADMIN — Medication 20 MICROGRAM(S): at 21:28

## 2024-03-21 RX ADMIN — PIPERACILLIN AND TAZOBACTAM 25 GRAM(S): 4; .5 INJECTION, POWDER, LYOPHILIZED, FOR SOLUTION INTRAVENOUS at 21:29

## 2024-03-21 RX ADMIN — Medication 3: at 23:13

## 2024-03-21 RX ADMIN — FAMOTIDINE 20 MILLIGRAM(S): 10 INJECTION INTRAVENOUS at 11:33

## 2024-03-21 RX ADMIN — HUMAN INSULIN 10 UNIT(S): 100 INJECTION, SUSPENSION SUBCUTANEOUS at 11:44

## 2024-03-21 RX ADMIN — HUMAN INSULIN 8 UNIT(S): 100 INJECTION, SUSPENSION SUBCUTANEOUS at 00:16

## 2024-03-21 RX ADMIN — ATENOLOL 100 MILLIGRAM(S): 25 TABLET ORAL at 05:26

## 2024-03-21 RX ADMIN — HUMAN INSULIN 13 UNIT(S): 100 INJECTION, SUSPENSION SUBCUTANEOUS at 23:13

## 2024-03-21 RX ADMIN — PROPOFOL 3.43 MICROGRAM(S)/KG/MIN: 10 INJECTION, EMULSION INTRAVENOUS at 19:19

## 2024-03-21 RX ADMIN — Medication 3 MILLILITER(S): at 15:13

## 2024-03-21 RX ADMIN — ATORVASTATIN CALCIUM 80 MILLIGRAM(S): 80 TABLET, FILM COATED ORAL at 21:29

## 2024-03-21 RX ADMIN — RANOLAZINE 500 MILLIGRAM(S): 500 TABLET, FILM COATED, EXTENDED RELEASE ORAL at 17:00

## 2024-03-21 RX ADMIN — PIPERACILLIN AND TAZOBACTAM 25 GRAM(S): 4; .5 INJECTION, POWDER, LYOPHILIZED, FOR SOLUTION INTRAVENOUS at 14:20

## 2024-03-21 RX ADMIN — Medication 15 MILLILITER(S): at 11:33

## 2024-03-21 RX ADMIN — CHLORHEXIDINE GLUCONATE 15 MILLILITER(S): 213 SOLUTION TOPICAL at 17:00

## 2024-03-21 RX ADMIN — LEVETIRACETAM 1000 MILLIGRAM(S): 250 TABLET, FILM COATED ORAL at 19:42

## 2024-03-21 RX ADMIN — LEVETIRACETAM 400 MILLIGRAM(S): 250 TABLET, FILM COATED ORAL at 15:33

## 2024-03-21 RX ADMIN — CHLORHEXIDINE GLUCONATE 1 APPLICATION(S): 213 SOLUTION TOPICAL at 11:34

## 2024-03-21 RX ADMIN — Medication 3 MILLILITER(S): at 22:24

## 2024-03-21 RX ADMIN — Medication 1 APPLICATION(S): at 11:34

## 2024-03-21 RX ADMIN — POLYETHYLENE GLYCOL 3350 17 GRAM(S): 17 POWDER, FOR SOLUTION ORAL at 05:26

## 2024-03-21 RX ADMIN — Medication 81 MILLIGRAM(S): at 11:33

## 2024-03-21 NOTE — PROCEDURE NOTE - NSINDICATIONS_GEN_A_CORE
airway protection
emergency venous access
cannulation purposes/critical patient/monitoring purposes
Intubated
mental status change
emergency venous access

## 2024-03-21 NOTE — PROCEDURE NOTE - NSPROCNAME_GEN_A_CORE
Lumbar Puncture
Feeding Tube Placement
Tracheal Intubation
Peripheral Line Insertion
Peripheral Line Insertion
Arterial Puncture/Cannulation

## 2024-03-21 NOTE — PROCEDURE NOTE - NSPROCDETAILS_GEN_ALL_CORE
location identified, draped/prepped, sterile technique used/blood seen on insertion/dressing applied/flushes easily/secured in place/sterile technique, catheter placed
patient pre-oxygenated, tube inserted, placement confirmed
location identified, draped/prepped, sterile technique used/blood seen on insertion/dressing applied/flushes easily/secured in place/sterile technique, catheter placed
location identified, draped/prepped, sterile technique used, needle inserted/introduced
location identified, draped/prepped, sterile technique used, needle inserted/introduced/positive blood return obtained via catheter/connected to a pressurized flush line/sutured in place/hemostasis with direct pressure, dressing applied/Seldinger technique/all materials/supplies accounted for at end of procedure

## 2024-03-21 NOTE — PROCEDURE NOTE - NSINFORMCONSENT_GEN_A_CORE
This was an emergent procedure.
Benefits, risks, and possible complications of procedure explained to patient/caregiver who verbalized understanding and gave written consent.
This was an emergent procedure.

## 2024-03-21 NOTE — PROCEDURE NOTE - PROCEDURE DATE TIME, MLM
17-Mar-2024 05:00
17-Mar-2024 04:10
17-Mar-2024 02:45
17-Mar-2024 04:30
17-Mar-2024 04:00
20-Mar-2024 14:32

## 2024-03-21 NOTE — PROGRESS NOTE ADULT - SUBJECTIVE AND OBJECTIVE BOX
**INCOMPLETE**    INTERVAL HPI/OVERNIGHT EVENTS:    SUBJECTIVE: Patient seen and examined at bedside. Intubated, sedated, ROS cannot be obtained.       VITAL SIGNS:  ICU Vital Signs Last 24 Hrs  T(C): 38.3 (21 Mar 2024 08:00), Max: 38.3 (21 Mar 2024 08:00)  T(F): 101 (21 Mar 2024 08:00), Max: 101 (21 Mar 2024 08:00)  HR: 64 (21 Mar 2024 12:00) (64 - 104)  BP: 108/51 (21 Mar 2024 12:00) (93/40 - 191/87)  BP(mean): 65 (21 Mar 2024 12:00) (48 - 113)  ABP: 127/57 (20 Mar 2024 14:30) (116/58 - 127/57)  ABP(mean): 82 (20 Mar 2024 14:30) (79 - 83)  RR: 16 (21 Mar 2024 12:00) (16 - 23)  SpO2: 96% (21 Mar 2024 12:00) (88% - 100%)    O2 Parameters below as of 21 Mar 2024 12:00  Patient On (Oxygen Delivery Method): ventilator, AC 16  PEEP 5    O2 Concentration (%): 40      Mode: AC/ CMV (Assist Control/ Continuous Mandatory Ventilation), RR (machine): 16, TV (machine): 420, FiO2: 40, PEEP: 5, ITime: 0.9, MAP: 9, PIP: 25  Plateau pressure:   P/F ratio:     03-20 @ 07:01  -  03-21 @ 07:00  --------------------------------------------------------  IN: 1524.7 mL / OUT: 674 mL / NET: 850.7 mL    03-21 @ 07:01  -  03-21 @ 12:37  --------------------------------------------------------  IN: 14.4 mL / OUT: 100 mL / NET: -85.6 mL      CAPILLARY BLOOD GLUCOSE      POCT Blood Glucose.: 246 mg/dL (21 Mar 2024 11:35)    ECG:    PHYSICAL EXAM:    General:   HEENT:   Neck:   Respiratory:   Cardiovascular:   Abdomen:   Extremities:  Neurological:    MEDICATIONS:  MEDICATIONS  (STANDING):  albuterol/ipratropium for Nebulization 3 milliLiter(s) Nebulizer every 6 hours  amLODIPine   Tablet 5 milliGRAM(s) Oral daily  aspirin  chewable 81 milliGRAM(s) Oral daily  atenolol  Tablet 100 milliGRAM(s) Oral daily  atorvastatin 80 milliGRAM(s) Oral at bedtime  chlorhexidine 0.12% Liquid 15 milliLiter(s) Oral Mucosa every 12 hours  chlorhexidine 2% Cloths 1 Application(s) Topical daily  dexMEDEtomidine Infusion 0.5 MICROgram(s)/kG/Hr (7.15 mL/Hr) IV Continuous <Continuous>  famotidine    Tablet 20 milliGRAM(s) Oral daily  heparin  Infusion.  Unit(s)/Hr (11 mL/Hr) IV Continuous <Continuous>  influenza  Vaccine (HIGH DOSE) 0.7 milliLiter(s) IntraMuscular once  insulin lispro (ADMELOG) corrective regimen sliding scale   SubCutaneous every 6 hours  insulin NPH human recombinant 10 Unit(s) SubCutaneous every 6 hours  isosorbide   mononitrate ER Tablet (IMDUR) 30 milliGRAM(s) Oral daily  levothyroxine Injectable 20 MICROGram(s) IV Push at bedtime  multivitamin/minerals/iron Oral Solution (CENTRUM) 15 milliLiter(s) Oral daily  petrolatum Ophthalmic Ointment 1 Application(s) Both EYES daily  piperacillin/tazobactam IVPB.. 3.375 Gram(s) IV Intermittent every 8 hours  polyethylene glycol 3350 17 Gram(s) Oral two times a day  propofol Infusion 10 MICROgram(s)/kG/Min (3.43 mL/Hr) IV Continuous <Continuous>  ranolazine 500 milliGRAM(s) Oral two times a day  senna 2 Tablet(s) Oral at bedtime    MEDICATIONS  (PRN):  acetaminophen     Tablet .. 650 milliGRAM(s) Oral every 6 hours PRN Temp greater or equal to 38C (100.4F), Mild Pain (1 - 3)  heparin   Injectable 4500 Unit(s) IV Push every 6 hours PRN For aPTT less than 40  heparin   Injectable 2000 Unit(s) IV Push every 6 hours PRN For aPTT between 40 - 57      ALLERGIES:  Allergies    No Known Allergies    Intolerances        LABS:                        9.2    17.61 )-----------( 328      ( 21 Mar 2024 00:30 )             28.6     03-21    130<L>  |  97<L>  |  17  ----------------------------<  253<H>  4.5   |  21<L>  |  0.98    Ca    8.7      21 Mar 2024 00:30  Phos  3.5     03-21  Mg     2.30     03-21    TPro  7.2  /  Alb  3.0<L>  /  TBili  0.6  /  DBili  x   /  AST  57<H>  /  ALT  33  /  AlkPhos  190<H>  03-21    PT/INR - ( 21 Mar 2024 00:30 )   PT: 12.8 sec;   INR: 1.15 ratio         PTT - ( 21 Mar 2024 06:50 )  PTT:97.3 sec  Urinalysis Basic - ( 21 Mar 2024 00:30 )    Color: x / Appearance: x / SG: x / pH: x  Gluc: 253 mg/dL / Ketone: x  / Bili: x / Urobili: x   Blood: x / Protein: x / Nitrite: x   Leuk Esterase: x / RBC: x / WBC x   Sq Epi: x / Non Sq Epi: x / Bacteria: x        RADIOLOGY & ADDITIONAL TESTS: Reviewed. INTERVAL HPI/OVERNIGHT EVENTS:    SUBJECTIVE: Patient seen and examined at bedside. Intubated, sedated, ROS cannot be obtained.     VITAL SIGNS:  ICU Vital Signs Last 24 Hrs  T(C): 38.3 (21 Mar 2024 08:00), Max: 38.3 (21 Mar 2024 08:00)  T(F): 101 (21 Mar 2024 08:00), Max: 101 (21 Mar 2024 08:00)  HR: 64 (21 Mar 2024 12:00) (64 - 104)  BP: 108/51 (21 Mar 2024 12:00) (93/40 - 191/87)  BP(mean): 65 (21 Mar 2024 12:00) (48 - 113)  ABP: 127/57 (20 Mar 2024 14:30) (116/58 - 127/57)  ABP(mean): 82 (20 Mar 2024 14:30) (79 - 83)  RR: 16 (21 Mar 2024 12:00) (16 - 23)  SpO2: 96% (21 Mar 2024 12:00) (88% - 100%)    O2 Parameters below as of 21 Mar 2024 12:00  Patient On (Oxygen Delivery Method): ventilator, AC 16  PEEP 5    O2 Concentration (%): 40      Mode: AC/ CMV (Assist Control/ Continuous Mandatory Ventilation), RR (machine): 16, TV (machine): 420, FiO2: 40, PEEP: 5, ITime: 0.9, MAP: 9, PIP: 25  Plateau pressure:   P/F ratio:     03-20 @ 07:01  -  03-21 @ 07:00  --------------------------------------------------------  IN: 1524.7 mL / OUT: 674 mL / NET: 850.7 mL    03-21 @ 07:01  -  03-21 @ 12:37  --------------------------------------------------------  IN: 14.4 mL / OUT: 100 mL / NET: -85.6 mL      CAPILLARY BLOOD GLUCOSE      POCT Blood Glucose.: 246 mg/dL (21 Mar 2024 11:35)    ECG:    PHYSICAL EXAM:    General:   HEENT:   Neck:   Respiratory:   Cardiovascular:   Abdomen:   Extremities:  Neurological:    MEDICATIONS:  MEDICATIONS  (STANDING):  albuterol/ipratropium for Nebulization 3 milliLiter(s) Nebulizer every 6 hours  amLODIPine   Tablet 5 milliGRAM(s) Oral daily  aspirin  chewable 81 milliGRAM(s) Oral daily  atenolol  Tablet 100 milliGRAM(s) Oral daily  atorvastatin 80 milliGRAM(s) Oral at bedtime  chlorhexidine 0.12% Liquid 15 milliLiter(s) Oral Mucosa every 12 hours  chlorhexidine 2% Cloths 1 Application(s) Topical daily  dexMEDEtomidine Infusion 0.5 MICROgram(s)/kG/Hr (7.15 mL/Hr) IV Continuous <Continuous>  famotidine    Tablet 20 milliGRAM(s) Oral daily  heparin  Infusion.  Unit(s)/Hr (11 mL/Hr) IV Continuous <Continuous>  influenza  Vaccine (HIGH DOSE) 0.7 milliLiter(s) IntraMuscular once  insulin lispro (ADMELOG) corrective regimen sliding scale   SubCutaneous every 6 hours  insulin NPH human recombinant 10 Unit(s) SubCutaneous every 6 hours  isosorbide   mononitrate ER Tablet (IMDUR) 30 milliGRAM(s) Oral daily  levothyroxine Injectable 20 MICROGram(s) IV Push at bedtime  multivitamin/minerals/iron Oral Solution (CENTRUM) 15 milliLiter(s) Oral daily  petrolatum Ophthalmic Ointment 1 Application(s) Both EYES daily  piperacillin/tazobactam IVPB.. 3.375 Gram(s) IV Intermittent every 8 hours  polyethylene glycol 3350 17 Gram(s) Oral two times a day  propofol Infusion 10 MICROgram(s)/kG/Min (3.43 mL/Hr) IV Continuous <Continuous>  ranolazine 500 milliGRAM(s) Oral two times a day  senna 2 Tablet(s) Oral at bedtime    MEDICATIONS  (PRN):  acetaminophen     Tablet .. 650 milliGRAM(s) Oral every 6 hours PRN Temp greater or equal to 38C (100.4F), Mild Pain (1 - 3)  heparin   Injectable 4500 Unit(s) IV Push every 6 hours PRN For aPTT less than 40  heparin   Injectable 2000 Unit(s) IV Push every 6 hours PRN For aPTT between 40 - 57      ALLERGIES:  Allergies    No Known Allergies    Intolerances        LABS:                        9.2    17.61 )-----------( 328      ( 21 Mar 2024 00:30 )             28.6     03-21    130<L>  |  97<L>  |  17  ----------------------------<  253<H>  4.5   |  21<L>  |  0.98    Ca    8.7      21 Mar 2024 00:30  Phos  3.5     03-21  Mg     2.30     03-21    TPro  7.2  /  Alb  3.0<L>  /  TBili  0.6  /  DBili  x   /  AST  57<H>  /  ALT  33  /  AlkPhos  190<H>  03-21    PT/INR - ( 21 Mar 2024 00:30 )   PT: 12.8 sec;   INR: 1.15 ratio         PTT - ( 21 Mar 2024 06:50 )  PTT:97.3 sec  Urinalysis Basic - ( 21 Mar 2024 00:30 )    Color: x / Appearance: x / SG: x / pH: x  Gluc: 253 mg/dL / Ketone: x  / Bili: x / Urobili: x   Blood: x / Protein: x / Nitrite: x   Leuk Esterase: x / RBC: x / WBC x   Sq Epi: x / Non Sq Epi: x / Bacteria: x        RADIOLOGY & ADDITIONAL TESTS: Reviewed. INTERVAL HPI/OVERNIGHT EVENTS:    SUBJECTIVE: Patient seen and examined at bedside. Intubated, sedated, ROS cannot be obtained.     VITAL SIGNS:  ICU Vital Signs Last 24 Hrs  T(C): 38.3 (21 Mar 2024 08:00), Max: 38.3 (21 Mar 2024 08:00)  T(F): 101 (21 Mar 2024 08:00), Max: 101 (21 Mar 2024 08:00)  HR: 64 (21 Mar 2024 12:00) (64 - 104)  BP: 108/51 (21 Mar 2024 12:00) (93/40 - 191/87)  BP(mean): 65 (21 Mar 2024 12:00) (48 - 113)  ABP: 127/57 (20 Mar 2024 14:30) (116/58 - 127/57)  ABP(mean): 82 (20 Mar 2024 14:30) (79 - 83)  RR: 16 (21 Mar 2024 12:00) (16 - 23)  SpO2: 96% (21 Mar 2024 12:00) (88% - 100%)    O2 Parameters below as of 21 Mar 2024 12:00  Patient On (Oxygen Delivery Method): ventilator, AC 16  PEEP 5    O2 Concentration (%): 40      Mode: AC/ CMV (Assist Control/ Continuous Mandatory Ventilation), RR (machine): 16, TV (machine): 420, FiO2: 40, PEEP: 5, ITime: 0.9, MAP: 9, PIP: 25  Plateau pressure:   P/F ratio:     03-20 @ 07:01  -  03-21 @ 07:00  --------------------------------------------------------  IN: 1524.7 mL / OUT: 674 mL / NET: 850.7 mL    03-21 @ 07:01  -  03-21 @ 12:37  --------------------------------------------------------  IN: 14.4 mL / OUT: 100 mL / NET: -85.6 mL      CAPILLARY BLOOD GLUCOSE      POCT Blood Glucose.: 246 mg/dL (21 Mar 2024 11:35)    ECG:    PHYSICAL EXAM:    GENERAL: Elderly female, intubated   HEAD:  Atraumatic, normocephalic  NECK: Supple, no swelling , JVD not appreciated   HEART: rrr  LUNGS: Coarse breath sounds B/L   ABDOMEN: Soft, nontender/ nondistended, +BS  EXTREMITIES: +2 pulses,  NEURO:  Intubated and sedated   SKIN: No rashes or lesions     MEDICATIONS:  MEDICATIONS  (STANDING):  albuterol/ipratropium for Nebulization 3 milliLiter(s) Nebulizer every 6 hours  amLODIPine   Tablet 5 milliGRAM(s) Oral daily  aspirin  chewable 81 milliGRAM(s) Oral daily  atenolol  Tablet 100 milliGRAM(s) Oral daily  atorvastatin 80 milliGRAM(s) Oral at bedtime  chlorhexidine 0.12% Liquid 15 milliLiter(s) Oral Mucosa every 12 hours  chlorhexidine 2% Cloths 1 Application(s) Topical daily  dexMEDEtomidine Infusion 0.5 MICROgram(s)/kG/Hr (7.15 mL/Hr) IV Continuous <Continuous>  famotidine    Tablet 20 milliGRAM(s) Oral daily  heparin  Infusion.  Unit(s)/Hr (11 mL/Hr) IV Continuous <Continuous>  influenza  Vaccine (HIGH DOSE) 0.7 milliLiter(s) IntraMuscular once  insulin lispro (ADMELOG) corrective regimen sliding scale   SubCutaneous every 6 hours  insulin NPH human recombinant 10 Unit(s) SubCutaneous every 6 hours  isosorbide   mononitrate ER Tablet (IMDUR) 30 milliGRAM(s) Oral daily  levothyroxine Injectable 20 MICROGram(s) IV Push at bedtime  multivitamin/minerals/iron Oral Solution (CENTRUM) 15 milliLiter(s) Oral daily  petrolatum Ophthalmic Ointment 1 Application(s) Both EYES daily  piperacillin/tazobactam IVPB.. 3.375 Gram(s) IV Intermittent every 8 hours  polyethylene glycol 3350 17 Gram(s) Oral two times a day  propofol Infusion 10 MICROgram(s)/kG/Min (3.43 mL/Hr) IV Continuous <Continuous>  ranolazine 500 milliGRAM(s) Oral two times a day  senna 2 Tablet(s) Oral at bedtime    MEDICATIONS  (PRN):  acetaminophen     Tablet .. 650 milliGRAM(s) Oral every 6 hours PRN Temp greater or equal to 38C (100.4F), Mild Pain (1 - 3)  heparin   Injectable 4500 Unit(s) IV Push every 6 hours PRN For aPTT less than 40  heparin   Injectable 2000 Unit(s) IV Push every 6 hours PRN For aPTT between 40 - 57      ALLERGIES:  Allergies    No Known Allergies    Intolerances        LABS:                        9.2    17.61 )-----------( 328      ( 21 Mar 2024 00:30 )             28.6     03-21    130<L>  |  97<L>  |  17  ----------------------------<  253<H>  4.5   |  21<L>  |  0.98    Ca    8.7      21 Mar 2024 00:30  Phos  3.5     03-21  Mg     2.30     03-21    TPro  7.2  /  Alb  3.0<L>  /  TBili  0.6  /  DBili  x   /  AST  57<H>  /  ALT  33  /  AlkPhos  190<H>  03-21    PT/INR - ( 21 Mar 2024 00:30 )   PT: 12.8 sec;   INR: 1.15 ratio         PTT - ( 21 Mar 2024 06:50 )  PTT:97.3 sec  Urinalysis Basic - ( 21 Mar 2024 00:30 )    Color: x / Appearance: x / SG: x / pH: x  Gluc: 253 mg/dL / Ketone: x  / Bili: x / Urobili: x   Blood: x / Protein: x / Nitrite: x   Leuk Esterase: x / RBC: x / WBC x   Sq Epi: x / Non Sq Epi: x / Bacteria: x        RADIOLOGY & ADDITIONAL TESTS: Reviewed.

## 2024-03-21 NOTE — CONSULT NOTE ADULT - SUBJECTIVE AND OBJECTIVE BOX
Neurology - Consult Note    -  Spectra: [phone] ([facility]), [phone] ([facility])  -    HPI: Patient SCHUYLER LAWLER is a 88y (1935) woman with a PMHx significant for CAD s/p PCI x6 11/2019 at [facility] (on aspirin, statin, Xarelto 2.5mg po BID), HTN, HLD, T2DM, and HFpEF who originally presented with fever and generalized weakness (3/14). Found to have influenza A, multiple family members at home with same illness. Patient was febrile, tachycardic, & had low O2 sat. Elevated white count. Started on antibiotics & antiviral. Due to respiratory distress she was intubated on 3/17/24. Patient went into septic shock, requiring pressors.  Patient came in on xarelto (home med) but unclear for what reason, in MICU switched to heparin gtt. Since 3/17 patient has persistent AMS though sedation was weaned down. At 4 am on 3/20 RN noted patient had R upward gaze with repetitive twitching motion of her RUE, and an increase in her SBP to 200s. Symptoms lasted ~ seconds. Ativan 2mg x1 IVP given, propofol gtt increased from 30 to 50. shaking resolved. MICU team pursued CTH, LP, & EEG. CTH was unremarkable for acute findings. EEG showing multifocal sharp waves (bilateral posterior quadrant, bilateral independent frontal, rarely bilateral synchronous frontal), increasing in prevalence over the course of recording, becoming LPDs mostly at 1-2 Hz, fluctuating, rarely up to 3-4 Hz, putting patient risk for multifocal-onset  seizures. Lumbar puncture showing elevated glucose in CSF at 120, normal protein (24), total nucleated cells of 1. CSF PCR (-), pending culture result. Neurology consulted for abnormal EEG and likely witnessed seizure. WBC today 17.61 (uptrending), 101 fever around 8am.     Review of Systems: unable to obtain    Allergies: No Known Allergies      PMHx/PSHx/Family Hx: As above, otherwise see below   CAD (coronary artery disease)  HTN (hypertension)  DM (diabetes mellitus)  HLD (hyperlipidemia)  H/O CHF    Social Hx:No current use of tobacco, alcohol, or illicit drugs      Medications:  MEDICATIONS  (STANDING):  albuterol/ipratropium for Nebulization 3 milliLiter(s) Nebulizer every 6 hours  amLODIPine   Tablet 5 milliGRAM(s) Oral daily  aspirin  chewable 81 milliGRAM(s) Oral daily  atenolol  Tablet 100 milliGRAM(s) Oral daily  atorvastatin 80 milliGRAM(s) Oral at bedtime  chlorhexidine 0.12% Liquid 15 milliLiter(s) Oral Mucosa every 12 hours  chlorhexidine 2% Cloths 1 Application(s) Topical daily  dexMEDEtomidine Infusion 0.5 MICROgram(s)/kG/Hr (7.15 mL/Hr) IV Continuous <Continuous>  famotidine    Tablet 20 milliGRAM(s) Oral daily  heparin  Infusion.  Unit(s)/Hr (11 mL/Hr) IV Continuous <Continuous>  influenza  Vaccine (HIGH DOSE) 0.7 milliLiter(s) IntraMuscular once  insulin lispro (ADMELOG) corrective regimen sliding scale   SubCutaneous every 6 hours  insulin NPH human recombinant 10 Unit(s) SubCutaneous every 6 hours  isosorbide   mononitrate ER Tablet (IMDUR) 30 milliGRAM(s) Oral daily  levothyroxine Injectable 20 MICROGram(s) IV Push at bedtime  multivitamin/minerals/iron Oral Solution (CENTRUM) 15 milliLiter(s) Oral daily  petrolatum Ophthalmic Ointment 1 Application(s) Both EYES daily  piperacillin/tazobactam IVPB.. 3.375 Gram(s) IV Intermittent every 8 hours  polyethylene glycol 3350 17 Gram(s) Oral two times a day  propofol Infusion 10 MICROgram(s)/kG/Min (3.43 mL/Hr) IV Continuous <Continuous>  ranolazine 500 milliGRAM(s) Oral two times a day  senna 2 Tablet(s) Oral at bedtime    MEDICATIONS  (PRN):  acetaminophen     Tablet .. 650 milliGRAM(s) Oral every 6 hours PRN Temp greater or equal to 38C (100.4F), Mild Pain (1 - 3)  heparin   Injectable 4500 Unit(s) IV Push every 6 hours PRN For aPTT less than 40  heparin   Injectable 2000 Unit(s) IV Push every 6 hours PRN For aPTT between 40 - 57      Vitals:  T(C): 38.3 (03-21-24 @ 08:00), Max: 38.3 (03-21-24 @ 08:00)  HR: 68 (03-21-24 @ 10:51) (66 - 104)  BP: 101/42 (03-21-24 @ 10:00) (93/40 - 191/87)  RR: 16 (03-21-24 @ 10:00) (16 - 23)  SpO2: 97% (03-21-24 @ 10:51) (88% - 100%)    Physical Examination: INCOMPLETE  General - NAD    Neurologic Exam:  Mental status - Awake, Alert, Oriented to person, place, and time. Speech fluent, repetition and naming intact. Follows simple and complex commands. Attention/concentration, recent and remote memory (including registration and recall), and fund of knowledge intact    Cranial nerves - PERRLA, VFF, EOMI, face sensation (V1-V3) intact b/l, facial strength intact without asymmetry b/l, hearing intact b/l, palate with symmetric elevation, trapezius OR sternocleidomastiod 5/5 strength b/l, tongue midline on protrusion with full lateral movement    Motor - Normal bulk and tone throughout. No pronator drift.  Strength testing            Deltoid      Biceps      Triceps     Wrist Extension    Wrist Flexion     Interossei         R            5                 5               5                     5                              5                        5                 5  L             5                 5               5                     5                              5                        5                 5              Hip Flexion    Hip Extension    Knee Flexion    Knee Extension    Dorsiflexion    Plantar Flexion  R              5                           5                       5                           5                            5                          5  L              5                           5                        5                           5                            5                          5    Sensation - Light touch/temperature OR pain/vibration intact throughout    DTR's -             Biceps      Triceps     Brachioradialis      Patellar    Ankle    Toes/plantar response  R             2+             2+                  2+                       2+            2+                 Down  L              2+             2+                 2+                        2+           2+                 Down    Coordination - Finger to Nose intact b/l. No tremors appreciated    Gait and station - Normal casual gait. Romberg (-)    Labs:                        9.2    17.61 )-----------( 328      ( 21 Mar 2024 00:30 )             28.6     03-21    130<L>  |  97<L>  |  17  ----------------------------<  253<H>  4.5   |  21<L>  |  0.98    Ca    8.7      21 Mar 2024 00:30  Phos  3.5     03-21  Mg     2.30     03-21    TPro  7.2  /  Alb  3.0<L>  /  TBili  0.6  /  DBili  x   /  AST  57<H>  /  ALT  33  /  AlkPhos  190<H>  03-21    CAPILLARY BLOOD GLUCOSE      POCT Blood Glucose.: 238 mg/dL (21 Mar 2024 05:24)    LIVER FUNCTIONS - ( 21 Mar 2024 00:30 )  Alb: 3.0 g/dL / Pro: 7.2 g/dL / ALK PHOS: 190 U/L / ALT: 33 U/L / AST: 57 U/L / GGT: x             Culture - Fungal, CSF (collected 20 Mar 2024 15:37)  Source: .CSF CSF  Preliminary Report (21 Mar 2024 09:27):    Testing in progress    Culture - CSF with Gram Stain (collected 20 Mar 2024 15:37)  Source: .CSF CSF  Gram Stain (20 Mar 2024 18:55):    No polymorphonuclear cells seen    No organisms seen    by cytocentrifuge    Culture - Acid Fast - CSF (collected 20 Mar 2024 15:37)  Source: .CSF CSF    PT/INR - ( 21 Mar 2024 00:30 )   PT: 12.8 sec;   INR: 1.15 ratio      PTT - ( 21 Mar 2024 06:50 )  PTT:97.3 sec    Lumbar Puncture  CSF:  Total Nucleated Cell Count, CSF: 1 cells/uL (03-20-24 @ 15:38)  RBC Count - Spinal Fluid: 77 cells/uL (03-20-24 @ 15:38)  Protein, CSF: 24 mg/dL (03-20-24 @ 15:38)    TTE 3/16/24  EF=57%  1. Left ventricular systolic function is normal with an ejection fraction of 57 % by Rainey's method of disks.   2. There is moderate (grade 2) left ventricular diastolic dysfunction, with elevated filling pressure.   3. Normal right ventricular cavity size, with normal wall thickness, and normal systolic function.   4. Moderate to severe mitral regurgitation. The etiology of the mitral regurgitation is unclear.   5. The left atrium is severely dilated.   6. The right atrium is normal in size.   7. No prior echocardiogram is available for comparison.    Radiology:  CT Head No Cont:  (19 Mar 2024 19:26)  no acute intracranial hemorrhage, mass effect, shift of the midline structures, herniation, extra-axial fluid collection, or hydrocephalus.  There is diffuse cerebral volume loss with prominence of the sulci, fissures, and cisternal spaces which is normal for the patient's age. 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  Extensive scattered mucosal thickening as well as secretions are seen throughout the paranasal sinuses. The tympanomastoid cavities are clear. The calvarium is intact. There is evidence of bilateral cataract removal.    IMPRESSION: No acute intracranial hemorrhage, mass effect, or shift of the midline structures.  Mild chronic white matter microvascular type changes.  Extensive acute on chronic sinusitis.    EEG 3/21/24 17 hr study  -Multifocal sharp waves (bilateral posterior quadrant, bilateral independent frontal, rarely bilateral synchronous frontal), increasing in prevalence over the course of recording, becoming LPDs mostly at 1-2 Hz, fluctuating, rarely up to 3-4 Hz without definite evolution, most prevalent with arousal/stimulation (SIRPIDs)  -Event of intermittent LUE shaking with ongoing multifocal LPDs as above  -Moderate-severe improving to moderate diffuse slowing  -No definite electrographic seizures are captured.     Clinical Impression:  -Risk of multifocal-onset seizures, increasing over the course of recording  -Cannot entirely exclude some risk of generalized seizures  -The captured event of intermittent LUE shaking is less likely to be epileptic in nature  -Moderate-severe improving to moderate diffuse cerebral dysfunction is nonspecific in etiology. In this case, sedating medications and decrease in sedating medications may be contributing.  -No definite seizures     Neurology - Consult Note    -  Spectra: [phone] ([facility]), [phone] ([facility])  -  Rylie  ID #[id number]    HPI: Patient SCHUYLER LAWLER is a 88y (1935) woman with a PMHx significant for CAD s/p PCI x6 11/2019 at [facility] (on aspirin, statin, Xarelto 2.5mg BID), HTN, HLD, T2DM, and HFpEF who originally presented with fever and generalized weakness (3/14). Found to have influenza A, multiple family members at home with same illness. Patient was febrile, tachycardic, & had low O2 sat. Elevated white count. Started on antibiotics & antiviral. Due to respiratory distress she was intubated on 3/17/24. Patient went into septic shock, requiring pressors.  Patient came in on xarelto (home med) but unclear for what reason, in MICU switched to heparin gtt. Since 3/17 patient has had persistent AMS though sedation was weaned down (on Precedex 0.4 during encounter). At 4 am on 3/20 RN noted patient had R upward gaze with repetitive twitching motion of her RUE, and an increase in her SBP to 200s. Symptoms lasted about  seconds. Ativan 2mg x1 IVP given, propofol gtt increased from 30 to 50. Shaking resolved. MICU team pursued CTH, LP, & EEG. CTH was unremarkable for acute findings. EEG showing multifocal sharp waves (bilateral posterior quadrant, bilateral independent frontal, rarely bilateral synchronous frontal), increasing in prevalence over the course of recording, becoming LPDs mostly at 1-2 Hz, fluctuating, rarely up to 3-4 Hz, putting patient risk for multifocal-onset  seizures. Lumbar puncture showing elevated glucose in CSF at 120, normal protein (24), total nucleated cells of 1. CSF PCR (-), pending CSF culture result. Neurology consulted for abnormal EEG and likely witnessed seizure. WBC today 17.61 (uptrending), 101 fever around 8am.     Review of Systems: unable to obtain    Allergies: No Known Allergies    PMHx/PSHx/Family Hx: As above, otherwise see below   CAD (coronary artery disease)  HTN (hypertension)  DM (diabetes mellitus)  HLD (hyperlipidemia)  H/O CHF    Social Hx:No current use of tobacco, alcohol, or illicit drugs    Medications:  MEDICATIONS  (STANDING):  albuterol/ipratropium for Nebulization 3 milliLiter(s) Nebulizer every 6 hours  amLODIPine   Tablet 5 milliGRAM(s) Oral daily  aspirin  chewable 81 milliGRAM(s) Oral daily  atenolol  Tablet 100 milliGRAM(s) Oral daily  atorvastatin 80 milliGRAM(s) Oral at bedtime  chlorhexidine 0.12% Liquid 15 milliLiter(s) Oral Mucosa every 12 hours  chlorhexidine 2% Cloths 1 Application(s) Topical daily  dexMEDEtomidine Infusion 0.5 MICROgram(s)/kG/Hr (7.15 mL/Hr) IV Continuous <Continuous>  famotidine    Tablet 20 milliGRAM(s) Oral daily  heparin  Infusion.  Unit(s)/Hr (11 mL/Hr) IV Continuous <Continuous>  influenza  Vaccine (HIGH DOSE) 0.7 milliLiter(s) IntraMuscular once  insulin lispro (ADMELOG) corrective regimen sliding scale   SubCutaneous every 6 hours  insulin NPH human recombinant 10 Unit(s) SubCutaneous every 6 hours  isosorbide   mononitrate ER Tablet (IMDUR) 30 milliGRAM(s) Oral daily  levothyroxine Injectable 20 MICROGram(s) IV Push at bedtime  multivitamin/minerals/iron Oral Solution (CENTRUM) 15 milliLiter(s) Oral daily  petrolatum Ophthalmic Ointment 1 Application(s) Both EYES daily  piperacillin/tazobactam IVPB.. 3.375 Gram(s) IV Intermittent every 8 hours  polyethylene glycol 3350 17 Gram(s) Oral two times a day  propofol Infusion 10 MICROgram(s)/kG/Min (3.43 mL/Hr) IV Continuous <Continuous>  ranolazine 500 milliGRAM(s) Oral two times a day  senna 2 Tablet(s) Oral at bedtime  MEDICATIONS  (PRN):  acetaminophen     Tablet .. 650 milliGRAM(s) Oral every 6 hours PRN Temp greater or equal to 38C (100.4F), Mild Pain (1 - 3)  heparin   Injectable 4500 Unit(s) IV Push every 6 hours PRN For aPTT less than 40  heparin   Injectable 2000 Unit(s) IV Push every 6 hours PRN For aPTT between 40 - 57    Vitals:  T(C): 38.3 (03-21-24 @ 08:00), Max: 38.3 (03-21-24 @ 08:00)  HR: 68 (03-21-24 @ 10:51) (66 - 104)  BP: 101/42 (03-21-24 @ 10:00) (93/40 - 191/87)  RR: 16 (03-21-24 @ 10:00) (16 - 23)  SpO2: 97% (03-21-24 @ 10:51) (88% - 100%)    Intubated and sedated (precedex 0.4)  Neurologic Exam: LIMTIED  Mental status - Intubated and sedated (precedex 0.4). Does not awake or arose to verbal, tactile, noxious   Cranial nerves -   Left eye 2mm constricts appropriately  Right eye 2.5-3mm constricts appropriately  BTT not intact b/l   Corneal reflex intact n/l   Oculocephalic reflex inconclusive, crosses midline intermittently   face looks symmetric but difficult to assess with intubation  gag reflex intact    Motor -   RUE cogwheel rigidity at elbow, possible asterixic or myoclonic jerks noted in Right Wrist when wrist is extended, no movement noted with noxious stimuli  LUE no movement with noxious stimuli, tone normal-paratonic  RLE slight knee flexion (withdrawal) from noxious stimuli, some intermittent toe movement with tactile touch, normal tone  LLE withdraws antigravity from noxious stimuli, some intermittent toe movement with tactile touch, normal tone     Sensation -   RUE no response to pain  LUE grimaces intermittently to noxious stimuli but does not withdraw  RLE slight knee flexion (withdrawal) from noxious stimuli  LLE withdraws antigravity from noxious stimuli     DTR's -             Biceps      Triceps     Brachioradialis      Patellar    Ankle    Toes/plantar response  R             2+             2+                  2+                 2+            2+                 Down  L              2+             2+                 2+                  2+           2+                 Down    Coordination - unable to assess    Gait and station - unable to assess  Labs:                        9.2    17.61 )-----------( 328      ( 21 Mar 2024 00:30 )             28.6     03-21    130<L>  |  97<L>  |  17  ----------------------------<  253<H>  4.5   |  21<L>  |  0.98    Ca    8.7      21 Mar 2024 00:30  Phos  3.5     03-21  Mg     2.30     03-21    TPro  7.2  /  Alb  3.0<L>  /  TBili  0.6  /  DBili  x   /  AST  57<H>  /  ALT  33  /  AlkPhos  190<H>  03-21    CAPILLARY BLOOD GLUCOSE    POCT Blood Glucose.: 238 mg/dL (21 Mar 2024 05:24)    LIVER FUNCTIONS - ( 21 Mar 2024 00:30 )  Alb: 3.0 g/dL / Pro: 7.2 g/dL / ALK PHOS: 190 U/L / ALT: 33 U/L / AST: 57 U/L / GGT: x           Culture - Fungal, CSF (collected 20 Mar 2024 15:37)  Source: .CSF CSF  Preliminary Report (21 Mar 2024 09:27):    Testing in progress    Culture - CSF with Gram Stain (collected 20 Mar 2024 15:37)  Source: .CSF CSF  Gram Stain (20 Mar 2024 18:55):    No polymorphonuclear cells seen    No organisms seen    by cytocentrifuge    Culture - Acid Fast - CSF (collected 20 Mar 2024 15:37)  Source: .CSF CSF    PT/INR - ( 21 Mar 2024 00:30 )   PT: 12.8 sec;   INR: 1.15 ratio      PTT - ( 21 Mar 2024 06:50 )  PTT:97.3 sec    Lumbar Puncture  CSF:  Total Nucleated Cell Count, CSF: 1 cells/uL (03-20-24 @ 15:38)  RBC Count - Spinal Fluid: 77 cells/uL (03-20-24 @ 15:38)  Protein, CSF: 24 mg/dL (03-20-24 @ 15:38)    TTE 3/16/24  EF=57%  1. Left ventricular systolic function is normal with an ejection fraction of 57 % by Rainey's method of disks.   2. There is moderate (grade 2) left ventricular diastolic dysfunction, with elevated filling pressure.   3. Normal right ventricular cavity size, with normal wall thickness, and normal systolic function.   4. Moderate to severe mitral regurgitation. The etiology of the mitral regurgitation is unclear.   5. The left atrium is severely dilated.   6. The right atrium is normal in size.   7. No prior echocardiogram is available for comparison.    Radiology:  CT Head No Cont:  (19 Mar 2024 19:26)  no acute intracranial hemorrhage, mass effect, shift of the midline structures, herniation, extra-axial fluid collection, or hydrocephalus.  There is diffuse cerebral volume loss with prominence of the sulci, fissures, and cisternal spaces which is normal for the patient's age. 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  Extensive scattered mucosal thickening as well as secretions are seen throughout the paranasal sinuses. The tympanomastoid cavities are clear. The calvarium is intact. There is evidence of bilateral cataract removal.    IMPRESSION: No acute intracranial hemorrhage, mass effect, or shift of the midline structures.  Mild chronic white matter microvascular type changes.  Extensive acute on chronic sinusitis.    EEG 3/21/24 17 hr study  -Multifocal sharp waves (bilateral posterior quadrant, bilateral independent frontal, rarely bilateral synchronous frontal), increasing in prevalence over the course of recording, becoming LPDs mostly at 1-2 Hz, fluctuating, rarely up to 3-4 Hz without definite evolution, most prevalent with arousal/stimulation (SIRPIDs)  -Event of intermittent LUE shaking with ongoing multifocal LPDs as above  -Moderate-severe improving to moderate diffuse slowing  -No definite electrographic seizures are captured.     Clinical Impression:  -Risk of multifocal-onset seizures, increasing over the course of recording  -Cannot entirely exclude some risk of generalized seizures  -The captured event of intermittent LUE shaking is less likely to be epileptic in nature  -Moderate-severe improving to moderate diffuse cerebral dysfunction is nonspecific in etiology. In this case, sedating medications and decrease in sedating medications may be contributing.  -No definite seizures

## 2024-03-21 NOTE — CONSULT NOTE ADULT - ASSESSMENT
Assessment: 88y (1935) woman with a PMHx significant for CAD s/p PCI x6 11/2019 at [facility] (on aspirin, statin, Xarelto 2.5mg BID), HTN, HLD, T2DM, and HFpEF who originally presented with fever and generalized weakness (3/14). Found to have influenza A, multiple family members at home with same illness. Patient was febrile, tachycardic, & had low O2 sat. Elevated white count. Started on antibiotics & antiviral. Due to respiratory distress she was intubated on 3/17/24. Patient went into septic shock, requiring pressors.  Patient came in on xarelto (home med) but unclear for what reason, in MICU switched to heparin gtt. Since 3/17 patient has had persistent AMS though sedation was weaned down (on Precedex 0.4 during encounter). At 4 am on 3/20 RN noted patient had R upward gaze with repetitive twitching motion of her RUE, and an increase in her SBP to 200s. Symptoms lasted about  seconds. Ativan 2mg x1 IVP given, propofol gtt increased from 30 to 50. Shaking resolved. MICU team pursued CTH, LP, & EEG. CTH was unremarkable for acute findings. EEG showing multifocal sharp waves (bilateral posterior quadrant, bilateral independent frontal, rarely bilateral synchronous frontal), increasing in prevalence over the course of recording, becoming LPDs mostly at 1-2 Hz, fluctuating, rarely up to 3-4 Hz, putting patient risk for multifocal-onset  seizures. Lumbar puncture showing elevated glucose in CSF at 120, normal protein (24), total nucleated cells of 1. CSF PCR (-), pending CSF culture result. Neurology consulted for abnormal EEG and likely witnessed seizure. WBC today 17.61 (uptrending), 101 fever around 8am. Neuro exam notable for in less withdrawal from noxious stimuli on right side, increased tone noted in RUE.     Impression: continued depressed level of consciousness 2/2 toxic metabolic encephalopathy likely due to infectious source but may be compounded by possible CNS etiology such as seizures vs acute infarcts       Plan  continue EEG but allow for break to obtain MRI as soon as possible  MRI brain with and without contrast  Reason for AC TBD by primary team   will discuss EEG findings with epilepsy team     Case to be seen and discussed with Neurology     Assessment: 88y (1935) woman with a PMHx significant for CAD s/p PCI x6 11/2019 at [facility] (on aspirin, statin, Xarelto 2.5mg BID), HTN, HLD, T2DM, and HFpEF who originally presented with fever and generalized weakness (3/14). Found to have influenza A, multiple family members at home with same illness. Patient was febrile, tachycardic, & had low O2 sat. Elevated white count. Started on antibiotics & antiviral. Due to respiratory distress she was intubated on 3/17/24. Patient went into septic shock, requiring pressors.  Patient came in on xarelto (home med) but unclear for what reason, in MICU switched to heparin gtt. Since 3/17 patient has had persistent AMS though sedation was weaned down (on Precedex 0.4 during encounter). At 4 am on 3/20 RN noted patient had R upward gaze with repetitive twitching motion of her RUE, and an increase in her SBP to 200s. Symptoms lasted about  seconds. Ativan 2mg x1 IVP given, propofol gtt increased from 30 to 50. Shaking resolved. MICU team pursued CTH, LP, & EEG. CTH was unremarkable for acute findings. EEG showing multifocal sharp waves (bilateral posterior quadrant, bilateral independent frontal, rarely bilateral synchronous frontal), increasing in prevalence over the course of recording, becoming LPDs mostly at 1-2 Hz, fluctuating, rarely up to 3-4 Hz, putting patient risk for multifocal-onset  seizures. Lumbar puncture showing elevated glucose in CSF at 120, normal protein (24), total nucleated cells of 1. CSF PCR (-), pending CSF culture result. Neurology consulted for abnormal EEG and likely witnessed seizure. WBC today 17.61 (uptrending), 101 fever around 8am. Neuro exam notable for in less withdrawal from noxious stimuli on right side, increased tone noted in RUE.     Impression: continued depressed level of consciousness 2/2 toxic metabolic encephalopathy likely due to infectious source but may be compounded by possible CNS etiology such as seizures vs acute infarcts       Plan  MRI brain with and without contrast   continue EEG but allow for break to obtain MRI as soon as possible  Reason for AC TBD by primary team   2500mg Keppra load, followed by keppra 1g BID (6 hrs from load)  consider switching precedex to versed gtt    Case to be seen and discussed with Neurology

## 2024-03-21 NOTE — EEG REPORT - NS EEG TEXT BOX
SCHUYLER LAWLER MRN-[id number]     Study Date: 3/20/24 13:28 - 3/21/24 07:34  Duration: 17 hr 25 min  --------------------------------------------------------------------------------------------------  History:  CC/ HPI Patient is a 88y old  Female who presents with a chief complaint of influenza, sepsis, hypoxia (20 Mar 2024 13:05)    MEDICATIONS  (STANDING):  albuterol/ipratropium for Nebulization 3 milliLiter(s) Nebulizer every 6 hours  amLODIPine   Tablet 5 milliGRAM(s) Oral daily  aspirin  chewable 81 milliGRAM(s) Oral daily  atenolol  Tablet 100 milliGRAM(s) Oral daily  atorvastatin 80 milliGRAM(s) Oral at bedtime  chlorhexidine 0.12% Liquid 15 milliLiter(s) Oral Mucosa every 12 hours  chlorhexidine 2% Cloths 1 Application(s) Topical daily  dexMEDEtomidine Infusion 0.5 MICROgram(s)/kG/Hr (7.15 mL/Hr) IV Continuous <Continuous>  famotidine    Tablet 20 milliGRAM(s) Oral daily  heparin  Infusion.  Unit(s)/Hr (11 mL/Hr) IV Continuous <Continuous>  influenza  Vaccine (HIGH DOSE) 0.7 milliLiter(s) IntraMuscular once  insulin lispro (ADMELOG) corrective regimen sliding scale   SubCutaneous every 6 hours  insulin NPH human recombinant 10 Unit(s) SubCutaneous every 6 hours  isosorbide   mononitrate ER Tablet (IMDUR) 30 milliGRAM(s) Oral daily  levothyroxine Injectable 20 MICROGram(s) IV Push at bedtime  multivitamin/minerals/iron Oral Solution (CENTRUM) 15 milliLiter(s) Oral daily  petrolatum Ophthalmic Ointment 1 Application(s) Both EYES daily  piperacillin/tazobactam IVPB.. 3.375 Gram(s) IV Intermittent every 8 hours  polyethylene glycol 3350 17 Gram(s) Oral two times a day  propofol Infusion 10 MICROgram(s)/kG/Min (3.43 mL/Hr) IV Continuous <Continuous>  ranolazine 500 milliGRAM(s) Oral two times a day  senna 2 Tablet(s) Oral at bedtime    --------------------------------------------------------------------------------------------------  Study Interpretation:    [[[Abbreviation Key:  PDR=alpha rhythm/posterior dominant rhythm. A-P=anterior posterior.  Amplitude: ‘very low’:<20; ‘low’:20-49; ‘medium’:; ‘high’:>150uV.  Persistence for periodic/rhythmic patterns (% of epoch) ‘rare’:<1%; ‘occasional’:1-10%; ‘frequent’:10-50%; ‘abundant’:50-90%; ‘continuous’:>90%.  Persistence for sporadic discharges: ‘rare’:<1/hr; ‘occasional’:1/min-1/hr; ‘frequent’:>1/min; ‘abundant’:>1/10 sec.  RPP=rhythmic and periodic patterns; GRDA=generalized rhythmic delta activity; FIRDA=frontal intermittent GRDA; LRDA=lateralized rhythmic delta activity; TIRDA=temporal intermittent rhythmic delta activity;  LPD=PLED=lateralized periodic discharges; GPD=generalized periodic discharges; BIPDs =bilateral independent periodic discharges; Mf=multifocal; SIRPDs=stimulus induced rhythmic, periodic, or ictal appearing discharges; BIRDs=brief potentially ictal rhythmic discharges >4 Hz, lasting .5-10s; PFA (paroxysmal bursts >13 Hz or =8 Hz <10s).  Modifiers: +F=with fast component; +S=with spike component; +R=with rhythmic component.  S-B=burst suppression pattern.  Max=maximal. N1-drowsy; N2-stage II sleep; N3-slow wave sleep. SSS/BETS=small sharp spikes/benign epileptiform transients of sleep. HV=hyperventilation; PS=photic stimulation]]]    Daily EEG Visual Analysis    FINDINGS:      Background:  The background is continuous and symmetric. The predominant background initially consists of diffuse polymorphic delta slowing with intermittent theta and intermittent faster frequencies and spindle-like waveforms, occasional 2-4-second periods of diffuse attenuation. Prevalence of theta increases over the course of recording, with intermittent arousals and return to drowsy state.    Background Slowing:  Generalized slowing: As above  Focal slowing: None    State Changes:   Drowsiness is characterized by slowing of the background activity.  No clear normal stage 2 sleep.    Interictal Findings:  Right posterior quadrant (P4/P8/C4, at times shifting to O2/P8) sharp waves  Left posterior quadrant (P3/P7) sharp waves  The above are initially occasional, later increase to abundant and frequently occurring as lateralized periodic discharges at 1-2 Hz, fluctuating. Rarely up to 3-4 Hz without definite evolution.  Later in recording, there are also bilateral independent frontal (Fp1, Fp2/F4) sharp waves, rarely bilateral synchronous, frequently occurring as lateralized periodic discharges at 1-2 Hz, fluctuating.  LPDs are most prevalent during arousals/stimulation (SIRPIDs) and decrease in prevalence in drowsiness.    Electrographic and Electroclinical seizures:  None    Other Clinical Events:  Event button press 23:50. There is intermittent LUE shaking, starting and stopping, not clonic. EEG shows an arousal shortly before the event when staff comes to bedside, with increase in prevalence of multifocal LPDs at 1-2 Hz as above, fluctuating, ongoing but not clearly time-locked to LUE shaking.    Activation Procedures:   Hyperventilation is not performed.    Photic stimulation is performed and does not elicit any abnormalities.      Artifacts:  Intermittent myogenic and movement artifacts are present.    EKG:  Single-lead EKG shows regular rhythm.    EEG Classification / Summary:  Abnormal EEG in a lethargic patient.  -Multifocal sharp waves (bilateral posterior quadrant, bilateral independent frontal, rarely bilateral synchronous frontal), increasing in prevalence over the course of recording, becoming LPDs mostly at 1-2 Hz, fluctuating, rarely up to 3-4 Hz without definite evolution, most prevalent with arousal/stimulation (SIRPIDs)  -Event of intermittent LUE shaking with ongoing multifocal LPDs as above  -Moderate-severe improving to moderate diffuse slowing  -No definite electrographic seizures are captured.       Clinical Impression:  -Risk of multifocal-onset seizures, increasing over the course of recording  -Cannot entirely exclude some risk of generalized seizures  -The captured event of intermittent LUE shaking is less likely to be epileptic in nature  -Moderate-severe improving to moderate diffuse cerebral dysfunction is nonspecific in etiology. In this case, sedating medications and decrease in sedating medications may be contributing.  -No definite seizures      -------------------------------------------------------------------------------------------------------    Kathleen Hoover MD  Attending Physician, [facility] [place]    -------------------------------------------------------------------------------------------------------    To reach EEG technologist:  Please use the pager number for the appropriate hospital or contact the .  At [facility] - Pager #: [phone]    To reach EEG-reading physician:  [facility] EEG Reading Room Phone #: [phone]  Epilepsy Answering Service after 5PM and before 8:30AM: Phone #: [phone]

## 2024-03-21 NOTE — PROGRESS NOTE ADULT - ASSESSMENT
88F with HFpEF, CAD (s/p multiple stents), HTN, HLD, DM2 admitted for acute hypoxemic respiratory failure requiring intubation from influenza    NEUROLOGIC    #Seizures  With left lateral gaze deviation and tonic clonic jerking of extremities when off sedation  CT head negative of acute intracranial process or mass  LP unrevealing  vEEG with epileptiform activity  - neuro c/s  - MR brain with and without gadolinium  - Keppra load followed by 1g bid  - follow full vEEG report    CARDIOVASCULAR    #HFpEF  #CAD  #HTN  - continue home antihypertensives  - re-introduce other home meds as able  - clarify why patient is on rivaroxaban at home (?AFib)    PULMONARY    #Acute hypoxemic respiratory failure  Intubated for airway protection in the setting of altered mental status from seizure activity  - continue with mechanical ventilation until mental status improves  - daily awakening/breathing trials    GI  - continue tube feeds    RENAL  - no issues    ID    #Flu  - completed Tamiflu  - continue piperacillin/tazobactam (3/17- )      ENDOCRINE    - continue with NPH and sliding scale    HEME    #DVT ppx  - continue heparin ggt

## 2024-03-21 NOTE — CHART NOTE - NSCHARTNOTEFT_GEN_A_CORE
: Faizan Agudelo    INDICATION: Respiratory Failure    PROCEDURE:  [x ] LIMITED ECHO  [x ] LIMITED CHEST  [ ] LIMITED RETROPERITONEAL  [ ] LIMITED ABDOMINAL  [ ] LIMITED DVT  [ ] NEEDLE GUIDANCE VASCULAR  [ ] NEEDLE GUIDANCE THORACENTESIS  [ ] NEEDLE GUIDANCE PARACENTESIS  [ ] NEEDLE GUIDANCE PERICARDIOCENTESIS  [ ] OTHER    FINDINGS:  Lungs: A line predominance with scattered b-lines b/l. Lung sliding b/l. no pleural effusions  Heart: Normal LVSF. LV > RV. No pericardial effusion. IVC indeterminant.    INTERPRETATION:  Normal aeration pattern.  No cardiac limitations.    Images uploaded on [employer] : Kilo Templeton     INDICATION: Respiratory Failure    PROCEDURE:  [x ] LIMITED ECHO  [x ] LIMITED CHEST  [ ] LIMITED RETROPERITONEAL  [ ] LIMITED ABDOMINAL  [ ] LIMITED DVT  [ ] NEEDLE GUIDANCE VASCULAR  [ ] NEEDLE GUIDANCE THORACENTESIS  [ ] NEEDLE GUIDANCE PARACENTESIS  [ ] NEEDLE GUIDANCE PERICARDIOCENTESIS  [ ] OTHER    FINDINGS:  Lungs: A line predominance with scattered b-lines b/l. Lung sliding b/l. no pleural effusions  Heart: Normal LVSF. LV > RV. No pericardial effusion. IVC indeterminant.    INTERPRETATION:  Normal aeration pattern.  No cardiac limitations.    Images uploaded on [employer]    I have assisted and supervised entire procedure.     Kilo Portillo MD

## 2024-03-21 NOTE — PROGRESS NOTE ADULT - ATTENDING COMMENTS
1. Acute hypoxemic respiratory failure.? from influenza Fio2 requirement decreasing.   Pt on zosyn for possible bacterial infection. CXR without clear infiltrate. Continue  Tamiflu.  Taper down sedation. Trials of PS as tolerated.    2. Cardiac. H/O CAD. EKG without acute ST t wave abnormalities. Troponin  elevated. Trend troponin.  POCUS ef mildly decrease LV systolic function.     On AC at home. Unclear etiology. Need more info. Now on insulin drip.  3. Hypotension. titrate Levophed for MAP> 65. Follow up blood cx.  Continue  Zosyn and Tamiflu.    4. DVT prophylaxis On heparin drip    5Neuro. Off sedation not responding appropriately.  CT head without acute changes. ? seizure like activity with L eye gaze and shaking Leg.    LP negative. EEG  high risk for seizures. Start Keppra as per Neuro .    6. GOC: Full code.

## 2024-03-22 LAB
ALBUMIN SERPL ELPH-MCNC: 2.6 G/DL — LOW (ref 3.3–5)
ALP SERPL-CCNC: 198 U/L — HIGH (ref 40–120)
ALT FLD-CCNC: 36 U/L — HIGH (ref 4–33)
ANION GAP SERPL CALC-SCNC: 11 MMOL/L — SIGNIFICANT CHANGE UP (ref 7–14)
ANISOCYTOSIS BLD QL: SLIGHT — SIGNIFICANT CHANGE UP
APTT BLD: 107.9 SEC — HIGH (ref 24.5–35.6)
AST SERPL-CCNC: 75 U/L — HIGH (ref 4–32)
BASE EXCESS BLDV CALC-SCNC: -1.7 MMOL/L — SIGNIFICANT CHANGE UP (ref -2–3)
BASOPHILS # BLD AUTO: 0 K/UL — SIGNIFICANT CHANGE UP (ref 0–0.2)
BASOPHILS # BLD AUTO: 0.1 K/UL — SIGNIFICANT CHANGE UP (ref 0–0.2)
BASOPHILS NFR BLD AUTO: 0 % — SIGNIFICANT CHANGE UP (ref 0–2)
BASOPHILS NFR BLD AUTO: 0.6 % — SIGNIFICANT CHANGE UP (ref 0–2)
BILIRUB SERPL-MCNC: 0.5 MG/DL — SIGNIFICANT CHANGE UP (ref 0.2–1.2)
BLD GP AB SCN SERPL QL: NEGATIVE — SIGNIFICANT CHANGE UP
BLOOD GAS VENOUS COMPREHENSIVE RESULT: SIGNIFICANT CHANGE UP
BUN SERPL-MCNC: 22 MG/DL — SIGNIFICANT CHANGE UP (ref 7–23)
CALCIUM SERPL-MCNC: 8.5 MG/DL — SIGNIFICANT CHANGE UP (ref 8.4–10.5)
CHLORIDE BLDV-SCNC: 101 MMOL/L — SIGNIFICANT CHANGE UP (ref 96–108)
CHLORIDE SERPL-SCNC: 99 MMOL/L — SIGNIFICANT CHANGE UP (ref 98–107)
CO2 BLDV-SCNC: 25 MMOL/L — SIGNIFICANT CHANGE UP (ref 22–26)
CO2 SERPL-SCNC: 21 MMOL/L — LOW (ref 22–31)
CREAT SERPL-MCNC: 1.06 MG/DL — SIGNIFICANT CHANGE UP (ref 0.5–1.3)
EGFR: 51 ML/MIN/1.73M2 — LOW
EOSINOPHIL # BLD AUTO: 0.39 K/UL — SIGNIFICANT CHANGE UP (ref 0–0.5)
EOSINOPHIL # BLD AUTO: 0.95 K/UL — HIGH (ref 0–0.5)
EOSINOPHIL NFR BLD AUTO: 2.2 % — SIGNIFICANT CHANGE UP (ref 0–6)
EOSINOPHIL NFR BLD AUTO: 6.7 % — HIGH (ref 0–6)
GAS PNL BLDV: 131 MMOL/L — LOW (ref 136–145)
GAS PNL BLDV: SIGNIFICANT CHANGE UP
GIANT PLATELETS BLD QL SMEAR: PRESENT — SIGNIFICANT CHANGE UP
GLUCOSE BLDC GLUCOMTR-MCNC: 191 MG/DL — HIGH (ref 70–99)
GLUCOSE BLDC GLUCOMTR-MCNC: 206 MG/DL — HIGH (ref 70–99)
GLUCOSE BLDC GLUCOMTR-MCNC: 231 MG/DL — HIGH (ref 70–99)
GLUCOSE BLDC GLUCOMTR-MCNC: 290 MG/DL — HIGH (ref 70–99)
GLUCOSE BLDV-MCNC: 257 MG/DL — HIGH (ref 70–99)
GLUCOSE SERPL-MCNC: 264 MG/DL — HIGH (ref 70–99)
HCO3 BLDV-SCNC: 24 MMOL/L — SIGNIFICANT CHANGE UP (ref 22–29)
HCT VFR BLD CALC: 20.2 % — CRITICAL LOW (ref 34.5–45)
HCT VFR BLD CALC: 22.7 % — LOW (ref 34.5–45)
HCT VFR BLDA CALC: 24 % — LOW (ref 34.5–46.5)
HGB BLD CALC-MCNC: 8 G/DL — LOW (ref 11.7–16.1)
HGB BLD-MCNC: 6.6 G/DL — CRITICAL LOW (ref 11.5–15.5)
HGB BLD-MCNC: 7.3 G/DL — LOW (ref 11.5–15.5)
IANC: 10.94 K/UL — HIGH (ref 1.8–7.4)
IANC: 8.85 K/UL — HIGH (ref 1.8–7.4)
IMM GRANULOCYTES NFR BLD AUTO: 7.9 % — HIGH (ref 0–0.9)
INR BLD: 1.17 RATIO — SIGNIFICANT CHANGE UP (ref 0.85–1.18)
LACTATE BLDV-MCNC: 1.5 MMOL/L — SIGNIFICANT CHANGE UP (ref 0.5–2)
LYMPHOCYTES # BLD AUTO: 1.3 K/UL — SIGNIFICANT CHANGE UP (ref 1–3.3)
LYMPHOCYTES # BLD AUTO: 1.94 K/UL — SIGNIFICANT CHANGE UP (ref 1–3.3)
LYMPHOCYTES # BLD AUTO: 11 % — LOW (ref 13–44)
LYMPHOCYTES # BLD AUTO: 9.2 % — LOW (ref 13–44)
MAGNESIUM SERPL-MCNC: 2.4 MG/DL — SIGNIFICANT CHANGE UP (ref 1.6–2.6)
MCHC RBC-ENTMCNC: 29.8 PG — SIGNIFICANT CHANGE UP (ref 27–34)
MCHC RBC-ENTMCNC: 30 PG — SIGNIFICANT CHANGE UP (ref 27–34)
MCHC RBC-ENTMCNC: 32.2 GM/DL — SIGNIFICANT CHANGE UP (ref 32–36)
MCHC RBC-ENTMCNC: 32.7 GM/DL — SIGNIFICANT CHANGE UP (ref 32–36)
MCV RBC AUTO: 91.8 FL — SIGNIFICANT CHANGE UP (ref 80–100)
MCV RBC AUTO: 92.7 FL — SIGNIFICANT CHANGE UP (ref 80–100)
METAMYELOCYTES # FLD: 1.7 % — HIGH (ref 0–1)
MONOCYTES # BLD AUTO: 1.66 K/UL — HIGH (ref 0–0.9)
MONOCYTES # BLD AUTO: 2.87 K/UL — HIGH (ref 0–0.9)
MONOCYTES NFR BLD AUTO: 11.7 % — SIGNIFICANT CHANGE UP (ref 2–14)
MONOCYTES NFR BLD AUTO: 16.3 % — HIGH (ref 2–14)
MYELOCYTES NFR BLD: 2.5 % — HIGH (ref 0–0)
NEUTROPHILS # BLD AUTO: 10.94 K/UL — HIGH (ref 1.8–7.4)
NEUTROPHILS # BLD AUTO: 9.65 K/UL — HIGH (ref 1.8–7.4)
NEUTROPHILS NFR BLD AUTO: 62 % — SIGNIFICANT CHANGE UP (ref 43–77)
NEUTROPHILS NFR BLD AUTO: 64.1 % — SIGNIFICANT CHANGE UP (ref 43–77)
NEUTS BAND # BLD: 4.1 % — SIGNIFICANT CHANGE UP (ref 0–6)
NRBC # BLD: 0 /100 WBCS — SIGNIFICANT CHANGE UP (ref 0–0)
NRBC # BLD: 1 /100 WBCS — HIGH (ref 0–0)
NRBC # FLD: 0.11 K/UL — HIGH (ref 0–0)
OVALOCYTES BLD QL SMEAR: SLIGHT — SIGNIFICANT CHANGE UP
PCO2 BLDV: 42 MMHG — SIGNIFICANT CHANGE UP (ref 39–52)
PH BLDV: 7.36 — SIGNIFICANT CHANGE UP (ref 7.32–7.43)
PHOSPHATE SERPL-MCNC: 3.5 MG/DL — SIGNIFICANT CHANGE UP (ref 2.5–4.5)
PLAT MORPH BLD: NORMAL — SIGNIFICANT CHANGE UP
PLATELET # BLD AUTO: 279 K/UL — SIGNIFICANT CHANGE UP (ref 150–400)
PLATELET # BLD AUTO: 296 K/UL — SIGNIFICANT CHANGE UP (ref 150–400)
PLATELET COUNT - ESTIMATE: NORMAL — SIGNIFICANT CHANGE UP
PO2 BLDV: 52 MMHG — HIGH (ref 25–45)
POIKILOCYTOSIS BLD QL AUTO: SLIGHT — SIGNIFICANT CHANGE UP
POLYCHROMASIA BLD QL SMEAR: SIGNIFICANT CHANGE UP
POTASSIUM BLDV-SCNC: 4.3 MMOL/L — SIGNIFICANT CHANGE UP (ref 3.5–5.1)
POTASSIUM SERPL-MCNC: 4.1 MMOL/L — SIGNIFICANT CHANGE UP (ref 3.5–5.3)
POTASSIUM SERPL-SCNC: 4.1 MMOL/L — SIGNIFICANT CHANGE UP (ref 3.5–5.3)
PROT SERPL-MCNC: 6.9 G/DL — SIGNIFICANT CHANGE UP (ref 6–8.3)
PROTHROM AB SERPL-ACNC: 13.2 SEC — HIGH (ref 9.5–13)
RBC # BLD: 2.2 M/UL — LOW (ref 3.8–5.2)
RBC # BLD: 2.45 M/UL — LOW (ref 3.8–5.2)
RBC # FLD: 14.7 % — HIGH (ref 10.3–14.5)
RBC # FLD: 15 % — HIGH (ref 10.3–14.5)
RBC BLD AUTO: ABNORMAL
RH IG SCN BLD-IMP: POSITIVE — SIGNIFICANT CHANGE UP
SAO2 % BLDV: 81.8 % — SIGNIFICANT CHANGE UP (ref 67–88)
SODIUM SERPL-SCNC: 131 MMOL/L — LOW (ref 135–145)
WBC # BLD: 14.15 K/UL — HIGH (ref 3.8–10.5)
WBC # BLD: 17.64 K/UL — HIGH (ref 3.8–10.5)
WBC # FLD AUTO: 14.15 K/UL — HIGH (ref 3.8–10.5)
WBC # FLD AUTO: 17.64 K/UL — HIGH (ref 3.8–10.5)

## 2024-03-22 PROCEDURE — 95720 EEG PHY/QHP EA INCR W/VEEG: CPT

## 2024-03-22 PROCEDURE — 99233 SBSQ HOSP IP/OBS HIGH 50: CPT | Mod: GC

## 2024-03-22 PROCEDURE — 99291 CRITICAL CARE FIRST HOUR: CPT | Mod: GC

## 2024-03-22 RX ORDER — HUMAN INSULIN 100 [IU]/ML
17 INJECTION, SUSPENSION SUBCUTANEOUS EVERY 6 HOURS
Refills: 0 | Status: DISCONTINUED | OUTPATIENT
Start: 2024-03-22 | End: 2024-03-23

## 2024-03-22 RX ORDER — HEPARIN SODIUM 5000 [USP'U]/ML
5000 INJECTION INTRAVENOUS; SUBCUTANEOUS EVERY 8 HOURS
Refills: 0 | Status: DISCONTINUED | OUTPATIENT
Start: 2024-03-22 | End: 2024-04-05

## 2024-03-22 RX ORDER — ACETAMINOPHEN 500 MG
650 TABLET ORAL EVERY 6 HOURS
Refills: 0 | Status: DISCONTINUED | OUTPATIENT
Start: 2024-03-22 | End: 2024-04-05

## 2024-03-22 RX ADMIN — CHLORHEXIDINE GLUCONATE 1 APPLICATION(S): 213 SOLUTION TOPICAL at 12:50

## 2024-03-22 RX ADMIN — HUMAN INSULIN 17 UNIT(S): 100 INJECTION, SUSPENSION SUBCUTANEOUS at 17:22

## 2024-03-22 RX ADMIN — HEPARIN SODIUM 1000 UNIT(S)/HR: 5000 INJECTION INTRAVENOUS; SUBCUTANEOUS at 01:44

## 2024-03-22 RX ADMIN — LEVETIRACETAM 1000 MILLIGRAM(S): 250 TABLET, FILM COATED ORAL at 17:09

## 2024-03-22 RX ADMIN — Medication 15 MILLILITER(S): at 12:52

## 2024-03-22 RX ADMIN — SENNA PLUS 2 TABLET(S): 8.6 TABLET ORAL at 21:32

## 2024-03-22 RX ADMIN — Medication 2: at 17:23

## 2024-03-22 RX ADMIN — Medication 3 MILLILITER(S): at 15:18

## 2024-03-22 RX ADMIN — FAMOTIDINE 20 MILLIGRAM(S): 10 INJECTION INTRAVENOUS at 12:52

## 2024-03-22 RX ADMIN — Medication 1 APPLICATION(S): at 12:52

## 2024-03-22 RX ADMIN — PROPOFOL 3.43 MICROGRAM(S)/KG/MIN: 10 INJECTION, EMULSION INTRAVENOUS at 12:54

## 2024-03-22 RX ADMIN — Medication 1: at 23:23

## 2024-03-22 RX ADMIN — HEPARIN SODIUM 5000 UNIT(S): 5000 INJECTION INTRAVENOUS; SUBCUTANEOUS at 14:43

## 2024-03-22 RX ADMIN — ATENOLOL 100 MILLIGRAM(S): 25 TABLET ORAL at 05:46

## 2024-03-22 RX ADMIN — LEVETIRACETAM 1000 MILLIGRAM(S): 250 TABLET, FILM COATED ORAL at 05:46

## 2024-03-22 RX ADMIN — HUMAN INSULIN 17 UNIT(S): 100 INJECTION, SUSPENSION SUBCUTANEOUS at 23:22

## 2024-03-22 RX ADMIN — PIPERACILLIN AND TAZOBACTAM 25 GRAM(S): 4; .5 INJECTION, POWDER, LYOPHILIZED, FOR SOLUTION INTRAVENOUS at 05:46

## 2024-03-22 RX ADMIN — Medication 2: at 12:53

## 2024-03-22 RX ADMIN — POLYETHYLENE GLYCOL 3350 17 GRAM(S): 17 POWDER, FOR SOLUTION ORAL at 05:46

## 2024-03-22 RX ADMIN — HUMAN INSULIN 13 UNIT(S): 100 INJECTION, SUSPENSION SUBCUTANEOUS at 05:47

## 2024-03-22 RX ADMIN — Medication 20 MICROGRAM(S): at 22:24

## 2024-03-22 RX ADMIN — RANOLAZINE 500 MILLIGRAM(S): 500 TABLET, FILM COATED, EXTENDED RELEASE ORAL at 05:46

## 2024-03-22 RX ADMIN — Medication 3: at 05:46

## 2024-03-22 RX ADMIN — HUMAN INSULIN 17 UNIT(S): 100 INJECTION, SUSPENSION SUBCUTANEOUS at 12:53

## 2024-03-22 RX ADMIN — CHLORHEXIDINE GLUCONATE 15 MILLILITER(S): 213 SOLUTION TOPICAL at 05:46

## 2024-03-22 RX ADMIN — CHLORHEXIDINE GLUCONATE 15 MILLILITER(S): 213 SOLUTION TOPICAL at 17:09

## 2024-03-22 RX ADMIN — AMLODIPINE BESYLATE 5 MILLIGRAM(S): 2.5 TABLET ORAL at 05:46

## 2024-03-22 RX ADMIN — Medication 3 MILLILITER(S): at 04:29

## 2024-03-22 RX ADMIN — Medication 3 MILLILITER(S): at 22:00

## 2024-03-22 RX ADMIN — HEPARIN SODIUM 5000 UNIT(S): 5000 INJECTION INTRAVENOUS; SUBCUTANEOUS at 21:30

## 2024-03-22 RX ADMIN — ATORVASTATIN CALCIUM 80 MILLIGRAM(S): 80 TABLET, FILM COATED ORAL at 21:30

## 2024-03-22 RX ADMIN — Medication 81 MILLIGRAM(S): at 12:52

## 2024-03-22 RX ADMIN — Medication 3 MILLILITER(S): at 11:02

## 2024-03-22 NOTE — PROGRESS NOTE ADULT - SUBJECTIVE AND OBJECTIVE BOX
**INCOMPLETE**    INTERVAL HPI/OVERNIGHT EVENTS:    SUBJECTIVE: Patient seen and examined at bedside. Intubated, sedated, ROS cannot be obtained.       VITAL SIGNS:  ICU Vital Signs Last 24 Hrs  T(C): 37.9 (22 Mar 2024 04:00), Max: 38.3 (21 Mar 2024 08:00)  T(F): 100.2 (22 Mar 2024 04:00), Max: 101 (21 Mar 2024 08:00)  HR: 78 (22 Mar 2024 07:31) (63 - 85)  BP: 153/60 (22 Mar 2024 06:00) (97/40 - 153/60)  BP(mean): 84 (22 Mar 2024 06:00) (54 - 84)  ABP: --  ABP(mean): --  RR: 16 (22 Mar 2024 06:00) (16 - 22)  SpO2: 100% (22 Mar 2024 07:31) (95% - 100%)    O2 Parameters below as of 22 Mar 2024 06:00  Patient On (Oxygen Delivery Method): ventilator    O2 Concentration (%): 40      Mode: AC/ CMV (Assist Control/ Continuous Mandatory Ventilation), RR (machine): 16, TV (machine): 420, FiO2: 40, PEEP: 5, ITime: 0.8, MAP: 10, PIP: 25  Plateau pressure:   P/F ratio:     03-21 @ 07:01  -  03-22 @ 07:00  --------------------------------------------------------  IN: 1806.2 mL / OUT: 977 mL / NET: 829.2 mL      CAPILLARY BLOOD GLUCOSE      POCT Blood Glucose.: 290 mg/dL (22 Mar 2024 05:45)    ECG:    PHYSICAL EXAM:    GENERAL: Elderly female, intubated   HEAD:  Atraumatic, normocephalic  NECK: Supple, no swelling , JVD not appreciated   HEART: rrr  LUNGS: Coarse breath sounds B/L   ABDOMEN: Soft, nontender/ nondistended, +BS  EXTREMITIES: +2 pulses,  NEURO:  Intubated and sedated   SKIN: No rashes or lesions     MEDICATIONS:  MEDICATIONS  (STANDING):  albuterol/ipratropium for Nebulization 3 milliLiter(s) Nebulizer every 6 hours  amLODIPine   Tablet 5 milliGRAM(s) Oral daily  aspirin  chewable 81 milliGRAM(s) Oral daily  atenolol  Tablet 100 milliGRAM(s) Oral daily  atorvastatin 80 milliGRAM(s) Oral at bedtime  chlorhexidine 0.12% Liquid 15 milliLiter(s) Oral Mucosa every 12 hours  chlorhexidine 2% Cloths 1 Application(s) Topical daily  famotidine    Tablet 20 milliGRAM(s) Oral daily  influenza  Vaccine (HIGH DOSE) 0.7 milliLiter(s) IntraMuscular once  insulin lispro (ADMELOG) corrective regimen sliding scale   SubCutaneous every 6 hours  insulin NPH human recombinant 17 Unit(s) SubCutaneous every 6 hours  isosorbide   mononitrate ER Tablet (IMDUR) 30 milliGRAM(s) Oral daily  levETIRAcetam 1000 milliGRAM(s) Oral two times a day  levothyroxine Injectable 20 MICROGram(s) IV Push at bedtime  multivitamin/minerals/iron Oral Solution (CENTRUM) 15 milliLiter(s) Oral daily  petrolatum Ophthalmic Ointment 1 Application(s) Both EYES daily  piperacillin/tazobactam IVPB.. 3.375 Gram(s) IV Intermittent every 8 hours  polyethylene glycol 3350 17 Gram(s) Oral two times a day  propofol Infusion 10 MICROgram(s)/kG/Min (3.43 mL/Hr) IV Continuous <Continuous>  ranolazine 500 milliGRAM(s) Oral two times a day  senna 2 Tablet(s) Oral at bedtime    MEDICATIONS  (PRN):  acetaminophen     Tablet .. 650 milliGRAM(s) Oral every 6 hours PRN Temp greater or equal to 38C (100.4F), Mild Pain (1 - 3)      ALLERGIES:  Allergies    No Known Allergies    Intolerances        LABS:                        7.3    17.64 )-----------( 279      ( 22 Mar 2024 01:40 )             22.7     03-22    131<L>  |  99  |  22  ----------------------------<  264<H>  4.1   |  21<L>  |  1.06    Ca    8.5      22 Mar 2024 00:25  Phos  3.5     03-22  Mg     2.40     03-22    TPro  6.9  /  Alb  2.6<L>  /  TBili  0.5  /  DBili  x   /  AST  75<H>  /  ALT  36<H>  /  AlkPhos  198<H>  03-22    PT/INR - ( 22 Mar 2024 00:25 )   PT: 13.2 sec;   INR: 1.17 ratio         PTT - ( 22 Mar 2024 00:25 )  PTT:107.9 sec  Urinalysis Basic - ( 22 Mar 2024 00:25 )    Color: x / Appearance: x / SG: x / pH: x  Gluc: 264 mg/dL / Ketone: x  / Bili: x / Urobili: x   Blood: x / Protein: x / Nitrite: x   Leuk Esterase: x / RBC: x / WBC x   Sq Epi: x / Non Sq Epi: x / Bacteria: x        RADIOLOGY & ADDITIONAL TESTS: Reviewed.

## 2024-03-22 NOTE — PROGRESS NOTE ADULT - ASSESSMENT
88F with HFpEF, CAD (s/p multiple stents), HTN, HLD, DM2 admitted for acute hypoxemic respiratory failure requiring intubation from altered mental status    NEUROLOGIC    #Seizures  With left lateral gaze deviation and tonic clonic jerking of extremities when off sedation  CT head negative of acute intracranial process or mass  LP unrevealing  vEEG with epileptiform activity  - neuro c/s  - MR brain with and without gadolinium  - Keppra load followed by 1g bid  - follow full vEEG report    CARDIOVASCULAR    #HFpEF  #CAD  #HTN  - continue home antihypertensives  - re-introduce other home meds as able  - clarify why patient is on rivaroxaban at home (?AFib)    PULMONARY    #Acute hypoxemic respiratory failure  Intubated for airway protection in the setting of altered mental status from seizure activity  - continue with mechanical ventilation until mental status improves  - daily awakening/breathing trials    GI  - continue tube feeds    RENAL  - no issues    ID    #Flu  - completed Tamiflu  - continue piperacillin/tazobactam (3/17- )      ENDOCRINE    - continue with NPH and sliding scale    HEME    #DVT ppx  - continue heparin ggt

## 2024-03-22 NOTE — PROGRESS NOTE ADULT - ASSESSMENT
Assessment: 88y (1935) woman with a PMHx significant for CAD s/p PCI x6 11/2019 at [facility] (on aspirin, statin, Xarelto 2.5mg BID), HTN, HLD, T2DM, and HFpEF who originally presented with fever and generalized weakness (3/14). Found to have influenza A, multiple family members at home with same illness. Patient was febrile, tachycardic, & had low O2 sat. Elevated white count. Started on antibiotics & antiviral. Due to respiratory distress she was intubated on 3/17/24. Patient went into septic shock, requiring pressors.  Patient came in on xarelto (home med) but unclear for what reason, in MICU switched to heparin gtt. Since 3/17 patient has had persistent AMS though sedation was weaned down (on Precedex 0.4 during encounter). At 4 am on 3/20 RN noted patient had R upward gaze with repetitive twitching motion of her RUE, and an increase in her SBP to 200s. Symptoms lasted about  seconds. Ativan 2mg x1 IVP given, propofol gtt increased from 30 to 50. Shaking resolved. MICU team pursued CTH, LP, & EEG. CTH was unremarkable for acute findings. EEG showing multifocal sharp waves (bilateral posterior quadrant, bilateral independent frontal, rarely bilateral synchronous frontal), increasing in prevalence over the course of recording, becoming LPDs mostly at 1-2 Hz, fluctuating, rarely up to 3-4 Hz, putting patient risk for multifocal-onset  seizures. Lumbar puncture showing elevated glucose in CSF at 120, normal protein (24), total nucleated cells of 1. CSF PCR (-), pending CSF culture result. Neurology consulted for abnormal EEG and likely witnessed seizure. EEG continues to show multifocal sharps which are risk of seizures, continue with keppra.    Impression: continued depressed level of consciousness 2/2 toxic metabolic encephalopathy likely due to infectious source but may be compounded by possible CNS etiology such as seizures vs acute infarcts     Plan  [] MRI brain with and without contrast   [] continue EEG but allow for break to obtain MRI as soon as possible  [x] followed by keppra 1g BID, s/p 2500mg Keppra load  [] wean off propofol/sedation as tolerated.      Case seen and discussed with Neurology

## 2024-03-22 NOTE — EEG REPORT - NS EEG TEXT BOX
SCHUYLER LAWLER [id number]     Study Date: 3/21/24 0906- 3/22/24 0800  Duration: 22 hr 55 mins  --------------------------------------------------------------------------------------------------  History:  CC/ HPI Patient is a 88y old  Female who presents with a chief complaint of influenza, sepsis, hypoxia (20 Mar 2024 13:05)    MEDICATIONS  (STANDING):  albuterol/ipratropium for Nebulization 3 milliLiter(s) Nebulizer every 6 hours  amLODIPine   Tablet 5 milliGRAM(s) Oral daily  aspirin  chewable 81 milliGRAM(s) Oral daily  atenolol  Tablet 100 milliGRAM(s) Oral daily  atorvastatin 80 milliGRAM(s) Oral at bedtime  chlorhexidine 0.12% Liquid 15 milliLiter(s) Oral Mucosa every 12 hours  chlorhexidine 2% Cloths 1 Application(s) Topical daily  dexMEDEtomidine Infusion 0.5 MICROgram(s)/kG/Hr (7.15 mL/Hr) IV Continuous <Continuous>  famotidine    Tablet 20 milliGRAM(s) Oral daily  heparin  Infusion.  Unit(s)/Hr (11 mL/Hr) IV Continuous <Continuous>  influenza  Vaccine (HIGH DOSE) 0.7 milliLiter(s) IntraMuscular once  insulin lispro (ADMELOG) corrective regimen sliding scale   SubCutaneous every 6 hours  insulin NPH human recombinant 10 Unit(s) SubCutaneous every 6 hours  isosorbide   mononitrate ER Tablet (IMDUR) 30 milliGRAM(s) Oral daily  levothyroxine Injectable 20 MICROGram(s) IV Push at bedtime  multivitamin/minerals/iron Oral Solution (CENTRUM) 15 milliLiter(s) Oral daily  petrolatum Ophthalmic Ointment 1 Application(s) Both EYES daily  piperacillin/tazobactam IVPB.. 3.375 Gram(s) IV Intermittent every 8 hours  polyethylene glycol 3350 17 Gram(s) Oral two times a day  propofol Infusion 10 MICROgram(s)/kG/Min (3.43 mL/Hr) IV Continuous <Continuous>  ranolazine 500 milliGRAM(s) Oral two times a day  senna 2 Tablet(s) Oral at bedtime    --------------------------------------------------------------------------------------------------  Study Interpretation:    [[[Abbreviation Key:  PDR=alpha rhythm/posterior dominant rhythm. A-P=anterior posterior.  Amplitude: ‘very low’:<20; ‘low’:20-49; ‘medium’:; ‘high’:>150uV.  Persistence for periodic/rhythmic patterns (% of epoch) ‘rare’:<1%; ‘occasional’:1-10%; ‘frequent’:10-50%; ‘abundant’:50-90%; ‘continuous’:>90%.  Persistence for sporadic discharges: ‘rare’:<1/hr; ‘occasional’:1/min-1/hr; ‘frequent’:>1/min; ‘abundant’:>1/10 sec.  RPP=rhythmic and periodic patterns; GRDA=generalized rhythmic delta activity; FIRDA=frontal intermittent GRDA; LRDA=lateralized rhythmic delta activity; TIRDA=temporal intermittent rhythmic delta activity;  LPD=PLED=lateralized periodic discharges; GPD=generalized periodic discharges; BIPDs =bilateral independent periodic discharges; Mf=multifocal; SIRPDs=stimulus induced rhythmic, periodic, or ictal appearing discharges; BIRDs=brief potentially ictal rhythmic discharges >4 Hz, lasting .5-10s; PFA (paroxysmal bursts >13 Hz or =8 Hz <10s).  Modifiers: +F=with fast component; +S=with spike component; +R=with rhythmic component.  S-B=burst suppression pattern.  Max=maximal. N1-drowsy; N2-stage II sleep; N3-slow wave sleep. SSS/BETS=small sharp spikes/benign epileptiform transients of sleep. HV=hyperventilation; PS=photic stimulation]]]    Daily EEG Visual Analysis    FINDINGS:      Background:  The background is continuous and symmetric. The predominant background initially consists of diffuse polymorphic delta slowing with intermittent theta and intermittent faster frequencies and spindle-like waveforms, occasional 2-4-second periods of diffuse attenuation. Prevalence of theta increases over the course of recording, with intermittent arousals and return to drowsy state.    Background Slowing:  Generalized slowing: As above  Focal slowing: None    State Changes:   Drowsiness is characterized by slowing of the background activity.  No clear normal stage 2 sleep.    Interictal Findings:  Right posterior quadrant (P4/P8/C4, at times shifting to O2/P8) sharp waves  Left posterior quadrant (P3/P7) sharp waves  The above are abundant and frequently occurring as lateralized periodic discharges at 1-2 Hz, fluctuating. Rarely up to 3 Hz without definite evolution.  There are also bilateral independent frontal (Fp1, Fp2/F4) sharp waves, rarely bilateral synchronous, frequently occurring as lateralized periodic discharges at 1-2 Hz, fluctuating.  LPDs are most prevalent during arousals/stimulation (SIRPIDs) and decrease in prevalence in drowsiness.    Electrographic and Electroclinical seizures:  None    Other Clinical Events:  None    Activation Procedures:   Hyperventilation is not performed.    Photic stimulation is performed and does not elicit any abnormalities.      Artifacts:  Intermittent myogenic and movement artifacts are present.    EKG:  Single-lead EKG shows regular rhythm.    EEG Classification / Summary:  Abnormal EEG in a lethargic patient.  -Multifocal sharp waves (bilateral posterior quadrant, bilateral independent frontal, rarely bilateral synchronous frontal), with LPDs mostly at 1-2 Hz, fluctuating, rarely up to 3 Hz without definite evolution, most prevalent with arousal/stimulation (SIRPIDs)  -Moderate-severe diffuse slowing  -No definite electrographic seizures are captured.     Clinical Impression:  -Increased risk of multifocal-onset seizures.  -Cannot entirely exclude some risk of generalized seizures  -Moderate-severe diffuse cerebral dysfunction is nonspecific in etiology.   -No definite seizures  This is a preliminary report pending attending review and attestation.    Otoniel Dominguez MD  Fellow, [facility]      -------------------------------------------------------------------------------------------------------    Kathleen Hoover MD  Attending Physician, [facility] [place]    -------------------------------------------------------------------------------------------------------    To reach EEG technologist:  Please use the pager number for the appropriate hospital or contact the .  At [facility] - Pager #: [phone]    To reach EEG-reading physician:  [facility] EEG Reading Room Phone #: [phone]  Epilepsy Answering Service after 5PM and before 8:30AM: Phone #: [phone]     SCHUYLER LAWLER [id number]     Study Date: 3/21/24 09:06- 3/22/24 08:00  Duration: 22 hr 53 min  --------------------------------------------------------------------------------------------------  History:  CC/ HPI Patient is a 88y old  Female who presents with a chief complaint of influenza, sepsis, hypoxia (20 Mar 2024 13:05)    MEDICATIONS  (STANDING):  albuterol/ipratropium for Nebulization 3 milliLiter(s) Nebulizer every 6 hours  amLODIPine   Tablet 5 milliGRAM(s) Oral daily  aspirin  chewable 81 milliGRAM(s) Oral daily  atenolol  Tablet 100 milliGRAM(s) Oral daily  atorvastatin 80 milliGRAM(s) Oral at bedtime  chlorhexidine 0.12% Liquid 15 milliLiter(s) Oral Mucosa every 12 hours  chlorhexidine 2% Cloths 1 Application(s) Topical daily  dexMEDEtomidine Infusion 0.5 MICROgram(s)/kG/Hr (7.15 mL/Hr) IV Continuous <Continuous>  famotidine    Tablet 20 milliGRAM(s) Oral daily  heparin  Infusion.  Unit(s)/Hr (11 mL/Hr) IV Continuous <Continuous>  influenza  Vaccine (HIGH DOSE) 0.7 milliLiter(s) IntraMuscular once  insulin lispro (ADMELOG) corrective regimen sliding scale   SubCutaneous every 6 hours  insulin NPH human recombinant 10 Unit(s) SubCutaneous every 6 hours  isosorbide   mononitrate ER Tablet (IMDUR) 30 milliGRAM(s) Oral daily  levothyroxine Injectable 20 MICROGram(s) IV Push at bedtime  multivitamin/minerals/iron Oral Solution (CENTRUM) 15 milliLiter(s) Oral daily  petrolatum Ophthalmic Ointment 1 Application(s) Both EYES daily  piperacillin/tazobactam IVPB.. 3.375 Gram(s) IV Intermittent every 8 hours  polyethylene glycol 3350 17 Gram(s) Oral two times a day  propofol Infusion 10 MICROgram(s)/kG/Min (3.43 mL/Hr) IV Continuous <Continuous>  ranolazine 500 milliGRAM(s) Oral two times a day  senna 2 Tablet(s) Oral at bedtime    --------------------------------------------------------------------------------------------------  Study Interpretation:    [[[Abbreviation Key:  PDR=alpha rhythm/posterior dominant rhythm. A-P=anterior posterior.  Amplitude: ‘very low’:<20; ‘low’:20-49; ‘medium’:; ‘high’:>150uV.  Persistence for periodic/rhythmic patterns (% of epoch) ‘rare’:<1%; ‘occasional’:1-10%; ‘frequent’:10-50%; ‘abundant’:50-90%; ‘continuous’:>90%.  Persistence for sporadic discharges: ‘rare’:<1/hr; ‘occasional’:1/min-1/hr; ‘frequent’:>1/min; ‘abundant’:>1/10 sec.  RPP=rhythmic and periodic patterns; GRDA=generalized rhythmic delta activity; FIRDA=frontal intermittent GRDA; LRDA=lateralized rhythmic delta activity; TIRDA=temporal intermittent rhythmic delta activity;  LPD=PLED=lateralized periodic discharges; GPD=generalized periodic discharges; BIPDs =bilateral independent periodic discharges; Mf=multifocal; SIRPDs=stimulus induced rhythmic, periodic, or ictal appearing discharges; BIRDs=brief potentially ictal rhythmic discharges >4 Hz, lasting .5-10s; PFA (paroxysmal bursts >13 Hz or =8 Hz <10s).  Modifiers: +F=with fast component; +S=with spike component; +R=with rhythmic component.  S-B=burst suppression pattern.  Max=maximal. N1-drowsy; N2-stage II sleep; N3-slow wave sleep. SSS/BETS=small sharp spikes/benign epileptiform transients of sleep. HV=hyperventilation; PS=photic stimulation]]]    Daily EEG Visual Analysis    FINDINGS:      Background:  The background is continuous and symmetric. The predominant background in the most wakeful state consists of diffuse polymorphic theta and delta slowing.    Background Slowing:  Generalized slowing: As above  Focal slowing: None    State Changes:   Drowsiness is characterized by slowing of the background activity.  No normal stage 2 sleep.    Interictal Findings:  Abundant right posterior quadrant (P4/P8/C4, at times shifting to O2/P8) sharp waves  Abundant left posterior quadrant (P3/P7, at times C3/P3) sharp waves  The above frequently occur as lateralized periodic discharges at 1-2 Hz, fluctuating. Rarely up to 2-3 Hz without definite evolution.  There are also bilateral independent frontal (Fp1, Fp2/F4) sharp waves, frequently bilateral synchronous, frequently occurring as lateralized periodic discharges at 1-2 Hz, fluctuating.  LPDs are most prevalent during arousals/stimulation (SIRPIDs) and decrease in prevalence in drowsiness.    Electrographic and Electroclinical seizures:  None    Other Clinical Events:  None    Activation Procedures:   Hyperventilation is not performed.    Photic stimulation is not performed.    Artifacts:  Intermittent myogenic and movement artifacts are present.    EKG:  Single-lead EKG shows regular rhythm.    EEG Classification / Summary:  Abnormal EEG in a lethargic patient.  -Multifocal sharp waves (bilateral posterior quadrant, bilateral independent frontal, frequently bilateral synchronous frontal), LPDs mostly at 1-2 Hz, fluctuating, rarely up to 2-3 Hz without definite evolution, most prevalent with arousal/stimulation (SIRPIDs)  -Moderate diffuse slowing  -No definite electrographic seizures are captured.     Clinical Impression:  -Risk of multifocal-onset seizures  -Cannot entirely exclude some risk of generalized seizures  -Moderate diffuse cerebral dysfunction is nonspecific in etiology.   -No definite seizures in >1.5 days of recording      -------------------------------------------------------------------------------------------------------    Otoniel Dominguez MD  Fellow, [facility] [place]    Kathleen Hoover MD  Attending Physician, [facility]    -------------------------------------------------------------------------------------------------------    To reach EEG technologist:  Please use the pager number for the appropriate hospital or contact the .  At [facility] - Pager #: [phone]    To reach EEG-reading physician:  [facility] EEG Reading Room Phone #: [phone]  Epilepsy Answering Service after 5PM and before 8:30AM: Phone #: [phone]     Albendazole Counseling:  I discussed with the patient the risks of albendazole including but not limited to cytopenia, kidney damage, nausea/vomiting and severe allergy.  The patient understands that this medication is being used in an off-label manner.

## 2024-03-22 NOTE — PROGRESS NOTE ADULT - SUBJECTIVE AND OBJECTIVE BOX
Neurology Progress Note    SUBJECTIVE/OBJECTIVE/INTERVAL EVENTS: Patient seen and examined at bedside w/ neuro attending and team. Patient intubated and sedated on propofol 10.     INTERVAL HISTORY: 88y  woman with a PMHx significant for CAD s/p PCI x6 11/2019 at [facility] (on aspirin, statin, Xarelto 2.5mg BID), HTN, HLD, T2DM, and HFpEF who originally presented with fever and generalized weakness (3/14). Found to have influenza A, multiple family members at home with same illness. Patient was febrile, tachycardic, & had low O2 sat. Elevated white count. Started on antibiotics & antiviral. Due to respiratory distress she was intubated on 3/17/24. Patient went into septic shock, requiring pressors.  Patient came in on xarelto (home med) but unclear for what reason, in MICU switched to heparin gtt. Since 3/17 patient has had persistent AMS though sedation was weaned down (on Precedex 0.4 during encounter). At 4 am on 3/20 RN noted patient had R upward gaze with repetitive twitching motion of her RUE, and an increase in her SBP to 200s. Symptoms lasted about  seconds. Ativan 2mg x1 IVP given, propofol gtt increased from 30 to 50. Shaking resolved. MICU team pursued CTH, LP, & EEG. CTH was unremarkable for acute findings. EEG showing multifocal sharp waves (bilateral posterior quadrant, bilateral independent frontal, rarely bilateral synchronous frontal), increasing in prevalence over the course of recording, becoming LPDs mostly at 1-2 Hz, fluctuating, rarely up to 3-4 Hz, putting patient risk for multifocal-onset  seizures. Lumbar puncture showing elevated glucose in CSF at 120, normal protein (24), total nucleated cells of 1. CSF PCR (-), pending CSF culture result. Neurology consulted for abnormal EEG and likely witnessed seizure.     REVIEW OF SYSTEMS: unable to assess.    MEDICATIONS  (STANDING):  albuterol/ipratropium for Nebulization 3 milliLiter(s) Nebulizer every 6 hours  amLODIPine   Tablet 5 milliGRAM(s) Oral daily  aspirin  chewable 81 milliGRAM(s) Oral daily  atenolol  Tablet 100 milliGRAM(s) Oral daily  atorvastatin 80 milliGRAM(s) Oral at bedtime  chlorhexidine 0.12% Liquid 15 milliLiter(s) Oral Mucosa every 12 hours  chlorhexidine 2% Cloths 1 Application(s) Topical daily  famotidine    Tablet 20 milliGRAM(s) Oral daily  heparin   Injectable 5000 Unit(s) SubCutaneous every 8 hours  influenza  Vaccine (HIGH DOSE) 0.7 milliLiter(s) IntraMuscular once  insulin lispro (ADMELOG) corrective regimen sliding scale   SubCutaneous every 6 hours  insulin NPH human recombinant 17 Unit(s) SubCutaneous every 6 hours  isosorbide   mononitrate ER Tablet (IMDUR) 30 milliGRAM(s) Oral daily  levETIRAcetam 1000 milliGRAM(s) Oral two times a day  levothyroxine Injectable 20 MICROGram(s) IV Push at bedtime  multivitamin/minerals/iron Oral Solution (CENTRUM) 15 milliLiter(s) Oral daily  petrolatum Ophthalmic Ointment 1 Application(s) Both EYES daily  polyethylene glycol 3350 17 Gram(s) Oral two times a day  propofol Infusion 10 MICROgram(s)/kG/Min (3.43 mL/Hr) IV Continuous <Continuous>  ranolazine 500 milliGRAM(s) Oral two times a day  senna 2 Tablet(s) Oral at bedtime    MEDICATIONS  (PRN):  acetaminophen     Tablet .. 650 milliGRAM(s) Oral every 6 hours PRN Temp greater or equal to 38C (100.4F), Mild Pain (1 - 3)    VITALS & EXAMINATION:  Vital Signs Last 24 Hrs  T(C): 37.9 (22 Mar 2024 08:00), Max: 38 (21 Mar 2024 20:00)  T(F): 100.2 (22 Mar 2024 08:00), Max: 100.4 (21 Mar 2024 20:00)  HR: 64 (22 Mar 2024 12:00) (62 - 82)  BP: 99/41 (22 Mar 2024 12:00) (97/40 - 173/76)  BP(mean): 55 (22 Mar 2024 12:00) (54 - 101)  RR: 16 (22 Mar 2024 12:00) (14 - 22)  SpO2: 99% (22 Mar 2024 12:00) (94% - 100%)    Parameters below as of 22 Mar 2024 12:00  Patient On (Oxygen Delivery Method): ventilator, AC 16  PEEP 5    O2 Concentration (%): 40    Intubated and sedated   Neurologic Exam: LIMTIED  Mental status - Intubated and sedated (propofol 10). Does arose to verbal, tactile, noxious. blinks occasionally. Does not follow any commands.  Cranial nerves -   Left eye 2mm constricts appropriately  Right eye 2.5-3mm constricts appropriately  BTT not intact b/l   Corneal reflex intact n/l   Oculocephalic reflex inconclusive, crosses midline intermittently   face looks symmetric but difficult to assess with intubation  gag reflex intact    Motor -   RUE no movement noted with noxious stimuli  LUE no movement with noxious stimuli, tone flaccid  RLE no movement noted with noxious stimuli  LLE no movement noted with noxious stimuli    Sensation - no response to pain in all extremities    DTR's -             Biceps      Triceps     Brachioradialis      Patellar    Ankle    Toes/plantar response  R             2+             2+                  2+                 2+            2+                 Down  L              2+             2+                 2+                  2+           2+                 Down    Coordination - unable to assess    Gait and station - unable to assess    LABORATORY:  CBC                       7.3    17.64 )-----------( 279      ( 22 Mar 2024 01:40 )             22.7     Chem 03-22    131<L>  |  99  |  22  ----------------------------<  264<H>  4.1   |  21<L>  |  1.06    Ca    8.5      22 Mar 2024 00:25  Phos  3.5     03-22  Mg     2.40     03-22    TPro  6.9  /  Alb  2.6<L>  /  TBili  0.5  /  DBili  x   /  AST  75<H>  /  ALT  36<H>  /  AlkPhos  198<H>  03-22    LFTs LIVER FUNCTIONS - ( 22 Mar 2024 00:25 )  Alb: 2.6 g/dL / Pro: 6.9 g/dL / ALK PHOS: 198 U/L / ALT: 36 U/L / AST: 75 U/L / GGT: x           Coagulopathy PT/INR - ( 22 Mar 2024 00:25 )   PT: 13.2 sec;   INR: 1.17 ratio      PTT - ( 22 Mar 2024 00:25 )  PTT:107.9 sec    STUDIES & IMAGING: (EEG, CT, MR, U/S, TTE/FAISAL):  Lumbar Puncture  CSF:  Total Nucleated Cell Count, CSF: 1 cells/uL (03-20-24 @ 15:38)  RBC Count - Spinal Fluid: 77 cells/uL (03-20-24 @ 15:38)  Protein, CSF: 24 mg/dL (03-20-24 @ 15:38)    TTE 3/16/24  EF=57%  1. Left ventricular systolic function is normal with an ejection fraction of 57 % by Rainey's method of disks.   2. There is moderate (grade 2) left ventricular diastolic dysfunction, with elevated filling pressure.   3. Normal right ventricular cavity size, with normal wall thickness, and normal systolic function.   4. Moderate to severe mitral regurgitation. The etiology of the mitral regurgitation is unclear.   5. The left atrium is severely dilated.   6. The right atrium is normal in size.   7. No prior echocardiogram is available for comparison.    Radiology:  CT Head No Cont:  (19 Mar 2024 19:26)  no acute intracranial hemorrhage, mass effect, shift of the midline structures, herniation, extra-axial fluid collection, or hydrocephalus.  There is diffuse cerebral volume loss with prominence of the sulci, fissures, and cisternal spaces which is normal for the patient's age. 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  Extensive scattered mucosal thickening as well as secretions are seen throughout the paranasal sinuses. The tympanomastoid cavities are clear. The calvarium is intact. There is evidence of bilateral cataract removal.    IMPRESSION: No acute intracranial hemorrhage, mass effect, or shift of the midline structures.  Mild chronic white matter microvascular type changes.  Extensive acute on chronic sinusitis.    EEG 3/21/24 17 hr study  -Multifocal sharp waves (bilateral posterior quadrant, bilateral independent frontal, rarely bilateral synchronous frontal), increasing in prevalence over the course of recording, becoming LPDs mostly at 1-2 Hz, fluctuating, rarely up to 3-4 Hz without definite evolution, most prevalent with arousal/stimulation (SIRPIDs)  -Event of intermittent LUE shaking with ongoing multifocal LPDs as above  -Moderate-severe improving to moderate diffuse slowing  -No definite electrographic seizures are captured.     Clinical Impression:  -Risk of multifocal-onset seizures, increasing over the course of recording  -Cannot entirely exclude some risk of generalized seizures  -The captured event of intermittent LUE shaking is less likely to be epileptic in nature  -Moderate-severe improving to moderate diffuse cerebral dysfunction is nonspecific in etiology. In this case, sedating medications and decrease in sedating medications may be contributing.  -No definite seizures    EEG 3/22/24 23hr study  -Multifocal sharp waves (bilateral posterior quadrant, bilateral independent frontal, frequently bilateral synchronous frontal), LPDs mostly at 1-2 Hz, fluctuating, rarely up to 2-3 Hz without definite evolution, most prevalent with arousal/stimulation (SIRPIDs)  -Moderate diffuse slowing  -No definite electrographic seizures are captured.     Clinical Impression:  -Risk of multifocal-onset seizures  -Cannot entirely exclude some risk of generalized seizures  -Moderate diffuse cerebral dysfunction is nonspecific in etiology.   -No definite seizures in >1.5 days of recording

## 2024-03-22 NOTE — PROGRESS NOTE ADULT - ATTENDING COMMENTS
Patient seen and examined at bedside. As per nurse, no new overnight events reported.     Detailed neuro exam:  ROS: Unable to obtain due to the patient is currently orally intubated.  Please note, neuro exam is very limited due to the patient on mechanical ventilation via orally intubated and on the propofol and versed.  General: Patient is comfortably lying on bed without any acute distress.  Mental status: On verbal command, patient unable to follow any simple or complex commands.  Cranial exams: Brainstem reflexes are intact cornea, conjunctiva and gag reflexes. Face looks symmetric. Pupils are symmetric, reactive to light bilaterally.  Power: On noxious stimuli the patient had 0/5 bilateral four extremities.   Sensation: On noxious stimuli patient did not grimace at all four extremities.  Coordination and gait: Patient did not participate in the setting of comatose.     Impression and plan:  Ms. Vaz is a 88 year old unknown handed woman with a PMHx significant for vascular risk factors, CAD s/p PCI x6, and HFpEF who originally presented with fever and generalized weakness (3/14). Found to have influenza  Due to respiratory distress she required mechanical ventilation.   Neurology consulted for abnormal EEG and seizure. Clinically, unable to assess due to the sedation.       EEG showing multifocal epileptiform potentials.   Lumbar puncture showing elevated glucose in CSF at 120, normal protein (24), total nucleated cells of 1. CSF PCR (-).  Follow up pending CSF result.     Continue Keppra 1gm BID.  Taper down propofol as per unit protocol.   Patient would benefit from MRI brain with and without contrast once patient stable.  Patient would continue benefit from EEG and EEG can discontinued once patient is ready for MRI.    Continue medical management, neuro- check and fall precaution.  GI and DVT prophylaxis.  Patient is at high risk for complications and morbidity or mortality. There is high probability of imminent or life threatening deterioration in the patient's condition.  Patient is unable or incompetent to participate in giving a history and/or making decisions and discussion is necessary for determining treatment decisions.  Plan of care discussed with MICU attending.   My cumulative time taking care of this critically ill patient is 55 minutes  If you have any further questions, please do not hesitate to contact our team.  Thank you for allowing us to participate in this patient care.

## 2024-03-23 LAB
ADD ON TEST-SPECIMEN IN LAB: SIGNIFICANT CHANGE UP
ALBUMIN SERPL ELPH-MCNC: 2.8 G/DL — LOW (ref 3.3–5)
ALP SERPL-CCNC: 214 U/L — HIGH (ref 40–120)
ALT FLD-CCNC: 39 U/L — HIGH (ref 4–33)
ANION GAP SERPL CALC-SCNC: 11 MMOL/L — SIGNIFICANT CHANGE UP (ref 7–14)
APPEARANCE UR: CLEAR — SIGNIFICANT CHANGE UP
APTT BLD: 27.2 SEC — SIGNIFICANT CHANGE UP (ref 24.5–35.6)
AST SERPL-CCNC: 77 U/L — HIGH (ref 4–32)
BACTERIA # UR AUTO: NEGATIVE /HPF — SIGNIFICANT CHANGE UP
BASOPHILS # BLD AUTO: 0.11 K/UL — SIGNIFICANT CHANGE UP (ref 0–0.2)
BASOPHILS NFR BLD AUTO: 0.5 % — SIGNIFICANT CHANGE UP (ref 0–2)
BILIRUB SERPL-MCNC: 0.4 MG/DL — SIGNIFICANT CHANGE UP (ref 0.2–1.2)
BILIRUB UR-MCNC: NEGATIVE — SIGNIFICANT CHANGE UP
BUN SERPL-MCNC: 23 MG/DL — SIGNIFICANT CHANGE UP (ref 7–23)
CALCIUM SERPL-MCNC: 9.1 MG/DL — SIGNIFICANT CHANGE UP (ref 8.4–10.5)
CAST: 0 /LPF — SIGNIFICANT CHANGE UP (ref 0–4)
CHLORIDE SERPL-SCNC: 102 MMOL/L — SIGNIFICANT CHANGE UP (ref 98–107)
CO2 SERPL-SCNC: 22 MMOL/L — SIGNIFICANT CHANGE UP (ref 22–31)
COLOR SPEC: YELLOW — SIGNIFICANT CHANGE UP
CREAT SERPL-MCNC: 0.86 MG/DL — SIGNIFICANT CHANGE UP (ref 0.5–1.3)
DIFF PNL FLD: NEGATIVE — SIGNIFICANT CHANGE UP
EGFR: 66 ML/MIN/1.73M2 — SIGNIFICANT CHANGE UP
EOSINOPHIL # BLD AUTO: 0.3 K/UL — SIGNIFICANT CHANGE UP (ref 0–0.5)
EOSINOPHIL NFR BLD AUTO: 1.5 % — SIGNIFICANT CHANGE UP (ref 0–6)
GLUCOSE BLDC GLUCOMTR-MCNC: 212 MG/DL — HIGH (ref 70–99)
GLUCOSE BLDC GLUCOMTR-MCNC: 248 MG/DL — HIGH (ref 70–99)
GLUCOSE BLDC GLUCOMTR-MCNC: 257 MG/DL — HIGH (ref 70–99)
GLUCOSE SERPL-MCNC: 245 MG/DL — HIGH (ref 70–99)
GLUCOSE UR QL: NEGATIVE MG/DL — SIGNIFICANT CHANGE UP
HCT VFR BLD CALC: 25.2 % — LOW (ref 34.5–45)
HGB BLD-MCNC: 8.1 G/DL — LOW (ref 11.5–15.5)
IANC: 13.03 K/UL — HIGH (ref 1.8–7.4)
IMM GRANULOCYTES NFR BLD AUTO: 7.1 % — HIGH (ref 0–0.9)
INR BLD: 1.05 RATIO — SIGNIFICANT CHANGE UP (ref 0.85–1.18)
KETONES UR-MCNC: NEGATIVE MG/DL — SIGNIFICANT CHANGE UP
LEUKOCYTE ESTERASE UR-ACNC: ABNORMAL
LYMPHOCYTES # BLD AUTO: 12 % — LOW (ref 13–44)
LYMPHOCYTES # BLD AUTO: 2.46 K/UL — SIGNIFICANT CHANGE UP (ref 1–3.3)
MAGNESIUM SERPL-MCNC: 2.4 MG/DL — SIGNIFICANT CHANGE UP (ref 1.6–2.6)
MCHC RBC-ENTMCNC: 29.2 PG — SIGNIFICANT CHANGE UP (ref 27–34)
MCHC RBC-ENTMCNC: 32.1 GM/DL — SIGNIFICANT CHANGE UP (ref 32–36)
MCV RBC AUTO: 91 FL — SIGNIFICANT CHANGE UP (ref 80–100)
MONOCYTES # BLD AUTO: 3.19 K/UL — HIGH (ref 0–0.9)
MONOCYTES NFR BLD AUTO: 15.5 % — HIGH (ref 2–14)
NEUTROPHILS # BLD AUTO: 13.03 K/UL — HIGH (ref 1.8–7.4)
NEUTROPHILS NFR BLD AUTO: 63.4 % — SIGNIFICANT CHANGE UP (ref 43–77)
NITRITE UR-MCNC: NEGATIVE — SIGNIFICANT CHANGE UP
NRBC # BLD: 0 /100 WBCS — SIGNIFICANT CHANGE UP (ref 0–0)
NRBC # FLD: 0.15 K/UL — HIGH (ref 0–0)
PH UR: 6.5 — SIGNIFICANT CHANGE UP (ref 5–8)
PHOSPHATE SERPL-MCNC: 3.4 MG/DL — SIGNIFICANT CHANGE UP (ref 2.5–4.5)
PLATELET # BLD AUTO: 375 K/UL — SIGNIFICANT CHANGE UP (ref 150–400)
POTASSIUM SERPL-MCNC: 4.1 MMOL/L — SIGNIFICANT CHANGE UP (ref 3.5–5.3)
POTASSIUM SERPL-SCNC: 4.1 MMOL/L — SIGNIFICANT CHANGE UP (ref 3.5–5.3)
PROCALCITONIN SERPL-MCNC: 0.26 NG/ML — HIGH (ref 0.02–0.1)
PROT SERPL-MCNC: 7.4 G/DL — SIGNIFICANT CHANGE UP (ref 6–8.3)
PROT UR-MCNC: 100 MG/DL
PROTHROM AB SERPL-ACNC: 11.9 SEC — SIGNIFICANT CHANGE UP (ref 9.5–13)
RBC # BLD: 2.77 M/UL — LOW (ref 3.8–5.2)
RBC # FLD: 14.8 % — HIGH (ref 10.3–14.5)
RBC CASTS # UR COMP ASSIST: 2 /HPF — SIGNIFICANT CHANGE UP (ref 0–4)
SODIUM SERPL-SCNC: 135 MMOL/L — SIGNIFICANT CHANGE UP (ref 135–145)
SP GR SPEC: 1.02 — SIGNIFICANT CHANGE UP (ref 1–1.03)
SQUAMOUS # UR AUTO: 0 /HPF — SIGNIFICANT CHANGE UP (ref 0–5)
UROBILINOGEN FLD QL: 0.2 MG/DL — SIGNIFICANT CHANGE UP (ref 0.2–1)
WBC # BLD: 20.56 K/UL — HIGH (ref 3.8–10.5)
WBC # FLD AUTO: 20.56 K/UL — HIGH (ref 3.8–10.5)
WBC UR QL: 29 /HPF — HIGH (ref 0–5)

## 2024-03-23 PROCEDURE — 99232 SBSQ HOSP IP/OBS MODERATE 35: CPT | Mod: GC

## 2024-03-23 PROCEDURE — 99291 CRITICAL CARE FIRST HOUR: CPT | Mod: GC

## 2024-03-23 RX ORDER — HUMAN INSULIN 100 [IU]/ML
20 INJECTION, SUSPENSION SUBCUTANEOUS EVERY 6 HOURS
Refills: 0 | Status: DISCONTINUED | OUTPATIENT
Start: 2024-03-23 | End: 2024-03-27

## 2024-03-23 RX ORDER — LEVETIRACETAM 250 MG/1
1000 TABLET, FILM COATED ORAL
Refills: 0 | Status: DISCONTINUED | OUTPATIENT
Start: 2024-03-23 | End: 2024-03-26

## 2024-03-23 RX ADMIN — Medication 650 MILLIGRAM(S): at 04:00

## 2024-03-23 RX ADMIN — CHLORHEXIDINE GLUCONATE 15 MILLILITER(S): 213 SOLUTION TOPICAL at 18:15

## 2024-03-23 RX ADMIN — Medication 3 MILLILITER(S): at 16:24

## 2024-03-23 RX ADMIN — ATORVASTATIN CALCIUM 80 MILLIGRAM(S): 80 TABLET, FILM COATED ORAL at 21:16

## 2024-03-23 RX ADMIN — Medication 3 MILLILITER(S): at 03:52

## 2024-03-23 RX ADMIN — HUMAN INSULIN 20 UNIT(S): 100 INJECTION, SUSPENSION SUBCUTANEOUS at 11:45

## 2024-03-23 RX ADMIN — HUMAN INSULIN 20 UNIT(S): 100 INJECTION, SUSPENSION SUBCUTANEOUS at 18:16

## 2024-03-23 RX ADMIN — HEPARIN SODIUM 5000 UNIT(S): 5000 INJECTION INTRAVENOUS; SUBCUTANEOUS at 14:15

## 2024-03-23 RX ADMIN — CHLORHEXIDINE GLUCONATE 15 MILLILITER(S): 213 SOLUTION TOPICAL at 05:06

## 2024-03-23 RX ADMIN — Medication 650 MILLIGRAM(S): at 21:46

## 2024-03-23 RX ADMIN — HUMAN INSULIN 17 UNIT(S): 100 INJECTION, SUSPENSION SUBCUTANEOUS at 05:05

## 2024-03-23 RX ADMIN — Medication 1 APPLICATION(S): at 12:32

## 2024-03-23 RX ADMIN — Medication 15 MILLILITER(S): at 11:17

## 2024-03-23 RX ADMIN — LEVETIRACETAM 1000 MILLIGRAM(S): 250 TABLET, FILM COATED ORAL at 05:47

## 2024-03-23 RX ADMIN — FAMOTIDINE 20 MILLIGRAM(S): 10 INJECTION INTRAVENOUS at 11:17

## 2024-03-23 RX ADMIN — Medication 3 MILLILITER(S): at 09:47

## 2024-03-23 RX ADMIN — POLYETHYLENE GLYCOL 3350 17 GRAM(S): 17 POWDER, FOR SOLUTION ORAL at 05:06

## 2024-03-23 RX ADMIN — Medication 2: at 11:45

## 2024-03-23 RX ADMIN — Medication 3 MILLILITER(S): at 20:14

## 2024-03-23 RX ADMIN — HEPARIN SODIUM 5000 UNIT(S): 5000 INJECTION INTRAVENOUS; SUBCUTANEOUS at 05:06

## 2024-03-23 RX ADMIN — Medication 650 MILLIGRAM(S): at 21:16

## 2024-03-23 RX ADMIN — Medication 3: at 05:05

## 2024-03-23 RX ADMIN — Medication 81 MILLIGRAM(S): at 11:17

## 2024-03-23 RX ADMIN — CHLORHEXIDINE GLUCONATE 1 APPLICATION(S): 213 SOLUTION TOPICAL at 11:17

## 2024-03-23 RX ADMIN — Medication 2: at 18:17

## 2024-03-23 RX ADMIN — Medication 20 MICROGRAM(S): at 21:16

## 2024-03-23 RX ADMIN — Medication 650 MILLIGRAM(S): at 00:34

## 2024-03-23 RX ADMIN — HEPARIN SODIUM 5000 UNIT(S): 5000 INJECTION INTRAVENOUS; SUBCUTANEOUS at 21:16

## 2024-03-23 RX ADMIN — LEVETIRACETAM 1000 MILLIGRAM(S): 250 TABLET, FILM COATED ORAL at 18:16

## 2024-03-23 RX ADMIN — SENNA PLUS 2 TABLET(S): 8.6 TABLET ORAL at 21:16

## 2024-03-23 NOTE — PROGRESS NOTE ADULT - ASSESSMENT
Assessment: 88y (1935) woman with a PMHx significant for CAD s/p PCI x6 11/2019 at [facility] (on aspirin, statin, Xarelto 2.5mg BID), HTN, HLD, T2DM, and HFpEF who originally presented with fever and generalized weakness (3/14). Found to have influenza A, multiple family members at home with same illness. Patient was febrile, tachycardic, & had low O2 sat. Elevated white count. Started on antibiotics & antiviral. Due to respiratory distress she was intubated on 3/17/24. Patient went into septic shock, requiring pressors.  Patient came in on xarelto (home med) but unclear for what reason, in MICU switched to heparin gtt. Since 3/17 patient has had persistent AMS though sedation was weaned down (on Precedex 0.4 during encounter). At 4 am on 3/20 RN noted patient had R upward gaze with repetitive twitching motion of her RUE, and an increase in her SBP to 200s. Symptoms lasted about  seconds. Ativan 2mg x1 IVP given, propofol gtt increased from 30 to 50. Shaking resolved. MICU team pursued CTH, LP, & EEG. CTH was unremarkable for acute findings. EEG showing multifocal sharp waves (bilateral posterior quadrant, bilateral independent frontal, rarely bilateral synchronous frontal), increasing in prevalence over the course of recording, becoming LPDs mostly at 1-2 Hz, fluctuating, rarely up to 3-4 Hz, putting patient risk for multifocal-onset  seizures. Lumbar puncture showing elevated glucose in CSF at 120, normal protein (24), total nucleated cells of 1. CSF PCR (-), pending CSF culture result. Neurology consulted for abnormal EEG and likely witnessed seizure. EEG continues to show multifocal sharps which are risk of seizures, continue with keppra.    Impression: continued depressed level of consciousness 2/2 toxic metabolic encephalopathy likely due to infectious source but may be compounded by possible CNS etiology such as seizures vs acute infarcts     Plan INCOMPLETE  [] MRI brain with and without contrast   [] continue EEG but allow for break to obtain MRI as soon as possible  [x] followed by keppra 1g BID, s/p 2500mg Keppra load  [] wean off propofol/sedation as tolerated.      Case seen and discussed with Neurology Attending    Assessment: 88y (1935) woman with a PMHx significant for CAD s/p PCI x6 11/2019 at [facility] (on aspirin, statin, Xarelto 2.5mg BID), HTN, HLD, T2DM, and HFpEF who originally presented with fever and generalized weakness (3/14). Found to have influenza A, multiple family members at home with same illness. Patient was febrile, tachycardic, & had low O2 sat. Elevated white count. Started on antibiotics & antiviral. Due to respiratory distress she was intubated on 3/17/24. Patient went into septic shock, requiring pressors.  Patient came in on xarelto (home med) but unclear for what reason, in MICU switched to heparin gtt. Since 3/17 patient has had persistent AMS though sedation was weaned down (on Precedex 0.4 during encounter). At 4 am on 3/20 RN noted patient had R upward gaze with repetitive twitching motion of her RUE, and an increase in her SBP to 200s. Symptoms lasted about  seconds. Ativan 2mg x1 IVP given, propofol gtt increased from 30 to 50. Shaking resolved. MICU team pursued CTH, LP, & EEG. CTH was unremarkable for acute findings. EEG showing multifocal sharp waves (bilateral posterior quadrant, bilateral independent frontal, rarely bilateral synchronous frontal), increasing in prevalence over the course of recording, becoming LPDs mostly at 1-2 Hz, fluctuating, rarely up to 3-4 Hz, putting patient risk for multifocal-onset  seizures. Lumbar puncture showing elevated glucose in CSF at 120, normal protein (24), total nucleated cells of 1. CSF PCR (-), pending CSF culture result. Neurology consulted for abnormal EEG and likely witnessed seizure. EEG continues to show multifocal sharps which are risk of seizures, continue with keppra.    Impression: continued depressed level of consciousness 2/2 toxic metabolic encephalopathy likely due to infectious source but may be compounded by possible CNS etiology such as seizures vs acute infarcts     Plan   [] MRI brain with and without contrast   [] continue EEG but allow for break to obtain MRI as soon as possible  [x] continue keppra 1g BID, s/p 2500mg Keppra load  [] wean off sedation as tolerated.      Case seen and discussed with Neurology Attending

## 2024-03-23 NOTE — PROGRESS NOTE ADULT - ATTENDING COMMENTS
Patient seen and examined at bedside. As per nurse, no new overnight events reported.     Detailed neuro exam:  ROS: Unable to obtain due to the patient is currently orally intubated.  Please note, neuro exam is very limited due to the patient on mechanical ventilation via orally intubated and on the propofol and versed.  General: Patient is comfortably lying on bed without any acute distress.  Mental status: On verbal command, patient unable to follow any simple or complex commands.  Cranial exams: Brainstem reflexes are intact cornea, conjunctiva and gag reflexes. Face looks symmetric. Pupils are symmetric, reactive to light bilaterally.  Power: On noxious stimuli the patient had 0/5 bilateral four extremities.   Sensation: On noxious stimuli patient did not grimace at all four extremities.  Coordination and gait: Patient did not participate in the setting of comatose.     Impression and plan:  Ms. Vaz is a 88 year old unknown handed woman with a PMHx significant for vascular risk factors, CAD s/p PCI x6, and HFpEF who originally presented with fever and generalized weakness (3/14). Found to have influenza  Due to respiratory distress she required mechanical ventilation.   Neurology consulted for abnormal EEG and seizure. Clinically, unable to assess due to the sedation and remains unchanged as compared yesterday.     EEG showing multifocal epileptiform potentials and no seizure.   Lumbar puncture showing elevated glucose in CSF at 120, normal protein (24), total nucleated cells of 1. CSF PCR (-).    Continue Keppra 1gm BID.  Taper down propofol as per unit protocol.   Patient would benefit from MRI brain with and without contrast once patient stable.  Discontinued EEG    Continue medical management, neuro- check and fall precaution.  GI and DVT prophylaxis.  Patient is at high risk for complications and morbidity or mortality. There is high probability of imminent or life threatening deterioration in the patient's condition.  Patient is unable or incompetent to participate in giving a history and/or making decisions and discussion is necessary for determining treatment decisions.  Plan of care discussed with MICU attending.   My cumulative time taking care of this critically ill patient is 40 minutes  If you have any further questions, please do not hesitate to contact our team.  Thank you for allowing us to participate in this patient care.

## 2024-03-23 NOTE — EEG REPORT - NS EEG TEXT BOX
[facility] [id number]     Study Date: 3/22/24 08:00- 3/23/24 08:00  Duration: 24 hr  --------------------------------------------------------------------------------------------------  History:  CC/ HPI Patient is a 88y old  Female who presents with a chief complaint of influenza, sepsis, hypoxia (20 Mar 2024 13:05)    MEDICATIONS  (STANDING):  albuterol/ipratropium for Nebulization 3 milliLiter(s) Nebulizer every 6 hours  amLODIPine   Tablet 5 milliGRAM(s) Oral daily  aspirin  chewable 81 milliGRAM(s) Oral daily  atenolol  Tablet 100 milliGRAM(s) Oral daily  atorvastatin 80 milliGRAM(s) Oral at bedtime  chlorhexidine 0.12% Liquid 15 milliLiter(s) Oral Mucosa every 12 hours  chlorhexidine 2% Cloths 1 Application(s) Topical daily  dexMEDEtomidine Infusion 0.5 MICROgram(s)/kG/Hr (7.15 mL/Hr) IV Continuous <Continuous>  famotidine    Tablet 20 milliGRAM(s) Oral daily  heparin  Infusion.  Unit(s)/Hr (11 mL/Hr) IV Continuous <Continuous>  influenza  Vaccine (HIGH DOSE) 0.7 milliLiter(s) IntraMuscular once  insulin lispro (ADMELOG) corrective regimen sliding scale   SubCutaneous every 6 hours  insulin NPH human recombinant 10 Unit(s) SubCutaneous every 6 hours  isosorbide   mononitrate ER Tablet (IMDUR) 30 milliGRAM(s) Oral daily  levothyroxine Injectable 20 MICROGram(s) IV Push at bedtime  multivitamin/minerals/iron Oral Solution (CENTRUM) 15 milliLiter(s) Oral daily  petrolatum Ophthalmic Ointment 1 Application(s) Both EYES daily  piperacillin/tazobactam IVPB.. 3.375 Gram(s) IV Intermittent every 8 hours  polyethylene glycol 3350 17 Gram(s) Oral two times a day  propofol Infusion 10 MICROgram(s)/kG/Min (3.43 mL/Hr) IV Continuous <Continuous>  ranolazine 500 milliGRAM(s) Oral two times a day  senna 2 Tablet(s) Oral at bedtime    --------------------------------------------------------------------------------------------------  Study Interpretation:    [[[Abbreviation Key:  PDR=alpha rhythm/posterior dominant rhythm. A-P=anterior posterior.  Amplitude: ‘very low’:<20; ‘low’:20-49; ‘medium’:; ‘high’:>150uV.  Persistence for periodic/rhythmic patterns (% of epoch) ‘rare’:<1%; ‘occasional’:1-10%; ‘frequent’:10-50%; ‘abundant’:50-90%; ‘continuous’:>90%.  Persistence for sporadic discharges: ‘rare’:<1/hr; ‘occasional’:1/min-1/hr; ‘frequent’:>1/min; ‘abundant’:>1/10 sec.  RPP=rhythmic and periodic patterns; GRDA=generalized rhythmic delta activity; FIRDA=frontal intermittent GRDA; LRDA=lateralized rhythmic delta activity; TIRDA=temporal intermittent rhythmic delta activity;  LPD=PLED=lateralized periodic discharges; GPD=generalized periodic discharges; BIPDs =bilateral independent periodic discharges; Mf=multifocal; SIRPDs=stimulus induced rhythmic, periodic, or ictal appearing discharges; BIRDs=brief potentially ictal rhythmic discharges >4 Hz, lasting .5-10s; PFA (paroxysmal bursts >13 Hz or =8 Hz <10s).  Modifiers: +F=with fast component; +S=with spike component; +R=with rhythmic component.  S-B=burst suppression pattern.  Max=maximal. N1-drowsy; N2-stage II sleep; N3-slow wave sleep. SSS/BETS=small sharp spikes/benign epileptiform transients of sleep. HV=hyperventilation; PS=photic stimulation]]]    Daily EEG Visual Analysis    FINDINGS:      Background:  The background is continuous and symmetric. The predominant background in the most wakeful state consists of diffuse polymorphic theta and delta slowing.    Background Slowing:  Generalized slowing: As above  Focal slowing: None    State Changes:   Drowsiness is characterized by slowing of the background activity.  No normal stage 2 sleep.    Interictal Findings:  Abundant right posterior quadrant (P4/P8/C4, at times shifting to O2/P8) sharp waves  Abundant left posterior quadrant (P3/P7, at times C3/P3) sharp waves  The above frequently occur as lateralized periodic discharges at 1-2 Hz, fluctuating.   There are also bilateral independent frontal (Fp1, Fp2/F4) sharp waves, frequently bilateral synchronous, frequently occurring as lateralized periodic discharges at 1-2 Hz, fluctuating.    Electrographic and Electroclinical seizures:  None    Other Clinical Events:  None    Activation Procedures:   Hyperventilation is not performed.    Photic stimulation is not performed.    Artifacts:  Intermittent myogenic and movement artifacts are present.    EKG:  Single-lead EKG shows regular rhythm.    EEG Classification / Summary:  Abnormal EEG in a lethargic patient.  -Multifocal sharp waves (bilateral posterior quadrant, bilateral independent frontal, frequently bilateral synchronous frontal), LPDs mostly at 1-2 Hz.  -No definite electrographic seizures are captured.     Clinical Impression:  -Risk of multifocal-onset seizures  -Cannot entirely exclude some risk of generalized seizures  -Moderate diffuse cerebral dysfunction is nonspecific in etiology.     No seizures recorded x  3 day(s)    [General recommendations for CEEG/LTM duration:  -1 Day recording if patient awake and no epileptiform abnormalities recorded.  -2 Day recording if patient comatose and no epileptiform abnormalities recorded.  -2-3 Day recording if patient comatose and epileptiform abnormalities recorded, with no seizures recorded.  -Discontinuation of recording if patient with seizures initially on recording, and no subsequent seizures recorded x 1-2 Days.]      Benjamin Fowler MD  Fellow, [facility]ile[facility] [place]

## 2024-03-23 NOTE — PROGRESS NOTE ADULT - ASSESSMENT
88F with HFpEF, CAD (s/p multiple stents), HTN, HLD, DM2 admitted for acute hypoxemic respiratory failure requiring intubation from altered mental status    NEUROLOGIC    #?Seizures  With left lateral gaze deviation and tonic clonic jerking of extremities when off sedation  CT head negative of acute intracranial process or mass  LP unrevealing  EEG showing multifocal epileptiform potentials  - MR brain with and without gadolinium  - Keppra 1g bid  - continue vEEG    CARDIOVASCULAR    #HFpEF  #CAD  #HTN  - continue home antihypertensives  - re-introduce other home meds as able  - on low dose rivaroxaban and aspirin for CAD - hold DOAC in the setting of critical illness    PULMONARY    #Acute hypoxemic respiratory failure  Intubated for airway protection in the setting of altered mental status from ?seizure activity  - continue with mechanical ventilation until mental status improves  - daily awakening/breathing trials  - AMD work up as per neuro section    GI  - continue tube feeds    RENAL  - no issues    ID    #Flu  - completed Tamiflu  - completed 5 days of piperacillin/tazobactam on 3/22      ENDOCRINE    - continue with NPH and sliding scale - requirements have been increasing past few days    HEME    #DVT ppx  - HSQ

## 2024-03-23 NOTE — PROGRESS NOTE ADULT - SUBJECTIVE AND OBJECTIVE BOX
*************************************  NEUROLOGY PROGRESS NOTE  **************************************    SCHUYLER LAWLER  Female  [id number]    Subjective: Patient seen and examined at bedside with Neurology team and attending     Interval History:          VITAL SIGNS:  Vital Signs Last 24 Hrs  T(C): 37.8 (23 Mar 2024 04:00), Max: 38 (23 Mar 2024 00:00)  T(F): 100 (23 Mar 2024 04:00), Max: 100.4 (23 Mar 2024 00:00)  HR: 87 (23 Mar 2024 08:00) (62 - 102)  BP: 141/59 (23 Mar 2024 08:00) (96/48 - 173/76)  BP(mean): 80 (23 Mar 2024 08:00) (54 - 103)  RR: 17 (23 Mar 2024 08:00) (14 - 23)  SpO2: 95% (23 Mar 2024 08:00) (94% - 100%)    Parameters below as of 23 Mar 2024 08:00  Patient On (Oxygen Delivery Method): ventilator, AC 16  PEEP 5    O2 Concentration (%): 40            PHYSICAL EXAMINATION:  INCOMPLETE    Intubated and sedated   Neurologic Exam: LIMTIED  Mental status - Intubated and sedated (propofol 10). Does arose to verbal, tactile, noxious. blinks occasionally. Does not follow any commands.  Cranial nerves -   Left eye 2mm constricts appropriately  Right eye 2.5-3mm constricts appropriately  BTT not intact b/l   Corneal reflex intact n/l   Oculocephalic reflex inconclusive, crosses midline intermittently   face looks symmetric but difficult to assess with intubation  gag reflex intact    Motor -   RUE no movement noted with noxious stimuli  LUE no movement with noxious stimuli, tone flaccid  RLE no movement noted with noxious stimuli  LLE no movement noted with noxious stimuli    Sensation - no response to pain in all extremities    DTR's -             Biceps      Triceps     Brachioradialis      Patellar    Ankle    Toes/plantar response  R             2+             2+                  2+                 2+            2+                 Down  L              2+             2+                 2+                  2+           2+                 Down    Coordination - unable to assess    Gait and station - unable to assess      LABS:    CBC                       8.1    20.56 )-----------( 375      ( 23 Mar 2024 04:40 )             25.2     Chem 03-23    135  |  102  |  23  ----------------------------<  245<H>  4.1   |  22  |  0.86    Ca    9.1      23 Mar 2024 04:40  Phos  3.4     03-23  Mg     2.40     03-23    TPro  7.4  /  Alb  2.8<L>  /  TBili  0.4  /  DBili  x   /  AST  77<H>  /  ALT  39<H>  /  AlkPhos  214<H>  03-23    LFTs LIVER FUNCTIONS - ( 23 Mar 2024 04:40 )  Alb: 2.8 g/dL / Pro: 7.4 g/dL / ALK PHOS: 214 U/L / ALT: 39 U/L / AST: 77 U/L / GGT: x           Coagulopathy PT/INR - ( 23 Mar 2024 04:40 )   PT: 11.9 sec;   INR: 1.05 ratio         PTT - ( 23 Mar 2024 04:40 )  PTT:27.2 sec  Lipid Panel   A1c   Cardiac enzymes     U/A Urinalysis Basic - ( 23 Mar 2024 04:40 )    Color: x / Appearance: x / SG: x / pH: x  Gluc: 245 mg/dL / Ketone: x  / Bili: x / Urobili: x   Blood: x / Protein: x / Nitrite: x   Leuk Esterase: x / RBC: x / WBC x   Sq Epi: x / Non Sq Epi: x / Bacteria: x      CSF  Immunological  Other      RADIOLOGY & ADDITIONAL STUDIES:          STUDIES & IMAGING: (EEG, CT, MR, U/S, TTE/FAISAL):  Lumbar Puncture  CSF:  Total Nucleated Cell Count, CSF: 1 cells/uL (03-20-24 @ 15:38)  RBC Count - Spinal Fluid: 77 cells/uL (03-20-24 @ 15:38)  Protein, CSF: 24 mg/dL (03-20-24 @ 15:38)    TTE 3/16/24  EF=57%  1. Left ventricular systolic function is normal with an ejection fraction of 57 % by Rainey's method of disks.   2. There is moderate (grade 2) left ventricular diastolic dysfunction, with elevated filling pressure.   3. Normal right ventricular cavity size, with normal wall thickness, and normal systolic function.   4. Moderate to severe mitral regurgitation. The etiology of the mitral regurgitation is unclear.   5. The left atrium is severely dilated.   6. The right atrium is normal in size.   7. No prior echocardiogram is available for comparison.    Radiology:  CT Head No Cont:  (19 Mar 2024 19:26)  no acute intracranial hemorrhage, mass effect, shift of the midline structures, herniation, extra-axial fluid collection, or hydrocephalus.  There is diffuse cerebral volume loss with prominence of the sulci, fissures, and cisternal spaces which is normal for the patient's age. 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  Extensive scattered mucosal thickening as well as secretions are seen throughout the paranasal sinuses. The tympanomastoid cavities are clear. The calvarium is intact. There is evidence of bilateral cataract removal.    IMPRESSION: No acute intracranial hemorrhage, mass effect, or shift of the midline structures.  Mild chronic white matter microvascular type changes.  Extensive acute on chronic sinusitis.    EEG 3/21/24 17 hr study  -Multifocal sharp waves (bilateral posterior quadrant, bilateral independent frontal, rarely bilateral synchronous frontal), increasing in prevalence over the course of recording, becoming LPDs mostly at 1-2 Hz, fluctuating, rarely up to 3-4 Hz without definite evolution, most prevalent with arousal/stimulation (SIRPIDs)  -Event of intermittent LUE shaking with ongoing multifocal LPDs as above  -Moderate-severe improving to moderate diffuse slowing  -No definite electrographic seizures are captured.     Clinical Impression:  -Risk of multifocal-onset seizures, increasing over the course of recording  -Cannot entirely exclude some risk of generalized seizures  -The captured event of intermittent LUE shaking is less likely to be epileptic in nature  -Moderate-severe improving to moderate diffuse cerebral dysfunction is nonspecific in etiology. In this case, sedating medications and decrease in sedating medications may be contributing.  -No definite seizures    EEG 3/22/24 23hr study  -Multifocal sharp waves (bilateral posterior quadrant, bilateral independent frontal, frequently bilateral synchronous frontal), LPDs mostly at 1-2 Hz, fluctuating, rarely up to 2-3 Hz without definite evolution, most prevalent with arousal/stimulation (SIRPIDs)  -Moderate diffuse slowing  -No definite electrographic seizures are captured.     Clinical Impression:  -Risk of multifocal-onset seizures  -Cannot entirely exclude some risk of generalized seizures  -Moderate diffuse cerebral dysfunction is nonspecific in etiology.   -No definite seizures in >1.5 days of recording *************************************  NEUROLOGY PROGRESS NOTE  **************************************    SCHUYLER LAWLER  Female  [id number]    Subjective: Patient seen and examined at bedside with Neurology team and attending     Interval History:    no acute events overnight. Thai  used for examination, ID [id number].    VITAL SIGNS:  Vital Signs Last 24 Hrs  T(C): 37.8 (23 Mar 2024 04:00), Max: 38 (23 Mar 2024 00:00)  T(F): 100 (23 Mar 2024 04:00), Max: 100.4 (23 Mar 2024 00:00)  HR: 87 (23 Mar 2024 08:00) (62 - 102)  BP: 141/59 (23 Mar 2024 08:00) (96/48 - 173/76)  BP(mean): 80 (23 Mar 2024 08:00) (54 - 103)  RR: 17 (23 Mar 2024 08:00) (14 - 23)  SpO2: 95% (23 Mar 2024 08:00) (94% - 100%)    Parameters below as of 23 Mar 2024 08:00  Patient On (Oxygen Delivery Method): ventilator, AC 16  PEEP 5    O2 Concentration (%): 40    PHYSICAL EXAMINATION:     Intubated and sedated   Neurologic Exam: LIMTIED  Mental status - Intubated. Does arose to verbal, tactile, noxious. Opens eyes spontaneously and blinks occasionally. Does not follow any commands.  Cranial nerves -   Left eye 2mm constricts appropriately  Right eye 2.5-3mm constricts appropriately  BTT not intact b/l   Corneal reflex intact n/l   Oculocephalic reflex inconclusive, crosses midline intermittently   face looks symmetric but difficult to assess with intubation  gag reflex intact    Motor -   RUE no movement noted with noxious stimuli  LUE no movement with noxious stimuli, tone flaccid  RLE no movement noted with noxious stimuli  LLE subtle movement noted with noxious stimuli    Sensation - limited response to pain in all extremities    Coordination - unable to assess    Gait and station - unable to assess      LABS:    CBC                       8.1    20.56 )-----------( 375      ( 23 Mar 2024 04:40 )             25.2     Chem 03-23    135  |  102  |  23  ----------------------------<  245<H>  4.1   |  22  |  0.86    Ca    9.1      23 Mar 2024 04:40  Phos  3.4     03-23  Mg     2.40     03-23    TPro  7.4  /  Alb  2.8<L>  /  TBili  0.4  /  DBili  x   /  AST  77<H>  /  ALT  39<H>  /  AlkPhos  214<H>  03-23    LFTs LIVER FUNCTIONS - ( 23 Mar 2024 04:40 )  Alb: 2.8 g/dL / Pro: 7.4 g/dL / ALK PHOS: 214 U/L / ALT: 39 U/L / AST: 77 U/L / GGT: x           Coagulopathy PT/INR - ( 23 Mar 2024 04:40 )   PT: 11.9 sec;   INR: 1.05 ratio         PTT - ( 23 Mar 2024 04:40 )  PTT:27.2 sec  Lipid Panel   A1c   Cardiac enzymes     U/A Urinalysis Basic - ( 23 Mar 2024 04:40 )    Color: x / Appearance: x / SG: x / pH: x  Gluc: 245 mg/dL / Ketone: x  / Bili: x / Urobili: x   Blood: x / Protein: x / Nitrite: x   Leuk Esterase: x / RBC: x / WBC x   Sq Epi: x / Non Sq Epi: x / Bacteria: x      CSF  Immunological  Other      RADIOLOGY & ADDITIONAL STUDIES:          STUDIES & IMAGING: (EEG, CT, MR, U/S, TTE/FAISAL):  Lumbar Puncture  CSF:  Total Nucleated Cell Count, CSF: 1 cells/uL (03-20-24 @ 15:38)  RBC Count - Spinal Fluid: 77 cells/uL (03-20-24 @ 15:38)  Protein, CSF: 24 mg/dL (03-20-24 @ 15:38)    TTE 3/16/24  EF=57%  1. Left ventricular systolic function is normal with an ejection fraction of 57 % by Rainey's method of disks.   2. There is moderate (grade 2) left ventricular diastolic dysfunction, with elevated filling pressure.   3. Normal right ventricular cavity size, with normal wall thickness, and normal systolic function.   4. Moderate to severe mitral regurgitation. The etiology of the mitral regurgitation is unclear.   5. The left atrium is severely dilated.   6. The right atrium is normal in size.   7. No prior echocardiogram is available for comparison.    Radiology:  CT Head No Cont:  (19 Mar 2024 19:26)  no acute intracranial hemorrhage, mass effect, shift of the midline structures, herniation, extra-axial fluid collection, or hydrocephalus.  There is diffuse cerebral volume loss with prominence of the sulci, fissures, and cisternal spaces which is normal for the patient's age. 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  Extensive scattered mucosal thickening as well as secretions are seen throughout the paranasal sinuses. The tympanomastoid cavities are clear. The calvarium is intact. There is evidence of bilateral cataract removal.    IMPRESSION: No acute intracranial hemorrhage, mass effect, or shift of the midline structures.  Mild chronic white matter microvascular type changes.  Extensive acute on chronic sinusitis.    EEG 3/21/24 17 hr study  -Multifocal sharp waves (bilateral posterior quadrant, bilateral independent frontal, rarely bilateral synchronous frontal), increasing in prevalence over the course of recording, becoming LPDs mostly at 1-2 Hz, fluctuating, rarely up to 3-4 Hz without definite evolution, most prevalent with arousal/stimulation (SIRPIDs)  -Event of intermittent LUE shaking with ongoing multifocal LPDs as above  -Moderate-severe improving to moderate diffuse slowing  -No definite electrographic seizures are captured.     Clinical Impression:  -Risk of multifocal-onset seizures, increasing over the course of recording  -Cannot entirely exclude some risk of generalized seizures  -The captured event of intermittent LUE shaking is less likely to be epileptic in nature  -Moderate-severe improving to moderate diffuse cerebral dysfunction is nonspecific in etiology. In this case, sedating medications and decrease in sedating medications may be contributing.  -No definite seizures    EEG 3/22/24 23hr study  -Multifocal sharp waves (bilateral posterior quadrant, bilateral independent frontal, frequently bilateral synchronous frontal), LPDs mostly at 1-2 Hz, fluctuating, rarely up to 2-3 Hz without definite evolution, most prevalent with arousal/stimulation (SIRPIDs)  -Moderate diffuse slowing  -No definite electrographic seizures are captured.     Clinical Impression:  -Risk of multifocal-onset seizures  -Cannot entirely exclude some risk of generalized seizures  -Moderate diffuse cerebral dysfunction is nonspecific in etiology.   -No definite seizures in >1.5 days of recording *************************************  NEUROLOGY PROGRESS NOTE  **************************************    SCHUYLER LAWLER  Female  [id number]    Subjective: Patient seen and examined at bedside with Neurology team and attending     Interval History:    no acute events overnight. [place]  used for examination, ID [id number].    VITAL SIGNS:  Vital Signs Last 24 Hrs  T(C): 37.8 (23 Mar 2024 04:00), Max: 38 (23 Mar 2024 00:00)  T(F): 100 (23 Mar 2024 04:00), Max: 100.4 (23 Mar 2024 00:00)  HR: 87 (23 Mar 2024 08:00) (62 - 102)  BP: 141/59 (23 Mar 2024 08:00) (96/48 - 173/76)  BP(mean): 80 (23 Mar 2024 08:00) (54 - 103)  RR: 17 (23 Mar 2024 08:00) (14 - 23)  SpO2: 95% (23 Mar 2024 08:00) (94% - 100%)    Parameters below as of 23 Mar 2024 08:00  Patient On (Oxygen Delivery Method): ventilator, AC 16  PEEP 5    O2 Concentration (%): 40    PHYSICAL EXAMINATION:     Intubated and sedated   Neurologic Exam: LIMTIED  Mental status - Intubated. Does arose to verbal, tactile, noxious. Opens eyes spontaneously and blinks occasionally. Does not follow any commands.  Cranial nerves -   Left eye 2mm constricts appropriately  Right eye 2.5-3mm constricts appropriately  BTT not intact b/l   Corneal reflex intact n/l   Oculocephalic reflex inconclusive, crosses midline intermittently   face looks symmetric but difficult to assess with intubation  gag reflex intact    Motor -   RUE no movement noted with noxious stimuli  LUE subtle movement with noxious stimuli, tone flaccid  RLE no movement noted with noxious stimuli  LLE subtle movement noted with noxious stimuli    Sensation - limited response to pain in all extremities    Coordination - unable to assess    Gait and station - unable to assess      LABS:    CBC                       8.1    20.56 )-----------( 375      ( 23 Mar 2024 04:40 )             25.2     Chem 03-23    135  |  102  |  23  ----------------------------<  245<H>  4.1   |  22  |  0.86    Ca    9.1      23 Mar 2024 04:40  Phos  3.4     03-23  Mg     2.40     03-23    TPro  7.4  /  Alb  2.8<L>  /  TBili  0.4  /  DBili  x   /  AST  77<H>  /  ALT  39<H>  /  AlkPhos  214<H>  03-23    LFTs LIVER FUNCTIONS - ( 23 Mar 2024 04:40 )  Alb: 2.8 g/dL / Pro: 7.4 g/dL / ALK PHOS: 214 U/L / ALT: 39 U/L / AST: 77 U/L / GGT: x           Coagulopathy PT/INR - ( 23 Mar 2024 04:40 )   PT: 11.9 sec;   INR: 1.05 ratio         PTT - ( 23 Mar 2024 04:40 )  PTT:27.2 sec  Lipid Panel   A1c   Cardiac enzymes     U/A Urinalysis Basic - ( 23 Mar 2024 04:40 )    Color: x / Appearance: x / SG: x / pH: x  Gluc: 245 mg/dL / Ketone: x  / Bili: x / Urobili: x   Blood: x / Protein: x / Nitrite: x   Leuk Esterase: x / RBC: x / WBC x   Sq Epi: x / Non Sq Epi: x / Bacteria: x      CSF  Immunological  Other      RADIOLOGY & ADDITIONAL STUDIES:          STUDIES & IMAGING: (EEG, CT, MR, U/S, TTE/FAISAL):  Lumbar Puncture  CSF:  Total Nucleated Cell Count, CSF: 1 cells/uL (03-20-24 @ 15:38)  RBC Count - Spinal Fluid: 77 cells/uL (03-20-24 @ 15:38)  Protein, CSF: 24 mg/dL (03-20-24 @ 15:38)    TTE 3/16/24  EF=57%  1. Left ventricular systolic function is normal with an ejection fraction of 57 % by Rainey's method of disks.   2. There is moderate (grade 2) left ventricular diastolic dysfunction, with elevated filling pressure.   3. Normal right ventricular cavity size, with normal wall thickness, and normal systolic function.   4. Moderate to severe mitral regurgitation. The etiology of the mitral regurgitation is unclear.   5. The left atrium is severely dilated.   6. The right atrium is normal in size.   7. No prior echocardiogram is available for comparison.    Radiology:  CT Head No Cont:  (19 Mar 2024 19:26)  no acute intracranial hemorrhage, mass effect, shift of the midline structures, herniation, extra-axial fluid collection, or hydrocephalus.  There is diffuse cerebral volume loss with prominence of the sulci, fissures, and cisternal spaces which is normal for the patient's age. 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  Extensive scattered mucosal thickening as well as secretions are seen throughout the paranasal sinuses. The tympanomastoid cavities are clear. The calvarium is intact. There is evidence of bilateral cataract removal.    IMPRESSION: No acute intracranial hemorrhage, mass effect, or shift of the midline structures.  Mild chronic white matter microvascular type changes.  Extensive acute on chronic sinusitis.    EEG 3/21/24 17 hr study  -Multifocal sharp waves (bilateral posterior quadrant, bilateral independent frontal, rarely bilateral synchronous frontal), increasing in prevalence over the course of recording, becoming LPDs mostly at 1-2 Hz, fluctuating, rarely up to 3-4 Hz without definite evolution, most prevalent with arousal/stimulation (SIRPIDs)  -Event of intermittent LUE shaking with ongoing multifocal LPDs as above  -Moderate-severe improving to moderate diffuse slowing  -No definite electrographic seizures are captured.     Clinical Impression:  -Risk of multifocal-onset seizures, increasing over the course of recording  -Cannot entirely exclude some risk of generalized seizures  -The captured event of intermittent LUE shaking is less likely to be epileptic in nature  -Moderate-severe improving to moderate diffuse cerebral dysfunction is nonspecific in etiology. In this case, sedating medications and decrease in sedating medications may be contributing.  -No definite seizures    EEG 3/22/24 23hr study  -Multifocal sharp waves (bilateral posterior quadrant, bilateral independent frontal, frequently bilateral synchronous frontal), LPDs mostly at 1-2 Hz, fluctuating, rarely up to 2-3 Hz without definite evolution, most prevalent with arousal/stimulation (SIRPIDs)  -Moderate diffuse slowing  -No definite electrographic seizures are captured.     Clinical Impression:  -Risk of multifocal-onset seizures  -Cannot entirely exclude some risk of generalized seizures  -Moderate diffuse cerebral dysfunction is nonspecific in etiology.   -No definite seizures in >1.5 days of recording    EEG 3/23/24    EEG Classification / Summary:  Abnormal EEG in a lethargic patient.  -Multifocal sharp waves (bilateral posterior quadrant, bilateral independent frontal, frequently bilateral synchronous frontal), LPDs mostly at 1-2 Hz.  -No definite electrographic seizures are captured.     Clinical Impression:  -Risk of multifocal-onset seizures  -Cannot entirely exclude some risk of generalized seizures  -Moderate diffuse cerebral dysfunction is nonspecific in etiology.

## 2024-03-23 NOTE — PROGRESS NOTE ADULT - SUBJECTIVE AND OBJECTIVE BOX
INTERVAL HPI/OVERNIGHT EVENTS:    SUBJECTIVE: Patient seen and examined at bedside. Intubated, sedated, ROS cannot be obtained.       VITAL SIGNS:  ICU Vital Signs Last 24 Hrs  T(C): 37.8 (23 Mar 2024 04:00), Max: 38 (23 Mar 2024 00:00)  T(F): 100 (23 Mar 2024 04:00), Max: 100.4 (23 Mar 2024 00:00)  HR: 76 (23 Mar 2024 07:00) (62 - 102)  BP: 101/50 (23 Mar 2024 07:00) (96/48 - 173/76)  BP(mean): 63 (23 Mar 2024 07:00) (54 - 103)  ABP: --  ABP(mean): --  RR: 16 (23 Mar 2024 07:00) (14 - 23)  SpO2: 98% (23 Mar 2024 07:00) (94% - 100%)    O2 Parameters below as of 23 Mar 2024 07:00  Patient On (Oxygen Delivery Method): ventilator    O2 Concentration (%): 40      Mode: AC/ CMV (Assist Control/ Continuous Mandatory Ventilation), RR (machine): 16, TV (machine): 420, FiO2: 40, PEEP: 5, ITime: 0.85, MAP: 9, PIP: 24  Plateau pressure:   P/F ratio:     03-22 @ 07:01  -  03-23 @ 07:00  --------------------------------------------------------  IN: 1035.5 mL / OUT: 1050 mL / NET: -14.5 mL      CAPILLARY BLOOD GLUCOSE      POCT Blood Glucose.: 257 mg/dL (23 Mar 2024 04:39)    ECG:    PHYSICAL EXAM:    GENERAL: Elderly female, intubated   HEAD:  Atraumatic, normocephalic  NECK: Supple, no swelling , JVD not appreciated   HEART: rrr  LUNGS: Coarse breath sounds B/L   ABDOMEN: Soft, nontender/ nondistended, +BS  EXTREMITIES: +2 pulses,  NEURO:  Intubated and sedated   SKIN: No rashes or lesions    MEDICATIONS:  MEDICATIONS  (STANDING):  albuterol/ipratropium for Nebulization 3 milliLiter(s) Nebulizer every 6 hours  aspirin  chewable 81 milliGRAM(s) Oral daily  atorvastatin 80 milliGRAM(s) Oral at bedtime  chlorhexidine 0.12% Liquid 15 milliLiter(s) Oral Mucosa every 12 hours  chlorhexidine 2% Cloths 1 Application(s) Topical daily  famotidine    Tablet 20 milliGRAM(s) Oral daily  heparin   Injectable 5000 Unit(s) SubCutaneous every 8 hours  influenza  Vaccine (HIGH DOSE) 0.7 milliLiter(s) IntraMuscular once  insulin lispro (ADMELOG) corrective regimen sliding scale   SubCutaneous every 6 hours  insulin NPH human recombinant 17 Unit(s) SubCutaneous every 6 hours  levETIRAcetam 1000 milliGRAM(s) Oral two times a day  levothyroxine Injectable 20 MICROGram(s) IV Push at bedtime  multivitamin/minerals/iron Oral Solution (CENTRUM) 15 milliLiter(s) Oral daily  petrolatum Ophthalmic Ointment 1 Application(s) Both EYES daily  polyethylene glycol 3350 17 Gram(s) Oral two times a day  ranolazine 500 milliGRAM(s) Oral two times a day  senna 2 Tablet(s) Oral at bedtime    MEDICATIONS  (PRN):  acetaminophen   Oral Liquid .. 650 milliGRAM(s) Oral every 6 hours PRN Temp greater or equal to 38C (100.4F), Mild Pain (1 - 3), Moderate Pain (4 - 6)      ALLERGIES:  Allergies    No Known Allergies    Intolerances        LABS:                        8.1    20.56 )-----------( 375      ( 23 Mar 2024 04:40 )             25.2     03-23    135  |  102  |  23  ----------------------------<  245<H>  4.1   |  22  |  0.86    Ca    9.1      23 Mar 2024 04:40  Phos  3.4     03-23  Mg     2.40     03-23    TPro  7.4  /  Alb  2.8<L>  /  TBili  0.4  /  DBili  x   /  AST  77<H>  /  ALT  39<H>  /  AlkPhos  214<H>  03-23    PT/INR - ( 23 Mar 2024 04:40 )   PT: 11.9 sec;   INR: 1.05 ratio         PTT - ( 23 Mar 2024 04:40 )  PTT:27.2 sec  Urinalysis Basic - ( 23 Mar 2024 04:40 )    Color: x / Appearance: x / SG: x / pH: x  Gluc: 245 mg/dL / Ketone: x  / Bili: x / Urobili: x   Blood: x / Protein: x / Nitrite: x   Leuk Esterase: x / RBC: x / WBC x   Sq Epi: x / Non Sq Epi: x / Bacteria: x        RADIOLOGY & ADDITIONAL TESTS: Reviewed.

## 2024-03-24 LAB
ALBUMIN SERPL ELPH-MCNC: 3.2 G/DL — LOW (ref 3.3–5)
ALP SERPL-CCNC: 206 U/L — HIGH (ref 40–120)
ALT FLD-CCNC: 35 U/L — HIGH (ref 4–33)
ANION GAP SERPL CALC-SCNC: 14 MMOL/L — SIGNIFICANT CHANGE UP (ref 7–14)
APTT BLD: 30.3 SEC — SIGNIFICANT CHANGE UP (ref 24.5–35.6)
AST SERPL-CCNC: 61 U/L — HIGH (ref 4–32)
BASE EXCESS BLDV CALC-SCNC: 1.5 MMOL/L — SIGNIFICANT CHANGE UP (ref -2–3)
BASOPHILS # BLD AUTO: 0.11 K/UL — SIGNIFICANT CHANGE UP (ref 0–0.2)
BASOPHILS NFR BLD AUTO: 0.6 % — SIGNIFICANT CHANGE UP (ref 0–2)
BILIRUB SERPL-MCNC: 0.3 MG/DL — SIGNIFICANT CHANGE UP (ref 0.2–1.2)
BLOOD GAS VENOUS COMPREHENSIVE RESULT: SIGNIFICANT CHANGE UP
BUN SERPL-MCNC: 22 MG/DL — SIGNIFICANT CHANGE UP (ref 7–23)
CA-I BLD-SCNC: 1.19 MMOL/L — SIGNIFICANT CHANGE UP (ref 1.15–1.29)
CALCIUM SERPL-MCNC: 9.7 MG/DL — SIGNIFICANT CHANGE UP (ref 8.4–10.5)
CHLORIDE BLDV-SCNC: 105 MMOL/L — SIGNIFICANT CHANGE UP (ref 96–108)
CHLORIDE SERPL-SCNC: 100 MMOL/L — SIGNIFICANT CHANGE UP (ref 98–107)
CO2 BLDV-SCNC: 27.1 MMOL/L — HIGH (ref 22–26)
CO2 SERPL-SCNC: 23 MMOL/L — SIGNIFICANT CHANGE UP (ref 22–31)
CREAT SERPL-MCNC: 0.76 MG/DL — SIGNIFICANT CHANGE UP (ref 0.5–1.3)
EGFR: 76 ML/MIN/1.73M2 — SIGNIFICANT CHANGE UP
EOSINOPHIL # BLD AUTO: 0.27 K/UL — SIGNIFICANT CHANGE UP (ref 0–0.5)
EOSINOPHIL NFR BLD AUTO: 1.4 % — SIGNIFICANT CHANGE UP (ref 0–6)
GAS PNL BLDV: 136 MMOL/L — SIGNIFICANT CHANGE UP (ref 136–145)
GLUCOSE BLDC GLUCOMTR-MCNC: 197 MG/DL — HIGH (ref 70–99)
GLUCOSE BLDC GLUCOMTR-MCNC: 206 MG/DL — HIGH (ref 70–99)
GLUCOSE BLDC GLUCOMTR-MCNC: 209 MG/DL — HIGH (ref 70–99)
GLUCOSE BLDC GLUCOMTR-MCNC: 232 MG/DL — HIGH (ref 70–99)
GLUCOSE BLDC GLUCOMTR-MCNC: 239 MG/DL — HIGH (ref 70–99)
GLUCOSE BLDV-MCNC: 201 MG/DL — HIGH (ref 70–99)
GLUCOSE SERPL-MCNC: 207 MG/DL — HIGH (ref 70–99)
HCO3 BLDV-SCNC: 26 MMOL/L — SIGNIFICANT CHANGE UP (ref 22–29)
HCT VFR BLD CALC: 27.1 % — LOW (ref 34.5–45)
HCT VFR BLDA CALC: 27 % — LOW (ref 34.5–46.5)
HGB BLD CALC-MCNC: 9 G/DL — LOW (ref 11.7–16.1)
HGB BLD-MCNC: 8.8 G/DL — LOW (ref 11.5–15.5)
IANC: 12.59 K/UL — HIGH (ref 1.8–7.4)
IMM GRANULOCYTES NFR BLD AUTO: 6.3 % — HIGH (ref 0–0.9)
INR BLD: 1.08 RATIO — SIGNIFICANT CHANGE UP (ref 0.85–1.18)
LACTATE BLDV-MCNC: 1.8 MMOL/L — SIGNIFICANT CHANGE UP (ref 0.5–2)
LYMPHOCYTES # BLD AUTO: 10.9 % — LOW (ref 13–44)
LYMPHOCYTES # BLD AUTO: 2.12 K/UL — SIGNIFICANT CHANGE UP (ref 1–3.3)
MAGNESIUM SERPL-MCNC: 2.5 MG/DL — SIGNIFICANT CHANGE UP (ref 1.6–2.6)
MCHC RBC-ENTMCNC: 29.6 PG — SIGNIFICANT CHANGE UP (ref 27–34)
MCHC RBC-ENTMCNC: 32.5 GM/DL — SIGNIFICANT CHANGE UP (ref 32–36)
MCV RBC AUTO: 91.2 FL — SIGNIFICANT CHANGE UP (ref 80–100)
MONOCYTES # BLD AUTO: 3.09 K/UL — HIGH (ref 0–0.9)
MONOCYTES NFR BLD AUTO: 15.9 % — HIGH (ref 2–14)
NEUTROPHILS # BLD AUTO: 12.59 K/UL — HIGH (ref 1.8–7.4)
NEUTROPHILS NFR BLD AUTO: 64.9 % — SIGNIFICANT CHANGE UP (ref 43–77)
NRBC # BLD: 0 /100 WBCS — SIGNIFICANT CHANGE UP (ref 0–0)
NRBC # FLD: 0.14 K/UL — HIGH (ref 0–0)
PCO2 BLDV: 39 MMHG — SIGNIFICANT CHANGE UP (ref 39–52)
PH BLDV: 7.43 — SIGNIFICANT CHANGE UP (ref 7.32–7.43)
PHOSPHATE SERPL-MCNC: 2.9 MG/DL — SIGNIFICANT CHANGE UP (ref 2.5–4.5)
PLATELET # BLD AUTO: 410 K/UL — HIGH (ref 150–400)
PO2 BLDV: 52 MMHG — HIGH (ref 25–45)
POTASSIUM BLDV-SCNC: 4.4 MMOL/L — SIGNIFICANT CHANGE UP (ref 3.5–5.1)
POTASSIUM SERPL-MCNC: 4.5 MMOL/L — SIGNIFICANT CHANGE UP (ref 3.5–5.3)
POTASSIUM SERPL-SCNC: 4.5 MMOL/L — SIGNIFICANT CHANGE UP (ref 3.5–5.3)
PROT SERPL-MCNC: 7.9 G/DL — SIGNIFICANT CHANGE UP (ref 6–8.3)
PROTHROM AB SERPL-ACNC: 12.2 SEC — SIGNIFICANT CHANGE UP (ref 9.5–13)
RBC # BLD: 2.97 M/UL — LOW (ref 3.8–5.2)
RBC # FLD: 14.8 % — HIGH (ref 10.3–14.5)
SAO2 % BLDV: 83.5 % — SIGNIFICANT CHANGE UP (ref 67–88)
SODIUM SERPL-SCNC: 137 MMOL/L — SIGNIFICANT CHANGE UP (ref 135–145)
WBC # BLD: 19.41 K/UL — HIGH (ref 3.8–10.5)
WBC # FLD AUTO: 19.41 K/UL — HIGH (ref 3.8–10.5)

## 2024-03-24 PROCEDURE — 99291 CRITICAL CARE FIRST HOUR: CPT | Mod: GC

## 2024-03-24 RX ADMIN — CHLORHEXIDINE GLUCONATE 15 MILLILITER(S): 213 SOLUTION TOPICAL at 05:23

## 2024-03-24 RX ADMIN — HUMAN INSULIN 20 UNIT(S): 100 INJECTION, SUSPENSION SUBCUTANEOUS at 17:11

## 2024-03-24 RX ADMIN — Medication 3 MILLILITER(S): at 03:37

## 2024-03-24 RX ADMIN — Medication 2: at 00:35

## 2024-03-24 RX ADMIN — HEPARIN SODIUM 5000 UNIT(S): 5000 INJECTION INTRAVENOUS; SUBCUTANEOUS at 05:23

## 2024-03-24 RX ADMIN — Medication 81 MILLIGRAM(S): at 12:12

## 2024-03-24 RX ADMIN — Medication 15 MILLILITER(S): at 12:11

## 2024-03-24 RX ADMIN — Medication 2: at 12:15

## 2024-03-24 RX ADMIN — HUMAN INSULIN 20 UNIT(S): 100 INJECTION, SUSPENSION SUBCUTANEOUS at 00:35

## 2024-03-24 RX ADMIN — RANOLAZINE 500 MILLIGRAM(S): 500 TABLET, FILM COATED, EXTENDED RELEASE ORAL at 05:22

## 2024-03-24 RX ADMIN — Medication 2: at 05:23

## 2024-03-24 RX ADMIN — HUMAN INSULIN 20 UNIT(S): 100 INJECTION, SUSPENSION SUBCUTANEOUS at 05:23

## 2024-03-24 RX ADMIN — Medication 2: at 23:57

## 2024-03-24 RX ADMIN — LEVETIRACETAM 1000 MILLIGRAM(S): 250 TABLET, FILM COATED ORAL at 17:09

## 2024-03-24 RX ADMIN — Medication 3 MILLILITER(S): at 20:28

## 2024-03-24 RX ADMIN — Medication 650 MILLIGRAM(S): at 05:23

## 2024-03-24 RX ADMIN — CHLORHEXIDINE GLUCONATE 1 APPLICATION(S): 213 SOLUTION TOPICAL at 12:21

## 2024-03-24 RX ADMIN — Medication 1 APPLICATION(S): at 12:14

## 2024-03-24 RX ADMIN — FAMOTIDINE 20 MILLIGRAM(S): 10 INJECTION INTRAVENOUS at 12:12

## 2024-03-24 RX ADMIN — LEVETIRACETAM 1000 MILLIGRAM(S): 250 TABLET, FILM COATED ORAL at 05:23

## 2024-03-24 RX ADMIN — Medication 1: at 17:28

## 2024-03-24 RX ADMIN — ATORVASTATIN CALCIUM 80 MILLIGRAM(S): 80 TABLET, FILM COATED ORAL at 21:25

## 2024-03-24 RX ADMIN — HUMAN INSULIN 20 UNIT(S): 100 INJECTION, SUSPENSION SUBCUTANEOUS at 12:12

## 2024-03-24 RX ADMIN — Medication 650 MILLIGRAM(S): at 05:55

## 2024-03-24 RX ADMIN — HEPARIN SODIUM 5000 UNIT(S): 5000 INJECTION INTRAVENOUS; SUBCUTANEOUS at 14:48

## 2024-03-24 RX ADMIN — CHLORHEXIDINE GLUCONATE 15 MILLILITER(S): 213 SOLUTION TOPICAL at 17:10

## 2024-03-24 RX ADMIN — RANOLAZINE 500 MILLIGRAM(S): 500 TABLET, FILM COATED, EXTENDED RELEASE ORAL at 17:09

## 2024-03-24 RX ADMIN — Medication 650 MILLIGRAM(S): at 18:24

## 2024-03-24 RX ADMIN — Medication 20 MICROGRAM(S): at 21:25

## 2024-03-24 RX ADMIN — HUMAN INSULIN 20 UNIT(S): 100 INJECTION, SUSPENSION SUBCUTANEOUS at 23:56

## 2024-03-24 RX ADMIN — Medication 650 MILLIGRAM(S): at 18:03

## 2024-03-24 RX ADMIN — Medication 3 MILLILITER(S): at 15:35

## 2024-03-24 RX ADMIN — HEPARIN SODIUM 5000 UNIT(S): 5000 INJECTION INTRAVENOUS; SUBCUTANEOUS at 21:25

## 2024-03-24 RX ADMIN — Medication 3 MILLILITER(S): at 11:55

## 2024-03-24 RX ADMIN — POLYETHYLENE GLYCOL 3350 17 GRAM(S): 17 POWDER, FOR SOLUTION ORAL at 05:22

## 2024-03-24 RX ADMIN — POLYETHYLENE GLYCOL 3350 17 GRAM(S): 17 POWDER, FOR SOLUTION ORAL at 17:10

## 2024-03-24 NOTE — PROGRESS NOTE ADULT - ATTENDING COMMENTS
88-year-old female with significant past medical history of heart failure with preserved ejection fraction, diabetes who comes in for acute hypoxic respiratory failure and altered mental status.  Patient continues to be intubated but mental status has not improved.  Patient has done neurological workup which includes EEG and LP which were negative.  CT head did not show any significant abnormalities and is currently awaiting an MRI.  Increase neuro exam today as patient grimcing to pain and moving lower extremities.     Patient has completed treatment for Tamiflu and aspiration pneumonia.    Rest of the plan as per above.  Patient prognosis is guarded.

## 2024-03-24 NOTE — PROGRESS NOTE ADULT - SUBJECTIVE AND OBJECTIVE BOX
PROGRESS NOTE:     Patient is a 88y old  Female who presents with a chief complaint of influenza, sepsis, hypoxia (23 Mar 2024 08:11)      SUBJECTIVE / OVERNIGHT EVENTS: No acute events overnight per night team.     ADDITIONAL REVIEW OF SYSTEMS:    MEDICATIONS  (STANDING):  albuterol/ipratropium for Nebulization 3 milliLiter(s) Nebulizer every 6 hours  aspirin  chewable 81 milliGRAM(s) Oral daily  atorvastatin 80 milliGRAM(s) Oral at bedtime  chlorhexidine 0.12% Liquid 15 milliLiter(s) Oral Mucosa every 12 hours  chlorhexidine 2% Cloths 1 Application(s) Topical daily  famotidine    Tablet 20 milliGRAM(s) Oral daily  heparin   Injectable 5000 Unit(s) SubCutaneous every 8 hours  influenza  Vaccine (HIGH DOSE) 0.7 milliLiter(s) IntraMuscular once  insulin lispro (ADMELOG) corrective regimen sliding scale   SubCutaneous every 6 hours  insulin NPH human recombinant 20 Unit(s) SubCutaneous every 6 hours  levETIRAcetam  Solution 1000 milliGRAM(s) Oral two times a day  levothyroxine Injectable 20 MICROGram(s) IV Push at bedtime  multivitamin/minerals/iron Oral Solution (CENTRUM) 15 milliLiter(s) Oral daily  petrolatum Ophthalmic Ointment 1 Application(s) Both EYES daily  polyethylene glycol 3350 17 Gram(s) Oral two times a day  ranolazine 500 milliGRAM(s) Oral two times a day  senna 2 Tablet(s) Oral at bedtime    MEDICATIONS  (PRN):  acetaminophen   Oral Liquid .. 650 milliGRAM(s) Oral every 6 hours PRN Temp greater or equal to 38C (100.4F), Mild Pain (1 - 3), Moderate Pain (4 - 6)      CAPILLARY BLOOD GLUCOSE      POCT Blood Glucose.: 239 mg/dL (24 Mar 2024 05:16)  POCT Blood Glucose.: 232 mg/dL (23 Mar 2024 23:58)  POCT Blood Glucose.: 212 mg/dL (23 Mar 2024 17:50)  POCT Blood Glucose.: 248 mg/dL (23 Mar 2024 11:18)    I&O's Summary    23 Mar 2024 07:01  -  24 Mar 2024 07:00  --------------------------------------------------------  IN: 965 mL / OUT: 1590 mL / NET: -625 mL        PHYSICAL EXAM:  Vital Signs Last 24 Hrs  T(C): 38.2 (24 Mar 2024 04:00), Max: 38.2 (24 Mar 2024 04:00)  T(F): 100.7 (24 Mar 2024 04:00), Max: 100.7 (24 Mar 2024 04:00)  HR: 93 (24 Mar 2024 08:02) (78 - 103)  BP: 132/62 (24 Mar 2024 07:00) (112/60 - 155/75)  BP(mean): 79 (24 Mar 2024 07:00) (61 - 94)  RR: 22 (24 Mar 2024 07:00) (15 - 23)  SpO2: 98% (24 Mar 2024 08:02) (96% - 100%)    Parameters below as of 24 Mar 2024 07:00  Patient On (Oxygen Delivery Method): ventilator, cpap10/5,40%    O2 Concentration (%): 40    CONSTITUTIONAL: NAD, well-developed  RESPIRATORY: Normal respiratory effort; lungs are clear to auscultation bilaterally  CARDIOVASCULAR: Regular rate and rhythm, normal S1 and S2, no murmur/rub/gallop; No lower extremity edema; Peripheral pulses are 2+ bilaterally  ABDOMEN: Nontender to palpation, normoactive bowel sounds, no rebound/guarding; No hepatosplenomegaly  MUSCULOSKELETAL: no clubbing or cyanosis of digits; no joint swelling or tenderness to palpation  PSYCH: A+O to person, place, and time; affect appropriate    LABS:                        8.8    19.41 )-----------( 410      ( 24 Mar 2024 00:35 )             27.1     03-24    137  |  100  |  22  ----------------------------<  207<H>  4.5   |  23  |  0.76    Ca    9.7      24 Mar 2024 00:35  Phos  2.9     03-24  Mg     2.50     03-24    TPro  7.9  /  Alb  3.2<L>  /  TBili  0.3  /  DBili  x   /  AST  61<H>  /  ALT  35<H>  /  AlkPhos  206<H>  03-24    PT/INR - ( 24 Mar 2024 00:35 )   PT: 12.2 sec;   INR: 1.08 ratio         PTT - ( 24 Mar 2024 00:35 )  PTT:30.3 sec      Urinalysis Basic - ( 24 Mar 2024 00:35 )    Color: x / Appearance: x / SG: x / pH: x  Gluc: 207 mg/dL / Ketone: x  / Bili: x / Urobili: x   Blood: x / Protein: x / Nitrite: x   Leuk Esterase: x / RBC: x / WBC x   Sq Epi: x / Non Sq Epi: x / Bacteria: x          RADIOLOGY & ADDITIONAL TESTS:  Results Reviewed:   Imaging Personally Reviewed:  Electrocardiogram Personally Reviewed:    COORDINATION OF CARE:  Care Discussed with Consultants/Other Providers [Y/N]:  Prior or Outpatient Records Reviewed [Y/N]:      ******************************  Authored By: Chuck Douglas MD PGY3  Internal Medicine  MS Teams - Call or Text  ******************************   PROGRESS NOTE:     Patient is a 88y old  Female who presents with a chief complaint of influenza, sepsis, hypoxia (23 Mar 2024 08:11)      SUBJECTIVE / OVERNIGHT EVENTS: Low grade 100.7 fever, given tylenol. Pt off precedex and IV sedatives/anxiolytics - awake, opens eyes, but not following structions - not tracking, not withdrawing to pain. HDS otherwise, but concern for protecting airway remains. MRI pending. CPAP now x24 hours, tolerating well.    ADDITIONAL REVIEW OF SYSTEMS:    MEDICATIONS  (STANDING):  albuterol/ipratropium for Nebulization 3 milliLiter(s) Nebulizer every 6 hours  aspirin  chewable 81 milliGRAM(s) Oral daily  atorvastatin 80 milliGRAM(s) Oral at bedtime  chlorhexidine 0.12% Liquid 15 milliLiter(s) Oral Mucosa every 12 hours  chlorhexidine 2% Cloths 1 Application(s) Topical daily  famotidine    Tablet 20 milliGRAM(s) Oral daily  heparin   Injectable 5000 Unit(s) SubCutaneous every 8 hours  influenza  Vaccine (HIGH DOSE) 0.7 milliLiter(s) IntraMuscular once  insulin lispro (ADMELOG) corrective regimen sliding scale   SubCutaneous every 6 hours  insulin NPH human recombinant 20 Unit(s) SubCutaneous every 6 hours  levETIRAcetam  Solution 1000 milliGRAM(s) Oral two times a day  levothyroxine Injectable 20 MICROGram(s) IV Push at bedtime  multivitamin/minerals/iron Oral Solution (CENTRUM) 15 milliLiter(s) Oral daily  petrolatum Ophthalmic Ointment 1 Application(s) Both EYES daily  polyethylene glycol 3350 17 Gram(s) Oral two times a day  ranolazine 500 milliGRAM(s) Oral two times a day  senna 2 Tablet(s) Oral at bedtime    MEDICATIONS  (PRN):  acetaminophen   Oral Liquid .. 650 milliGRAM(s) Oral every 6 hours PRN Temp greater or equal to 38C (100.4F), Mild Pain (1 - 3), Moderate Pain (4 - 6)      CAPILLARY BLOOD GLUCOSE      POCT Blood Glucose.: 239 mg/dL (24 Mar 2024 05:16)  POCT Blood Glucose.: 232 mg/dL (23 Mar 2024 23:58)  POCT Blood Glucose.: 212 mg/dL (23 Mar 2024 17:50)  POCT Blood Glucose.: 248 mg/dL (23 Mar 2024 11:18)    I&O's Summary    23 Mar 2024 07:01  -  24 Mar 2024 07:00  --------------------------------------------------------  IN: 965 mL / OUT: 1590 mL / NET: -625 mL        PHYSICAL EXAM:  Vital Signs Last 24 Hrs  T(C): 38.2 (24 Mar 2024 04:00), Max: 38.2 (24 Mar 2024 04:00)  T(F): 100.7 (24 Mar 2024 04:00), Max: 100.7 (24 Mar 2024 04:00)  HR: 93 (24 Mar 2024 08:02) (78 - 103)  BP: 132/62 (24 Mar 2024 07:00) (112/60 - 155/75)  BP(mean): 79 (24 Mar 2024 07:00) (61 - 94)  RR: 22 (24 Mar 2024 07:00) (15 - 23)  SpO2: 98% (24 Mar 2024 08:02) (96% - 100%)    Parameters below as of 24 Mar 2024 07:00  Patient On (Oxygen Delivery Method): ventilator, cpap10/5,40%    O2 Concentration (%): 40    CONSTITUTIONAL: NAD, well-developed  RESPIRATORY: Normal respiratory effort; lungs are clear to auscultation bilaterally  CARDIOVASCULAR: Regular rate and rhythm, normal S1 and S2, no murmur/rub/gallop; No lower extremity edema; Peripheral pulses are 2+ bilaterally  ABDOMEN: Nontender to palpation, normoactive bowel sounds, no rebound/guarding; No hepatosplenomegaly  MUSCULOSKELETAL: no clubbing or cyanosis of digits; no joint swelling or tenderness to palpation  PSYCH: A&Ox0  NEURO: PERRL, A&Ox0, opens eyes but not tracking, no cough/gag when suctioned through ET tube    LABS:                        8.8    19.41 )-----------( 410      ( 24 Mar 2024 00:35 )             27.1     03-24    137  |  100  |  22  ----------------------------<  207<H>  4.5   |  23  |  0.76    Ca    9.7      24 Mar 2024 00:35  Phos  2.9     03-24  Mg     2.50     03-24    TPro  7.9  /  Alb  3.2<L>  /  TBili  0.3  /  DBili  x   /  AST  61<H>  /  ALT  35<H>  /  AlkPhos  206<H>  03-24    PT/INR - ( 24 Mar 2024 00:35 )   PT: 12.2 sec;   INR: 1.08 ratio         PTT - ( 24 Mar 2024 00:35 )  PTT:30.3 sec      Urinalysis Basic - ( 24 Mar 2024 00:35 )    Color: x / Appearance: x / SG: x / pH: x  Gluc: 207 mg/dL / Ketone: x  / Bili: x / Urobili: x   Blood: x / Protein: x / Nitrite: x   Leuk Esterase: x / RBC: x / WBC x   Sq Epi: x / Non Sq Epi: x / Bacteria: x          RADIOLOGY & ADDITIONAL TESTS:  Results Reviewed:   Imaging Personally Reviewed:  Electrocardiogram Personally Reviewed:    COORDINATION OF CARE:  Care Discussed with Consultants/Other Providers [Y/N]:  Prior or Outpatient Records Reviewed [Y/N]:      ******************************  Authored By: Chuck Douglas MD PGY3  Internal Medicine  MS Teams - Call or Text  ******************************

## 2024-03-24 NOTE — PROGRESS NOTE ADULT - ASSESSMENT
88F with HFpEF, CAD (s/p multiple stents), HTN, HLD, DM2 admitted for acute hypoxemic respiratory failure requiring intubation from altered mental status    NEUROLOGIC    #?Seizures  With left lateral gaze deviation and tonic clonic jerking of extremities when off sedation  CT head negative of acute intracranial process or mass  LP unrevealing  EEG showing multifocal epileptiform potentials  - MR brain with and without gadolinium  - Keppra 1g bid  - continue vEEG    CARDIOVASCULAR    #HFpEF  #CAD  #HTN  - continue home antihypertensives  - re-introduce other home meds as able  - on low dose rivaroxaban and aspirin for CAD - hold DOAC in the setting of critical illness    PULMONARY    #Acute hypoxemic respiratory failure  Intubated for airway protection in the setting of altered mental status from ?seizure activity  - continue with mechanical ventilation until mental status improves  - daily awakening/breathing trials  - AMD work up as per neuro section    GI  - continue tube feeds    RENAL  - no issues    ID    #Flu  - completed Tamiflu  - completed 5 days of piperacillin/tazobactam on 3/22      ENDOCRINE    - continue with NPH and sliding scale - requirements have been increasing past few days    HEME    #DVT ppx  - HSQ 88F with HFpEF, CAD (s/p multiple stents), HTN, HLD, DM2 admitted for acute hypoxemic respiratory failure requiring intubation from altered mental status    NEUROLOGIC    #?Seizures  - Prior episodes with left lateral gaze deviation and tonic clonic jerking of extremities when off sedation  - CT head negative of acute intracranial process or mass  - LP unrevealing  - EEG showing multifocal epileptiform potentials >> no acute seizures documented but unable to completely rule out either  - MR brain with and without gadolinium pending  - Keppra 1g bid continue      CARDIOVASCULAR    #HFpEF  #CAD (WAYNE x6 in 2019, NSTEMI admission 2022)  #HTN  - continue home antihypertensives  - Home meds: aspirin 81, rivaroxaban 2.5 PO BID, statin  - DOAC has been held during MICU admission iso critical illness >> may resume after MRI head completed      PULMONARY    #Acute hypoxemic respiratory failure  Intubated for airway protection in the setting of altered mental status from ?seizure activity vs aspiration during RRT on floors  - Intubated 3/17  - Abx: Zosyn completed x5 day course for aspiration  - Volume A/C >> CPAP started 3/23 AM, tolerating well    GI  - continue tube feeds    RENAL  - no issues    ID    #Flu  - completed Tamiflu  - completed 5 days of piperacillin/tazobactam on 3/22      ENDOCRINE    - continue with NPH and sliding scale - requirements have been increasing past few days    HEME    #DVT ppx  - HSQ    Full Code. 88F with HFpEF, CAD (s/p multiple stents), HTN, HLD, DM2 admitted to MICU for acute hypoxemic respiratory failure requiring intubation during RRT on floors 3/17/23 from altered mental status (?pna vs seizure), course c/b seizure like activity.    NEUROLOGIC    #?Seizures  - Prior episodes with left lateral gaze deviation and tonic clonic jerking of extremities when off sedation  - CT head negative of acute intracranial process or mass  - LP unrevealing  - EEG showing multifocal epileptiform potentials >> no acute seizures documented but unable to completely rule out either  - MR brain with and without gadolinium pending  - Keppra 1g bid continue      CARDIOVASCULAR    #HFpEF  #CAD (WAYNE x6 in 2019, NSTEMI admission 2022)  #HTN  - continue home antihypertensives  - Home meds: aspirin 81, rivaroxaban 2.5 PO BID, statin  - DOAC has been held during MICU admission iso critical illness >> may resume after MRI head completed      PULMONARY    #Acute hypoxemic respiratory failure  Intubated for airway protection in the setting of altered mental status from ?seizure activity vs aspiration during RRT on floors  - Intubated 3/17  - Abx: Zosyn completed x5 day course for aspiration  - Volume A/C >> CPAP started 3/23 AM, tolerating well    GI  - continue tube feeds    RENAL  - no issues    ID    #Flu  - completed Tamiflu  - completed 5 days of piperacillin/tazobactam on 3/22      ENDOCRINE    - continue with NPH and sliding scale - requirements have been increasing past few days    HEME    #DVT ppx  - HSQ    Full Code.

## 2024-03-25 LAB
ALBUMIN SERPL ELPH-MCNC: 2.9 G/DL — LOW (ref 3.3–5)
ALP SERPL-CCNC: 210 U/L — HIGH (ref 40–120)
ALT FLD-CCNC: 35 U/L — HIGH (ref 4–33)
ANION GAP SERPL CALC-SCNC: 10 MMOL/L — SIGNIFICANT CHANGE UP (ref 7–14)
ANISOCYTOSIS BLD QL: SLIGHT — SIGNIFICANT CHANGE UP
APTT BLD: 34.4 SEC — SIGNIFICANT CHANGE UP (ref 24.5–35.6)
AST SERPL-CCNC: 48 U/L — HIGH (ref 4–32)
BASOPHILS # BLD AUTO: 0 K/UL — SIGNIFICANT CHANGE UP (ref 0–0.2)
BASOPHILS NFR BLD AUTO: 0 % — SIGNIFICANT CHANGE UP (ref 0–2)
BILIRUB SERPL-MCNC: 0.2 MG/DL — SIGNIFICANT CHANGE UP (ref 0.2–1.2)
BLOOD GAS ARTERIAL - LYTES,HGB,ICA,LACT RESULT: SIGNIFICANT CHANGE UP
BUN SERPL-MCNC: 20 MG/DL — SIGNIFICANT CHANGE UP (ref 7–23)
CA-I BLD-SCNC: 1.23 MMOL/L — SIGNIFICANT CHANGE UP (ref 1.15–1.29)
CALCIUM SERPL-MCNC: 9.4 MG/DL — SIGNIFICANT CHANGE UP (ref 8.4–10.5)
CHLORIDE SERPL-SCNC: 106 MMOL/L — SIGNIFICANT CHANGE UP (ref 98–107)
CO2 SERPL-SCNC: 25 MMOL/L — SIGNIFICANT CHANGE UP (ref 22–31)
CREAT SERPL-MCNC: 0.78 MG/DL — SIGNIFICANT CHANGE UP (ref 0.5–1.3)
CULTURE RESULTS: SIGNIFICANT CHANGE UP
EGFR: 74 ML/MIN/1.73M2 — SIGNIFICANT CHANGE UP
EOSINOPHIL # BLD AUTO: 0.7 K/UL — HIGH (ref 0–0.5)
EOSINOPHIL NFR BLD AUTO: 4.4 % — SIGNIFICANT CHANGE UP (ref 0–6)
GAS PNL BLDV: SIGNIFICANT CHANGE UP
GIANT PLATELETS BLD QL SMEAR: PRESENT — SIGNIFICANT CHANGE UP
GLUCOSE BLDC GLUCOMTR-MCNC: 128 MG/DL — HIGH (ref 70–99)
GLUCOSE BLDC GLUCOMTR-MCNC: 171 MG/DL — HIGH (ref 70–99)
GLUCOSE BLDC GLUCOMTR-MCNC: 180 MG/DL — HIGH (ref 70–99)
GLUCOSE BLDC GLUCOMTR-MCNC: 200 MG/DL — HIGH (ref 70–99)
GLUCOSE SERPL-MCNC: 176 MG/DL — HIGH (ref 70–99)
HCT VFR BLD CALC: 24.6 % — LOW (ref 34.5–45)
HGB BLD-MCNC: 7.8 G/DL — LOW (ref 11.5–15.5)
IANC: 10.45 K/UL — HIGH (ref 1.8–7.4)
INR BLD: 1.17 RATIO — SIGNIFICANT CHANGE UP (ref 0.85–1.18)
LYMPHOCYTES # BLD AUTO: 0.98 K/UL — LOW (ref 1–3.3)
LYMPHOCYTES # BLD AUTO: 6.2 % — LOW (ref 13–44)
MAGNESIUM SERPL-MCNC: 2.7 MG/DL — HIGH (ref 1.6–2.6)
MANUAL SMEAR VERIFICATION: SIGNIFICANT CHANGE UP
MCHC RBC-ENTMCNC: 29.3 PG — SIGNIFICANT CHANGE UP (ref 27–34)
MCHC RBC-ENTMCNC: 31.7 GM/DL — LOW (ref 32–36)
MCV RBC AUTO: 92.5 FL — SIGNIFICANT CHANGE UP (ref 80–100)
MONOCYTES # BLD AUTO: 2.1 K/UL — HIGH (ref 0–0.9)
MONOCYTES NFR BLD AUTO: 13.3 % — SIGNIFICANT CHANGE UP (ref 2–14)
NEUTROPHILS # BLD AUTO: 11.62 K/UL — HIGH (ref 1.8–7.4)
NEUTROPHILS NFR BLD AUTO: 72.6 % — SIGNIFICANT CHANGE UP (ref 43–77)
NEUTS BAND # BLD: 0.9 % — SIGNIFICANT CHANGE UP (ref 0–6)
PHOSPHATE SERPL-MCNC: 3.9 MG/DL — SIGNIFICANT CHANGE UP (ref 2.5–4.5)
PLAT MORPH BLD: NORMAL — SIGNIFICANT CHANGE UP
PLATELET # BLD AUTO: 430 K/UL — HIGH (ref 150–400)
PLATELET COUNT - ESTIMATE: NORMAL — SIGNIFICANT CHANGE UP
POIKILOCYTOSIS BLD QL AUTO: SLIGHT — SIGNIFICANT CHANGE UP
POLYCHROMASIA BLD QL SMEAR: SIGNIFICANT CHANGE UP
POTASSIUM SERPL-MCNC: 4.3 MMOL/L — SIGNIFICANT CHANGE UP (ref 3.5–5.3)
POTASSIUM SERPL-SCNC: 4.3 MMOL/L — SIGNIFICANT CHANGE UP (ref 3.5–5.3)
PROT SERPL-MCNC: 7.5 G/DL — SIGNIFICANT CHANGE UP (ref 6–8.3)
PROTHROM AB SERPL-ACNC: 13 SEC — SIGNIFICANT CHANGE UP (ref 9.5–13)
RBC # BLD: 2.66 M/UL — LOW (ref 3.8–5.2)
RBC # FLD: 15.5 % — HIGH (ref 10.3–14.5)
RBC BLD AUTO: ABNORMAL
SODIUM SERPL-SCNC: 141 MMOL/L — SIGNIFICANT CHANGE UP (ref 135–145)
SPECIMEN SOURCE: SIGNIFICANT CHANGE UP
VARIANT LYMPHS # BLD: 2.6 % — SIGNIFICANT CHANGE UP (ref 0–6)
WBC # BLD: 15.81 K/UL — HIGH (ref 3.8–10.5)
WBC # FLD AUTO: 15.81 K/UL — HIGH (ref 3.8–10.5)

## 2024-03-25 PROCEDURE — 93308 TTE F-UP OR LMTD: CPT | Mod: 26,GC

## 2024-03-25 PROCEDURE — 99291 CRITICAL CARE FIRST HOUR: CPT | Mod: GC

## 2024-03-25 PROCEDURE — 99291 CRITICAL CARE FIRST HOUR: CPT

## 2024-03-25 RX ORDER — DEXAMETHASONE 0.5 MG/5ML
4 ELIXIR ORAL ONCE
Refills: 0 | Status: DISCONTINUED | OUTPATIENT
Start: 2024-03-25 | End: 2024-03-25

## 2024-03-25 RX ORDER — DEXAMETHASONE 0.5 MG/5ML
6 ELIXIR ORAL ONCE
Refills: 0 | Status: DISCONTINUED | OUTPATIENT
Start: 2024-03-25 | End: 2024-03-25

## 2024-03-25 RX ORDER — DEXAMETHASONE 0.5 MG/5ML
6 ELIXIR ORAL ONCE
Refills: 0 | Status: COMPLETED | OUTPATIENT
Start: 2024-03-25 | End: 2024-03-25

## 2024-03-25 RX ORDER — DEXMEDETOMIDINE HYDROCHLORIDE IN 0.9% SODIUM CHLORIDE 4 UG/ML
0.5 INJECTION INTRAVENOUS
Qty: 400 | Refills: 0 | Status: DISCONTINUED | OUTPATIENT
Start: 2024-03-25 | End: 2024-03-25

## 2024-03-25 RX ORDER — DEXMEDETOMIDINE HYDROCHLORIDE IN 0.9% SODIUM CHLORIDE 4 UG/ML
0.5 INJECTION INTRAVENOUS
Qty: 200 | Refills: 0 | Status: DISCONTINUED | OUTPATIENT
Start: 2024-03-25 | End: 2024-03-25

## 2024-03-25 RX ADMIN — Medication 1: at 11:35

## 2024-03-25 RX ADMIN — CHLORHEXIDINE GLUCONATE 1 APPLICATION(S): 213 SOLUTION TOPICAL at 11:35

## 2024-03-25 RX ADMIN — HEPARIN SODIUM 5000 UNIT(S): 5000 INJECTION INTRAVENOUS; SUBCUTANEOUS at 21:07

## 2024-03-25 RX ADMIN — Medication 6 MILLIGRAM(S): at 21:07

## 2024-03-25 RX ADMIN — Medication 20 MICROGRAM(S): at 22:43

## 2024-03-25 RX ADMIN — Medication 81 MILLIGRAM(S): at 11:34

## 2024-03-25 RX ADMIN — Medication 6 MILLIGRAM(S): at 15:22

## 2024-03-25 RX ADMIN — CHLORHEXIDINE GLUCONATE 15 MILLILITER(S): 213 SOLUTION TOPICAL at 05:17

## 2024-03-25 RX ADMIN — Medication 6 MILLIGRAM(S): at 03:22

## 2024-03-25 RX ADMIN — Medication 1: at 23:09

## 2024-03-25 RX ADMIN — FAMOTIDINE 20 MILLIGRAM(S): 10 INJECTION INTRAVENOUS at 11:38

## 2024-03-25 RX ADMIN — Medication 1: at 18:15

## 2024-03-25 RX ADMIN — Medication 3 MILLILITER(S): at 02:28

## 2024-03-25 RX ADMIN — Medication 1 APPLICATION(S): at 11:34

## 2024-03-25 RX ADMIN — Medication 15 MILLILITER(S): at 11:34

## 2024-03-25 RX ADMIN — HEPARIN SODIUM 5000 UNIT(S): 5000 INJECTION INTRAVENOUS; SUBCUTANEOUS at 13:15

## 2024-03-25 RX ADMIN — Medication 6 MILLIGRAM(S): at 11:34

## 2024-03-25 RX ADMIN — HEPARIN SODIUM 5000 UNIT(S): 5000 INJECTION INTRAVENOUS; SUBCUTANEOUS at 05:17

## 2024-03-25 RX ADMIN — Medication 3 MILLILITER(S): at 11:02

## 2024-03-25 RX ADMIN — LEVETIRACETAM 1000 MILLIGRAM(S): 250 TABLET, FILM COATED ORAL at 05:16

## 2024-03-25 RX ADMIN — Medication 3 MILLILITER(S): at 17:05

## 2024-03-25 RX ADMIN — Medication 3 MILLILITER(S): at 20:33

## 2024-03-25 NOTE — PROGRESS NOTE ADULT - SUBJECTIVE AND OBJECTIVE BOX
INTERVAL HPI/OVERNIGHT EVENTS:    SUBJECTIVE: Patient seen and examined at bedside.     ROS: All negative except as listed above.    VITAL SIGNS:  ICU Vital Signs Last 24 Hrs  T(C): 37.4 (25 Mar 2024 04:00), Max: 38.9 (24 Mar 2024 20:00)  T(F): 99.3 (25 Mar 2024 04:00), Max: 102.1 (24 Mar 2024 20:00)  HR: 76 (25 Mar 2024 06:20) (66 - 104)  BP: 153/70 (25 Mar 2024 06:00) (113/60 - 163/74)  BP(mean): 90 (25 Mar 2024 06:00) (70 - 96)  ABP: --  ABP(mean): --  RR: 20 (25 Mar 2024 06:20) (14 - 28)  SpO2: 100% (25 Mar 2024 06:20) (97% - 100%)    O2 Parameters below as of 25 Mar 2024 06:00  Patient On (Oxygen Delivery Method): ventilator, CPAP 5/5    O2 Concentration (%): 40      Mode: CPAP with PS, FiO2: 40, PEEP: 5, PS: 8, MAP: 9, PIP: 14  Plateau pressure:   P/F ratio:     03-24 @ 07:01  -  03-25 @ 07:00  --------------------------------------------------------  IN: 758 mL / OUT: 1920 mL / NET: -1162 mL      CAPILLARY BLOOD GLUCOSE      POCT Blood Glucose.: 128 mg/dL (25 Mar 2024 05:14)      ECG: reviewed.    PHYSICAL EXAM:    GENERAL: NAD, lying in bed comfortably  HEAD:  Atraumatic, normocephalic  EYES: EOMI, PERRLA, conjunctiva and sclera clear  NECK: Supple, trachea midline, no JVD  HEART: Regular rate and rhythm, no murmurs, rubs, or gallops  LUNGS: Unlabored respirations.  Clear to auscultation bilaterally, no crackles, wheezing, or rhonchi  ABDOMEN: Soft, nontender, nondistended, +BS  EXTREMITIES: 2+ peripheral pulses bilaterally, cap refill<2 secs. No clubbing, cyanosis, or edema  NERVOUS SYSTEM:  A&Ox3, following commands, moving all extremities, no focal deficits   SKIN: No rashes or lesions    MEDICATIONS:  MEDICATIONS  (STANDING):  albuterol/ipratropium for Nebulization 3 milliLiter(s) Nebulizer every 6 hours  aspirin  chewable 81 milliGRAM(s) Oral daily  atorvastatin 80 milliGRAM(s) Oral at bedtime  chlorhexidine 0.12% Liquid 15 milliLiter(s) Oral Mucosa every 12 hours  chlorhexidine 2% Cloths 1 Application(s) Topical daily  dexMEDEtomidine Infusion 0.5 MICROgram(s)/kG/Hr (7.15 mL/Hr) IV Continuous <Continuous>  famotidine    Tablet 20 milliGRAM(s) Oral daily  heparin   Injectable 5000 Unit(s) SubCutaneous every 8 hours  influenza  Vaccine (HIGH DOSE) 0.7 milliLiter(s) IntraMuscular once  insulin lispro (ADMELOG) corrective regimen sliding scale   SubCutaneous every 6 hours  insulin NPH human recombinant 20 Unit(s) SubCutaneous every 6 hours  levETIRAcetam  Solution 1000 milliGRAM(s) Oral two times a day  levothyroxine Injectable 20 MICROGram(s) IV Push at bedtime  multivitamin/minerals/iron Oral Solution (CENTRUM) 15 milliLiter(s) Oral daily  petrolatum Ophthalmic Ointment 1 Application(s) Both EYES daily  polyethylene glycol 3350 17 Gram(s) Oral two times a day  ranolazine 500 milliGRAM(s) Oral two times a day  senna 2 Tablet(s) Oral at bedtime    MEDICATIONS  (PRN):  acetaminophen   Oral Liquid .. 650 milliGRAM(s) Oral every 6 hours PRN Temp greater or equal to 38C (100.4F), Mild Pain (1 - 3), Moderate Pain (4 - 6)      ALLERGIES:  Allergies    No Known Allergies    Intolerances        LABS:                        7.8    15.81 )-----------( 430      ( 25 Mar 2024 00:40 )             24.6     03-25    141  |  106  |  20  ----------------------------<  176<H>  4.3   |  25  |  0.78    Ca    9.4      25 Mar 2024 00:40  Phos  3.9     03-25  Mg     2.70     03-25    TPro  7.5  /  Alb  2.9<L>  /  TBili  0.2  /  DBili  x   /  AST  48<H>  /  ALT  35<H>  /  AlkPhos  210<H>  03-25    PT/INR - ( 25 Mar 2024 00:40 )   PT: 13.0 sec;   INR: 1.17 ratio         PTT - ( 25 Mar 2024 00:40 )  PTT:34.4 sec  Urinalysis Basic - ( 25 Mar 2024 00:40 )    Color: x / Appearance: x / SG: x / pH: x  Gluc: 176 mg/dL / Ketone: x  / Bili: x / Urobili: x   Blood: x / Protein: x / Nitrite: x   Leuk Esterase: x / RBC: x / WBC x   Sq Epi: x / Non Sq Epi: x / Bacteria: x      ABG:  pH, Arterial: 7.42 (03-25-24 @ 00:40)  pCO2, Arterial: 42 mmHg (03-25-24 @ 00:40)  pO2, Arterial: 93 mmHg (03-25-24 @ 00:40)      vBG:    Micro:    Culture - Blood (collected 03-23-24 @ 17:43)  Source: .Blood Blood-Peripheral  Preliminary Report (03-24-24 @ 19:02):    No growth at 24 hours    Culture - Blood (collected 03-14-24 @ 10:58)  Source: .Blood Blood-Peripheral  Final Report (03-19-24 @ 15:00):    No growth at 5 days    Culture - Blood (collected 03-14-24 @ 10:40)  Source: .Blood Blood-Peripheral  Final Report (03-19-24 @ 15:00):    No growth at 5 days          RADIOLOGY & ADDITIONAL TESTS: Reviewed.   INTERVAL HPI/OVERNIGHT EVENTS:    SUBJECTIVE: Patient seen and examined at bedside. Febrile to 102.1 overnight. Overnight, failed cuff leak test so was given Decadron. Intubated in AM.     VITAL SIGNS:  ICU Vital Signs Last 24 Hrs  T(C): 37.4 (25 Mar 2024 04:00), Max: 38.9 (24 Mar 2024 20:00)  T(F): 99.3 (25 Mar 2024 04:00), Max: 102.1 (24 Mar 2024 20:00)  HR: 76 (25 Mar 2024 06:20) (66 - 104)  BP: 153/70 (25 Mar 2024 06:00) (113/60 - 163/74)  BP(mean): 90 (25 Mar 2024 06:00) (70 - 96)  ABP: --  ABP(mean): --  RR: 20 (25 Mar 2024 06:20) (14 - 28)  SpO2: 100% (25 Mar 2024 06:20) (97% - 100%)    O2 Parameters below as of 25 Mar 2024 06:00  Patient On (Oxygen Delivery Method): ventilator, CPAP 5/5    O2 Concentration (%): 40      Mode: CPAP with PS, FiO2: 40, PEEP: 5, PS: 8, MAP: 9, PIP: 14  Plateau pressure:   P/F ratio:     03-24 @ 07:01  -  03-25 @ 07:00  --------------------------------------------------------  IN: 758 mL / OUT: 1920 mL / NET: -1162 mL      CAPILLARY BLOOD GLUCOSE      POCT Blood Glucose.: 128 mg/dL (25 Mar 2024 05:14)      PHYSICAL EXAM:    GENERAL: NAD, intubated   HEAD:  Atraumatic, normocephalic  EYES: EOMI, PERRLA, conjunctiva and sclera clear  HEART: Regular rate and rhythm, no murmurs, rubs, or gallops  LUNGS: Unlabored respirations.  Clear to auscultation bilaterally, no crackles, wheezing, or rhonchi  ABDOMEN: Soft, nontender, nondistended, +BS  EXTREMITIES: 2+ peripheral pulses bilaterally, cap refill<2 secs. No clubbing, cyanosis, or edema  NERVOUS SYSTEM:  A&Ox0, intubated, opens eyes  SKIN: No rashes or lesions    MEDICATIONS:  MEDICATIONS  (STANDING):  albuterol/ipratropium for Nebulization 3 milliLiter(s) Nebulizer every 6 hours  aspirin  chewable 81 milliGRAM(s) Oral daily  atorvastatin 80 milliGRAM(s) Oral at bedtime  chlorhexidine 0.12% Liquid 15 milliLiter(s) Oral Mucosa every 12 hours  chlorhexidine 2% Cloths 1 Application(s) Topical daily  dexMEDEtomidine Infusion 0.5 MICROgram(s)/kG/Hr (7.15 mL/Hr) IV Continuous <Continuous>  famotidine    Tablet 20 milliGRAM(s) Oral daily  heparin   Injectable 5000 Unit(s) SubCutaneous every 8 hours  influenza  Vaccine (HIGH DOSE) 0.7 milliLiter(s) IntraMuscular once  insulin lispro (ADMELOG) corrective regimen sliding scale   SubCutaneous every 6 hours  insulin NPH human recombinant 20 Unit(s) SubCutaneous every 6 hours  levETIRAcetam  Solution 1000 milliGRAM(s) Oral two times a day  levothyroxine Injectable 20 MICROGram(s) IV Push at bedtime  multivitamin/minerals/iron Oral Solution (CENTRUM) 15 milliLiter(s) Oral daily  petrolatum Ophthalmic Ointment 1 Application(s) Both EYES daily  polyethylene glycol 3350 17 Gram(s) Oral two times a day  ranolazine 500 milliGRAM(s) Oral two times a day  senna 2 Tablet(s) Oral at bedtime    MEDICATIONS  (PRN):  acetaminophen   Oral Liquid .. 650 milliGRAM(s) Oral every 6 hours PRN Temp greater or equal to 38C (100.4F), Mild Pain (1 - 3), Moderate Pain (4 - 6)      ALLERGIES:  Allergies    No Known Allergies    Intolerances        LABS:                        7.8    15.81 )-----------( 430      ( 25 Mar 2024 00:40 )             24.6     03-25    141  |  106  |  20  ----------------------------<  176<H>  4.3   |  25  |  0.78    Ca    9.4      25 Mar 2024 00:40  Phos  3.9     03-25  Mg     2.70     03-25    TPro  7.5  /  Alb  2.9<L>  /  TBili  0.2  /  DBili  x   /  AST  48<H>  /  ALT  35<H>  /  AlkPhos  210<H>  03-25    PT/INR - ( 25 Mar 2024 00:40 )   PT: 13.0 sec;   INR: 1.17 ratio         PTT - ( 25 Mar 2024 00:40 )  PTT:34.4 sec  Urinalysis Basic - ( 25 Mar 2024 00:40 )    Color: x / Appearance: x / SG: x / pH: x  Gluc: 176 mg/dL / Ketone: x  / Bili: x / Urobili: x   Blood: x / Protein: x / Nitrite: x   Leuk Esterase: x / RBC: x / WBC x   Sq Epi: x / Non Sq Epi: x / Bacteria: x      ABG:  pH, Arterial: 7.42 (03-25-24 @ 00:40)  pCO2, Arterial: 42 mmHg (03-25-24 @ 00:40)  pO2, Arterial: 93 mmHg (03-25-24 @ 00:40)      vBG:    Micro:    Culture - Blood (collected 03-23-24 @ 17:43)  Source: .Blood Blood-Peripheral  Preliminary Report (03-24-24 @ 19:02):    No growth at 24 hours    Culture - Blood (collected 03-14-24 @ 10:58)  Source: .Blood Blood-Peripheral  Final Report (03-19-24 @ 15:00):    No growth at 5 days    Culture - Blood (collected 03-14-24 @ 10:40)  Source: .Blood Blood-Peripheral  Final Report (03-19-24 @ 15:00):    No growth at 5 days          RADIOLOGY & ADDITIONAL TESTS: Reviewed.   INTERVAL HPI/OVERNIGHT EVENTS:    SUBJECTIVE: Patient seen and examined at bedside. Febrile to 102.1 overnight. Overnight, failed cuff leak test so was given Decadron. Intubated in AM. Opens eyes and responds to son. Butts cuff leak at bedside.     VITAL SIGNS:  ICU Vital Signs Last 24 Hrs  T(C): 37.4 (25 Mar 2024 04:00), Max: 38.9 (24 Mar 2024 20:00)  T(F): 99.3 (25 Mar 2024 04:00), Max: 102.1 (24 Mar 2024 20:00)  HR: 76 (25 Mar 2024 06:20) (66 - 104)  BP: 153/70 (25 Mar 2024 06:00) (113/60 - 163/74)  BP(mean): 90 (25 Mar 2024 06:00) (70 - 96)  ABP: --  ABP(mean): --  RR: 20 (25 Mar 2024 06:20) (14 - 28)  SpO2: 100% (25 Mar 2024 06:20) (97% - 100%)    O2 Parameters below as of 25 Mar 2024 06:00  Patient On (Oxygen Delivery Method): ventilator, CPAP 5/5    O2 Concentration (%): 40      Mode: CPAP with PS, FiO2: 40, PEEP: 5, PS: 8, MAP: 9, PIP: 14  Plateau pressure:   P/F ratio:     03-24 @ 07:01  -  03-25 @ 07:00  --------------------------------------------------------  IN: 758 mL / OUT: 1920 mL / NET: -1162 mL      CAPILLARY BLOOD GLUCOSE      POCT Blood Glucose.: 128 mg/dL (25 Mar 2024 05:14)      PHYSICAL EXAM:  CONSTITUTIONAL: NAD, intubated  RESPIRATORY: Normal respiratory effort; lungs are clear to auscultation bilaterally  CARDIOVASCULAR: Regular rate and rhythm, normal S1 and S2, no murmur/rub/gallop; No lower extremity edema; Peripheral pulses are 2+ bilaterally  ABDOMEN: Nontender to palpation, normoactive bowel sounds, no rebound/guarding  MUSCULOSKELETAL: no clubbing or cyanosis of digits; no joint swelling or tenderness to palpation  NEURO: PERRL, A&Ox0, opens eyes, responds to son at bedside     MEDICATIONS:  MEDICATIONS  (STANDING):  albuterol/ipratropium for Nebulization 3 milliLiter(s) Nebulizer every 6 hours  aspirin  chewable 81 milliGRAM(s) Oral daily  atorvastatin 80 milliGRAM(s) Oral at bedtime  chlorhexidine 0.12% Liquid 15 milliLiter(s) Oral Mucosa every 12 hours  chlorhexidine 2% Cloths 1 Application(s) Topical daily  dexMEDEtomidine Infusion 0.5 MICROgram(s)/kG/Hr (7.15 mL/Hr) IV Continuous <Continuous>  famotidine    Tablet 20 milliGRAM(s) Oral daily  heparin   Injectable 5000 Unit(s) SubCutaneous every 8 hours  influenza  Vaccine (HIGH DOSE) 0.7 milliLiter(s) IntraMuscular once  insulin lispro (ADMELOG) corrective regimen sliding scale   SubCutaneous every 6 hours  insulin NPH human recombinant 20 Unit(s) SubCutaneous every 6 hours  levETIRAcetam  Solution 1000 milliGRAM(s) Oral two times a day  levothyroxine Injectable 20 MICROGram(s) IV Push at bedtime  multivitamin/minerals/iron Oral Solution (CENTRUM) 15 milliLiter(s) Oral daily  petrolatum Ophthalmic Ointment 1 Application(s) Both EYES daily  polyethylene glycol 3350 17 Gram(s) Oral two times a day  ranolazine 500 milliGRAM(s) Oral two times a day  senna 2 Tablet(s) Oral at bedtime    MEDICATIONS  (PRN):  acetaminophen   Oral Liquid .. 650 milliGRAM(s) Oral every 6 hours PRN Temp greater or equal to 38C (100.4F), Mild Pain (1 - 3), Moderate Pain (4 - 6)      ALLERGIES:  Allergies    No Known Allergies    Intolerances        LABS:                        7.8    15.81 )-----------( 430      ( 25 Mar 2024 00:40 )             24.6     03-25    141  |  106  |  20  ----------------------------<  176<H>  4.3   |  25  |  0.78    Ca    9.4      25 Mar 2024 00:40  Phos  3.9     03-25  Mg     2.70     03-25    TPro  7.5  /  Alb  2.9<L>  /  TBili  0.2  /  DBili  x   /  AST  48<H>  /  ALT  35<H>  /  AlkPhos  210<H>  03-25    PT/INR - ( 25 Mar 2024 00:40 )   PT: 13.0 sec;   INR: 1.17 ratio         PTT - ( 25 Mar 2024 00:40 )  PTT:34.4 sec  Urinalysis Basic - ( 25 Mar 2024 00:40 )    Color: x / Appearance: x / SG: x / pH: x  Gluc: 176 mg/dL / Ketone: x  / Bili: x / Urobili: x   Blood: x / Protein: x / Nitrite: x   Leuk Esterase: x / RBC: x / WBC x   Sq Epi: x / Non Sq Epi: x / Bacteria: x      ABG:  pH, Arterial: 7.42 (03-25-24 @ 00:40)  pCO2, Arterial: 42 mmHg (03-25-24 @ 00:40)  pO2, Arterial: 93 mmHg (03-25-24 @ 00:40)      vBG:    Micro:    Culture - Blood (collected 03-23-24 @ 17:43)  Source: .Blood Blood-Peripheral  Preliminary Report (03-24-24 @ 19:02):    No growth at 24 hours    Culture - Blood (collected 03-14-24 @ 10:58)  Source: .Blood Blood-Peripheral  Final Report (03-19-24 @ 15:00):    No growth at 5 days    Culture - Blood (collected 03-14-24 @ 10:40)  Source: .Blood Blood-Peripheral  Final Report (03-19-24 @ 15:00):    No growth at 5 days          RADIOLOGY & ADDITIONAL TESTS: Reviewed.

## 2024-03-25 NOTE — PROGRESS NOTE ADULT - ASSESSMENT
88y (1935) woman with a PMHx significant for CAD s/p PCI x6 11/2019 at [facility] (on aspirin, statin, Xarelto 2.5mg BID), HTN, HLD, T2DM, and HFpEF who originally presented with fever and generalized weakness (3/14). Found to have influenza A, multiple family members at home with same illness. Patient was febrile, tachycardic, & had low O2 sat. Elevated white count. Started on antibiotics & antiviral. Due to respiratory distress she was intubated on 3/17/24. Patient went into septic shock, requiring pressors.  Patient came in on xarelto (home med) but unclear for what reason, in MICU switched to heparin gtt. Since 3/17 patient has had persistent AMS though sedation was weaned down (on Precedex 0.4 during encounter). At 4 am on 3/20 RN noted patient had R upward gaze with repetitive twitching motion of her RUE, and an increase in her SBP to 200s. Symptoms lasted about  seconds. Ativan 2mg x1 IVP given, propofol gtt increased from 30 to 50. Shaking resolved. MICU team pursued CTH, LP, & EEG. CTH was unremarkable for acute findings. EEG showing multifocal sharp waves (bilateral posterior quadrant, bilateral independent frontal, rarely bilateral synchronous frontal), increasing in prevalence over the course of recording, becoming LPDs mostly at 1-2 Hz, fluctuating, rarely up to 3-4 Hz, putting patient risk for multifocal-onset  seizures. Lumbar puncture showing elevated glucose in CSF at 120, normal protein (24), total nucleated cells of 1. CSF PCR (-), pending CSF culture result. Neurology consulted for abnormal EEG and likely witnessed seizure. EEG continues to show multifocal sharps which are risk of seizures, continue with keppra.    Impression: Neurologically improving. Depressed level of consciousness and provoked seizures 2/2 toxic metabolic encephalopathy   Plan   [] Pending MRI brain with and without contrast   [x] continue keppra 1g BID  [] neuro checks q4, seizure, fall & aspiration precautions  [] Given concern for seizure, advise patient to not drive, operate heavy machinery, avoid heights, pools, bathtubs, locked doors until cleared by further follow up outpatient by neurology.   [] Please note: if patient has a convulsion, please document length of episode, specifically what patient was doing paying attention to eye opening vs closure, gaze deviation, shaking of extremities, tongue bite, urinary incontinence, any derangement of vital signs. Generalized motor seizures can have dilated and unreactive pupils, absent oculocephalic reflexes (no dolls eyes), and open eyelids (99% of cases). Head turning from sided to side, pelvic thrusting, bicycling movements, hand waving are NOT manifestations of generalized motor seizures.  [] Patient can follow up with [facility], located in [address] [place] [address]. Patient/family can call [phone] to schedule this appointment.     Case seen and discussed with Neurology Attending

## 2024-03-25 NOTE — PROGRESS NOTE ADULT - ASSESSMENT
88F with HFpEF, CAD (s/p multiple stents), HTN, HLD, DM2 admitted to MICU for acute hypoxemic respiratory failure requiring intubation during RRT on floors 3/17/23 from altered mental status (?pna vs seizure), course c/b seizure like activity.    NEUROLOGIC    #?Seizures  - Prior episodes with left lateral gaze deviation and tonic clonic jerking of extremities when off sedation  - CT head negative of acute intracranial process or mass  - LP unrevealing  - EEG showing multifocal epileptiform potentials >> no acute seizures documented but unable to completely rule out either  - MR brain with and without gadolinium pending  - Keppra 1g bid continue      CARDIOVASCULAR    #HFpEF  #CAD (WAYNE x6 in 2019, NSTEMI admission 2022)  #HTN  - continue home antihypertensives  - Home meds: aspirin 81, rivaroxaban 2.5 PO BID, statin  - DOAC has been held during MICU admission iso critical illness >> may resume after MRI head completed      PULMONARY    #Acute hypoxemic respiratory failure  Intubated for airway protection in the setting of altered mental status from ?seizure activity vs aspiration during RRT on floors  - Intubated 3/17  - Abx: Zosyn completed x5 day course for aspiration  - Volume A/C >> CPAP started 3/23 AM, tolerating well    GI  - continue tube feeds    RENAL  - no issues    ID    #Flu  - completed Tamiflu  - completed 5 days of piperacillin/tazobactam on 3/22      ENDOCRINE    - continue with NPH and sliding scale - requirements have been increasing past few days    HEME    #DVT ppx  - HSQ    Full Code. 88F with HFpEF, CAD (s/p multiple stents), HTN, HLD, DM2 admitted to MICU for acute hypoxemic respiratory failure requiring intubation during RRT on floors 3/17/23 from altered mental status (?pna vs seizure), course c/b seizure like activity.    NEUROLOGIC    #?Seizures  - Prior episodes with left lateral gaze deviation and tonic clonic jerking of extremities when off sedation  - CT head negative of acute intracranial process or mass  - LP unrevealing  - EEG showing multifocal epileptiform potentials >> no acute seizures documented but unable to completely rule out either  - MR brain with and without gadolinium pending  - Keppra 1g bid continue      CARDIOVASCULAR    #HFpEF  #CAD (WAYNE x6 in 2019, NSTEMI admission 2022)  #HTN  - continue home antihypertensives  - Home meds: aspirin 81, rivaroxaban 2.5 PO BID, statin  - DOAC has been held during MICU admission iso critical illness >> may resume after MRI head completed  - CTM      PULMONARY    #Acute hypoxemic respiratory failure  Intubated for airway protection in the setting of altered mental status from ?seizure activity vs aspiration during RRT on floors  - Intubated 3/17, trial of extubation 3/25  - Abx: Zosyn completed x5 day course for aspiration  - Volume A/C >> CPAP started 3/23 AM, tolerating well, with minimal secretions, and some cuff leak 3/25  - will give decadron x2 more doses and consider trial of extubation 3/25    GI  - holding tube feeds iso of possible extubation     RENAL  - no issues  - TOV 3/25     ID    #Flu  - completed Tamiflu  - completed 5 days of piperacillin/tazobactam on 3/22  - febrile 3/24 ON, blood cx negative for now, low threshold to restart abx if febrile   - repeat sputum cx      ENDOCRINE    - continue with NPH and sliding scale - monitor POCG    HEME    #DVT ppx  - HSQ    Full Code.

## 2024-03-25 NOTE — CHART NOTE - NSCHARTNOTEFT_GEN_A_CORE
: LILIANA    INDICATION: respiratory failure    PROCEDURE:  [x ] LIMITED ECHO  [ ] LIMITED CHEST  [ ] LIMITED RETROPERITONEAL  [ ] LIMITED ABDOMINAL  [ ] LIMITED DVT  [ ] NEEDLE GUIDANCE VASCULAR  [ ] NEEDLE GUIDANCE THORACENTESIS  [ ] NEEDLE GUIDANCE PARACENTESIS  [ ] NEEDLE GUIDANCE PERICARDIOCENTESIS  [ ] OTHER    FINDINGS:  Technically limited windows; systolic function appears grossly normal. LV>RV. IVC wnl. Likely pericardial fat pad present.    INTERPRETATION:  Technically limited windows but LV systolic function appears grossly normal.    Images stored on [employer]. : LILIANA    INDICATION: acute hypoxemic respiratory failure     PROCEDURE:  [x ] LIMITED ECHO  [ ] LIMITED CHEST  [ ] LIMITED RETROPERITONEAL  [ ] LIMITED ABDOMINAL  [ ] LIMITED DVT  [ ] NEEDLE GUIDANCE VASCULAR  [ ] NEEDLE GUIDANCE THORACENTESIS  [ ] NEEDLE GUIDANCE PARACENTESIS  [ ] NEEDLE GUIDANCE PERICARDIOCENTESIS  [ ] OTHER    FINDINGS:  Technically limited windows; systolic function appears grossly normal. LV>RV. IVC wnl. Likely pericardial fat pad present.    INTERPRETATION:  Technically limited windows but LV systolic function appears grossly normal.    Images stored on [employer].    Attending Attestation:  I was present during the key portions of the procedure and immediately available during the entire procedure.  Linda Russell MD  Attending  Pulmonary & Critical Care Medicine

## 2024-03-25 NOTE — PROGRESS NOTE ADULT - ATTENDING COMMENTS
Ms. Vaz is an 87 yo woman with encephalopathy - likely toxic metabolic septic encephalopathy - improving.   No evidence of a primary CNS infection on CSF analysis.   Possible seizure and EEG with epileptiform discharges.   Continue levetiracetam 1000 mg Q12H.   I agree with work up and management as above.     There is a high probability of sudden, clinically significant, or life threatening deterioration in the patient’s condition which requires the highest level of physician preparedness to intervene urgently.  Critical care time:  40 minutes.     Thank you.

## 2024-03-25 NOTE — PROGRESS NOTE ADULT - ATTENDING COMMENTS
88 F with HFpEF, on a/c for unclear reason, diabetes here with acute hypoxemic respiratory failure due to influenza and AMS, eventually requiring intubation    opening eyes, following commands this am  tolerating PS trials  d/w neuro, may represent toxic metabolic syndrome    # acute hypoxemic respiratory failure  # influenza pna  # AMS/ acute metabolic encephalopathy   - PS trials as tolerated  - has cuff leak today, will give another dose steroids, may extubate to NIPPV, one dose steroids afterwards  - monitor off abx  - f/u neuro reccs, will need MRI  - c/w AED for now as per neuro

## 2024-03-25 NOTE — PROGRESS NOTE ADULT - SUBJECTIVE AND OBJECTIVE BOX
SUBJECTIVE/INTERVAL HISTORY:    Seems more alert today     PAST MEDICAL & SURGICAL HISTORY:  CAD (coronary artery disease)  s/p stent x 6      HTN (hypertension)      DM (diabetes mellitus)      HLD (hyperlipidemia)      H/O CHF      S/P primary angioplasty with coronary stent        FAMILY HISTORY:  No pertinent family history in first degree relatives        MEDICATIONS (HOME):  Home Medications:  amLODIPine 5 mg oral tablet: 1 tab(s) orally once a day (14 Mar 2024 16:45)  atenolol 100 mg oral tablet: 1 tab(s) orally once a day (14 Mar 2024 16:48)  famotidine 40 mg oral tablet: 1 tab(s) orally once a day (14 Mar 2024 16:48)  isosorbide mononitrate 30 mg oral tablet, extended release: 1 tab(s) orally once a day (14 Mar 2024 16:48)  levothyroxine 25 mcg (0.025 mg) oral tablet: 1 tab(s) orally once a day (14 Mar 2024 16:46)  losartan 100 mg oral tablet: 1 tab(s) orally once a day (14 Mar 2024 16:48)  metFORMIN 500 mg oral tablet: 1 tab(s) orally 2 times a day (14 Mar 2024 16:48)  OYSCO 500/D TABLETS: TAKE 1 TABLET BY MOUTH TWICE DAILY WITH MEALS (14 Mar 2024 16:48)  TORSEMIDE 10MG TABLETS: TAKE 1 TABLET BY MOUTH DAILY (14 Mar 2024 16:48)  Xarelto 2.5 mg oral tablet: 1 tab(s) orally 2 times a day (14 Mar 2024 16:53)    MEDICATIONS  (STANDING):  albuterol/ipratropium for Nebulization 3 milliLiter(s) Nebulizer every 6 hours  aspirin  chewable 81 milliGRAM(s) Oral daily  atorvastatin 80 milliGRAM(s) Oral at bedtime  chlorhexidine 0.12% Liquid 15 milliLiter(s) Oral Mucosa every 12 hours  chlorhexidine 2% Cloths 1 Application(s) Topical daily  famotidine    Tablet 20 milliGRAM(s) Oral daily  heparin   Injectable 5000 Unit(s) SubCutaneous every 8 hours  influenza  Vaccine (HIGH DOSE) 0.7 milliLiter(s) IntraMuscular once  insulin lispro (ADMELOG) corrective regimen sliding scale   SubCutaneous every 6 hours  insulin NPH human recombinant 20 Unit(s) SubCutaneous every 6 hours  levETIRAcetam  Solution 1000 milliGRAM(s) Oral two times a day  levothyroxine Injectable 20 MICROGram(s) IV Push at bedtime  multivitamin/minerals/iron Oral Solution (CENTRUM) 15 milliLiter(s) Oral daily  petrolatum Ophthalmic Ointment 1 Application(s) Both EYES daily  polyethylene glycol 3350 17 Gram(s) Oral two times a day  ranolazine 500 milliGRAM(s) Oral two times a day  senna 2 Tablet(s) Oral at bedtime    MEDICATIONS  (PRN):  acetaminophen   Oral Liquid .. 650 milliGRAM(s) Oral every 6 hours PRN Temp greater or equal to 38C (100.4F), Mild Pain (1 - 3), Moderate Pain (4 - 6)    ALLERGIES/INTOLERANCES:  Allergies  No Known Allergies    Intolerances    VITALS & EXAMINATION:  Vital Signs Last 24 Hrs  T(C): 36 (25 Mar 2024 08:00), Max: 38.9 (24 Mar 2024 20:00)  T(F): 96.8 (25 Mar 2024 08:00), Max: 102.1 (24 Mar 2024 20:00)  HR: 93 (25 Mar 2024 11:35) (66 - 104)  BP: 132/64 (25 Mar 2024 11:00) (113/60 - 160/70)  BP(mean): 80 (25 Mar 2024 11:00) (70 - 96)  RR: 19 (25 Mar 2024 11:00) (14 - 28)  SpO2: 99% (25 Mar 2024 11:35) (97% - 100%)    Parameters below as of 25 Mar 2024 11:35  Patient On (Oxygen Delivery Method): ventilator        GEN: intubated not on sedation   NEURO:     MENTAL STATUS: Alert to voice     LANG/SPEECH: nonverbal     CRANIAL NERVES:    II: Pupils equal and reactive, BTT on right, nonexistent on left     III, IV, VI: able to bury sclera on right gaze, almost able to do so on left gaze (but pass midline)     V: normal    VII: no facial asymmetry    VIII: normal hearing to speech    MOTOR: Antigravity on bilateral upper and lower extremities     REFLEXES: 2/4 throughout, bilateral flexor plantars    SENSORY: Normal to touch in all extremities    COORD: unable to perform     GAIT: unable to perform, intubated     LABORATORY:  CBC                       7.8    15.81 )-----------( 430      ( 25 Mar 2024 00:40 )             24.6     Chem 03-25    141  |  106  |  20  ----------------------------<  176<H>  4.3   |  25  |  0.78    Ca    9.4      25 Mar 2024 00:40  Phos  3.9     03-25  Mg     2.70     03-25    TPro  7.5  /  Alb  2.9<L>  /  TBili  0.2  /  DBili  x   /  AST  48<H>  /  ALT  35<H>  /  AlkPhos  210<H>  03-25    LFTs LIVER FUNCTIONS - ( 25 Mar 2024 00:40 )  Alb: 2.9 g/dL / Pro: 7.5 g/dL / ALK PHOS: 210 U/L / ALT: 35 U/L / AST: 48 U/L / GGT: x           Coagulopathy PT/INR - ( 25 Mar 2024 00:40 )   PT: 13.0 sec;   INR: 1.17 ratio         PTT - ( 25 Mar 2024 00:40 )  PTT:34.4 sec  Lipid Panel   A1c   Cardiac enzymes     U/A Urinalysis Basic - ( 25 Mar 2024 00:40 )    Color: x / Appearance: x / SG: x / pH: x  Gluc: 176 mg/dL / Ketone: x  / Bili: x / Urobili: x   Blood: x / Protein: x / Nitrite: x   Leuk Esterase: x / RBC: x / WBC x   Sq Epi: x / Non Sq Epi: x / Bacteria: x        STUDIES & IMAGING:  Studies (EKG, EEG, EMG, etc):   EEG  Study Date: 3/22/24 08:00- 3/23/24 08:00  Duration: 24 hr  -Risk of multifocal-onset seizures  -Cannot entirely exclude some risk of generalized seizures  -Moderate diffuse cerebral dysfunction is nonspecific in etiology.   No seizures recorded x  3 day(s)    Study Date: 3/21/24 09:06- 3/22/24 08:00  Duration: 22 hr 53 min  -Risk of multifocal-onset seizures  -Cannot entirely exclude some risk of generalized seizures  -Moderate diffuse cerebral dysfunction is nonspecific in etiology.   -No definite seizures in >1.5 days of recording    Study Date: 3/20/24 13:28 - 3/21/24 07:34  Duration: 17 hr 25 min  -Risk of multifocal-onset seizures, increasing over the course of recording  -Cannot entirely exclude some risk of generalized seizures  -The captured event of intermittent LUE shaking is less likely to be epileptic in nature  -Moderate-severe improving to moderate diffuse cerebral dysfunction is nonspecific in etiology. In this case, sedating medications and decrease in sedating medications may be contributing.  -No definite seizures    Radiology (XR, CT, MR, U/S, TTE/FAISAL): SUBJECTIVE/INTERVAL HISTORY:    Seems more alert today. Seen at bedside with son providing interpretation. Patient following commands intermittently     PAST MEDICAL & SURGICAL HISTORY:  CAD (coronary artery disease)  s/p stent x 6      HTN (hypertension)      DM (diabetes mellitus)      HLD (hyperlipidemia)      H/O CHF      S/P primary angioplasty with coronary stent        FAMILY HISTORY:  No pertinent family history in first degree relatives        MEDICATIONS (HOME):  Home Medications:  amLODIPine 5 mg oral tablet: 1 tab(s) orally once a day (14 Mar 2024 16:45)  atenolol 100 mg oral tablet: 1 tab(s) orally once a day (14 Mar 2024 16:48)  famotidine 40 mg oral tablet: 1 tab(s) orally once a day (14 Mar 2024 16:48)  isosorbide mononitrate 30 mg oral tablet, extended release: 1 tab(s) orally once a day (14 Mar 2024 16:48)  levothyroxine 25 mcg (0.025 mg) oral tablet: 1 tab(s) orally once a day (14 Mar 2024 16:46)  losartan 100 mg oral tablet: 1 tab(s) orally once a day (14 Mar 2024 16:48)  metFORMIN 500 mg oral tablet: 1 tab(s) orally 2 times a day (14 Mar 2024 16:48)  OYSCO 500/D TABLETS: TAKE 1 TABLET BY MOUTH TWICE DAILY WITH MEALS (14 Mar 2024 16:48)  TORSEMIDE 10MG TABLETS: TAKE 1 TABLET BY MOUTH DAILY (14 Mar 2024 16:48)  Xarelto 2.5 mg oral tablet: 1 tab(s) orally 2 times a day (14 Mar 2024 16:53)    MEDICATIONS  (STANDING):  albuterol/ipratropium for Nebulization 3 milliLiter(s) Nebulizer every 6 hours  aspirin  chewable 81 milliGRAM(s) Oral daily  atorvastatin 80 milliGRAM(s) Oral at bedtime  chlorhexidine 0.12% Liquid 15 milliLiter(s) Oral Mucosa every 12 hours  chlorhexidine 2% Cloths 1 Application(s) Topical daily  famotidine    Tablet 20 milliGRAM(s) Oral daily  heparin   Injectable 5000 Unit(s) SubCutaneous every 8 hours  influenza  Vaccine (HIGH DOSE) 0.7 milliLiter(s) IntraMuscular once  insulin lispro (ADMELOG) corrective regimen sliding scale   SubCutaneous every 6 hours  insulin NPH human recombinant 20 Unit(s) SubCutaneous every 6 hours  levETIRAcetam  Solution 1000 milliGRAM(s) Oral two times a day  levothyroxine Injectable 20 MICROGram(s) IV Push at bedtime  multivitamin/minerals/iron Oral Solution (CENTRUM) 15 milliLiter(s) Oral daily  petrolatum Ophthalmic Ointment 1 Application(s) Both EYES daily  polyethylene glycol 3350 17 Gram(s) Oral two times a day  ranolazine 500 milliGRAM(s) Oral two times a day  senna 2 Tablet(s) Oral at bedtime    MEDICATIONS  (PRN):  acetaminophen   Oral Liquid .. 650 milliGRAM(s) Oral every 6 hours PRN Temp greater or equal to 38C (100.4F), Mild Pain (1 - 3), Moderate Pain (4 - 6)    ALLERGIES/INTOLERANCES:  Allergies  No Known Allergies    Intolerances    VITALS & EXAMINATION:  Vital Signs Last 24 Hrs  T(C): 36 (25 Mar 2024 08:00), Max: 38.9 (24 Mar 2024 20:00)  T(F): 96.8 (25 Mar 2024 08:00), Max: 102.1 (24 Mar 2024 20:00)  HR: 93 (25 Mar 2024 11:35) (66 - 104)  BP: 132/64 (25 Mar 2024 11:00) (113/60 - 160/70)  BP(mean): 80 (25 Mar 2024 11:00) (70 - 96)  RR: 19 (25 Mar 2024 11:00) (14 - 28)  SpO2: 99% (25 Mar 2024 11:35) (97% - 100%)    Parameters below as of 25 Mar 2024 11:35  Patient On (Oxygen Delivery Method): ventilator        GEN: intubated not on sedation   NEURO:     MENTAL STATUS: Alert to voice     LANG/SPEECH: nonverbal     CRANIAL NERVES:    II: Pupils equal and reactive, BTT on right, nonexistent on left     III, IV, VI: able to bury sclera on right gaze, almost able to do so on left gaze (but pass midline)     V: normal    VII: no facial asymmetry    VIII: normal hearing to speech    MOTOR: Antigravity on bilateral upper and lower extremities     REFLEXES: not assessed     SENSORY: Normal to touch in all extremities    COORD: unable to perform     GAIT: unable to perform, intubated     LABORATORY:  CBC                       7.8    15.81 )-----------( 430      ( 25 Mar 2024 00:40 )             24.6     Chem 03-25    141  |  106  |  20  ----------------------------<  176<H>  4.3   |  25  |  0.78    Ca    9.4      25 Mar 2024 00:40  Phos  3.9     03-25  Mg     2.70     03-25    TPro  7.5  /  Alb  2.9<L>  /  TBili  0.2  /  DBili  x   /  AST  48<H>  /  ALT  35<H>  /  AlkPhos  210<H>  03-25    LFTs LIVER FUNCTIONS - ( 25 Mar 2024 00:40 )  Alb: 2.9 g/dL / Pro: 7.5 g/dL / ALK PHOS: 210 U/L / ALT: 35 U/L / AST: 48 U/L / GGT: x           Coagulopathy PT/INR - ( 25 Mar 2024 00:40 )   PT: 13.0 sec;   INR: 1.17 ratio         PTT - ( 25 Mar 2024 00:40 )  PTT:34.4 sec  Lipid Panel   A1c   Cardiac enzymes     U/A Urinalysis Basic - ( 25 Mar 2024 00:40 )    Color: x / Appearance: x / SG: x / pH: x  Gluc: 176 mg/dL / Ketone: x  / Bili: x / Urobili: x   Blood: x / Protein: x / Nitrite: x   Leuk Esterase: x / RBC: x / WBC x   Sq Epi: x / Non Sq Epi: x / Bacteria: x        STUDIES & IMAGING:  Studies (EKG, EEG, EMG, etc):   EEG  Study Date: 3/22/24 08:00- 3/23/24 08:00  Duration: 24 hr  -Risk of multifocal-onset seizures  -Cannot entirely exclude some risk of generalized seizures  -Moderate diffuse cerebral dysfunction is nonspecific in etiology.   No seizures recorded x  3 day(s)    Study Date: 3/21/24 09:06- 3/22/24 08:00  Duration: 22 hr 53 min  -Risk of multifocal-onset seizures  -Cannot entirely exclude some risk of generalized seizures  -Moderate diffuse cerebral dysfunction is nonspecific in etiology.   -No definite seizures in >1.5 days of recording    Study Date: 3/20/24 13:28 - 3/21/24 07:34  Duration: 17 hr 25 min  -Risk of multifocal-onset seizures, increasing over the course of recording  -Cannot entirely exclude some risk of generalized seizures  -The captured event of intermittent LUE shaking is less likely to be epileptic in nature  -Moderate-severe improving to moderate diffuse cerebral dysfunction is nonspecific in etiology. In this case, sedating medications and decrease in sedating medications may be contributing.  -No definite seizures    Radiology (XR, CT, MR, U/S, TTE/FAISAL): SUBJECTIVE/INTERVAL HISTORY:    Seems more alert today. Seen at bedside with son providing interpretation. Patient following commands intermittently     PAST MEDICAL & SURGICAL HISTORY:  CAD (coronary artery disease)  s/p stent x 6      HTN (hypertension)      DM (diabetes mellitus)      HLD (hyperlipidemia)      H/O CHF      S/P primary angioplasty with coronary stent        FAMILY HISTORY:  No pertinent family history in first degree relatives        MEDICATIONS (HOME):  Home Medications:  amLODIPine 5 mg oral tablet: 1 tab(s) orally once a day (14 Mar 2024 16:45)  atenolol 100 mg oral tablet: 1 tab(s) orally once a day (14 Mar 2024 16:48)  famotidine 40 mg oral tablet: 1 tab(s) orally once a day (14 Mar 2024 16:48)  isosorbide mononitrate 30 mg oral tablet, extended release: 1 tab(s) orally once a day (14 Mar 2024 16:48)  levothyroxine 25 mcg (0.025 mg) oral tablet: 1 tab(s) orally once a day (14 Mar 2024 16:46)  losartan 100 mg oral tablet: 1 tab(s) orally once a day (14 Mar 2024 16:48)  metFORMIN 500 mg oral tablet: 1 tab(s) orally 2 times a day (14 Mar 2024 16:48)  OYSCO 500/D TABLETS: TAKE 1 TABLET BY MOUTH TWICE DAILY WITH MEALS (14 Mar 2024 16:48)  TORSEMIDE 10MG TABLETS: TAKE 1 TABLET BY MOUTH DAILY (14 Mar 2024 16:48)  Xarelto 2.5 mg oral tablet: 1 tab(s) orally 2 times a day (14 Mar 2024 16:53)    MEDICATIONS  (STANDING):  albuterol/ipratropium for Nebulization 3 milliLiter(s) Nebulizer every 6 hours  aspirin  chewable 81 milliGRAM(s) Oral daily  atorvastatin 80 milliGRAM(s) Oral at bedtime  chlorhexidine 0.12% Liquid 15 milliLiter(s) Oral Mucosa every 12 hours  chlorhexidine 2% Cloths 1 Application(s) Topical daily  famotidine    Tablet 20 milliGRAM(s) Oral daily  heparin   Injectable 5000 Unit(s) SubCutaneous every 8 hours  influenza  Vaccine (HIGH DOSE) 0.7 milliLiter(s) IntraMuscular once  insulin lispro (ADMELOG) corrective regimen sliding scale   SubCutaneous every 6 hours  insulin NPH human recombinant 20 Unit(s) SubCutaneous every 6 hours  levETIRAcetam  Solution 1000 milliGRAM(s) Oral two times a day  levothyroxine Injectable 20 MICROGram(s) IV Push at bedtime  multivitamin/minerals/iron Oral Solution (CENTRUM) 15 milliLiter(s) Oral daily  petrolatum Ophthalmic Ointment 1 Application(s) Both EYES daily  polyethylene glycol 3350 17 Gram(s) Oral two times a day  ranolazine 500 milliGRAM(s) Oral two times a day  senna 2 Tablet(s) Oral at bedtime    MEDICATIONS  (PRN):  acetaminophen   Oral Liquid .. 650 milliGRAM(s) Oral every 6 hours PRN Temp greater or equal to 38C (100.4F), Mild Pain (1 - 3), Moderate Pain (4 - 6)    ALLERGIES/INTOLERANCES:  Allergies  No Known Allergies    Intolerances    VITALS & EXAMINATION:  Vital Signs Last 24 Hrs  T(C): 36 (25 Mar 2024 08:00), Max: 38.9 (24 Mar 2024 20:00)  T(F): 96.8 (25 Mar 2024 08:00), Max: 102.1 (24 Mar 2024 20:00)  HR: 93 (25 Mar 2024 11:35) (66 - 104)  BP: 132/64 (25 Mar 2024 11:00) (113/60 - 160/70)  BP(mean): 80 (25 Mar 2024 11:00) (70 - 96)  RR: 19 (25 Mar 2024 11:00) (14 - 28)  SpO2: 99% (25 Mar 2024 11:35) (97% - 100%)    Parameters below as of 25 Mar 2024 11:35  Patient On (Oxygen Delivery Method): ventilator        GEN: intubated not on sedation   NEURO:     MENTAL STATUS: Alert to voice     LANG/SPEECH: nonverbal     CRANIAL NERVES:    II: Pupils equal and reactive, BTT on right, not on left     III, IV, VI: able to bury sclera on right gaze, almost able to do so on left gaze (but pass midline)     V: normal    VII: no facial asymmetry    VIII: normal hearing to speech    MOTOR: Antigravity on bilateral upper and lower extremities     REFLEXES: not assessed     SENSORY: Normal to touch in all extremities    COORD: unable to perform     GAIT: unable to perform, intubated     LABORATORY:  CBC                       7.8    15.81 )-----------( 430      ( 25 Mar 2024 00:40 )             24.6     Chem 03-25    141  |  106  |  20  ----------------------------<  176<H>  4.3   |  25  |  0.78    Ca    9.4      25 Mar 2024 00:40  Phos  3.9     03-25  Mg     2.70     03-25    TPro  7.5  /  Alb  2.9<L>  /  TBili  0.2  /  DBili  x   /  AST  48<H>  /  ALT  35<H>  /  AlkPhos  210<H>  03-25    LFTs LIVER FUNCTIONS - ( 25 Mar 2024 00:40 )  Alb: 2.9 g/dL / Pro: 7.5 g/dL / ALK PHOS: 210 U/L / ALT: 35 U/L / AST: 48 U/L / GGT: x           Coagulopathy PT/INR - ( 25 Mar 2024 00:40 )   PT: 13.0 sec;   INR: 1.17 ratio         PTT - ( 25 Mar 2024 00:40 )  PTT:34.4 sec  Lipid Panel   A1c   Cardiac enzymes     U/A Urinalysis Basic - ( 25 Mar 2024 00:40 )    Color: x / Appearance: x / SG: x / pH: x  Gluc: 176 mg/dL / Ketone: x  / Bili: x / Urobili: x   Blood: x / Protein: x / Nitrite: x   Leuk Esterase: x / RBC: x / WBC x   Sq Epi: x / Non Sq Epi: x / Bacteria: x        STUDIES & IMAGING:  Studies (EKG, EEG, EMG, etc):   EEG  Study Date: 3/22/24 08:00- 3/23/24 08:00  Duration: 24 hr  -Risk of multifocal-onset seizures  -Cannot entirely exclude some risk of generalized seizures  -Moderate diffuse cerebral dysfunction is nonspecific in etiology.   No seizures recorded x  3 day(s)    Study Date: 3/21/24 09:06- 3/22/24 08:00  Duration: 22 hr 53 min  -Risk of multifocal-onset seizures  -Cannot entirely exclude some risk of generalized seizures  -Moderate diffuse cerebral dysfunction is nonspecific in etiology.   -No definite seizures in >1.5 days of recording    Study Date: 3/20/24 13:28 - 3/21/24 07:34  Duration: 17 hr 25 min  -Risk of multifocal-onset seizures, increasing over the course of recording  -Cannot entirely exclude some risk of generalized seizures  -The captured event of intermittent LUE shaking is less likely to be epileptic in nature  -Moderate-severe improving to moderate diffuse cerebral dysfunction is nonspecific in etiology. In this case, sedating medications and decrease in sedating medications may be contributing.  -No definite seizures    Radiology (XR, CT, MR, U/S, TTE/FAISAL):

## 2024-03-25 NOTE — CHART NOTE - NSCHARTNOTEFT_GEN_A_CORE
MICU DOWN GRADE NOTE      HPI: 88F with HFpEF, CAD (s/p multiple stents), HTN, HLD, DM2 admitted to MICU for acute hypoxemic respiratory failure requiring intubation during RRT on floors 3/17/23 from altered mental status (?pna vs seizure), course c/b seizure like activity.  Intubated 3/17, extubated 3/25. Influenza pna completed Tamiflu and got Zosyn. LP unrevealing and EEG showing multifocal epileptiform potentials >> no acute seizures documented but unable to completely rule out either. Neurology following.     TO DO:   [] Pending MRI brain with and without contrast   [ ] Neuro recs  [ ] Collateral info from Cardiologist, unclear why patient is on ac  [ ] Monitor fever curve. If spikes fever, would do infectious workup and start empiric abx  [ ] Monitor POCG, currently on NPH      MEDICATIONS:  acetaminophen   Oral Liquid .. 650 milliGRAM(s) Oral every 6 hours PRN  albuterol/ipratropium for Nebulization 3 milliLiter(s) Nebulizer every 6 hours  aspirin  chewable 81 milliGRAM(s) Oral daily  atorvastatin 80 milliGRAM(s) Oral at bedtime  chlorhexidine 2% Cloths 1 Application(s) Topical daily  dexAMETHasone  Injectable 6 milliGRAM(s) IV Push once  famotidine    Tablet 20 milliGRAM(s) Oral daily  heparin   Injectable 5000 Unit(s) SubCutaneous every 8 hours  influenza  Vaccine (HIGH DOSE) 0.7 milliLiter(s) IntraMuscular once  insulin lispro (ADMELOG) corrective regimen sliding scale   SubCutaneous every 6 hours  insulin NPH human recombinant 20 Unit(s) SubCutaneous every 6 hours  levETIRAcetam  Solution 1000 milliGRAM(s) Oral two times a day  levothyroxine Injectable 20 MICROGram(s) IV Push at bedtime  multivitamin/minerals/iron Oral Solution (CENTRUM) 15 milliLiter(s) Oral daily  petrolatum Ophthalmic Ointment 1 Application(s) Both EYES daily  polyethylene glycol 3350 17 Gram(s) Oral two times a day  ranolazine 500 milliGRAM(s) Oral two times a day  senna 2 Tablet(s) Oral at bedtime      T(C): 37.7 (03-25-24 @ 12:00), Max: 38.9 (03-24-24 @ 20:00)  HR: 97 (03-25-24 @ 13:36) (66 - 104)  BP: 139/69 (03-25-24 @ 13:00) (113/60 - 160/70)  RR: 16 (03-25-24 @ 13:33) (14 - 28)  SpO2: 99% (03-25-24 @ 13:36) (97% - 100%)  Wt(kg): --Vital Signs Last 24 Hrs  T(C): 37.7 (25 Mar 2024 12:00), Max: 38.9 (24 Mar 2024 20:00)  T(F): 99.8 (25 Mar 2024 12:00), Max: 102.1 (24 Mar 2024 20:00)  HR: 97 (25 Mar 2024 13:36) (66 - 104)  BP: 139/69 (25 Mar 2024 13:00) (113/60 - 160/70)  BP(mean): 85 (25 Mar 2024 13:00) (70 - 96)  RR: 16 (25 Mar 2024 13:33) (14 - 28)  SpO2: 99% (25 Mar 2024 13:36) (97% - 100%)    Parameters below as of 25 Mar 2024 13:00  Patient On (Oxygen Delivery Method): ventilator, CPAP    O2 Concentration (%): 40    LABS:                        7.8    15.81 )-----------( 430      ( 25 Mar 2024 00:40 )             24.6     03-25    141  |  106  |  20  ----------------------------<  176<H>  4.3   |  25  |  0.78    Ca    9.4      25 Mar 2024 00:40  Phos  3.9     03-25  Mg     2.70     03-25    TPro  7.5  /  Alb  2.9<L>  /  TBili  0.2  /  DBili  x   /  AST  48<H>  /  ALT  35<H>  /  AlkPhos  210<H>  03-25    PT/INR - ( 25 Mar 2024 00:40 )   PT: 13.0 sec;   INR: 1.17 ratio         PTT - ( 25 Mar 2024 00:40 )  PTT:34.4 sec  Urinalysis Basic - ( 25 Mar 2024 00:40 )    Color: x / Appearance: x / SG: x / pH: x  Gluc: 176 mg/dL / Ketone: x  / Bili: x / Urobili: x   Blood: x / Protein: x / Nitrite: x   Leuk Esterase: x / RBC: x / WBC x   Sq Epi: x / Non Sq Epi: x / Bacteria: x      CAPILLARY BLOOD GLUCOSE      POCT Blood Glucose.: 171 mg/dL (25 Mar 2024 11:33)  POCT Blood Glucose.: 128 mg/dL (25 Mar 2024 05:14)  POCT Blood Glucose.: 206 mg/dL (24 Mar 2024 23:30)  POCT Blood Glucose.: 197 mg/dL (24 Mar 2024 17:08)      ABG - ( 25 Mar 2024 00:40 )  pH, Arterial: 7.42  pH, Blood: x     /  pCO2: 42    /  pO2: 93    / HCO3: 27    / Base Excess: 2.5   /  SaO2: 97.3              Urinalysis Basic - ( 25 Mar 2024 00:40 )    Color: x / Appearance: x / SG: x / pH: x  Gluc: 176 mg/dL / Ketone: x  / Bili: x / Urobili: x   Blood: x / Protein: x / Nitrite: x   Leuk Esterase: x / RBC: x / WBC x   Sq Epi: x / Non Sq Epi: x / Bacteria: x        RADIOLOGY & ADDITIONAL TESTS:    Imaging Personally Reviewed:  [x ] YES  [ ] NO    ASSESSMENT AND PLAN:   88F with HFpEF, CAD (s/p multiple stents), HTN, HLD, DM2 admitted to MICU for acute hypoxemic respiratory failure requiring intubation during RRT on floors 3/17/23 from altered mental status (?pna vs seizure), course c/b seizure like activity.    NEUROLOGIC    #?Seizures  - Prior episodes with left lateral gaze deviation and tonic clonic jerking of extremities when off sedation  - CT head negative of acute intracranial process or mass  - LP unrevealing  - EEG showing multifocal epileptiform potentials >> no acute seizures documented but unable to completely rule out either  - MR brain with and without gadolinium pending  - Keppra 1g bid continue      CARDIOVASCULAR    #HFpEF  #CAD (WAYNE x6 in 2019, NSTEMI admission 2022)  #HTN  - continue home antihypertensives  - Home meds: aspirin 81, rivaroxaban 2.5 PO BID, statin  - DOAC has been held during MICU admission iso critical illness >> may resume after MRI head completed  - CTM      PULMONARY    #Acute hypoxemic respiratory failure  Intubated for airway protection in the setting of altered mental status from ?seizure activity vs aspiration during RRT on floors  - Intubated 3/17, trial of extubation 3/25  - Abx: Zosyn completed x5 day course for aspiration  - Volume A/C >> CPAP started 3/23 AM, tolerating well, with minimal secretions, and some cuff leak 3/25  - will give decadron x2 more doses and consider trial of extubation 3/25    GI  - holding tube feeds iso of possible extubation     RENAL  - no issues  - TOV 3/25     ID    #Flu  - completed Tamiflu  - completed 5 days of piperacillin/tazobactam on 3/22  - febrile 3/24 ON, blood cx negative for now, low threshold to restart abx if febrile   - repeat sputum cx      ENDOCRINE    - continue with NPH and sliding scale - monitor POCG    HEME    #DVT ppx  - HSQ    Full Code. MICU DOWN GRADE NOTE      HPI: 88F with HFpEF, CAD (s/p multiple stents), HTN, HLD, DM2 admitted to MICU for acute hypoxemic respiratory failure requiring intubation during RRT on floors 3/17/23 from altered mental status (?pna vs seizure), course c/b seizure like activity.  Intubated 3/17, extubated 3/25. Influenza pna completed Tamiflu and got Zosyn. LP unrevealing and EEG showing multifocal epileptiform potentials >> no acute seizures documented but unable to completely rule out either. Neurology following.     TO DO:   [] Pending MRI brain with and without contrast   [ ] Neuro recs  [ ] Monitor fever curve. If spikes fever, would do infectious workup and start empiric abx  [ ] Monitor POCG, currently on NPH      MEDICATIONS:  acetaminophen   Oral Liquid .. 650 milliGRAM(s) Oral every 6 hours PRN  albuterol/ipratropium for Nebulization 3 milliLiter(s) Nebulizer every 6 hours  aspirin  chewable 81 milliGRAM(s) Oral daily  atorvastatin 80 milliGRAM(s) Oral at bedtime  chlorhexidine 2% Cloths 1 Application(s) Topical daily  dexAMETHasone  Injectable 6 milliGRAM(s) IV Push once  famotidine    Tablet 20 milliGRAM(s) Oral daily  heparin   Injectable 5000 Unit(s) SubCutaneous every 8 hours  influenza  Vaccine (HIGH DOSE) 0.7 milliLiter(s) IntraMuscular once  insulin lispro (ADMELOG) corrective regimen sliding scale   SubCutaneous every 6 hours  insulin NPH human recombinant 20 Unit(s) SubCutaneous every 6 hours  levETIRAcetam  Solution 1000 milliGRAM(s) Oral two times a day  levothyroxine Injectable 20 MICROGram(s) IV Push at bedtime  multivitamin/minerals/iron Oral Solution (CENTRUM) 15 milliLiter(s) Oral daily  petrolatum Ophthalmic Ointment 1 Application(s) Both EYES daily  polyethylene glycol 3350 17 Gram(s) Oral two times a day  ranolazine 500 milliGRAM(s) Oral two times a day  senna 2 Tablet(s) Oral at bedtime      T(C): 37.7 (03-25-24 @ 12:00), Max: 38.9 (03-24-24 @ 20:00)  HR: 97 (03-25-24 @ 13:36) (66 - 104)  BP: 139/69 (03-25-24 @ 13:00) (113/60 - 160/70)  RR: 16 (03-25-24 @ 13:33) (14 - 28)  SpO2: 99% (03-25-24 @ 13:36) (97% - 100%)  Wt(kg): --Vital Signs Last 24 Hrs  T(C): 37.7 (25 Mar 2024 12:00), Max: 38.9 (24 Mar 2024 20:00)  T(F): 99.8 (25 Mar 2024 12:00), Max: 102.1 (24 Mar 2024 20:00)  HR: 97 (25 Mar 2024 13:36) (66 - 104)  BP: 139/69 (25 Mar 2024 13:00) (113/60 - 160/70)  BP(mean): 85 (25 Mar 2024 13:00) (70 - 96)  RR: 16 (25 Mar 2024 13:33) (14 - 28)  SpO2: 99% (25 Mar 2024 13:36) (97% - 100%)    Parameters below as of 25 Mar 2024 13:00  Patient On (Oxygen Delivery Method): ventilator, CPAP    O2 Concentration (%): 40    LABS:                        7.8    15.81 )-----------( 430      ( 25 Mar 2024 00:40 )             24.6     03-25    141  |  106  |  20  ----------------------------<  176<H>  4.3   |  25  |  0.78    Ca    9.4      25 Mar 2024 00:40  Phos  3.9     03-25  Mg     2.70     03-25    TPro  7.5  /  Alb  2.9<L>  /  TBili  0.2  /  DBili  x   /  AST  48<H>  /  ALT  35<H>  /  AlkPhos  210<H>  03-25    PT/INR - ( 25 Mar 2024 00:40 )   PT: 13.0 sec;   INR: 1.17 ratio         PTT - ( 25 Mar 2024 00:40 )  PTT:34.4 sec  Urinalysis Basic - ( 25 Mar 2024 00:40 )    Color: x / Appearance: x / SG: x / pH: x  Gluc: 176 mg/dL / Ketone: x  / Bili: x / Urobili: x   Blood: x / Protein: x / Nitrite: x   Leuk Esterase: x / RBC: x / WBC x   Sq Epi: x / Non Sq Epi: x / Bacteria: x      CAPILLARY BLOOD GLUCOSE      POCT Blood Glucose.: 171 mg/dL (25 Mar 2024 11:33)  POCT Blood Glucose.: 128 mg/dL (25 Mar 2024 05:14)  POCT Blood Glucose.: 206 mg/dL (24 Mar 2024 23:30)  POCT Blood Glucose.: 197 mg/dL (24 Mar 2024 17:08)      ABG - ( 25 Mar 2024 00:40 )  pH, Arterial: 7.42  pH, Blood: x     /  pCO2: 42    /  pO2: 93    / HCO3: 27    / Base Excess: 2.5   /  SaO2: 97.3              Urinalysis Basic - ( 25 Mar 2024 00:40 )    Color: x / Appearance: x / SG: x / pH: x  Gluc: 176 mg/dL / Ketone: x  / Bili: x / Urobili: x   Blood: x / Protein: x / Nitrite: x   Leuk Esterase: x / RBC: x / WBC x   Sq Epi: x / Non Sq Epi: x / Bacteria: x        RADIOLOGY & ADDITIONAL TESTS:    Imaging Personally Reviewed:  [x ] YES  [ ] NO    ASSESSMENT AND PLAN:   88F with HFpEF, CAD (s/p multiple stents), HTN, HLD, DM2 admitted to MICU for acute hypoxemic respiratory failure requiring intubation during RRT on floors 3/17/23 from altered mental status (?pna vs seizure), course c/b seizure like activity.    NEUROLOGIC    #?Seizures  - Prior episodes with left lateral gaze deviation and tonic clonic jerking of extremities when off sedation  - CT head negative of acute intracranial process or mass  - LP unrevealing  - EEG showing multifocal epileptiform potentials >> no acute seizures documented but unable to completely rule out either  - MR brain with and without gadolinium pending  - Keppra 1g bid continue      CARDIOVASCULAR    #HFpEF  #CAD (WAYNE x6 in 2019, NSTEMI admission 2022)  #HTN  - continue home antihypertensives  - Home meds: aspirin 81, rivaroxaban 2.5 PO BID, statin  - DOAC has been held during MICU admission iso critical illness >> may resume after MRI head completed  - CTM      PULMONARY    #Acute hypoxemic respiratory failure  Intubated for airway protection in the setting of altered mental status from ?seizure activity vs aspiration during RRT on floors  - Intubated 3/17, trial of extubation 3/25  - Abx: Zosyn completed x5 day course for aspiration  - Volume A/C >> CPAP started 3/23 AM, tolerating well, with minimal secretions, and some cuff leak 3/25  - will give decadron x2 more doses and consider trial of extubation 3/25    GI  - holding tube feeds iso of possible extubation     RENAL  - no issues  - TOV 3/25     ID    #Flu  - completed Tamiflu  - completed 5 days of piperacillin/tazobactam on 3/22  - febrile 3/24 ON, blood cx negative for now, low threshold to restart abx if febrile   - repeat sputum cx      ENDOCRINE    - continue with NPH and sliding scale - monitor POCG    HEME    #DVT ppx  - HSQ    Full Code. MICU DOWN GRADE NOTE      HPI: 88F with HFpEF, CAD (s/p multiple stents), HTN, HLD, DM2 admitted to MICU for acute hypoxemic respiratory failure requiring intubation during RRT on floors 3/17/23 from altered mental status (?pna vs seizure), course c/b seizure like activity.  Intubated 3/17, extubated 3/25. Influenza pna completed Tamiflu and got Zosyn. LP unrevealing and EEG showing multifocal epileptiform potentials >> no acute seizures documented but unable to completely rule out either. Neurology following. Now stable of off IV sedation.    TO DO:   [] Pending MRI brain with and without contrast   [ ] Neuro recs  [ ] Monitor fever curve. If spikes fever, would do infectious workup and start empiric abx  [ ] Monitor POCG, currently on NPH      MEDICATIONS:  acetaminophen   Oral Liquid .. 650 milliGRAM(s) Oral every 6 hours PRN  albuterol/ipratropium for Nebulization 3 milliLiter(s) Nebulizer every 6 hours  aspirin  chewable 81 milliGRAM(s) Oral daily  atorvastatin 80 milliGRAM(s) Oral at bedtime  chlorhexidine 2% Cloths 1 Application(s) Topical daily  dexAMETHasone  Injectable 6 milliGRAM(s) IV Push once  famotidine    Tablet 20 milliGRAM(s) Oral daily  heparin   Injectable 5000 Unit(s) SubCutaneous every 8 hours  influenza  Vaccine (HIGH DOSE) 0.7 milliLiter(s) IntraMuscular once  insulin lispro (ADMELOG) corrective regimen sliding scale   SubCutaneous every 6 hours  insulin NPH human recombinant 20 Unit(s) SubCutaneous every 6 hours  levETIRAcetam  Solution 1000 milliGRAM(s) Oral two times a day  levothyroxine Injectable 20 MICROGram(s) IV Push at bedtime  multivitamin/minerals/iron Oral Solution (CENTRUM) 15 milliLiter(s) Oral daily  petrolatum Ophthalmic Ointment 1 Application(s) Both EYES daily  polyethylene glycol 3350 17 Gram(s) Oral two times a day  ranolazine 500 milliGRAM(s) Oral two times a day  senna 2 Tablet(s) Oral at bedtime      T(C): 37.7 (03-25-24 @ 12:00), Max: 38.9 (03-24-24 @ 20:00)  HR: 97 (03-25-24 @ 13:36) (66 - 104)  BP: 139/69 (03-25-24 @ 13:00) (113/60 - 160/70)  RR: 16 (03-25-24 @ 13:33) (14 - 28)  SpO2: 99% (03-25-24 @ 13:36) (97% - 100%)  Wt(kg): --Vital Signs Last 24 Hrs  T(C): 37.7 (25 Mar 2024 12:00), Max: 38.9 (24 Mar 2024 20:00)  T(F): 99.8 (25 Mar 2024 12:00), Max: 102.1 (24 Mar 2024 20:00)  HR: 97 (25 Mar 2024 13:36) (66 - 104)  BP: 139/69 (25 Mar 2024 13:00) (113/60 - 160/70)  BP(mean): 85 (25 Mar 2024 13:00) (70 - 96)  RR: 16 (25 Mar 2024 13:33) (14 - 28)  SpO2: 99% (25 Mar 2024 13:36) (97% - 100%)    Parameters below as of 25 Mar 2024 13:00  Patient On (Oxygen Delivery Method): ventilator, CPAP    O2 Concentration (%): 40    LABS:                        7.8    15.81 )-----------( 430      ( 25 Mar 2024 00:40 )             24.6     03-25    141  |  106  |  20  ----------------------------<  176<H>  4.3   |  25  |  0.78    Ca    9.4      25 Mar 2024 00:40  Phos  3.9     03-25  Mg     2.70     03-25    TPro  7.5  /  Alb  2.9<L>  /  TBili  0.2  /  DBili  x   /  AST  48<H>  /  ALT  35<H>  /  AlkPhos  210<H>  03-25    PT/INR - ( 25 Mar 2024 00:40 )   PT: 13.0 sec;   INR: 1.17 ratio         PTT - ( 25 Mar 2024 00:40 )  PTT:34.4 sec  Urinalysis Basic - ( 25 Mar 2024 00:40 )    Color: x / Appearance: x / SG: x / pH: x  Gluc: 176 mg/dL / Ketone: x  / Bili: x / Urobili: x   Blood: x / Protein: x / Nitrite: x   Leuk Esterase: x / RBC: x / WBC x   Sq Epi: x / Non Sq Epi: x / Bacteria: x      CAPILLARY BLOOD GLUCOSE      POCT Blood Glucose.: 171 mg/dL (25 Mar 2024 11:33)  POCT Blood Glucose.: 128 mg/dL (25 Mar 2024 05:14)  POCT Blood Glucose.: 206 mg/dL (24 Mar 2024 23:30)  POCT Blood Glucose.: 197 mg/dL (24 Mar 2024 17:08)      ABG - ( 25 Mar 2024 00:40 )  pH, Arterial: 7.42  pH, Blood: x     /  pCO2: 42    /  pO2: 93    / HCO3: 27    / Base Excess: 2.5   /  SaO2: 97.3              Urinalysis Basic - ( 25 Mar 2024 00:40 )    Color: x / Appearance: x / SG: x / pH: x  Gluc: 176 mg/dL / Ketone: x  / Bili: x / Urobili: x   Blood: x / Protein: x / Nitrite: x   Leuk Esterase: x / RBC: x / WBC x   Sq Epi: x / Non Sq Epi: x / Bacteria: x        RADIOLOGY & ADDITIONAL TESTS:    Imaging Personally Reviewed:  [x ] YES  [ ] NO    88F with HFpEF, CAD (s/p multiple stents), HTN, HLD, DM2 admitted to MICU following intubation for airway protection (?pna vs seizure on the floor), now extubated and stable for transfer back to general medical floor.    NEUROLOGIC    #?Seizures  Prior episodes with left lateral gaze deviation and tonic clonic jerking of extremities when off sedation  CT head negative of acute intracranial process or mass  LP unrevealing  EEG showing multifocal epileptiform potentials >> no acute seizures documented but unable to completely rule out either  - f/u MR brain with and without gadolinium pending  - continue Keppra 1g bid      CARDIOVASCULAR    #HFpEF  #CAD (WAYNE x6 in 2019, NSTEMI admission 2022)  #HTN  Home meds: aspirin 81, rivaroxaban 2.5 PO BID, statin  Confirmed with PCP that she takes rivaroxaban at a low dose for her stents as prescribed by her cardiologist  - DOAC has been held during MICU admission >> may resume after MRI head completed  - re-introduce home blood pressure medications PRN      PULMONARY    #Acute hypoxemic respiratory failure  Intubated for 8 days airway protection in the setting of altered mental status from ?seizure activity vs aspiration during RRT on floors  Now on HFNC  - continue to wean HFNC as tolerated    GI    #Dysphagia  Failed bedside swallow eval  - formal S&S eval   - if fails, will place NGT for feeds and meds    RENAL    No issues    ID    #Flu  - completed Tamiflu    #?aspiration PNA  completed 5 days of piperacillin/tazobactam      ENDOCRINE    #T2DM  - continue with NPH and sliding scale - monitor POCG    HEME    #DVT ppx  - HSQ, can discontinue when rivaroxaban is resumed    Full Code. MICU DOWN GRADE NOTE      HPI: 88F with HFpEF, CAD (s/p multiple stents), HTN, HLD, DM2 admitted to MICU for acute hypoxemic respiratory failure requiring intubation during RRT on floors 3/17/23 from altered mental status (?pna vs seizure), course c/b seizure like activity.  Intubated 3/17, extubated 3/25. Influenza pna completed Tamiflu and got Zosyn. LP unrevealing and EEG showing multifocal epileptiform potentials >> no acute seizures documented but unable to completely rule out either. Neurology following. Now stable of off IV sedation.    TO DO:   [] Pending MRI brain with and without contrast   [ ] Neuro recs  [ ] Monitor fever curve. If spikes fever, would do infectious workup and start empiric abx  [ ] Monitor POCG, currently on NPH  [] restart home meds when able (on rivaroxaban 2.5 bid for CAD)      MEDICATIONS:  acetaminophen   Oral Liquid .. 650 milliGRAM(s) Oral every 6 hours PRN  albuterol/ipratropium for Nebulization 3 milliLiter(s) Nebulizer every 6 hours  aspirin  chewable 81 milliGRAM(s) Oral daily  atorvastatin 80 milliGRAM(s) Oral at bedtime  chlorhexidine 2% Cloths 1 Application(s) Topical daily  dexAMETHasone  Injectable 6 milliGRAM(s) IV Push once  famotidine    Tablet 20 milliGRAM(s) Oral daily  heparin   Injectable 5000 Unit(s) SubCutaneous every 8 hours  influenza  Vaccine (HIGH DOSE) 0.7 milliLiter(s) IntraMuscular once  insulin lispro (ADMELOG) corrective regimen sliding scale   SubCutaneous every 6 hours  insulin NPH human recombinant 20 Unit(s) SubCutaneous every 6 hours  levETIRAcetam  Solution 1000 milliGRAM(s) Oral two times a day  levothyroxine Injectable 20 MICROGram(s) IV Push at bedtime  multivitamin/minerals/iron Oral Solution (CENTRUM) 15 milliLiter(s) Oral daily  petrolatum Ophthalmic Ointment 1 Application(s) Both EYES daily  polyethylene glycol 3350 17 Gram(s) Oral two times a day  ranolazine 500 milliGRAM(s) Oral two times a day  senna 2 Tablet(s) Oral at bedtime      T(C): 37.7 (03-25-24 @ 12:00), Max: 38.9 (03-24-24 @ 20:00)  HR: 97 (03-25-24 @ 13:36) (66 - 104)  BP: 139/69 (03-25-24 @ 13:00) (113/60 - 160/70)  RR: 16 (03-25-24 @ 13:33) (14 - 28)  SpO2: 99% (03-25-24 @ 13:36) (97% - 100%)  Wt(kg): --Vital Signs Last 24 Hrs  T(C): 37.7 (25 Mar 2024 12:00), Max: 38.9 (24 Mar 2024 20:00)  T(F): 99.8 (25 Mar 2024 12:00), Max: 102.1 (24 Mar 2024 20:00)  HR: 97 (25 Mar 2024 13:36) (66 - 104)  BP: 139/69 (25 Mar 2024 13:00) (113/60 - 160/70)  BP(mean): 85 (25 Mar 2024 13:00) (70 - 96)  RR: 16 (25 Mar 2024 13:33) (14 - 28)  SpO2: 99% (25 Mar 2024 13:36) (97% - 100%)    Parameters below as of 25 Mar 2024 13:00  Patient On (Oxygen Delivery Method): ventilator, CPAP    O2 Concentration (%): 40    LABS:                        7.8    15.81 )-----------( 430      ( 25 Mar 2024 00:40 )             24.6     03-25    141  |  106  |  20  ----------------------------<  176<H>  4.3   |  25  |  0.78    Ca    9.4      25 Mar 2024 00:40  Phos  3.9     03-25  Mg     2.70     03-25    TPro  7.5  /  Alb  2.9<L>  /  TBili  0.2  /  DBili  x   /  AST  48<H>  /  ALT  35<H>  /  AlkPhos  210<H>  03-25    PT/INR - ( 25 Mar 2024 00:40 )   PT: 13.0 sec;   INR: 1.17 ratio         PTT - ( 25 Mar 2024 00:40 )  PTT:34.4 sec  Urinalysis Basic - ( 25 Mar 2024 00:40 )    Color: x / Appearance: x / SG: x / pH: x  Gluc: 176 mg/dL / Ketone: x  / Bili: x / Urobili: x   Blood: x / Protein: x / Nitrite: x   Leuk Esterase: x / RBC: x / WBC x   Sq Epi: x / Non Sq Epi: x / Bacteria: x      CAPILLARY BLOOD GLUCOSE      POCT Blood Glucose.: 171 mg/dL (25 Mar 2024 11:33)  POCT Blood Glucose.: 128 mg/dL (25 Mar 2024 05:14)  POCT Blood Glucose.: 206 mg/dL (24 Mar 2024 23:30)  POCT Blood Glucose.: 197 mg/dL (24 Mar 2024 17:08)      ABG - ( 25 Mar 2024 00:40 )  pH, Arterial: 7.42  pH, Blood: x     /  pCO2: 42    /  pO2: 93    / HCO3: 27    / Base Excess: 2.5   /  SaO2: 97.3              Urinalysis Basic - ( 25 Mar 2024 00:40 )    Color: x / Appearance: x / SG: x / pH: x  Gluc: 176 mg/dL / Ketone: x  / Bili: x / Urobili: x   Blood: x / Protein: x / Nitrite: x   Leuk Esterase: x / RBC: x / WBC x   Sq Epi: x / Non Sq Epi: x / Bacteria: x        RADIOLOGY & ADDITIONAL TESTS:    Imaging Personally Reviewed:  [x ] YES  [ ] NO    88F with HFpEF, CAD (s/p multiple stents), HTN, HLD, DM2 admitted to MICU following intubation for airway protection (?pna vs seizure on the floor), now extubated and stable for transfer back to general medical floor.    NEUROLOGIC    #?Seizures  Prior episodes with left lateral gaze deviation and tonic clonic jerking of extremities when off sedation  CT head negative of acute intracranial process or mass  LP unrevealing  EEG showing multifocal epileptiform potentials >> no acute seizures documented but unable to completely rule out either  - f/u MR brain with and without gadolinium pending  - continue Keppra 1g bid      CARDIOVASCULAR    #HFpEF  #CAD (WAYNE x6 in 2019, NSTEMI admission 2022)  #HTN  Home meds: aspirin 81, rivaroxaban 2.5 PO BID, statin  Confirmed with PCP that she takes rivaroxaban at a low dose for her stents as prescribed by her cardiologist  - DOAC has been held during MICU admission >> may resume after MRI head completed  - re-introduce home blood pressure medications PRN      PULMONARY    #Acute hypoxemic respiratory failure  Intubated for 8 days airway protection in the setting of altered mental status from ?seizure activity vs aspiration during RRT on floors  Now on HFNC  - continue to wean HFNC as tolerated    GI    #Dysphagia  Failed bedside swallow eval  - formal S&S eval   - if fails, will place NGT for feeds and meds    RENAL    No issues    ID    #Flu  - completed Tamiflu    #?aspiration PNA  completed 5 days of piperacillin/tazobactam      ENDOCRINE    #T2DM  - continue with NPH and sliding scale - monitor POCG    HEME    #DVT ppx  - HSQ, can discontinue when rivaroxaban is resumed    Full Code.

## 2024-03-26 LAB
ALBUMIN SERPL ELPH-MCNC: 3.4 G/DL — SIGNIFICANT CHANGE UP (ref 3.3–5)
ALP SERPL-CCNC: 181 U/L — HIGH (ref 40–120)
ALT FLD-CCNC: 27 U/L — SIGNIFICANT CHANGE UP (ref 4–33)
ANION GAP SERPL CALC-SCNC: 14 MMOL/L — SIGNIFICANT CHANGE UP (ref 7–14)
APTT BLD: 31.7 SEC — SIGNIFICANT CHANGE UP (ref 24.5–35.6)
AST SERPL-CCNC: 35 U/L — HIGH (ref 4–32)
BASE EXCESS BLDV CALC-SCNC: 1.7 MMOL/L — SIGNIFICANT CHANGE UP (ref -2–3)
BASOPHILS # BLD AUTO: 0.07 K/UL — SIGNIFICANT CHANGE UP (ref 0–0.2)
BASOPHILS NFR BLD AUTO: 0.4 % — SIGNIFICANT CHANGE UP (ref 0–2)
BILIRUB SERPL-MCNC: 0.3 MG/DL — SIGNIFICANT CHANGE UP (ref 0.2–1.2)
BLOOD GAS VENOUS COMPREHENSIVE RESULT: SIGNIFICANT CHANGE UP
BUN SERPL-MCNC: 32 MG/DL — HIGH (ref 7–23)
CALCIUM SERPL-MCNC: 9.6 MG/DL — SIGNIFICANT CHANGE UP (ref 8.4–10.5)
CHLORIDE BLDV-SCNC: 108 MMOL/L — SIGNIFICANT CHANGE UP (ref 96–108)
CHLORIDE SERPL-SCNC: 107 MMOL/L — SIGNIFICANT CHANGE UP (ref 98–107)
CO2 BLDV-SCNC: 27.9 MMOL/L — HIGH (ref 22–26)
CO2 SERPL-SCNC: 22 MMOL/L — SIGNIFICANT CHANGE UP (ref 22–31)
CREAT SERPL-MCNC: 0.78 MG/DL — SIGNIFICANT CHANGE UP (ref 0.5–1.3)
EGFR: 74 ML/MIN/1.73M2 — SIGNIFICANT CHANGE UP
EOSINOPHIL # BLD AUTO: 0 K/UL — SIGNIFICANT CHANGE UP (ref 0–0.5)
EOSINOPHIL NFR BLD AUTO: 0 % — SIGNIFICANT CHANGE UP (ref 0–6)
GAS PNL BLDV: 141 MMOL/L — SIGNIFICANT CHANGE UP (ref 136–145)
GLUCOSE BLDC GLUCOMTR-MCNC: 168 MG/DL — HIGH (ref 70–99)
GLUCOSE BLDC GLUCOMTR-MCNC: 195 MG/DL — HIGH (ref 70–99)
GLUCOSE BLDC GLUCOMTR-MCNC: 234 MG/DL — HIGH (ref 70–99)
GLUCOSE BLDC GLUCOMTR-MCNC: 79 MG/DL — SIGNIFICANT CHANGE UP (ref 70–99)
GLUCOSE BLDV-MCNC: 198 MG/DL — HIGH (ref 70–99)
GLUCOSE SERPL-MCNC: 186 MG/DL — HIGH (ref 70–99)
HCO3 BLDV-SCNC: 27 MMOL/L — SIGNIFICANT CHANGE UP (ref 22–29)
HCT VFR BLD CALC: 26.8 % — LOW (ref 34.5–45)
HCT VFR BLDA CALC: 32 % — LOW (ref 34.5–46.5)
HGB BLD CALC-MCNC: 10.6 G/DL — LOW (ref 11.7–16.1)
HGB BLD-MCNC: 8.2 G/DL — LOW (ref 11.5–15.5)
IANC: 14.93 K/UL — HIGH (ref 1.8–7.4)
IMM GRANULOCYTES NFR BLD AUTO: 5.2 % — HIGH (ref 0–0.9)
INR BLD: 1.12 RATIO — SIGNIFICANT CHANGE UP (ref 0.85–1.18)
LACTATE BLDV-MCNC: 1.1 MMOL/L — SIGNIFICANT CHANGE UP (ref 0.5–2)
LYMPHOCYTES # BLD AUTO: 1.44 K/UL — SIGNIFICANT CHANGE UP (ref 1–3.3)
LYMPHOCYTES # BLD AUTO: 7.9 % — LOW (ref 13–44)
MAGNESIUM SERPL-MCNC: 2.9 MG/DL — HIGH (ref 1.6–2.6)
MCHC RBC-ENTMCNC: 28.7 PG — SIGNIFICANT CHANGE UP (ref 27–34)
MCHC RBC-ENTMCNC: 30.6 GM/DL — LOW (ref 32–36)
MCV RBC AUTO: 93.7 FL — SIGNIFICANT CHANGE UP (ref 80–100)
MONOCYTES # BLD AUTO: 0.81 K/UL — SIGNIFICANT CHANGE UP (ref 0–0.9)
MONOCYTES NFR BLD AUTO: 4.5 % — SIGNIFICANT CHANGE UP (ref 2–14)
NEUTROPHILS # BLD AUTO: 14.93 K/UL — HIGH (ref 1.8–7.4)
NEUTROPHILS NFR BLD AUTO: 82 % — HIGH (ref 43–77)
NRBC # BLD: 0 /100 WBCS — SIGNIFICANT CHANGE UP (ref 0–0)
NRBC # FLD: 0.02 K/UL — HIGH (ref 0–0)
PCO2 BLDV: 42 MMHG — SIGNIFICANT CHANGE UP (ref 39–52)
PH BLDV: 7.41 — SIGNIFICANT CHANGE UP (ref 7.32–7.43)
PHOSPHATE SERPL-MCNC: 4.3 MG/DL — SIGNIFICANT CHANGE UP (ref 2.5–4.5)
PLATELET # BLD AUTO: 548 K/UL — HIGH (ref 150–400)
PO2 BLDV: 44 MMHG — SIGNIFICANT CHANGE UP (ref 25–45)
POTASSIUM BLDV-SCNC: 4.8 MMOL/L — SIGNIFICANT CHANGE UP (ref 3.5–5.1)
POTASSIUM SERPL-MCNC: 5 MMOL/L — SIGNIFICANT CHANGE UP (ref 3.5–5.3)
POTASSIUM SERPL-SCNC: 5 MMOL/L — SIGNIFICANT CHANGE UP (ref 3.5–5.3)
PROT SERPL-MCNC: 8.3 G/DL — SIGNIFICANT CHANGE UP (ref 6–8.3)
PROTHROM AB SERPL-ACNC: 12.6 SEC — SIGNIFICANT CHANGE UP (ref 9.5–13)
RBC # BLD: 2.86 M/UL — LOW (ref 3.8–5.2)
RBC # FLD: 15.5 % — HIGH (ref 10.3–14.5)
SAO2 % BLDV: 73.6 % — SIGNIFICANT CHANGE UP (ref 67–88)
SODIUM SERPL-SCNC: 143 MMOL/L — SIGNIFICANT CHANGE UP (ref 135–145)
WBC # BLD: 18.19 K/UL — HIGH (ref 3.8–10.5)
WBC # FLD AUTO: 18.19 K/UL — HIGH (ref 3.8–10.5)

## 2024-03-26 PROCEDURE — 99291 CRITICAL CARE FIRST HOUR: CPT | Mod: GC

## 2024-03-26 PROCEDURE — 71045 X-RAY EXAM CHEST 1 VIEW: CPT | Mod: 26

## 2024-03-26 RX ORDER — LEVETIRACETAM 250 MG/1
1000 TABLET, FILM COATED ORAL EVERY 12 HOURS
Refills: 0 | Status: DISCONTINUED | OUTPATIENT
Start: 2024-03-26 | End: 2024-04-04

## 2024-03-26 RX ORDER — LEVETIRACETAM 250 MG/1
1000 TABLET, FILM COATED ORAL
Refills: 0 | Status: DISCONTINUED | OUTPATIENT
Start: 2024-03-26 | End: 2024-03-26

## 2024-03-26 RX ORDER — RANOLAZINE 500 MG/1
500 TABLET, FILM COATED, EXTENDED RELEASE ORAL
Refills: 0 | Status: DISCONTINUED | OUTPATIENT
Start: 2024-03-26 | End: 2024-03-26

## 2024-03-26 RX ORDER — LEVETIRACETAM 250 MG/1
1000 TABLET, FILM COATED ORAL EVERY 12 HOURS
Refills: 0 | Status: DISCONTINUED | OUTPATIENT
Start: 2024-03-26 | End: 2024-03-26

## 2024-03-26 RX ORDER — FAMOTIDINE 10 MG/ML
20 INJECTION INTRAVENOUS DAILY
Refills: 0 | Status: DISCONTINUED | OUTPATIENT
Start: 2024-03-26 | End: 2024-04-05

## 2024-03-26 RX ORDER — ATORVASTATIN CALCIUM 80 MG/1
80 TABLET, FILM COATED ORAL AT BEDTIME
Refills: 0 | Status: DISCONTINUED | OUTPATIENT
Start: 2024-03-26 | End: 2024-04-04

## 2024-03-26 RX ADMIN — Medication 2: at 05:28

## 2024-03-26 RX ADMIN — SENNA PLUS 2 TABLET(S): 8.6 TABLET ORAL at 22:05

## 2024-03-26 RX ADMIN — Medication 3 MILLILITER(S): at 16:13

## 2024-03-26 RX ADMIN — HEPARIN SODIUM 5000 UNIT(S): 5000 INJECTION INTRAVENOUS; SUBCUTANEOUS at 05:29

## 2024-03-26 RX ADMIN — LEVETIRACETAM 1000 MILLIGRAM(S): 250 TABLET, FILM COATED ORAL at 17:29

## 2024-03-26 RX ADMIN — Medication 3 MILLILITER(S): at 04:51

## 2024-03-26 RX ADMIN — Medication 1: at 12:49

## 2024-03-26 RX ADMIN — Medication 1: at 18:10

## 2024-03-26 RX ADMIN — LEVETIRACETAM 1000 MILLIGRAM(S): 250 TABLET, FILM COATED ORAL at 06:29

## 2024-03-26 RX ADMIN — POLYETHYLENE GLYCOL 3350 17 GRAM(S): 17 POWDER, FOR SOLUTION ORAL at 17:32

## 2024-03-26 RX ADMIN — CHLORHEXIDINE GLUCONATE 1 APPLICATION(S): 213 SOLUTION TOPICAL at 12:38

## 2024-03-26 RX ADMIN — HEPARIN SODIUM 5000 UNIT(S): 5000 INJECTION INTRAVENOUS; SUBCUTANEOUS at 22:05

## 2024-03-26 RX ADMIN — Medication 3 MILLILITER(S): at 11:19

## 2024-03-26 RX ADMIN — HUMAN INSULIN 20 UNIT(S): 100 INJECTION, SUSPENSION SUBCUTANEOUS at 18:10

## 2024-03-26 RX ADMIN — Medication 1 APPLICATION(S): at 12:38

## 2024-03-26 RX ADMIN — Medication 3 MILLILITER(S): at 22:51

## 2024-03-26 RX ADMIN — HEPARIN SODIUM 5000 UNIT(S): 5000 INJECTION INTRAVENOUS; SUBCUTANEOUS at 15:08

## 2024-03-26 RX ADMIN — ATORVASTATIN CALCIUM 80 MILLIGRAM(S): 80 TABLET, FILM COATED ORAL at 22:05

## 2024-03-26 NOTE — SWALLOW BEDSIDE ASSESSMENT ADULT - COMMENTS
As per MICU note dated 3/26/24 "88F with HFpEF, CAD (s/p multiple stents), HTN, HLD, DM2 admitted to MICU following intubation for airway protection (?pna vs seizure on the floor), now extubated and stable for transfer back to general medical floor."    Per chart review, patient intubated 3/17/24-3/25/24.     CXR 3/21/24 "IMPRESSION: Faint bilateral hazy opacities, possibly related to known influenza infection."    Patient visited at bedside for clinical swallow evaluation with son present.  services offered in patient's primary language of [facility], however patient/family declined with preference for son to provide translation. Patient presents as awake and alert, intermittently follows 1-step directives. Able to make basic wants/needs known. Patient receiving supplemental oxygen via High Flow Nasal Cannula. Baseline cough noted.

## 2024-03-26 NOTE — PROGRESS NOTE ADULT - ASSESSMENT
88F with HFpEF, CAD (s/p multiple stents), HTN, HLD, DM2 admitted to MICU for acute hypoxemic respiratory failure requiring intubation during RRT on floors 3/17/23 from altered mental status (?pna vs seizure), course c/b seizure like activity.    NEUROLOGIC    #?Seizures  - Prior episodes with left lateral gaze deviation and tonic clonic jerking of extremities when off sedation  - CT head negative of acute intracranial process or mass  - LP unrevealing  - EEG showing multifocal epileptiform potentials >> no acute seizures documented but unable to completely rule out either  - MR brain with and without gadolinium pending  - Keppra 1g bid continue      CARDIOVASCULAR    #HFpEF  #CAD (WAYNE x6 in 2019, NSTEMI admission 2022)  #HTN  - continue home antihypertensives  - Home meds: aspirin 81, rivaroxaban 2.5 PO BID, statin  - DOAC has been held during MICU admission iso critical illness >> may resume after MRI head completed  - CTM      PULMONARY    #Acute hypoxemic respiratory failure  Intubated for airway protection in the setting of altered mental status from ?seizure activity vs aspiration during RRT on floors  - Intubated 3/17, trial of extubation 3/25  - Abx: Zosyn completed x5 day course for aspiration  - Volume A/C >> CPAP started 3/23 AM, tolerating well, with minimal secretions, and some cuff leak 3/25  - will give decadron x2 more doses and consider trial of extubation 3/25    GI  - holding tube feeds iso of possible extubation     RENAL  - no issues  - TOV 3/25     ID    #Flu  - completed Tamiflu  - completed 5 days of piperacillin/tazobactam on 3/22  - febrile 3/24 ON, blood cx negative for now, low threshold to restart abx if febrile   - repeat sputum cx      ENDOCRINE    - continue with NPH and sliding scale - monitor POCG    HEME    #DVT ppx  - HSQ    Full Code. 88F with HFpEF, CAD (s/p multiple stents), HTN, HLD, DM2 admitted to MICU following intubation for airway protection (?pna vs seizure on the floor), now extubated and stable for transfer back to general medical floor.    NEUROLOGIC    #?Seizures  Prior episodes with left lateral gaze deviation and tonic clonic jerking of extremities when off sedation  CT head negative of acute intracranial process or mass  LP unrevealing  EEG showing multifocal epileptiform potentials >> no acute seizures documented but unable to completely rule out either  - f/u MR brain with and without gadolinium pending  - continue Keppra 1g bid      CARDIOVASCULAR    #HFpEF  #CAD (WAYNE x6 in 2019, NSTEMI admission 2022)  #HTN  Home meds: aspirin 81, rivaroxaban 2.5 PO BID, statin  Confirmed with PCP that she takes rivaroxaban at a low dose for her stents as prescribed by her cardiologist  - DOAC has been held during MICU admission >> may resume after MRI head completed  - re-introduce home blood pressure medications PRN      PULMONARY    #Acute hypoxemic respiratory failure  Intubated for 8 days airway protection in the setting of altered mental status from ?seizure activity vs aspiration during RRT on floors  Now on HFNC  - continue to wean HFNC as tolerated    GI    #Dysphagia  Failed bedside swallow eval  - formal S&S eval   - if fails, will place NGT for feeds and meds    RENAL    No issues    ID    #Flu  - completed Tamiflu    #?aspiration PNA  completed 5 days of piperacillin/tazobactam      ENDOCRINE    #T2DM  - continue with NPH and sliding scale - monitor POCG    HEME    #DVT ppx  - HSQ, can discontinue when rivaroxaban is resumed    Full Code.

## 2024-03-26 NOTE — PROGRESS NOTE ADULT - CRITICAL CARE ATTENDING COMMENT
Critically ill patient requiring frequent bedside visits with therapy changes.
Critically ill patient requiring frequent bedside visits with therapy changes.

## 2024-03-26 NOTE — CHART NOTE - NSCHARTNOTEFT_GEN_A_CORE
MAR Accept Note  Transfer to:  medicine  Accepting Attending Physician:  Jose  Assigned Room:  563B    Patient seen and examined.   Labs and data reviewed.   No findings precluding transfer of service.       HPI/MICU COURSE:   Please refer to MICU transfer note for full details. Briefly, this is a 88F with HFpEF, CAD (s/p multiple stents), HTN, HLD, DM2 admitted to MICU for acute hypoxemic respiratory failure requiring intubation during RRT on floors 3/17/23 from altered mental status (?pna vs seizure), course c/b seizure like activity.  Intubated 3/17, extubated 3/25. Influenza pna completed Tamiflu and got Zosyn. LP unrevealing and EEG showing multifocal epileptiform potentials >> no acute seizures documented but unable to completely rule out either. Still confused. Neurology following. Now stable of off IV sedation.    TO DO:   [] Pending MRI brain with and without contrast   [ ] Neuro recs  [ ] Monitor fever curve. If spikes fever, would do infectious workup and start empiric abx  [ ] Monitor POCG, currently on NPH  [] restart home meds when able (on rivaroxaban 2.5 bid for CAD)  [] S/S damaris Sepulveda  IM PGY-3  MAR

## 2024-03-26 NOTE — SWALLOW BEDSIDE ASSESSMENT ADULT - ADDITIONAL RECOMMENDATIONS
2. Medical team advised to reconsult this department with any change in medical status and/or as patient becomes medically optimized. 3. This service to follow-up as schedule permits to determine candidacy for oral intake.

## 2024-03-26 NOTE — PROGRESS NOTE ADULT - SUBJECTIVE AND OBJECTIVE BOX
**INCOMPLETE**    INTERVAL HPI/OVERNIGHT EVENTS:    SUBJECTIVE: Patient seen and examined at bedside. Intubated, sedated, ROS cannot be obtained.       VITAL SIGNS:  ICU Vital Signs Last 24 Hrs  T(C): 37.3 (26 Mar 2024 04:00), Max: 37.7 (25 Mar 2024 12:00)  T(F): 99.2 (26 Mar 2024 04:00), Max: 99.9 (26 Mar 2024 00:00)  HR: 91 (26 Mar 2024 06:00) (67 - 102)  BP: 166/84 (26 Mar 2024 06:00) (124/56 - 182/162)  BP(mean): 106 (26 Mar 2024 06:00) (73 - 168)  ABP: --  ABP(mean): --  RR: 25 (26 Mar 2024 06:00) (14 - 26)  SpO2: 98% (26 Mar 2024 06:00) (89% - 100%)    O2 Parameters below as of 26 Mar 2024 06:00  Patient On (Oxygen Delivery Method): nasal cannula, high flow  O2 Flow (L/min): 30  O2 Concentration (%): 50      Mode: CPAP with PS, FiO2: 40, PEEP: 5, PS: 5, MAP: 7, PIP: 11  Plateau pressure:   P/F ratio:     03-25 @ 07:01  -  03-26 @ 07:00  --------------------------------------------------------  IN: 50 mL / OUT: 1395 mL / NET: -1345 mL      CAPILLARY BLOOD GLUCOSE      POCT Blood Glucose.: 234 mg/dL (26 Mar 2024 04:59)    ECG:    PHYSICAL EXAM:    CONSTITUTIONAL: NAD, intubated  RESPIRATORY: Normal respiratory effort; lungs are clear to auscultation bilaterally  CARDIOVASCULAR: Regular rate and rhythm, normal S1 and S2, no murmur/rub/gallop; No lower extremity edema; Peripheral pulses are 2+ bilaterally  ABDOMEN: Nontender to palpation, normoactive bowel sounds, no rebound/guarding  MUSCULOSKELETAL: no clubbing or cyanosis of digits; no joint swelling or tenderness to palpation  NEURO: PERRL, A&Ox0, opens eyes, responds to son at bedside     MEDICATIONS:  MEDICATIONS  (STANDING):  albuterol/ipratropium for Nebulization 3 milliLiter(s) Nebulizer every 6 hours  aspirin  chewable 81 milliGRAM(s) Oral daily  atorvastatin 80 milliGRAM(s) Oral at bedtime  chlorhexidine 2% Cloths 1 Application(s) Topical daily  famotidine    Tablet 20 milliGRAM(s) Oral daily  heparin   Injectable 5000 Unit(s) SubCutaneous every 8 hours  influenza  Vaccine (HIGH DOSE) 0.7 milliLiter(s) IntraMuscular once  insulin lispro (ADMELOG) corrective regimen sliding scale   SubCutaneous every 6 hours  insulin NPH human recombinant 20 Unit(s) SubCutaneous every 6 hours  levETIRAcetam   Injectable 1000 milliGRAM(s) IV Push every 12 hours  levothyroxine Injectable 20 MICROGram(s) IV Push at bedtime  multivitamin/minerals/iron Oral Solution (CENTRUM) 15 milliLiter(s) Oral daily  petrolatum Ophthalmic Ointment 1 Application(s) Both EYES daily  polyethylene glycol 3350 17 Gram(s) Oral two times a day  ranolazine 500 milliGRAM(s) Oral two times a day  senna 2 Tablet(s) Oral at bedtime    MEDICATIONS  (PRN):  acetaminophen   Oral Liquid .. 650 milliGRAM(s) Oral every 6 hours PRN Temp greater or equal to 38C (100.4F), Mild Pain (1 - 3), Moderate Pain (4 - 6)      ALLERGIES:  Allergies    No Known Allergies    Intolerances        LABS:                        8.2    18.19 )-----------( 548      ( 26 Mar 2024 00:40 )             26.8     03-26    143  |  107  |  32<H>  ----------------------------<  186<H>  5.0   |  22  |  0.78    Ca    9.6      26 Mar 2024 00:40  Phos  4.3     03-26  Mg     2.90     03-26    TPro  8.3  /  Alb  3.4  /  TBili  0.3  /  DBili  x   /  AST  35<H>  /  ALT  27  /  AlkPhos  181<H>  03-26    PT/INR - ( 26 Mar 2024 00:40 )   PT: 12.6 sec;   INR: 1.12 ratio         PTT - ( 26 Mar 2024 00:40 )  PTT:31.7 sec  Urinalysis Basic - ( 26 Mar 2024 00:40 )    Color: x / Appearance: x / SG: x / pH: x  Gluc: 186 mg/dL / Ketone: x  / Bili: x / Urobili: x   Blood: x / Protein: x / Nitrite: x   Leuk Esterase: x / RBC: x / WBC x   Sq Epi: x / Non Sq Epi: x / Bacteria: x        RADIOLOGY & ADDITIONAL TESTS: Reviewed. INTERVAL HPI/OVERNIGHT EVENTS: Failed her bedside dysphagia screen.    SUBJECTIVE: Patient seen and examined at bedside. Communicating with her son in her native Chinese, though still not at baseline mental status.      VITAL SIGNS:  ICU Vital Signs Last 24 Hrs  T(C): 37.3 (26 Mar 2024 04:00), Max: 37.7 (25 Mar 2024 12:00)  T(F): 99.2 (26 Mar 2024 04:00), Max: 99.9 (26 Mar 2024 00:00)  HR: 91 (26 Mar 2024 06:00) (67 - 102)  BP: 166/84 (26 Mar 2024 06:00) (124/56 - 182/162)  BP(mean): 106 (26 Mar 2024 06:00) (73 - 168)  RR: 25 (26 Mar 2024 06:00) (14 - 26)  SpO2: 98% (26 Mar 2024 06:00) (89% - 100%)    O2 Parameters below as of 26 Mar 2024 06:00  Patient On (Oxygen Delivery Method): nasal cannula, high flow  O2 Flow (L/min): 30  O2 Concentration (%): 50      Mode: CPAP with PS, FiO2: 40, PEEP: 5, PS: 5, MAP: 7, PIP: 11  Plateau pressure:   P/F ratio:     03-25 @ 07:01  -  03-26 @ 07:00  --------------------------------------------------------  IN: 50 mL / OUT: 1395 mL / NET: -1345 mL      CAPILLARY BLOOD GLUCOSE      POCT Blood Glucose.: 234 mg/dL (26 Mar 2024 04:59)    PHYSICAL EXAM:    CONSTITUTIONAL: elderly woman  RESPIRATORY: Normal respiratory effort; lungs are clear to auscultation bilaterally  CARDIOVASCULAR: Regular rate and rhythm, normal S1 and S2, no murmur/rub/gallop; No lower extremity edema; Peripheral pulses are 2+ bilaterally  ABDOMEN: Nontender to palpation, no rebound/guarding  MUSCULOSKELETAL: no clubbing or cyanosis of digits; no joint swelling or tenderness to palpation  NEURO: PERRL, A&Ox0, opens eyes to voice, though responds to son at bedside     MEDICATIONS:  MEDICATIONS  (STANDING):  albuterol/ipratropium for Nebulization 3 milliLiter(s) Nebulizer every 6 hours  aspirin  chewable 81 milliGRAM(s) Oral daily  atorvastatin 80 milliGRAM(s) Oral at bedtime  chlorhexidine 2% Cloths 1 Application(s) Topical daily  famotidine    Tablet 20 milliGRAM(s) Oral daily  heparin   Injectable 5000 Unit(s) SubCutaneous every 8 hours  influenza  Vaccine (HIGH DOSE) 0.7 milliLiter(s) IntraMuscular once  insulin lispro (ADMELOG) corrective regimen sliding scale   SubCutaneous every 6 hours  insulin NPH human recombinant 20 Unit(s) SubCutaneous every 6 hours  levETIRAcetam   Injectable 1000 milliGRAM(s) IV Push every 12 hours  levothyroxine Injectable 20 MICROGram(s) IV Push at bedtime  multivitamin/minerals/iron Oral Solution (CENTRUM) 15 milliLiter(s) Oral daily  petrolatum Ophthalmic Ointment 1 Application(s) Both EYES daily  polyethylene glycol 3350 17 Gram(s) Oral two times a day  ranolazine 500 milliGRAM(s) Oral two times a day  senna 2 Tablet(s) Oral at bedtime    MEDICATIONS  (PRN):  acetaminophen   Oral Liquid .. 650 milliGRAM(s) Oral every 6 hours PRN Temp greater or equal to 38C (100.4F), Mild Pain (1 - 3), Moderate Pain (4 - 6)      ALLERGIES:  Allergies    No Known Allergies    Intolerances        LABS:                        8.2    18.19 )-----------( 548      ( 26 Mar 2024 00:40 )             26.8     03-26    143  |  107  |  32<H>  ----------------------------<  186<H>  5.0   |  22  |  0.78    Ca    9.6      26 Mar 2024 00:40  Phos  4.3     03-26  Mg     2.90     03-26    TPro  8.3  /  Alb  3.4  /  TBili  0.3  /  DBili  x   /  AST  35<H>  /  ALT  27  /  AlkPhos  181<H>  03-26    PT/INR - ( 26 Mar 2024 00:40 )   PT: 12.6 sec;   INR: 1.12 ratio         PTT - ( 26 Mar 2024 00:40 )  PTT:31.7 sec  Urinalysis Basic - ( 26 Mar 2024 00:40 )    Color: x / Appearance: x / SG: x / pH: x  Gluc: 186 mg/dL / Ketone: x  / Bili: x / Urobili: x   Blood: x / Protein: x / Nitrite: x   Leuk Esterase: x / RBC: x / WBC x   Sq Epi: x / Non Sq Epi: x / Bacteria: x        RADIOLOGY & ADDITIONAL TESTS: Reviewed.

## 2024-03-26 NOTE — PROGRESS NOTE ADULT - ATTENDING COMMENTS
88 F with HFpEF, on a/c for unclear reason, diabetes here with acute hypoxemic respiratory failure due to influenza and AMS, eventually requiring intubation    extubated to HFNC  following commands    # acute hypoxemic respiratory failure  # influenza pna  # AMS/ acute metabolic encephalopathy   - c/w HFNC and wean as tolerated  - monitor off abx  - f/u neuro reccs, will need MRI at some point  - c/w AED for now as per neuro  - oropharyngeal dysphagia - f/u s/s eval

## 2024-03-27 DIAGNOSIS — R13.10 DYSPHAGIA, UNSPECIFIED: ICD-10-CM

## 2024-03-27 DIAGNOSIS — R56.9 UNSPECIFIED CONVULSIONS: ICD-10-CM

## 2024-03-27 LAB
ALBUMIN SERPL ELPH-MCNC: 3.4 G/DL — SIGNIFICANT CHANGE UP (ref 3.3–5)
ALP SERPL-CCNC: 169 U/L — HIGH (ref 40–120)
ALT FLD-CCNC: 30 U/L — SIGNIFICANT CHANGE UP (ref 4–33)
ANION GAP SERPL CALC-SCNC: 14 MMOL/L — SIGNIFICANT CHANGE UP (ref 7–14)
AST SERPL-CCNC: 38 U/L — HIGH (ref 4–32)
BASE EXCESS BLDA CALC-SCNC: 2.1 MMOL/L — SIGNIFICANT CHANGE UP (ref -2–3)
BASOPHILS # BLD AUTO: 0.05 K/UL — SIGNIFICANT CHANGE UP (ref 0–0.2)
BASOPHILS NFR BLD AUTO: 0.3 % — SIGNIFICANT CHANGE UP (ref 0–2)
BILIRUB SERPL-MCNC: 0.4 MG/DL — SIGNIFICANT CHANGE UP (ref 0.2–1.2)
BUN SERPL-MCNC: 32 MG/DL — HIGH (ref 7–23)
CALCIUM SERPL-MCNC: 10 MG/DL — SIGNIFICANT CHANGE UP (ref 8.4–10.5)
CHLORIDE SERPL-SCNC: 110 MMOL/L — HIGH (ref 98–107)
CO2 BLDA-SCNC: 27 MMOL/L — HIGH (ref 19–24)
CO2 SERPL-SCNC: 24 MMOL/L — SIGNIFICANT CHANGE UP (ref 22–31)
CREAT SERPL-MCNC: 0.68 MG/DL — SIGNIFICANT CHANGE UP (ref 0.5–1.3)
EGFR: 85 ML/MIN/1.73M2 — SIGNIFICANT CHANGE UP
EOSINOPHIL # BLD AUTO: 0.09 K/UL — SIGNIFICANT CHANGE UP (ref 0–0.5)
EOSINOPHIL NFR BLD AUTO: 0.5 % — SIGNIFICANT CHANGE UP (ref 0–6)
GLUCOSE BLDC GLUCOMTR-MCNC: 112 MG/DL — HIGH (ref 70–99)
GLUCOSE BLDC GLUCOMTR-MCNC: 141 MG/DL — HIGH (ref 70–99)
GLUCOSE BLDC GLUCOMTR-MCNC: 153 MG/DL — HIGH (ref 70–99)
GLUCOSE BLDC GLUCOMTR-MCNC: 166 MG/DL — HIGH (ref 70–99)
GLUCOSE BLDC GLUCOMTR-MCNC: 195 MG/DL — HIGH (ref 70–99)
GLUCOSE SERPL-MCNC: 150 MG/DL — HIGH (ref 70–99)
HCO3 BLDA-SCNC: 26 MMOL/L — SIGNIFICANT CHANGE UP (ref 21–28)
HCT VFR BLD CALC: 30.4 % — LOW (ref 34.5–45)
HGB BLD-MCNC: 9.5 G/DL — LOW (ref 11.5–15.5)
HOROWITZ INDEX BLDA+IHG-RTO: 98 — SIGNIFICANT CHANGE UP
IANC: 11.37 K/UL — HIGH (ref 1.8–7.4)
IMM GRANULOCYTES NFR BLD AUTO: 2.7 % — HIGH (ref 0–0.9)
LYMPHOCYTES # BLD AUTO: 13.1 % — SIGNIFICANT CHANGE UP (ref 13–44)
LYMPHOCYTES # BLD AUTO: 2.16 K/UL — SIGNIFICANT CHANGE UP (ref 1–3.3)
MAGNESIUM SERPL-MCNC: 2.6 MG/DL — SIGNIFICANT CHANGE UP (ref 1.6–2.6)
MCHC RBC-ENTMCNC: 29.3 PG — SIGNIFICANT CHANGE UP (ref 27–34)
MCHC RBC-ENTMCNC: 31.3 GM/DL — LOW (ref 32–36)
MCV RBC AUTO: 93.8 FL — SIGNIFICANT CHANGE UP (ref 80–100)
MONOCYTES # BLD AUTO: 2.38 K/UL — HIGH (ref 0–0.9)
MONOCYTES NFR BLD AUTO: 14.4 % — HIGH (ref 2–14)
NEUTROPHILS # BLD AUTO: 11.37 K/UL — HIGH (ref 1.8–7.4)
NEUTROPHILS NFR BLD AUTO: 69 % — SIGNIFICANT CHANGE UP (ref 43–77)
NRBC # BLD: 0 /100 WBCS — SIGNIFICANT CHANGE UP (ref 0–0)
NRBC # FLD: 0.04 K/UL — HIGH (ref 0–0)
PCO2 BLDA: 35 MMHG — SIGNIFICANT CHANGE UP (ref 32–45)
PH BLDA: 7.47 — HIGH (ref 7.35–7.45)
PHOSPHATE SERPL-MCNC: 3.1 MG/DL — SIGNIFICANT CHANGE UP (ref 2.5–4.5)
PLATELET # BLD AUTO: 731 K/UL — HIGH (ref 150–400)
PO2 BLDA: 96 MMHG — SIGNIFICANT CHANGE UP (ref 83–108)
POTASSIUM SERPL-MCNC: 3.5 MMOL/L — SIGNIFICANT CHANGE UP (ref 3.5–5.3)
POTASSIUM SERPL-SCNC: 3.5 MMOL/L — SIGNIFICANT CHANGE UP (ref 3.5–5.3)
PROT SERPL-MCNC: 8.1 G/DL — SIGNIFICANT CHANGE UP (ref 6–8.3)
RBC # BLD: 3.24 M/UL — LOW (ref 3.8–5.2)
RBC # FLD: 15.5 % — HIGH (ref 10.3–14.5)
SAO2 % BLDA: 96.7 % — SIGNIFICANT CHANGE UP (ref 94–98)
SODIUM SERPL-SCNC: 148 MMOL/L — HIGH (ref 135–145)
WBC # BLD: 16.49 K/UL — HIGH (ref 3.8–10.5)
WBC # FLD AUTO: 16.49 K/UL — HIGH (ref 3.8–10.5)

## 2024-03-27 PROCEDURE — 99233 SBSQ HOSP IP/OBS HIGH 50: CPT

## 2024-03-27 PROCEDURE — 71045 X-RAY EXAM CHEST 1 VIEW: CPT | Mod: 26,77

## 2024-03-27 PROCEDURE — 93010 ELECTROCARDIOGRAM REPORT: CPT

## 2024-03-27 PROCEDURE — 71045 X-RAY EXAM CHEST 1 VIEW: CPT | Mod: 26

## 2024-03-27 PROCEDURE — 99233 SBSQ HOSP IP/OBS HIGH 50: CPT | Mod: GC

## 2024-03-27 RX ORDER — HALOPERIDOL DECANOATE 100 MG/ML
2.5 INJECTION INTRAMUSCULAR ONCE
Refills: 0 | Status: COMPLETED | OUTPATIENT
Start: 2024-03-27 | End: 2024-03-27

## 2024-03-27 RX ORDER — LEVOTHYROXINE SODIUM 125 MCG
20 TABLET ORAL ONCE
Refills: 0 | Status: COMPLETED | OUTPATIENT
Start: 2024-03-27 | End: 2024-03-27

## 2024-03-27 RX ORDER — SODIUM CHLORIDE 9 MG/ML
1000 INJECTION, SOLUTION INTRAVENOUS
Refills: 0 | Status: DISCONTINUED | OUTPATIENT
Start: 2024-03-27 | End: 2024-03-28

## 2024-03-27 RX ORDER — IPRATROPIUM/ALBUTEROL SULFATE 18-103MCG
3 AEROSOL WITH ADAPTER (GRAM) INHALATION ONCE
Refills: 0 | Status: DISCONTINUED | OUTPATIENT
Start: 2024-03-27 | End: 2024-03-27

## 2024-03-27 RX ORDER — LEVALBUTEROL 1.25 MG/.5ML
0.63 SOLUTION, CONCENTRATE RESPIRATORY (INHALATION) ONCE
Refills: 0 | Status: DISCONTINUED | OUTPATIENT
Start: 2024-03-27 | End: 2024-03-27

## 2024-03-27 RX ORDER — SODIUM CHLORIDE 9 MG/ML
4 INJECTION INTRAMUSCULAR; INTRAVENOUS; SUBCUTANEOUS EVERY 12 HOURS
Refills: 0 | Status: DISCONTINUED | OUTPATIENT
Start: 2024-03-27 | End: 2024-04-05

## 2024-03-27 RX ADMIN — HEPARIN SODIUM 5000 UNIT(S): 5000 INJECTION INTRAVENOUS; SUBCUTANEOUS at 14:24

## 2024-03-27 RX ADMIN — SODIUM CHLORIDE 4 MILLILITER(S): 9 INJECTION INTRAMUSCULAR; INTRAVENOUS; SUBCUTANEOUS at 21:28

## 2024-03-27 RX ADMIN — POLYETHYLENE GLYCOL 3350 17 GRAM(S): 17 POWDER, FOR SOLUTION ORAL at 05:13

## 2024-03-27 RX ADMIN — Medication 1: at 05:07

## 2024-03-27 RX ADMIN — HALOPERIDOL DECANOATE 2.5 MILLIGRAM(S): 100 INJECTION INTRAMUSCULAR at 10:50

## 2024-03-27 RX ADMIN — Medication 3 MILLILITER(S): at 21:25

## 2024-03-27 RX ADMIN — Medication 1: at 18:06

## 2024-03-27 RX ADMIN — Medication 20 MICROGRAM(S): at 12:20

## 2024-03-27 RX ADMIN — Medication 40 MILLIGRAM(S): at 18:58

## 2024-03-27 RX ADMIN — Medication 20 MICROGRAM(S): at 23:52

## 2024-03-27 RX ADMIN — Medication 3 MILLILITER(S): at 14:28

## 2024-03-27 RX ADMIN — Medication 3 MILLILITER(S): at 03:30

## 2024-03-27 RX ADMIN — LEVETIRACETAM 1000 MILLIGRAM(S): 250 TABLET, FILM COATED ORAL at 18:07

## 2024-03-27 RX ADMIN — HEPARIN SODIUM 5000 UNIT(S): 5000 INJECTION INTRAVENOUS; SUBCUTANEOUS at 23:51

## 2024-03-27 RX ADMIN — CHLORHEXIDINE GLUCONATE 1 APPLICATION(S): 213 SOLUTION TOPICAL at 19:02

## 2024-03-27 RX ADMIN — SODIUM CHLORIDE 75 MILLILITER(S): 9 INJECTION, SOLUTION INTRAVENOUS at 19:00

## 2024-03-27 RX ADMIN — HUMAN INSULIN 20 UNIT(S): 100 INJECTION, SUSPENSION SUBCUTANEOUS at 05:12

## 2024-03-27 RX ADMIN — LEVETIRACETAM 1000 MILLIGRAM(S): 250 TABLET, FILM COATED ORAL at 05:11

## 2024-03-27 RX ADMIN — HALOPERIDOL DECANOATE 2.5 MILLIGRAM(S): 100 INJECTION INTRAMUSCULAR at 20:21

## 2024-03-27 RX ADMIN — HEPARIN SODIUM 5000 UNIT(S): 5000 INJECTION INTRAVENOUS; SUBCUTANEOUS at 05:12

## 2024-03-27 RX ADMIN — Medication 3 MILLILITER(S): at 10:22

## 2024-03-27 NOTE — PROGRESS NOTE ADULT - PROBLEM SELECTOR PLAN 5
- Hb 10.9 on presentation, prior known within normal range  - total iron blood levels low, but ferritin in 300s   - likelt AOCD  - OP pcp follow up

## 2024-03-27 NOTE — CONSULT NOTE ADULT - ASSESSMENT
88F with HFpEF, CAD (s/p multiple stents), HTN, HLD, DM2 previously admitted to MICU for acute hypoxemic respiratory failure and was stable for floors on NC/HFNC s/p extubation. MICU consulted for SOB. Patient known to author from recent micu admission, currently restless with increased wob. Inspiratory/Expiratory wheezing auscultated bilaterally on exam. Secured mittens in place as patient has been tugging at lines. A/Ox0 on discussion. Appears improved from time in MICU. Reportedly was belly breathing on 5lnc w/o hypoxia. ABG at time showed 7.47/35/96/26. Placed on HFNC with improvement of subjective markers.    Acute mild to moderate wheezing throughout all lung fields with appropriate air movement and is without signs of hypercapnia and hypoxia, likely element of bronchospastic airway 2.2 recent viral pna and intubation    Recommendations:   Recommending solumedrol 40x1 today  Recommending airway clearance - duonebs, HTS, guaifenesin  Recommending pulm consult in AM for failure to wean HFNC + management of bronchospasticity 88F with HFpEF, CAD (s/p multiple stents), HTN, HLD, DM2 previously admitted to MICU for acute hypoxemic respiratory failure and was stable for floors on NC/HFNC s/p extubation. MICU consulted for SOB. Patient known to author from recent micu admission, currently restless with increased wob. Inspiratory/Expiratory wheezing auscultated bilaterally on exam. Secured mittens in place as patient has been tugging at lines. A/Ox0 on discussion. Appears improved from time in MICU. Reportedly was belly breathing on 5lnc w/o hypoxia. ABG at time showed 7.47/35/96/26. Placed on HFNC with improvement of subjective markers.    Acute mild to moderate wheezing throughout all lung fields with appropriate air movement and is without signs of hypercapnia and hypoxia, likely element of bronchospastic airway 2.2 recent viral pna and intubation    Recommendations:   Recommending solumedrol 40x1   Recommending airway clearance - duonebs, HTS, guaifenesin  Recommending pulm consult in AM for failure to wean HFNC + management of bronchospasticity  Patient remains at high risk of aspiration, recommend keeping hob elevated, Chest PT, airway clearance as above.

## 2024-03-27 NOTE — PROGRESS NOTE ADULT - SUBJECTIVE AND OBJECTIVE BOX
Lionel Elliott MD  [facility] Division of [facility] Medicine  Pager: [phone]  Available via MS Teams    SUBJECTIVE / OVERNIGHT EVENTS:    in mild resp distress - belly breathing   confused   son at bedside - discussed code status - full code - full medical management      MEDICATIONS  (STANDING):  albuterol/ipratropium for Nebulization 3 milliLiter(s) Nebulizer every 6 hours  aspirin  chewable 81 milliGRAM(s) Oral daily  atorvastatin 80 milliGRAM(s) Oral at bedtime  chlorhexidine 2% Cloths 1 Application(s) Topical daily  famotidine    Tablet 20 milliGRAM(s) Oral daily  heparin   Injectable 5000 Unit(s) SubCutaneous every 8 hours  influenza  Vaccine (HIGH DOSE) 0.7 milliLiter(s) IntraMuscular once  insulin lispro (ADMELOG) corrective regimen sliding scale   SubCutaneous every 6 hours  levETIRAcetam   Injectable 1000 milliGRAM(s) IV Push every 12 hours  levothyroxine Injectable 20 MICROGram(s) IV Push at bedtime  methylPREDNISolone sodium succinate Injectable 40 milliGRAM(s) IV Push once  multivitamin/minerals/iron Oral Solution (CENTRUM) 15 milliLiter(s) Oral daily  petrolatum Ophthalmic Ointment 1 Application(s) Both EYES daily  polyethylene glycol 3350 17 Gram(s) Oral two times a day  senna 2 Tablet(s) Oral at bedtime  sodium chloride 7% Inhalation 4 milliLiter(s) Inhalation every 12 hours    MEDICATIONS  (PRN):  acetaminophen   Oral Liquid .. 650 milliGRAM(s) Oral every 6 hours PRN Temp greater or equal to 38C (100.4F), Mild Pain (1 - 3), Moderate Pain (4 - 6)  guaiFENesin Oral Liquid (Sugar-Free) 200 milliGRAM(s) Oral every 6 hours PRN Cough      I&O's Summary    26 Mar 2024 07:01  -  27 Mar 2024 07:00  --------------------------------------------------------  IN: 90 mL / OUT: 1100 mL / NET: -1010 mL        PHYSICAL EXAM:  Vital Signs Last 24 Hrs  T(C): 36.8 (27 Mar 2024 14:00), Max: 37.1 (26 Mar 2024 20:36)  T(F): 98.3 (27 Mar 2024 14:00), Max: 98.7 (26 Mar 2024 20:36)  HR: 111 (27 Mar 2024 14:46) (89 - 116)  BP: 155/82 (27 Mar 2024 14:00) (145/67 - 155/82)  BP(mean): 96 (26 Mar 2024 19:00) (96 - 96)  RR: 18 (27 Mar 2024 14:46) (18 - 19)  SpO2: 100% (27 Mar 2024 14:46) (97% - 100%)    Parameters below as of 27 Mar 2024 14:46  Patient On (Oxygen Delivery Method): nasal cannula, high flow  O2 Flow (L/min): 40  O2 Concentration (%): 40  CONSTITUTIONAL: NAD   EYES: conjunctiva and sclera clear  ENMT: Moist oral mucosa   NECK: Supple   RESPIRATORY: mild wheezing and rhonchi on exam  CARDIOVASCULAR: Regular rate and rhythm, normal S1 and S2, no murmur/rub/gallop; Peripheral pulses are 2+ bilaterally  ABDOMEN: Nontender to palpation, normoactive bowel sounds, no rebound/guarding   MUSCULOSKELETAL: No lower extremity edema   PSYCH: A+O to person, place, and time; affect appropriate  NEUROLOGY: moves all extremities   SKIN: No rashes    LABS:                        9.5    16.49 )-----------( 731      ( 27 Mar 2024 05:05 )             30.4     03-27    148<H>  |  110<H>  |  32<H>  ----------------------------<  150<H>  3.5   |  24  |  0.68    Ca    10.0      27 Mar 2024 05:05  Phos  3.1     03-27  Mg     2.60     03-27    TPro  8.1  /  Alb  3.4  /  TBili  0.4  /  DBili  x   /  AST  38<H>  /  ALT  30  /  AlkPhos  169<H>  03-27    PT/INR - ( 26 Mar 2024 00:40 )   PT: 12.6 sec;   INR: 1.12 ratio         PTT - ( 26 Mar 2024 00:40 )  PTT:31.7 sec      Urinalysis Basic - ( 27 Mar 2024 05:05 )    Color: x / Appearance: x / SG: x / pH: x  Gluc: 150 mg/dL / Ketone: x  / Bili: x / Urobili: x   Blood: x / Protein: x / Nitrite: x   Leuk Esterase: x / RBC: x / WBC x   Sq Epi: x / Non Sq Epi: x / Bacteria: x            RADIOLOGY & ADDITIONAL TESTS:  Other Results Reviewed Today: BMP with stable Cr, CBC with stable Hg     COORDINATION OF CARE:  Communication: discussed plan of care with ACP

## 2024-03-27 NOTE — PROGRESS NOTE ADULT - ASSESSMENT
Patient is an 87yo F with PMH significant for CAD s/p PCI x6 11/2019 at [facility], HTN, HLD, T2DM, and HFpEF who presented with fever and generalized weakness of 2 days’ duration, admitted with sepsis and acute hypoxic respiratory failure due to influenza. Patient required an upgrade in care to the MICU after an RRT on 3/17 during which patient was intubated for AMS. Patient completed a course of PNA treatment with Tamiflu and  Zosyn. Course c/b seizure like activity, LP unrevealing and EEG showing multifocal epileptiform potential. Although no acute seizures are documented, unable to completely rule out either. MRI brain now pending. Transferred to floors for further management.

## 2024-03-27 NOTE — CHART NOTE - NSCHARTNOTEFT_GEN_A_CORE
RN notified that patient's NGT is clogged and not flushing. Assessed the pt at bedside. NGT is placed @ 70 cm and feels resistance when flushing the air in likely kinked inside . NGT pulled back and PLACED AT 52 CM     NGT flushed with air without any resistance and   successful auscultation done over stomach to verify placement. CXR STAT ordered and  confirmed placement. RN notified that patient's NGT is clogged and not flushing. Assessed the pt at bedside. NGT is placed @ 70 cm and feels resistance when flushing the air in likely kinked inside . NGT pulled back and PLACED AT 52 CM     NGT flushed with air without any resistance and   successful auscultation done over stomach to verify placement. CXR STAT ordered ,confirmed placement and NGT cleared to use

## 2024-03-27 NOTE — PROGRESS NOTE ADULT - PROBLEM SELECTOR PLAN 1
- febrile, tachycardic, with leukocytosis on admission   - requiring supplemental O2 which was escalated a few days after admission   - Intubated for 8 days 3/17 - 3/25 for airway protection in the setting of altered mental status in the setting of PNA from bacterial and influenza sources   - s/p tamiflu and zosyn course in the ICU   - now on HFNC (did not tolerate 6L NC)   - MICU reconsulted on the floors given belly breathing and concern for tiring out - follow recs   - give a dose of solumedrol 40, start duonebs, HTS, guaifenesin  - will consult pulm in the AM

## 2024-03-27 NOTE — CONSULT NOTE ADULT - SUBJECTIVE AND OBJECTIVE BOX
CHIEF COMPLAINT:    HPI:  88F with HFpEF, CAD (s/p multiple stents), HTN, HLD, DM2 admitted to MICU for acute hypoxemic respiratory failure requiring intubation during RRT on floors 3/17/23 from altered mental status (?pna vs seizure), course c/b seizure like activity.  Intubated 3/17, extubated 3/25. Influenza pna completed Tamiflu and got Zosyn. LP unrevealing and EEG showing multifocal epileptiform potentials >> no acute seizures documented but unable to completely rule out either. Neurology following. Now stable of off IV sedation. MICU consulted for SOB. Patient known to author from recent micu admission, currently restless with increased wob. Inspiratory/Expiratory wheezing auscultated bilaterally on exam. Secured mittens in place as patient has been tugging at lines. A/Ox0 on discussion. Appears improved from time in MICU. Reportedly was belly breathing on 5lnc w/o hypoxia. ABG at time showed 7.47/35/96/26. Placed on HFNC with improvement of subjective markers.        PAST MEDICAL & SURGICAL HISTORY:  CAD (coronary artery disease)  s/p stent x 6      HTN (hypertension)      DM (diabetes mellitus)      HLD (hyperlipidemia)      H/O CHF      S/P primary angioplasty with coronary stent          FAMILY HISTORY:  No pertinent family history in first degree relatives        SOCIAL HISTORY:    Allergies    No Known Allergies    Intolerances        HOME MEDICATIONS:    REVIEW OF SYSTEMS:    CONSTITUTIONAL:  No weakness, fevers or chills  EYES/ENT:  No visual changes;  No vertigo or throat pain   NECK:  No pain or stiffness  RESPIRATORY:  No cough, wheezing, hemoptysis; No shortness of breath  CARDIOVASCULAR:  No chest pain or palpitations  GASTROINTESTINAL:  No abdominal or epigastric pain. No nausea, vomiting, or hematemesis; No diarrhea or constipation. No melena or hematochezia.  GENITOURINARY:  No dysuria, frequency or hematuria  NEUROLOGICAL:  No numbness or weakness  SKIN:  No itching, rashes    [ ] Unable to assess ROS because ________    OBJECTIVE:  ICU Vital Signs Last 24 Hrs  T(C): 36.8 (27 Mar 2024 14:00), Max: 37.1 (26 Mar 2024 20:36)  T(F): 98.3 (27 Mar 2024 14:00), Max: 98.7 (26 Mar 2024 20:36)  HR: 111 (27 Mar 2024 14:46) (89 - 116)  BP: 155/82 (27 Mar 2024 14:00) (145/67 - 155/82)  BP(mean): 96 (26 Mar 2024 19:00) (95 - 96)  ABP: --  ABP(mean): --  RR: 18 (27 Mar 2024 14:46) (18 - 20)  SpO2: 100% (27 Mar 2024 14:46) (95% - 100%)    O2 Parameters below as of 27 Mar 2024 14:46  Patient On (Oxygen Delivery Method): nasal cannula, high flow  O2 Flow (L/min): 40  O2 Concentration (%): 40          03-26 @ 07:01  -  03-27 @ 07:00  --------------------------------------------------------  IN: 90 mL / OUT: 1100 mL / NET: -1010 mL      CAPILLARY BLOOD GLUCOSE      POCT Blood Glucose.: 141 mg/dL (27 Mar 2024 12:09)      Vital Signs Last 24 Hrs  T(C): 36.8 (27 Mar 2024 14:00), Max: 37.1 (26 Mar 2024 20:36)  T(F): 98.3 (27 Mar 2024 14:00), Max: 98.7 (26 Mar 2024 20:36)  HR: 111 (27 Mar 2024 14:46) (89 - 116)  BP: 155/82 (27 Mar 2024 14:00) (145/67 - 155/82)  BP(mean): 96 (26 Mar 2024 19:00) (95 - 96)  RR: 18 (27 Mar 2024 14:46) (18 - 20)  SpO2: 100% (27 Mar 2024 14:46) (95% - 100%)    Parameters below as of 27 Mar 2024 14:46  Patient On (Oxygen Delivery Method): nasal cannula, high flow  O2 Flow (L/min): 40  O2 Concentration (%): 40    General: WN/WD NAD  Neurology: A&Ox0, nonfocal, TIMMONS x 4  Eyes: PERRLA/ EOMI, Gross vision intact  ENT/Neck: Neck supple, trachea midline, No JVD, Gross hearing intact  Respiratory: b/l inspiratory and expiratory wheezing  CV: RRR, +S1/S2, -S3/S4, no murmurs, rubs or gallops  Abdominal: Soft, NT, ND +BS, No HSM  MSK: 5/5 strength UE/LE bilaterally  Extremities: No edema, 2+ peripheral pulses  Skin: No Rashes, Hematoma, Ecchymosis  Incisions:   Tubes:    HOSPITAL MEDICATIONS:  MEDICATIONS  (STANDING):  albuterol/ipratropium for Nebulization 3 milliLiter(s) Nebulizer every 6 hours  aspirin  chewable 81 milliGRAM(s) Oral daily  atorvastatin 80 milliGRAM(s) Oral at bedtime  chlorhexidine 2% Cloths 1 Application(s) Topical daily  famotidine    Tablet 20 milliGRAM(s) Oral daily  heparin   Injectable 5000 Unit(s) SubCutaneous every 8 hours  influenza  Vaccine (HIGH DOSE) 0.7 milliLiter(s) IntraMuscular once  insulin lispro (ADMELOG) corrective regimen sliding scale   SubCutaneous every 6 hours  levETIRAcetam   Injectable 1000 milliGRAM(s) IV Push every 12 hours  levothyroxine Injectable 20 MICROGram(s) IV Push once  levothyroxine Injectable 20 MICROGram(s) IV Push at bedtime  multivitamin/minerals/iron Oral Solution (CENTRUM) 15 milliLiter(s) Oral daily  petrolatum Ophthalmic Ointment 1 Application(s) Both EYES daily  polyethylene glycol 3350 17 Gram(s) Oral two times a day  senna 2 Tablet(s) Oral at bedtime    MEDICATIONS  (PRN):  acetaminophen   Oral Liquid .. 650 milliGRAM(s) Oral every 6 hours PRN Temp greater or equal to 38C (100.4F), Mild Pain (1 - 3), Moderate Pain (4 - 6)      LABS:                        9.5    16.49 )-----------( 731      ( 27 Mar 2024 05:05 )             30.4     03-27    148<H>  |  110<H>  |  32<H>  ----------------------------<  150<H>  3.5   |  24  |  0.68    Ca    10.0      27 Mar 2024 05:05  Phos  3.1     03-27  Mg     2.60     03-27    TPro  8.1  /  Alb  3.4  /  TBili  0.4  /  DBili  x   /  AST  38<H>  /  ALT  30  /  AlkPhos  169<H>  03-27    PT/INR - ( 26 Mar 2024 00:40 )   PT: 12.6 sec;   INR: 1.12 ratio         PTT - ( 26 Mar 2024 00:40 )  PTT:31.7 sec  Urinalysis Basic - ( 27 Mar 2024 05:05 )    Color: x / Appearance: x / SG: x / pH: x  Gluc: 150 mg/dL / Ketone: x  / Bili: x / Urobili: x   Blood: x / Protein: x / Nitrite: x   Leuk Esterase: x / RBC: x / WBC x   Sq Epi: x / Non Sq Epi: x / Bacteria: x      Arterial Blood Gas:  03-27 @ 13:20  7.47/35/96/26/96.7/2.1  ABG lactate: --    Venous Blood Gas:  03-26 @ 00:40  7.41/42/44/27/73.6  VBG Lactate: 1.1      MICROBIOLOGY:     RADIOLOGY:  [ ] Reviewed and interpreted by me    EKG:

## 2024-03-28 DIAGNOSIS — E87.0 HYPEROSMOLALITY AND HYPERNATREMIA: ICD-10-CM

## 2024-03-28 LAB
ALBUMIN SERPL ELPH-MCNC: 3.3 G/DL — SIGNIFICANT CHANGE UP (ref 3.3–5)
ALP SERPL-CCNC: 154 U/L — HIGH (ref 40–120)
ALT FLD-CCNC: 24 U/L — SIGNIFICANT CHANGE UP (ref 4–33)
ANION GAP SERPL CALC-SCNC: 16 MMOL/L — HIGH (ref 7–14)
AST SERPL-CCNC: 25 U/L — SIGNIFICANT CHANGE UP (ref 4–32)
BASOPHILS # BLD AUTO: 0.02 K/UL — SIGNIFICANT CHANGE UP (ref 0–0.2)
BASOPHILS NFR BLD AUTO: 0.2 % — SIGNIFICANT CHANGE UP (ref 0–2)
BILIRUB SERPL-MCNC: 0.4 MG/DL — SIGNIFICANT CHANGE UP (ref 0.2–1.2)
BUN SERPL-MCNC: 30 MG/DL — HIGH (ref 7–23)
CALCIUM SERPL-MCNC: 9.7 MG/DL — SIGNIFICANT CHANGE UP (ref 8.4–10.5)
CHLORIDE SERPL-SCNC: 112 MMOL/L — HIGH (ref 98–107)
CO2 SERPL-SCNC: 23 MMOL/L — SIGNIFICANT CHANGE UP (ref 22–31)
CREAT SERPL-MCNC: 0.72 MG/DL — SIGNIFICANT CHANGE UP (ref 0.5–1.3)
CULTURE RESULTS: SIGNIFICANT CHANGE UP
EGFR: 81 ML/MIN/1.73M2 — SIGNIFICANT CHANGE UP
EOSINOPHIL # BLD AUTO: 0 K/UL — SIGNIFICANT CHANGE UP (ref 0–0.5)
EOSINOPHIL NFR BLD AUTO: 0 % — SIGNIFICANT CHANGE UP (ref 0–6)
GAS PNL BLDA: SIGNIFICANT CHANGE UP
GLUCOSE BLDC GLUCOMTR-MCNC: 184 MG/DL — HIGH (ref 70–99)
GLUCOSE BLDC GLUCOMTR-MCNC: 197 MG/DL — HIGH (ref 70–99)
GLUCOSE BLDC GLUCOMTR-MCNC: 212 MG/DL — HIGH (ref 70–99)
GLUCOSE BLDC GLUCOMTR-MCNC: 213 MG/DL — HIGH (ref 70–99)
GLUCOSE BLDC GLUCOMTR-MCNC: 222 MG/DL — HIGH (ref 70–99)
GLUCOSE SERPL-MCNC: 205 MG/DL — HIGH (ref 70–99)
HCT VFR BLD CALC: 31.5 % — LOW (ref 34.5–45)
HGB BLD-MCNC: 9.7 G/DL — LOW (ref 11.5–15.5)
IANC: 9.83 K/UL — HIGH (ref 1.8–7.4)
IMM GRANULOCYTES NFR BLD AUTO: 1.2 % — HIGH (ref 0–0.9)
LYMPHOCYTES # BLD AUTO: 1.25 K/UL — SIGNIFICANT CHANGE UP (ref 1–3.3)
LYMPHOCYTES # BLD AUTO: 10.2 % — LOW (ref 13–44)
MAGNESIUM SERPL-MCNC: 2.7 MG/DL — HIGH (ref 1.6–2.6)
MCHC RBC-ENTMCNC: 29 PG — SIGNIFICANT CHANGE UP (ref 27–34)
MCHC RBC-ENTMCNC: 30.8 GM/DL — LOW (ref 32–36)
MCV RBC AUTO: 94.3 FL — SIGNIFICANT CHANGE UP (ref 80–100)
MONOCYTES # BLD AUTO: 1.02 K/UL — HIGH (ref 0–0.9)
MONOCYTES NFR BLD AUTO: 8.3 % — SIGNIFICANT CHANGE UP (ref 2–14)
NEUTROPHILS # BLD AUTO: 9.83 K/UL — HIGH (ref 1.8–7.4)
NEUTROPHILS NFR BLD AUTO: 80.1 % — HIGH (ref 43–77)
NRBC # BLD: 0 /100 WBCS — SIGNIFICANT CHANGE UP (ref 0–0)
NRBC # FLD: 0.02 K/UL — HIGH (ref 0–0)
PHOSPHATE SERPL-MCNC: 3.9 MG/DL — SIGNIFICANT CHANGE UP (ref 2.5–4.5)
PLATELET # BLD AUTO: 727 K/UL — HIGH (ref 150–400)
POTASSIUM SERPL-MCNC: 4.1 MMOL/L — SIGNIFICANT CHANGE UP (ref 3.5–5.3)
POTASSIUM SERPL-SCNC: 4.1 MMOL/L — SIGNIFICANT CHANGE UP (ref 3.5–5.3)
PROT SERPL-MCNC: 7.9 G/DL — SIGNIFICANT CHANGE UP (ref 6–8.3)
RBC # BLD: 3.34 M/UL — LOW (ref 3.8–5.2)
RBC # FLD: 15.4 % — HIGH (ref 10.3–14.5)
SODIUM SERPL-SCNC: 151 MMOL/L — HIGH (ref 135–145)
SPECIMEN SOURCE: SIGNIFICANT CHANGE UP
WBC # BLD: 12.27 K/UL — HIGH (ref 3.8–10.5)
WBC # FLD AUTO: 12.27 K/UL — HIGH (ref 3.8–10.5)

## 2024-03-28 PROCEDURE — 99223 1ST HOSP IP/OBS HIGH 75: CPT | Mod: GC

## 2024-03-28 PROCEDURE — 99232 SBSQ HOSP IP/OBS MODERATE 35: CPT

## 2024-03-28 PROCEDURE — 71045 X-RAY EXAM CHEST 1 VIEW: CPT | Mod: 26

## 2024-03-28 RX ORDER — SODIUM CHLORIDE 9 MG/ML
1000 INJECTION, SOLUTION INTRAVENOUS
Refills: 0 | Status: DISCONTINUED | OUTPATIENT
Start: 2024-03-28 | End: 2024-03-29

## 2024-03-28 RX ORDER — BUDESONIDE AND FORMOTEROL FUMARATE DIHYDRATE 160; 4.5 UG/1; UG/1
2 AEROSOL RESPIRATORY (INHALATION)
Refills: 0 | Status: DISCONTINUED | OUTPATIENT
Start: 2024-03-28 | End: 2024-04-05

## 2024-03-28 RX ORDER — SODIUM CHLORIDE 9 MG/ML
1000 INJECTION, SOLUTION INTRAVENOUS
Refills: 0 | Status: DISCONTINUED | OUTPATIENT
Start: 2024-03-28 | End: 2024-03-28

## 2024-03-28 RX ADMIN — SODIUM CHLORIDE 50 MILLILITER(S): 9 INJECTION, SOLUTION INTRAVENOUS at 11:22

## 2024-03-28 RX ADMIN — SODIUM CHLORIDE 45 MILLILITER(S): 9 INJECTION, SOLUTION INTRAVENOUS at 09:07

## 2024-03-28 RX ADMIN — BUDESONIDE AND FORMOTEROL FUMARATE DIHYDRATE 2 PUFF(S): 160; 4.5 AEROSOL RESPIRATORY (INHALATION) at 22:36

## 2024-03-28 RX ADMIN — SODIUM CHLORIDE 75 MILLILITER(S): 9 INJECTION, SOLUTION INTRAVENOUS at 08:26

## 2024-03-28 RX ADMIN — Medication 3 MILLILITER(S): at 08:37

## 2024-03-28 RX ADMIN — SODIUM CHLORIDE 4 MILLILITER(S): 9 INJECTION INTRAMUSCULAR; INTRAVENOUS; SUBCUTANEOUS at 20:20

## 2024-03-28 RX ADMIN — Medication 1: at 06:53

## 2024-03-28 RX ADMIN — LEVETIRACETAM 1000 MILLIGRAM(S): 250 TABLET, FILM COATED ORAL at 18:58

## 2024-03-28 RX ADMIN — Medication 2: at 13:32

## 2024-03-28 RX ADMIN — Medication 20 MICROGRAM(S): at 23:19

## 2024-03-28 RX ADMIN — Medication 3 MILLILITER(S): at 20:20

## 2024-03-28 RX ADMIN — LEVETIRACETAM 1000 MILLIGRAM(S): 250 TABLET, FILM COATED ORAL at 05:57

## 2024-03-28 RX ADMIN — HEPARIN SODIUM 5000 UNIT(S): 5000 INJECTION INTRAVENOUS; SUBCUTANEOUS at 13:47

## 2024-03-28 RX ADMIN — Medication 3 MILLILITER(S): at 03:42

## 2024-03-28 RX ADMIN — SODIUM CHLORIDE 4 MILLILITER(S): 9 INJECTION INTRAMUSCULAR; INTRAVENOUS; SUBCUTANEOUS at 08:37

## 2024-03-28 RX ADMIN — Medication 3 MILLILITER(S): at 16:27

## 2024-03-28 RX ADMIN — HEPARIN SODIUM 5000 UNIT(S): 5000 INJECTION INTRAVENOUS; SUBCUTANEOUS at 05:56

## 2024-03-28 RX ADMIN — CHLORHEXIDINE GLUCONATE 1 APPLICATION(S): 213 SOLUTION TOPICAL at 19:11

## 2024-03-28 RX ADMIN — Medication 2: at 17:45

## 2024-03-28 RX ADMIN — HEPARIN SODIUM 5000 UNIT(S): 5000 INJECTION INTRAVENOUS; SUBCUTANEOUS at 22:35

## 2024-03-28 NOTE — PROGRESS NOTE ADULT - PROBLEM SELECTOR PLAN 11
#VTE PPx: hep  #Diet: NPO until speech eval   #Dispo: pending stabilization    TOV tonight. If successful, will DC rectal tube tomorrow

## 2024-03-28 NOTE — SWALLOW BEDSIDE ASSESSMENT ADULT - COMMENTS
Progress Note 3/27: "Patient is an 89yo F with PMH significant for CAD s/p PCI x6 11/2019 at [facility], HTN, HLD, T2DM, and HFpEF who presented with fever and generalized weakness of 2 days’ duration, admitted with sepsis and acute hypoxic respiratory failure due to influenza. Patient required an upgrade in care to the MICU after an RRT on 3/17 during which patient was intubated for AMS. Patient completed a course of PNA treatment with Tamiflu and  Zosyn. Course c/b seizure like activity, LP unrevealing and EEG showing multifocal epileptiform potential. Although no acute seizures are documented, unable to completely rule out either. MRI brain now pending. Transferred to floors for further management. / Overnight: Patient in mild resp distress - belly breathing; Confused."    Contact Note 3/28: "Pt pulled out NG tube during day shift. Status NPO. Pt is A&Ox1,confused, agitated with bilateral secured mitten restraint for pulling out her NG tube.  vital signs stable. pt on high flow nasal cannula for oxygen. pt remain NPO."    CXR 3/27: "No acute parenchymal findings and no change."    Patient seen at bedside this AM for repeat clinical swallow evaluation. RN and sitter present; No family at bedside. [employer] ( #: [id number]) utilized in patient's primary language of Lithuanian.     Patient was as awake and alert and intermittently followed 1-step verbal directives. Able to make basic wants/needs known. Voicing was characterized by reduced intensity and was perceptually breathy. Patient was receiving supplemental oxygen via High Flow Nasal Cannula. Progress Note 3/27: "Patient is an 87yo F with PMH significant for CAD s/p PCI x6 11/2019 at [facility], HTN, HLD, T2DM, and HFpEF who presented with fever and generalized weakness of 2 days’ duration, admitted with sepsis and acute hypoxic respiratory failure due to influenza. Patient required an upgrade in care to the MICU after an RRT on 3/17 during which patient was intubated for AMS. Patient completed a course of PNA treatment with Tamiflu and  Zosyn. Course c/b seizure like activity, LP unrevealing and EEG showing multifocal epileptiform potential. Although no acute seizures are documented, unable to completely rule out either. MRI brain now pending. Transferred to floors for further management. / Overnight: Patient in mild resp distress - belly breathing; Confused."    Contact Note 3/28: "Pt pulled out NG tube during day shift. Status NPO. Pt is A&Ox1,confused, agitated with bilateral secured mitten restraint for pulling out her NG tube.  vital signs stable. pt on high flow nasal cannula for oxygen. pt remain NPO."    CXR 3/27: "No acute parenchymal findings and no change."    Patient seen at bedside this AM for repeat clinical swallow evaluation. RN and sitter present; No family at bedside. [employer] ( #: [id number]) utilized in patient's primary language of Ukrainian.     Patient was as awake and alert and intermittently followed 1-step verbal directives. Able to make basic wants/needs known. Voicing was characterized by reduced intensity and was perceptually breathy. Recommend ENT consult at MD's discretion. Patient was receiving supplemental oxygen via High Flow Nasal Cannula. Progress Note 3/27: "Patient is an 89yo F with PMH significant for CAD s/p PCI x6 11/2019 at [facility], HTN, HLD, T2DM, and HFpEF who presented with fever and generalized weakness of 2 days’ duration, admitted with sepsis and acute hypoxic respiratory failure due to influenza. Patient required an upgrade in care to the MICU after an RRT on 3/17 during which patient was intubated for AMS. Patient completed a course of PNA treatment with Tamiflu and  Zosyn. Course c/b seizure like activity, LP unrevealing and EEG showing multifocal epileptiform potential. Although no acute seizures are documented, unable to completely rule out either. MRI brain now pending. Transferred to floors for further management. / Overnight: Patient in mild resp distress - belly breathing; Confused."    Contact Note 3/28: "Pt pulled out NG tube during day shift. Status NPO. Pt is A&Ox1,confused, agitated with bilateral secured mitten restraint for pulling out her NG tube.  vital signs stable. pt on high flow nasal cannula for oxygen. pt remain NPO."    CXR 3/27: "No acute parenchymal findings and no change."    Patient seen at bedside this AM for repeat clinical swallow evaluation. RN and sitter present; No family at bedside. [employer] ( #: [id number]) utilized in patient's primary language of English.     Patient was as awake and alert and intermittently followed 1-step verbal directives. Able to make basic wants/needs known. Voicing was characterized by reduced intensity and was perceptually breathy. Recommend ENT consult at MD's discretion secondary to breathy and hoarse vocal quality and recent extubation. Patient was receiving supplemental oxygen via High Flow Nasal Cannula.

## 2024-03-28 NOTE — CONSULT NOTE ADULT - ASSESSMENT
88F with HFpEF, CAD (s/p multiple stents), DM2  admitted to MICU for acute hypoxemic respiratory failure insetting of flu and encephalopathy. Extubated and sent to floors.  Pulm consulted for hypoxemia    Pt son at bedside translating- pt more alert and following commands but not back to baseline yet. Failed swallow eval earlier.  Pt currently titrated down to NC and appears comfortable. Rhonchorous and wheezing past two day. Son reports that no smoking in past but does have biomass exposure in [place]. Denies wheezing or asthma/copd in past.  Likely w/ exacerbation of obstructive airways dz now poss due to flu or aspiration event  Weaned to NC today at bedside    - cont NC, titrate down to maintain sat 90-95%  - cont steroids iv for now as npo  - cont BD neb tx  - start 160/4.5 BID Symbicort  - can repeat swallow eval as mental status improves if not would place NG if amenable  - aspiration precaution  - per son she is improving and more awake today  - dvt ppx  - f/u in pulmonary clinic    Please notify pulmonary prior to discharge and email [email or web address] to schedule an appointment; please provide appointment info to patient    Follow up at  [address] [place] [address], [address]  [place] [address]  tel: [phone]  fax: [phone]

## 2024-03-28 NOTE — CONSULT NOTE ADULT - SUBJECTIVE AND OBJECTIVE BOX
PATIENT:  [facility] NURIS  [id number]    CHIEF COMPLAINT:  Patient is a 88y old  Female who presents with a chief complaint of influenza, sepsis, hypoxia (28 Mar 2024 16:42)      INTERVAL HISTORY/OVERNIGHT EVENTS:  HPI:  88F with HFpEF, CAD (s/p multiple stents), HTN, HLD, DM2 admitted to MICU for acute hypoxemic respiratory failure requiring intubation during RRT on floors 3/17/23 from altered mental status (?pna vs seizure), course c/b seizure like activity.  Intubated 3/17, extubated 3/25. Influenza pna completed Tamiflu and got Zosyn. LP unrevealing and EEG showing multifocal epileptiform potentials >> no acute seizures documented but unable to completely rule out either. Neurology following. Now stable of off IV sedation. MICU consulted for SOB. Patient known to author from recent micu admission, currently restless with increased wob. Inspiratory/Expiratory wheezing auscultated bilaterally on exam. Secured mittens in place as patient has been tugging at lines. A/Ox0 on discussion. Appears improved from time in MICU. Reportedly was belly breathing on 5lnc w/o hypoxia. ABG at time showed 7.47/35/96/26. Placed on HFNC with improvement of subjective markers.        MEDICATIONS:  MEDICATIONS  (STANDING):  albuterol/ipratropium for Nebulization 3 milliLiter(s) Nebulizer every 6 hours  aspirin  chewable 81 milliGRAM(s) Oral daily  atorvastatin 80 milliGRAM(s) Oral at bedtime  chlorhexidine 2% Cloths 1 Application(s) Topical daily  dextrose 5%. 1000 milliLiter(s) (50 mL/Hr) IV Continuous <Continuous>  famotidine    Tablet 20 milliGRAM(s) Oral daily  heparin   Injectable 5000 Unit(s) SubCutaneous every 8 hours  influenza  Vaccine (HIGH DOSE) 0.7 milliLiter(s) IntraMuscular once  insulin lispro (ADMELOG) corrective regimen sliding scale   SubCutaneous every 6 hours  levETIRAcetam   Injectable 1000 milliGRAM(s) IV Push every 12 hours  levothyroxine Injectable 20 MICROGram(s) IV Push at bedtime  multivitamin/minerals/iron Oral Solution (CENTRUM) 15 milliLiter(s) Oral daily  petrolatum Ophthalmic Ointment 1 Application(s) Both EYES daily  polyethylene glycol 3350 17 Gram(s) Oral two times a day  senna 2 Tablet(s) Oral at bedtime  sodium chloride 7% Inhalation 4 milliLiter(s) Inhalation every 12 hours    MEDICATIONS  (PRN):  acetaminophen   Oral Liquid .. 650 milliGRAM(s) Oral every 6 hours PRN Temp greater or equal to 38C (100.4F), Mild Pain (1 - 3), Moderate Pain (4 - 6)  guaiFENesin Oral Liquid (Sugar-Free) 200 milliGRAM(s) Oral every 6 hours PRN Cough      ALLERGIES:  Allergies    No Known Allergies    Intolerances        OBJECTIVE:  ICU Vital Signs Last 24 Hrs  T(C): 36.5 (28 Mar 2024 14:00), Max: 37 (28 Mar 2024 06:00)  T(F): 97.7 (28 Mar 2024 14:00), Max: 98.6 (28 Mar 2024 06:00)  HR: 120 (28 Mar 2024 14:00) (100 - 120)  BP: 153/88 (28 Mar 2024 14:00) (132/78 - 156/86)  BP(mean): --  ABP: --  ABP(mean): --  RR: 19 (28 Mar 2024 14:00) (18 - 20)  SpO2: 95% (28 Mar 2024 14:00) (94% - 100%)    O2 Parameters below as of 28 Mar 2024 11:32  Patient On (Oxygen Delivery Method): nasal cannula, high flow  O2 Flow (L/min): 30  O2 Concentration (%): 40        Adult Advanced Hemodynamics Last 24 Hrs  CVP(mm Hg): --  CVP(cm H2O): --  CO: --  CI: --  PA: --  PA(mean): --  PCWP: --  SVR: --  SVRI: --  PVR: --  PVRI: --  CAPILLARY BLOOD GLUCOSE      POCT Blood Glucose.: 213 mg/dL (28 Mar 2024 13:21)  POCT Blood Glucose.: 197 mg/dL (28 Mar 2024 12:09)  POCT Blood Glucose.: 184 mg/dL (28 Mar 2024 06:47)  POCT Blood Glucose.: 222 mg/dL (28 Mar 2024 00:29)  POCT Blood Glucose.: 195 mg/dL (27 Mar 2024 22:17)  POCT Blood Glucose.: 166 mg/dL (27 Mar 2024 18:01)    CAPILLARY BLOOD GLUCOSE      POCT Blood Glucose.: 213 mg/dL (28 Mar 2024 13:21)    I&O's Summary    Daily     Daily     PHYSICAL EXAMINATION:  General: WN/WD NAD  HEENT: EOMI, moist mucous membranes  Neurology: fully alert not oriented  Respiratory: rhonchi diffusely  CV: RRR, S1S2, no murmurs, rubs or gallops  Abdominal: Soft, NT, ND +BS, Last BM  Extremities: No edema, + peripheral pulses      LABS:  ABG - ( 28 Mar 2024 11:15 )  pH, Arterial: 7.51  pH, Blood: x     /  pCO2: 30    /  pO2: 87    / HCO3: 24    / Base Excess: 1.4   /  SaO2: 98.4                                    9.7    12.27 )-----------( 727      ( 28 Mar 2024 06:20 )             31.5     03-28    151<H>  |  112<H>  |  30<H>  ----------------------------<  205<H>  4.1   |  23  |  0.72    Ca    9.7      28 Mar 2024 06:20  Phos  3.9     03-28  Mg     2.70     03-28    TPro  7.9  /  Alb  3.3  /  TBili  0.4  /  DBili  x   /  AST  25  /  ALT  24  /  AlkPhos  154<H>  03-28    LIVER FUNCTIONS - ( 28 Mar 2024 06:20 )  Alb: 3.3 g/dL / Pro: 7.9 g/dL / ALK PHOS: 154 U/L / ALT: 24 U/L / AST: 25 U/L / GGT: x                   Urinalysis Basic - ( 28 Mar 2024 06:20 )    Color: x / Appearance: x / SG: x / pH: x  Gluc: 205 mg/dL / Ketone: x  / Bili: x / Urobili: x   Blood: x / Protein: x / Nitrite: x   Leuk Esterase: x / RBC: x / WBC x   Sq Epi: x / Non Sq Epi: x / Bacteria: x        TELEMETRY:     EKG:     IMAGING:

## 2024-03-28 NOTE — PROGRESS NOTE ADULT - ASSESSMENT
Patient with Hypercapnic and Hypoxic Respiratory failure which is Acute on chronic.  she is on home oxygen at 2 LPM. Supplemental oxygen was provided and noted- Oxygen Concentration (%):  [32-50] 32    Signs/symptoms of respiratory failure include- tachypnea, increased work of breathing, use of accessory muscles and wheezing. Contributing diagnoses includes - CHF, COPD, Obesity Hypoventilation and Pleural effusion Labs and images were reviewed. Patient Has recent ABG, which has been reviewed. Will treat underlying causes and adjust management of respiratory failure as follows- IV lasix, supplemental oxygen, continuous BiPAP  3/31: IV lasix 40 mg Q6H x 2 doses  4/1 improving daily, continue diuresis with Lasix, continue BiPAP therapy and will transition to O2 therapy as tolerates  4/2 continue current treatment, continue diuresis with Lasix; continue to offer nasal cannula as tolerates for oxygen options  4/3 transitioned to PO lasix 40 mg BID, continue BiPAP I/E 14/6 FiO2 60%  4/4 There is evidence, patient now requires noninvasive home ventilator due to chronic respiratory failure and COPD.  If obtained, this will help the patient decrease the work of breathing, hospital readmits, intubations, and improve overall quality of life. If this home respiratory device is not obtained it may lead to patient's harm or death. Will arrange for NI ventilator for home use ready on discharge.  4/6 SpO2 >94% on 2L NC, arranging at home NIV   4/7 Home NIV arranged on discharge. Currently requiring continuous O2 3L via NC during daytime. Continue daily pulmonary hygiene care  4/11 2LNC during daytime, BiPAP at bedtime and daytime when asleep, continue daily pulmonary hygiene care.   Patient is an 89yo F with PMH significant for CAD s/p PCI x6 11/2019 at [facility], HTN, HLD, T2DM, and HFpEF who presented with fever and generalized weakness of 2 days’ duration, admitted with sepsis and acute hypoxic respiratory failure due to influenza. Patient required an upgrade in care to the MICU after an RRT on 3/17 during which patient was intubated for AMS. Patient completed a course of PNA treatment with Tamiflu and  Zosyn. Course c/b seizure like activity, LP unrevealing and EEG showing multifocal epileptiform potential. Although no acute seizures are documented, unable to completely rule out either. MRI brain now pending. Transferred to floors for further management.

## 2024-03-28 NOTE — PROGRESS NOTE ADULT - PROBLEM SELECTOR PLAN 1
- febrile, tachycardic, with leukocytosis on admission   - requiring supplemental O2  - Intubated for 8 days 3/17 - 3/25 for airway protection in the setting of altered mental status in the setting of PNA from bacterial and influenza sources   - s/p tamiflu and zosyn course in the ICU    - now on NC, trial off HFNC  - continue Duoneb, hypertonic saline, guaifenesin  - speech recs appreciated, keep NPO. Will place Ng tube to start tube feeds   - pulm consulted for further resp mgmt

## 2024-03-28 NOTE — PROGRESS NOTE ADULT - SUBJECTIVE AND OBJECTIVE BOX
Lionel Elliott MD  [facility] Division of Hospital Medicine  Pager: [phone]  Available via MS Teams    SUBJECTIVE / OVERNIGHT EVENTS:    Breathing improved this AM, no more belly breathing   asking to eat - will have speech reeval   otherwise no new complaints per son at bedside     MEDICATIONS  (STANDING):  albuterol/ipratropium for Nebulization 3 milliLiter(s) Nebulizer every 6 hours  aspirin  chewable 81 milliGRAM(s) Oral daily  atorvastatin 80 milliGRAM(s) Oral at bedtime  chlorhexidine 2% Cloths 1 Application(s) Topical daily  dextrose 5%. 1000 milliLiter(s) (50 mL/Hr) IV Continuous <Continuous>  famotidine    Tablet 20 milliGRAM(s) Oral daily  heparin   Injectable 5000 Unit(s) SubCutaneous every 8 hours  influenza  Vaccine (HIGH DOSE) 0.7 milliLiter(s) IntraMuscular once  insulin lispro (ADMELOG) corrective regimen sliding scale   SubCutaneous every 6 hours  levETIRAcetam   Injectable 1000 milliGRAM(s) IV Push every 12 hours  levothyroxine Injectable 20 MICROGram(s) IV Push at bedtime  multivitamin/minerals/iron Oral Solution (CENTRUM) 15 milliLiter(s) Oral daily  petrolatum Ophthalmic Ointment 1 Application(s) Both EYES daily  polyethylene glycol 3350 17 Gram(s) Oral two times a day  senna 2 Tablet(s) Oral at bedtime  sodium chloride 7% Inhalation 4 milliLiter(s) Inhalation every 12 hours    MEDICATIONS  (PRN):  acetaminophen   Oral Liquid .. 650 milliGRAM(s) Oral every 6 hours PRN Temp greater or equal to 38C (100.4F), Mild Pain (1 - 3), Moderate Pain (4 - 6)  guaiFENesin Oral Liquid (Sugar-Free) 200 milliGRAM(s) Oral every 6 hours PRN Cough      I&O's Summary      PHYSICAL EXAM:  Vital Signs Last 24 Hrs  T(C): 36.5 (28 Mar 2024 14:00), Max: 37 (28 Mar 2024 06:00)  T(F): 97.7 (28 Mar 2024 14:00), Max: 98.6 (28 Mar 2024 06:00)  HR: 120 (28 Mar 2024 14:00) (100 - 120)  BP: 153/88 (28 Mar 2024 14:00) (132/78 - 156/86)  BP(mean): --  RR: 19 (28 Mar 2024 14:00) (18 - 20)  SpO2: 95% (28 Mar 2024 14:00) (94% - 100%)    Parameters below as of 28 Mar 2024 11:32  Patient On (Oxygen Delivery Method): nasal cannula, high flow  O2 Flow (L/min): 30  O2 Concentration (%): 40  CONSTITUTIONAL: NAD, disheveled    EYES: conjunctiva and sclera clear  ENMT: Moist oral mucosa   NECK: Supple   RESPIRATORY: mild wheezing on exam   CARDIOVASCULAR: Regular rate and rhythm, normal S1 and S2, no murmur/rub/gallop; Peripheral pulses are 2+ bilaterally  ABDOMEN: Nontender to palpation, normoactive bowel sounds, no rebound/guarding   MUSCULOSKELETAL: No lower extremity edema   PSYCH: Alert, not O to person, place, or time; affect appropriate  NEUROLOGY: moves all extremities   SKIN: No rashes    LABS:                        9.7    12.27 )-----------( 727      ( 28 Mar 2024 06:20 )             31.5     03-28    151<H>  |  112<H>  |  30<H>  ----------------------------<  205<H>  4.1   |  23  |  0.72    Ca    9.7      28 Mar 2024 06:20  Phos  3.9     03-28  Mg     2.70     03-28    TPro  7.9  /  Alb  3.3  /  TBili  0.4  /  DBili  x   /  AST  25  /  ALT  24  /  AlkPhos  154<H>  03-28          Urinalysis Basic - ( 28 Mar 2024 06:20 )    Color: x / Appearance: x / SG: x / pH: x  Gluc: 205 mg/dL / Ketone: x  / Bili: x / Urobili: x   Blood: x / Protein: x / Nitrite: x   Leuk Esterase: x / RBC: x / WBC x   Sq Epi: x / Non Sq Epi: x / Bacteria: x            RADIOLOGY & ADDITIONAL TESTS:  Other Results Reviewed Today: BMP with stable Cr, CBC with stable Hg     COORDINATION OF CARE:  Communication: discussed plan of care with ACP

## 2024-03-28 NOTE — PROGRESS NOTE ADULT - PROBLEM SELECTOR PLAN 5
- Failed bedside swallow eval  - formal S&S eval pending   - patient pulled NG tube on 3/27   - converted meds to IV   - if fails speech eval, need to replace NG tube   - maintenance fluids while NPO

## 2024-03-28 NOTE — SWALLOW BEDSIDE ASSESSMENT ADULT - ADDITIONAL RECOMMENDATIONS
2. Medical team advised to re-consult this service as patient becomes medically optimized.  3. This service to follow-up as schedule permits. 2. Medical team advised to re-consult this service as patient becomes medically optimized.  3. ENT consult at MD's discretion.  4. This service to follow-up as schedule permits.

## 2024-03-29 LAB
ANION GAP SERPL CALC-SCNC: 13 MMOL/L — SIGNIFICANT CHANGE UP (ref 7–14)
BASOPHILS # BLD AUTO: 0.05 K/UL — SIGNIFICANT CHANGE UP (ref 0–0.2)
BASOPHILS NFR BLD AUTO: 0.3 % — SIGNIFICANT CHANGE UP (ref 0–2)
BUN SERPL-MCNC: 26 MG/DL — HIGH (ref 7–23)
CALCIUM SERPL-MCNC: 9.6 MG/DL — SIGNIFICANT CHANGE UP (ref 8.4–10.5)
CHLORIDE SERPL-SCNC: 114 MMOL/L — HIGH (ref 98–107)
CO2 SERPL-SCNC: 23 MMOL/L — SIGNIFICANT CHANGE UP (ref 22–31)
CREAT SERPL-MCNC: 0.68 MG/DL — SIGNIFICANT CHANGE UP (ref 0.5–1.3)
CULTURE RESULTS: SIGNIFICANT CHANGE UP
EGFR: 85 ML/MIN/1.73M2 — SIGNIFICANT CHANGE UP
EOSINOPHIL # BLD AUTO: 0.11 K/UL — SIGNIFICANT CHANGE UP (ref 0–0.5)
EOSINOPHIL NFR BLD AUTO: 0.7 % — SIGNIFICANT CHANGE UP (ref 0–6)
GLUCOSE BLDC GLUCOMTR-MCNC: 157 MG/DL — HIGH (ref 70–99)
GLUCOSE BLDC GLUCOMTR-MCNC: 192 MG/DL — HIGH (ref 70–99)
GLUCOSE BLDC GLUCOMTR-MCNC: 229 MG/DL — HIGH (ref 70–99)
GLUCOSE BLDC GLUCOMTR-MCNC: 371 MG/DL — HIGH (ref 70–99)
GLUCOSE BLDC GLUCOMTR-MCNC: 418 MG/DL — HIGH (ref 70–99)
GLUCOSE BLDC GLUCOMTR-MCNC: 431 MG/DL — HIGH (ref 70–99)
GLUCOSE SERPL-MCNC: 281 MG/DL — HIGH (ref 70–99)
HCT VFR BLD CALC: 33.1 % — LOW (ref 34.5–45)
HGB BLD-MCNC: 10.4 G/DL — LOW (ref 11.5–15.5)
IANC: 13.52 K/UL — HIGH (ref 1.8–7.4)
IMM GRANULOCYTES NFR BLD AUTO: 0.7 % — SIGNIFICANT CHANGE UP (ref 0–0.9)
LYMPHOCYTES # BLD AUTO: 1.49 K/UL — SIGNIFICANT CHANGE UP (ref 1–3.3)
LYMPHOCYTES # BLD AUTO: 9.4 % — LOW (ref 13–44)
MAGNESIUM SERPL-MCNC: 2.5 MG/DL — SIGNIFICANT CHANGE UP (ref 1.6–2.6)
MCHC RBC-ENTMCNC: 29.7 PG — SIGNIFICANT CHANGE UP (ref 27–34)
MCHC RBC-ENTMCNC: 31.4 GM/DL — LOW (ref 32–36)
MCV RBC AUTO: 94.6 FL — SIGNIFICANT CHANGE UP (ref 80–100)
MONOCYTES # BLD AUTO: 0.64 K/UL — SIGNIFICANT CHANGE UP (ref 0–0.9)
MONOCYTES NFR BLD AUTO: 4 % — SIGNIFICANT CHANGE UP (ref 2–14)
NEUTROPHILS # BLD AUTO: 13.52 K/UL — HIGH (ref 1.8–7.4)
NEUTROPHILS NFR BLD AUTO: 84.9 % — HIGH (ref 43–77)
NRBC # BLD: 0 /100 WBCS — SIGNIFICANT CHANGE UP (ref 0–0)
NRBC # FLD: 0.04 K/UL — HIGH (ref 0–0)
PHOSPHATE SERPL-MCNC: 2.3 MG/DL — LOW (ref 2.5–4.5)
PLATELET # BLD AUTO: 787 K/UL — HIGH (ref 150–400)
POTASSIUM SERPL-MCNC: 3.9 MMOL/L — SIGNIFICANT CHANGE UP (ref 3.5–5.3)
POTASSIUM SERPL-SCNC: 3.9 MMOL/L — SIGNIFICANT CHANGE UP (ref 3.5–5.3)
RBC # BLD: 3.5 M/UL — LOW (ref 3.8–5.2)
RBC # FLD: 15.4 % — HIGH (ref 10.3–14.5)
SODIUM SERPL-SCNC: 150 MMOL/L — HIGH (ref 135–145)
SPECIMEN SOURCE: SIGNIFICANT CHANGE UP
WBC # BLD: 15.92 K/UL — HIGH (ref 3.8–10.5)
WBC # FLD AUTO: 15.92 K/UL — HIGH (ref 3.8–10.5)

## 2024-03-29 PROCEDURE — 99497 ADVNCD CARE PLAN 30 MIN: CPT

## 2024-03-29 PROCEDURE — 93010 ELECTROCARDIOGRAM REPORT: CPT

## 2024-03-29 PROCEDURE — 99233 SBSQ HOSP IP/OBS HIGH 50: CPT | Mod: GC

## 2024-03-29 PROCEDURE — 99233 SBSQ HOSP IP/OBS HIGH 50: CPT

## 2024-03-29 RX ORDER — HUMAN INSULIN 100 [IU]/ML
3 INJECTION, SUSPENSION SUBCUTANEOUS EVERY 8 HOURS
Refills: 0 | Status: DISCONTINUED | OUTPATIENT
Start: 2024-03-29 | End: 2024-03-29

## 2024-03-29 RX ORDER — HUMAN INSULIN 100 [IU]/ML
3 INJECTION, SUSPENSION SUBCUTANEOUS EVERY 6 HOURS
Refills: 0 | Status: DISCONTINUED | OUTPATIENT
Start: 2024-03-29 | End: 2024-03-29

## 2024-03-29 RX ORDER — SODIUM,POTASSIUM PHOSPHATES 278-250MG
1 POWDER IN PACKET (EA) ORAL ONCE
Refills: 0 | Status: COMPLETED | OUTPATIENT
Start: 2024-03-29 | End: 2024-03-29

## 2024-03-29 RX ORDER — HUMAN INSULIN 100 [IU]/ML
10 INJECTION, SUSPENSION SUBCUTANEOUS EVERY 6 HOURS
Refills: 0 | Status: DISCONTINUED | OUTPATIENT
Start: 2024-03-29 | End: 2024-03-30

## 2024-03-29 RX ORDER — SODIUM CHLORIDE 9 MG/ML
1000 INJECTION, SOLUTION INTRAVENOUS
Refills: 0 | Status: DISCONTINUED | OUTPATIENT
Start: 2024-03-29 | End: 2024-03-29

## 2024-03-29 RX ADMIN — POLYETHYLENE GLYCOL 3350 17 GRAM(S): 17 POWDER, FOR SOLUTION ORAL at 06:45

## 2024-03-29 RX ADMIN — Medication 81 MILLIGRAM(S): at 13:38

## 2024-03-29 RX ADMIN — BUDESONIDE AND FORMOTEROL FUMARATE DIHYDRATE 2 PUFF(S): 160; 4.5 AEROSOL RESPIRATORY (INHALATION) at 22:53

## 2024-03-29 RX ADMIN — ATORVASTATIN CALCIUM 80 MILLIGRAM(S): 80 TABLET, FILM COATED ORAL at 22:52

## 2024-03-29 RX ADMIN — Medication 5: at 18:24

## 2024-03-29 RX ADMIN — Medication 1: at 01:04

## 2024-03-29 RX ADMIN — Medication 3 MILLILITER(S): at 09:03

## 2024-03-29 RX ADMIN — Medication 15 MILLILITER(S): at 13:38

## 2024-03-29 RX ADMIN — Medication 650 MILLIGRAM(S): at 09:10

## 2024-03-29 RX ADMIN — SENNA PLUS 2 TABLET(S): 8.6 TABLET ORAL at 22:51

## 2024-03-29 RX ADMIN — Medication 1 PACKET(S): at 22:51

## 2024-03-29 RX ADMIN — Medication 3 MILLILITER(S): at 03:05

## 2024-03-29 RX ADMIN — Medication 20 MICROGRAM(S): at 22:51

## 2024-03-29 RX ADMIN — HUMAN INSULIN 10 UNIT(S): 100 INJECTION, SUSPENSION SUBCUTANEOUS at 23:52

## 2024-03-29 RX ADMIN — Medication 3 MILLILITER(S): at 20:53

## 2024-03-29 RX ADMIN — HEPARIN SODIUM 5000 UNIT(S): 5000 INJECTION INTRAVENOUS; SUBCUTANEOUS at 14:17

## 2024-03-29 RX ADMIN — Medication 2: at 06:49

## 2024-03-29 RX ADMIN — BUDESONIDE AND FORMOTEROL FUMARATE DIHYDRATE 2 PUFF(S): 160; 4.5 AEROSOL RESPIRATORY (INHALATION) at 08:22

## 2024-03-29 RX ADMIN — HUMAN INSULIN 3 UNIT(S): 100 INJECTION, SUSPENSION SUBCUTANEOUS at 14:18

## 2024-03-29 RX ADMIN — Medication 1 APPLICATION(S): at 13:39

## 2024-03-29 RX ADMIN — HUMAN INSULIN 10 UNIT(S): 100 INJECTION, SUSPENSION SUBCUTANEOUS at 18:25

## 2024-03-29 RX ADMIN — Medication 650 MILLIGRAM(S): at 08:21

## 2024-03-29 RX ADMIN — LEVETIRACETAM 1000 MILLIGRAM(S): 250 TABLET, FILM COATED ORAL at 18:44

## 2024-03-29 RX ADMIN — FAMOTIDINE 20 MILLIGRAM(S): 10 INJECTION INTRAVENOUS at 13:38

## 2024-03-29 RX ADMIN — HEPARIN SODIUM 5000 UNIT(S): 5000 INJECTION INTRAVENOUS; SUBCUTANEOUS at 22:51

## 2024-03-29 RX ADMIN — HEPARIN SODIUM 5000 UNIT(S): 5000 INJECTION INTRAVENOUS; SUBCUTANEOUS at 06:44

## 2024-03-29 RX ADMIN — Medication 1: at 23:51

## 2024-03-29 RX ADMIN — LEVETIRACETAM 1000 MILLIGRAM(S): 250 TABLET, FILM COATED ORAL at 06:32

## 2024-03-29 RX ADMIN — Medication 40 MILLIGRAM(S): at 06:37

## 2024-03-29 RX ADMIN — SODIUM CHLORIDE 75 MILLILITER(S): 9 INJECTION, SOLUTION INTRAVENOUS at 14:11

## 2024-03-29 RX ADMIN — Medication 3 MILLILITER(S): at 15:11

## 2024-03-29 RX ADMIN — CHLORHEXIDINE GLUCONATE 1 APPLICATION(S): 213 SOLUTION TOPICAL at 13:40

## 2024-03-29 NOTE — PROGRESS NOTE ADULT - ASSESSMENT
Patient is an 87yo F with PMH significant for CAD s/p PCI x6 11/2019 at [facility], HTN, HLD, T2DM, and HFpEF who presented with fever and generalized weakness of 2 days’ duration, admitted with sepsis and acute hypoxic respiratory failure due to influenza. Patient required an upgrade in care to the MICU after an RRT on 3/17 during which patient was intubated for AMS. Patient completed a course of PNA treatment with Tamiflu and  Zosyn. Course c/b seizure like activity, LP unrevealing and EEG showing multifocal epileptiform potential. Although no acute seizures are documented, unable to completely rule out either. MRI brain now pending. Transferred to floors for further management.

## 2024-03-29 NOTE — SWALLOW BEDSIDE ASSESSMENT ADULT - ORAL PREPARATORY PHASE
Within functional limits
Within functional limits
Bolus falls into left lateral sulci/Bolus falls into right lateral sulci

## 2024-03-29 NOTE — PROGRESS NOTE ADULT - PROBLEM SELECTOR PLAN 1
- febrile, tachycardic, with leukocytosis on admission   - requiring supplemental O2  - Intubated for 8 days 3/17 - 3/25 for airway protection in the setting of altered mental status in the setting of PNA from bacterial and influenza sources   - s/p tamiflu and zosyn course in the ICU    - now on NC, trial off HFNC  - continue Duoneb, hypertonic saline, guaifenesin  - speech recs appreciated, keep NPO on 3/29. Ng tube placed and tube feeds started    - pulm consulted for further resp mgmt

## 2024-03-29 NOTE — PROGRESS NOTE ADULT - CONVERSATION DETAILS
Discussed GOC with son, Pravin Flynn at bedside. There is no other main family members in the  (rest are in [facility]). I explained to the son that patient has been hospitalized for 2+ weeks now and is still altered. She likely has delirium in addition to pulmonary pathology and may have had seizures this admission. Son would like to continue full medical management for now. He did inquire about hospice/comfort care route but isnt ready to make a decision. In addition, he would not like a PEG tube at this moment in time.

## 2024-03-29 NOTE — SWALLOW BEDSIDE ASSESSMENT ADULT - SWALLOW EVAL: RECOMMENDED DIET
1. Consider NPO with consideration of short-term non-oral meals of nutrition/hydration/medical administration.
1. Continue NPO with NGT for short-term non-oral means of nutrition/hydration/medication administration
1. Consider NPO with consideration for short-term non-oral means of nutrition/hydration/medication administration

## 2024-03-29 NOTE — PROGRESS NOTE ADULT - SUBJECTIVE AND OBJECTIVE BOX
Lionel Elliott MD  [facility] Division of Hospital Medicine  Pager: [phone]  Available via MS Teams    SUBJECTIVE / OVERNIGHT EVENTS:    Continues to be altered and restless  Did not tolerate diet with speech. Still rec NPO   NG tube placed, tube feeds started     MEDICATIONS  (STANDING):  albuterol/ipratropium for Nebulization 3 milliLiter(s) Nebulizer every 6 hours  aspirin  chewable 81 milliGRAM(s) Oral daily  atorvastatin 80 milliGRAM(s) Oral at bedtime  budesonide 160 MICROgram(s)/formoterol 4.5 MICROgram(s) Inhaler 2 Puff(s) Inhalation two times a day  chlorhexidine 2% Cloths 1 Application(s) Topical daily  dextrose 5%. 1000 milliLiter(s) (75 mL/Hr) IV Continuous <Continuous>  famotidine    Tablet 20 milliGRAM(s) Oral daily  heparin   Injectable 5000 Unit(s) SubCutaneous every 8 hours  influenza  Vaccine (HIGH DOSE) 0.7 milliLiter(s) IntraMuscular once  insulin lispro (ADMELOG) corrective regimen sliding scale   SubCutaneous every 6 hours  insulin NPH human recombinant 3 Unit(s) SubCutaneous every 6 hours  levETIRAcetam   Injectable 1000 milliGRAM(s) IV Push every 12 hours  levothyroxine Injectable 20 MICROGram(s) IV Push at bedtime  LORazepam     Tablet 1 milliGRAM(s) Oral once  methylPREDNISolone sodium succinate Injectable 40 milliGRAM(s) IV Push daily  multivitamin/minerals/iron Oral Solution (CENTRUM) 15 milliLiter(s) Oral daily  petrolatum Ophthalmic Ointment 1 Application(s) Both EYES daily  polyethylene glycol 3350 17 Gram(s) Oral two times a day  senna 2 Tablet(s) Oral at bedtime  sodium chloride 7% Inhalation 4 milliLiter(s) Inhalation every 12 hours    MEDICATIONS  (PRN):  acetaminophen   Oral Liquid .. 650 milliGRAM(s) Oral every 6 hours PRN Temp greater or equal to 38C (100.4F), Mild Pain (1 - 3), Moderate Pain (4 - 6)  guaiFENesin Oral Liquid (Sugar-Free) 200 milliGRAM(s) Oral every 6 hours PRN Cough      I&O's Summary    28 Mar 2024 07:01  -  29 Mar 2024 07:00  --------------------------------------------------------  IN: 400 mL / OUT: 575 mL / NET: -175 mL        PHYSICAL EXAM:  Vital Signs Last 24 Hrs  T(C): 36.9 (29 Mar 2024 10:00), Max: 37.8 (29 Mar 2024 07:55)  T(F): 98.5 (29 Mar 2024 10:00), Max: 100 (29 Mar 2024 07:55)  HR: 112 (29 Mar 2024 15:11) (112 - 135)  BP: 155/82 (29 Mar 2024 10:00) (151/88 - 161/85)  BP(mean): --  RR: 20 (29 Mar 2024 10:00) (18 - 20)  SpO2: 99% (29 Mar 2024 10:00) (98% - 100%)    Parameters below as of 29 Mar 2024 10:00  Patient On (Oxygen Delivery Method): nasal cannula      CONSTITUTIONAL: NAD   EYES: conjunctiva and sclera clear  ENMT: Moist oral mucosa, NG tube placed   NECK: Supple   RESPIRATORY: rhonchi noted   CARDIOVASCULAR: Regular rate and rhythm, normal S1 and S2, no murmur/rub/gallop; Peripheral pulses are 2+ bilaterally  ABDOMEN: Nontender to palpation, normoactive bowel sounds, no rebound/guarding   MUSCULOSKELETAL: No lower extremity edema   PSYCH: alert, but not O at all   NEUROLOGY: moves all extremities   SKIN: No rashes    LABS:                        10.4   15.92 )-----------( 787      ( 29 Mar 2024 09:02 )             33.1     03-29    150<H>  |  114<H>  |  26<H>  ----------------------------<  281<H>  3.9   |  23  |  0.68    Ca    9.6      29 Mar 2024 09:02  Phos  2.3     03-29  Mg     2.50     03-29    TPro  7.9  /  Alb  3.3  /  TBili  0.4  /  DBili  x   /  AST  25  /  ALT  24  /  AlkPhos  154<H>  03-28          Urinalysis Basic - ( 29 Mar 2024 09:02 )    Color: x / Appearance: x / SG: x / pH: x  Gluc: 281 mg/dL / Ketone: x  / Bili: x / Urobili: x   Blood: x / Protein: x / Nitrite: x   Leuk Esterase: x / RBC: x / WBC x   Sq Epi: x / Non Sq Epi: x / Bacteria: x            RADIOLOGY & ADDITIONAL TESTS:  Other Results Reviewed Today: BMP with stable Cr, CBC with stable Hg     COORDINATION OF CARE:  Communication: discussed plan of care with ACP

## 2024-03-29 NOTE — SWALLOW BEDSIDE ASSESSMENT ADULT - ASR SWALLOW RECOMMEND DIAG
Objective testing is NOT indicated at this time given presence of overt s/s of aspiration across trials
Objective testing is not indicated at this time given patient demonstration with overt s/sx aspiration/penetration.
Objective testing is NOT indicated given presence of overt s/s of aspiration across trials

## 2024-03-29 NOTE — SWALLOW BEDSIDE ASSESSMENT ADULT - ORAL PHASE
Delayed oral transit time/Stasis in lateral sulci
Decreased anterior-posterior movement of the bolus/Delayed oral transit time/Stasis in lateral sulci/Palatal stasis/Lingual stasis
Decreased anterior-posterior movement of the bolus/Delayed oral transit time/Stasis in lateral sulci/Lingual stasis

## 2024-03-29 NOTE — PROGRESS NOTE ADULT - PROBLEM SELECTOR PLAN 11
#VTE PPx: hep  #Diet: tube feeds via NG tube   #Dispo: pending stabilization    DC fuchs and rectal tube

## 2024-03-29 NOTE — CHART NOTE - NSCHARTNOTEFT_GEN_A_CORE
Source: Patient A&Ox [1]    Family: Son  [x]  other [x] Chart review     Medical Course: 88F with HFpEF, CAD (s/p multiple stents), DM2  admitted to MICU for acute hypoxemic respiratory failure insetting of flu and encephalopathy. Extubated and sent to floors.  Pulm consulted for hypoxemia.     Nutrition Course: Patient seen at bedside, with decreased cognition. Pt's son by the bedside provided collateral, who reports pt currently tolerating TF regimen. Vital HP 1.0Cal visibly running at 35ml/hr x 24 hrs to provide 840kcal, 73.3gm pro. Pt noted previously sedated and incubated on propofol in MICU, now extubated and downgraded to regular medicine floor. Current TF regimen is not meeting her calorie needs. Pt failed Swallow Eval on 3/28 with recommendation of NPO with considerations non-oral means of nutrition. Recommend when medically feasible change TF formula, Glucerna 1.5 @ rate 10ml and increase by 10mlq 5 hrs to meet goal rate 35ml/hr x 24 hrs to provide total volume 909ml/day. If this TF running at goal rate will provide total 1363kcal, 75gm pro, and 690 ml of free water.        Diet, NPO with Tube Feed:   Tube Feeding Modality: Nasogastric  Vital High Protein (VITALHP)  Total Volume for 24 Hours (mL): 840  Continuous  Starting Tube Feed Rate {mL per Hour}: 35  Increase Tube Feed Rate by (mL): 0     Every 4 hours  Until Goal Tube Feed Rate (mL per Hour): 35  Tube Feed Duration (in Hours): 24 (03-26-24 @ 14:33)      GI: WDL. Last BM     PO intake:  < 50% [ ] 50-75% [ ]   % [ ]  other :    Enteral /Parenteral Nutrition:     Anthropometrics: Height (cm): 152.4 (03-26)  Weight (kg): 57 (03-26)  BMI (kg/m2): 24.5 (03-26)    Edema:  Pressure Injuries:    __________________ Pertinent Medications__________________   MEDICATIONS  (STANDING):  albuterol/ipratropium for Nebulization 3 milliLiter(s) Nebulizer every 6 hours  aspirin  chewable 81 milliGRAM(s) Oral daily  atorvastatin 80 milliGRAM(s) Oral at bedtime  budesonide 160 MICROgram(s)/formoterol 4.5 MICROgram(s) Inhaler 2 Puff(s) Inhalation two times a day  chlorhexidine 2% Cloths 1 Application(s) Topical daily  dextrose 5%. 1000 milliLiter(s) (75 mL/Hr) IV Continuous <Continuous>  famotidine    Tablet 20 milliGRAM(s) Oral daily  heparin   Injectable 5000 Unit(s) SubCutaneous every 8 hours  influenza  Vaccine (HIGH DOSE) 0.7 milliLiter(s) IntraMuscular once  insulin lispro (ADMELOG) corrective regimen sliding scale   SubCutaneous every 6 hours  levETIRAcetam   Injectable 1000 milliGRAM(s) IV Push every 12 hours  levothyroxine Injectable 20 MICROGram(s) IV Push at bedtime  LORazepam     Tablet 1 milliGRAM(s) Oral once  methylPREDNISolone sodium succinate Injectable 40 milliGRAM(s) IV Push daily  multivitamin/minerals/iron Oral Solution (CENTRUM) 15 milliLiter(s) Oral daily  petrolatum Ophthalmic Ointment 1 Application(s) Both EYES daily  polyethylene glycol 3350 17 Gram(s) Oral two times a day  senna 2 Tablet(s) Oral at bedtime  sodium chloride 7% Inhalation 4 milliLiter(s) Inhalation every 12 hours    MEDICATIONS  (PRN):  acetaminophen   Oral Liquid .. 650 milliGRAM(s) Oral every 6 hours PRN Temp greater or equal to 38C (100.4F), Mild Pain (1 - 3), Moderate Pain (4 - 6)  guaiFENesin Oral Liquid (Sugar-Free) 200 milliGRAM(s) Oral every 6 hours PRN Cough      __________________ Pertinent Labs__________________   03-29 Na150 mmol/L<H> Glu 281 mg/dL<H> K+ 3.9 mmol/L Cr  0.68 mg/dL BUN 26 mg/dL<H> 03-29 Phos 2.3 mg/dL<L> 03-28 Alb 3.3 g/dL        POCT Blood Glucose.: 229 mg/dL (03-29-24 @ 06:47)  POCT Blood Glucose.: 192 mg/dL (03-29-24 @ 00:23)  POCT Blood Glucose.: 212 mg/dL (03-28-24 @ 17:07)  POCT Blood Glucose.: 213 mg/dL (03-28-24 @ 13:21)  POCT Blood Glucose.: 197 mg/dL (03-28-24 @ 12:09)  POCT Blood Glucose.: 184 mg/dL (03-28-24 @ 06:47)  POCT Blood Glucose.: 222 mg/dL (03-28-24 @ 00:29)  POCT Blood Glucose.: 195 mg/dL (03-27-24 @ 22:17)  POCT Blood Glucose.: 166 mg/dL (03-27-24 @ 18:01)  POCT Blood Glucose.: 141 mg/dL (03-27-24 @ 12:09)  POCT Blood Glucose.: 153 mg/dL (03-27-24 @ 05:03)  POCT Blood Glucose.: 112 mg/dL (03-27-24 @ 02:34)  POCT Blood Glucose.: 79 mg/dL (03-26-24 @ 23:34)  POCT Blood Glucose.: 168 mg/dL (03-26-24 @ 17:23)          Estimated Needs:   stormy/d  gm pro/d    [ ] no change since previous assessment  [ ] recalculated:       Previous Nutrition Diagnosis:     Nutrition Diagnosis is [ ] ongoing  [ ] resolved [ ] not applicable     New Nutrition Diagnosis:      Recommendations:        Monitoring and Evaluation:      [ x] Tolerance to diet prescription [x ] weights [x ] follow up per protocol  [ ] other: Source: Patient A&Ox [1]    Family: Son  [x]  other [x] Chart review     Medical Course: 88F with HFpEF, CAD (s/p multiple stents), DM2  admitted to MICU for acute hypoxemic respiratory failure insetting of flu and encephalopathy. Extubated and sent to floors.  Pulm consulted for hypoxemia.     Nutrition Course: Patient seen at bedside, with decreased cognition. Pt's son by the bedside provided collateral, who reports pt currently tolerating TF regimen. Vital HP 1.0Cal visibly running at 35ml/hr x 24 hrs to provide 840kcal, 73.3gm pro. Pt noted previously sedated and incubated on propofol in MICU, now extubated and downgraded to regular medicine floor. Current TF regimen is not meeting her calorie needs. Pt failed Swallow Eval on 3/28 with recommendation of NPO with considerations non-oral means of nutrition. Pt's labs notable with elevated POCT ranges from 184-229. Insulin regimen in use. Recommend when medically feasible change TF formula to Glucerna 1.5 @ rate 10ml and increase by 10mlq 5 hrs to meet goal rate 35ml/hr x 24 hrs to provide total volume 909ml/day. If this TF running at goal rate will provide total 1363kcal, 75gm pro, and 690 ml of free water. RD to remain available for further nutritional interventions as indicated.        Diet, NPO with Tube Feed:   Tube Feeding Modality: Nasogastric  Vital High Protein (VITALHP)  Total Volume for 24 Hours (mL): 840  Continuous  Starting Tube Feed Rate {mL per Hour}: 35  Increase Tube Feed Rate by (mL): 0     Every 4 hours  Until Goal Tube Feed Rate (mL per Hour): 35  Tube Feed Duration (in Hours): 24 (03-26-24 @ 14:33)    This TF regimen provided total 840kcal, 73gm pro.     GI: WDL. Last BM 3/28       Anthropometrics: Height (cm): 152.4 (03-26)  Weight (kg): 57 (03-26)     58.2kg (3/25)  57.2kg (3/14)    BMI (kg/m2): 24.5 (03-26)    Edema: 2+ generalized edema per RN flow sheet   Pressure Injuries: None reported at present.     __________________ Pertinent Medications__________________   MEDICATIONS  (STANDING):  albuterol/ipratropium for Nebulization 3 milliLiter(s) Nebulizer every 6 hours  aspirin  chewable 81 milliGRAM(s) Oral daily  atorvastatin 80 milliGRAM(s) Oral at bedtime  budesonide 160 MICROgram(s)/formoterol 4.5 MICROgram(s) Inhaler 2 Puff(s) Inhalation two times a day  chlorhexidine 2% Cloths 1 Application(s) Topical daily  dextrose 5%. 1000 milliLiter(s) (75 mL/Hr) IV Continuous <Continuous>  famotidine    Tablet 20 milliGRAM(s) Oral daily  heparin   Injectable 5000 Unit(s) SubCutaneous every 8 hours  influenza  Vaccine (HIGH DOSE) 0.7 milliLiter(s) IntraMuscular once  insulin lispro (ADMELOG) corrective regimen sliding scale   SubCutaneous every 6 hours  levETIRAcetam   Injectable 1000 milliGRAM(s) IV Push every 12 hours  levothyroxine Injectable 20 MICROGram(s) IV Push at bedtime  LORazepam     Tablet 1 milliGRAM(s) Oral once  methylPREDNISolone sodium succinate Injectable 40 milliGRAM(s) IV Push daily  multivitamin/minerals/iron Oral Solution (CENTRUM) 15 milliLiter(s) Oral daily  petrolatum Ophthalmic Ointment 1 Application(s) Both EYES daily  polyethylene glycol 3350 17 Gram(s) Oral two times a day  senna 2 Tablet(s) Oral at bedtime  sodium chloride 7% Inhalation 4 milliLiter(s) Inhalation every 12 hours    MEDICATIONS  (PRN):  acetaminophen   Oral Liquid .. 650 milliGRAM(s) Oral every 6 hours PRN Temp greater or equal to 38C (100.4F), Mild Pain (1 - 3), Moderate Pain (4 - 6)  guaiFENesin Oral Liquid (Sugar-Free) 200 milliGRAM(s) Oral every 6 hours PRN Cough      __________________ Pertinent Labs__________________   03-29 Na150 mmol/L<H> Glu 281 mg/dL<H> K+ 3.9 mmol/L Cr  0.68 mg/dL BUN 26 mg/dL<H> 03-29 Phos 2.3 mg/dL<L> 03-28 Alb 3.3 g/dL        POCT Blood Glucose.: 229 mg/dL (03-29-24 @ 06:47)  POCT Blood Glucose.: 192 mg/dL (03-29-24 @ 00:23)  POCT Blood Glucose.: 212 mg/dL (03-28-24 @ 17:07)  POCT Blood Glucose.: 213 mg/dL (03-28-24 @ 13:21)  POCT Blood Glucose.: 197 mg/dL (03-28-24 @ 12:09)  POCT Blood Glucose.: 184 mg/dL (03-28-24 @ 06:47)  POCT Blood Glucose.: 222 mg/dL (03-28-24 @ 00:29)  POCT Blood Glucose.: 195 mg/dL (03-27-24 @ 22:17)  POCT Blood Glucose.: 166 mg/dL (03-27-24 @ 18:01)  POCT Blood Glucose.: 141 mg/dL (03-27-24 @ 12:09)  POCT Blood Glucose.: 153 mg/dL (03-27-24 @ 05:03)  POCT Blood Glucose.: 112 mg/dL (03-27-24 @ 02:34)  POCT Blood Glucose.: 79 mg/dL (03-26-24 @ 23:34)  POCT Blood Glucose.: 168 mg/dL (03-26-24 @ 17:23)          Estimated Needs:   [x ] no change since previous assessment  22-25 KCals/kg = 6684-7109 KCals/d/  1.2-1.4g protein/kg = 70-82g protein/d      Previous Nutrition Diagnosis: Inadequate Protein Energy Intake,  Excessive Energy Intake      Nutrition Diagnosis is [x ] ongoing     Recommendations:  1) Recommend when medically feasible change TF formula to Glucerna 1.5 @ rate 10ml and increase by 10mlq 5 hrs to meet goal rate 35ml/hr x 24 hrs to provide total volume 909ml/day. ( total 1363kcal, 75gm pro, and 690 ml of free water). Water flushes defer to MD discretion.   2)  Monitor POCT and adjust insulin PRN.   3) Monitor TF tolerance, Labs, weights, BMs, and skin integrity.   4) RD to remain available for further nutritional interventions as indicated.     Monitoring and Evaluation:      [ x] Tolerance to diet prescription [x ] weights [x ] follow up per protocol  [ ] other: Source: Patient A&Ox [1]    Family: Son  [x]  other [x] Chart review     Medical Course: 88F with HFpEF, CAD (s/p multiple stents), DM2  admitted to MICU for acute hypoxemic respiratory failure insetting of flu and encephalopathy. Extubated and sent to floors.  Pulm consulted for hypoxemia.     Nutrition Course: NP verbally consulted RD for TF recommendation for this patient. Patient seen at bedside, with decreased cognition. Pt's son by the bedside provided collateral, who reports pt currently tolerating TF regimen. Vital HP 1.0Cal visibly running at 35ml/hr x 24 hrs to provide 840kcal, 73.3gm pro. Pt noted previously sedated and incubated on propofol in MICU, now extubated and downgraded to regular medicine floor. Current TF regimen is not meeting her calorie needs. Pt failed Swallow Eval on 3/28 with recommendation of NPO with considerations non-oral means of nutrition. Pt's labs notable with elevated POCT ranges from 184-229. Insulin regimen in use. Recommend when medically feasible change TF formula to Glucerna 1.5 @ rate 10ml and increase by 10mlq 5 hrs to meet goal rate 35ml/hr x 24 hrs to provide total volume 909ml/day. If this TF running at goal rate will provide total 1363kcal, 75gm pro, and 690 ml of free water. RD to remain available for further nutritional interventions as indicated.        Diet, NPO with Tube Feed:   Tube Feeding Modality: Nasogastric  Vital High Protein (VITALHP)  Total Volume for 24 Hours (mL): 840  Continuous  Starting Tube Feed Rate {mL per Hour}: 35  Increase Tube Feed Rate by (mL): 0     Every 4 hours  Until Goal Tube Feed Rate (mL per Hour): 35  Tube Feed Duration (in Hours): 24 (03-26-24 @ 14:33)    This TF regimen provided total 840kcal, 73gm pro.     GI: WDL. Last BM 3/28       Anthropometrics: Height (cm): 152.4 (03-26)  Weight (kg): 57 (03-26)     58.2kg (3/25)  57.2kg (3/14)    BMI (kg/m2): 24.5 (03-26)    Edema: 2+ generalized edema per RN flow sheet   Pressure Injuries: None reported at present.     __________________ Pertinent Medications__________________   MEDICATIONS  (STANDING):  albuterol/ipratropium for Nebulization 3 milliLiter(s) Nebulizer every 6 hours  aspirin  chewable 81 milliGRAM(s) Oral daily  atorvastatin 80 milliGRAM(s) Oral at bedtime  budesonide 160 MICROgram(s)/formoterol 4.5 MICROgram(s) Inhaler 2 Puff(s) Inhalation two times a day  chlorhexidine 2% Cloths 1 Application(s) Topical daily  dextrose 5%. 1000 milliLiter(s) (75 mL/Hr) IV Continuous <Continuous>  famotidine    Tablet 20 milliGRAM(s) Oral daily  heparin   Injectable 5000 Unit(s) SubCutaneous every 8 hours  influenza  Vaccine (HIGH DOSE) 0.7 milliLiter(s) IntraMuscular once  insulin lispro (ADMELOG) corrective regimen sliding scale   SubCutaneous every 6 hours  levETIRAcetam   Injectable 1000 milliGRAM(s) IV Push every 12 hours  levothyroxine Injectable 20 MICROGram(s) IV Push at bedtime  LORazepam     Tablet 1 milliGRAM(s) Oral once  methylPREDNISolone sodium succinate Injectable 40 milliGRAM(s) IV Push daily  multivitamin/minerals/iron Oral Solution (CENTRUM) 15 milliLiter(s) Oral daily  petrolatum Ophthalmic Ointment 1 Application(s) Both EYES daily  polyethylene glycol 3350 17 Gram(s) Oral two times a day  senna 2 Tablet(s) Oral at bedtime  sodium chloride 7% Inhalation 4 milliLiter(s) Inhalation every 12 hours    MEDICATIONS  (PRN):  acetaminophen   Oral Liquid .. 650 milliGRAM(s) Oral every 6 hours PRN Temp greater or equal to 38C (100.4F), Mild Pain (1 - 3), Moderate Pain (4 - 6)  guaiFENesin Oral Liquid (Sugar-Free) 200 milliGRAM(s) Oral every 6 hours PRN Cough      __________________ Pertinent Labs__________________   03-29 Na150 mmol/L<H> Glu 281 mg/dL<H> K+ 3.9 mmol/L Cr  0.68 mg/dL BUN 26 mg/dL<H> 03-29 Phos 2.3 mg/dL<L> 03-28 Alb 3.3 g/dL        POCT Blood Glucose.: 229 mg/dL (03-29-24 @ 06:47)  POCT Blood Glucose.: 192 mg/dL (03-29-24 @ 00:23)  POCT Blood Glucose.: 212 mg/dL (03-28-24 @ 17:07)  POCT Blood Glucose.: 213 mg/dL (03-28-24 @ 13:21)  POCT Blood Glucose.: 197 mg/dL (03-28-24 @ 12:09)  POCT Blood Glucose.: 184 mg/dL (03-28-24 @ 06:47)  POCT Blood Glucose.: 222 mg/dL (03-28-24 @ 00:29)  POCT Blood Glucose.: 195 mg/dL (03-27-24 @ 22:17)  POCT Blood Glucose.: 166 mg/dL (03-27-24 @ 18:01)  POCT Blood Glucose.: 141 mg/dL (03-27-24 @ 12:09)  POCT Blood Glucose.: 153 mg/dL (03-27-24 @ 05:03)  POCT Blood Glucose.: 112 mg/dL (03-27-24 @ 02:34)  POCT Blood Glucose.: 79 mg/dL (03-26-24 @ 23:34)  POCT Blood Glucose.: 168 mg/dL (03-26-24 @ 17:23)          Estimated Needs:   [x ] no change since previous assessment  22-25 KCals/kg = 8604-4075 KCals/d/  1.2-1.4g protein/kg = 70-82g protein/d      Previous Nutrition Diagnosis: Inadequate Protein Energy Intake,  Excessive Energy Intake      Nutrition Diagnosis is [x ] ongoing     Recommendations:  1) Recommend when medically feasible change TF formula to Glucerna 1.5 @ rate 10ml and increase by 10mlq 5 hrs to meet goal rate 35ml/hr x 24 hrs to provide total volume 909ml/day. ( total 1363kcal, 75gm pro, and 690 ml of free water). Water flushes defer to MD discretion.   2)  Monitor POCT and adjust insulin PRN.   3) Monitor TF tolerance, Labs, weights, BMs, and skin integrity.   4) RD to remain available for further nutritional interventions as indicated.     Monitoring and Evaluation:      [ x] Tolerance to diet prescription [x ] weights [x ] follow up per protocol  [ ] other:

## 2024-03-29 NOTE — PROGRESS NOTE ADULT - SUBJECTIVE AND OBJECTIVE BOX
PATIENT:  SCHUYLER LAWLER  [id number]    CHIEF COMPLAINT:  Patient is a 88y old  Female who presents with a chief complaint of influenza, sepsis, hypoxia (28 Mar 2024 16:42)    INTERVAL HISTORY/OVERNIGHT EVENTS:  - no acute events    MEDICATIONS:  MEDICATIONS  (STANDING):  albuterol/ipratropium for Nebulization 3 milliLiter(s) Nebulizer every 6 hours  aspirin  chewable 81 milliGRAM(s) Oral daily  atorvastatin 80 milliGRAM(s) Oral at bedtime  budesonide 160 MICROgram(s)/formoterol 4.5 MICROgram(s) Inhaler 2 Puff(s) Inhalation two times a day  chlorhexidine 2% Cloths 1 Application(s) Topical daily  dextrose 5%. 1000 milliLiter(s) (50 mL/Hr) IV Continuous <Continuous>  famotidine    Tablet 20 milliGRAM(s) Oral daily  heparin   Injectable 5000 Unit(s) SubCutaneous every 8 hours  influenza  Vaccine (HIGH DOSE) 0.7 milliLiter(s) IntraMuscular once  insulin lispro (ADMELOG) corrective regimen sliding scale   SubCutaneous every 6 hours  levETIRAcetam   Injectable 1000 milliGRAM(s) IV Push every 12 hours  levothyroxine Injectable 20 MICROGram(s) IV Push at bedtime  LORazepam     Tablet 1 milliGRAM(s) Oral once  methylPREDNISolone sodium succinate Injectable 40 milliGRAM(s) IV Push daily  multivitamin/minerals/iron Oral Solution (CENTRUM) 15 milliLiter(s) Oral daily  petrolatum Ophthalmic Ointment 1 Application(s) Both EYES daily  polyethylene glycol 3350 17 Gram(s) Oral two times a day  senna 2 Tablet(s) Oral at bedtime  sodium chloride 7% Inhalation 4 milliLiter(s) Inhalation every 12 hours    MEDICATIONS  (PRN):  acetaminophen   Oral Liquid .. 650 milliGRAM(s) Oral every 6 hours PRN Temp greater or equal to 38C (100.4F), Mild Pain (1 - 3), Moderate Pain (4 - 6)  guaiFENesin Oral Liquid (Sugar-Free) 200 milliGRAM(s) Oral every 6 hours PRN Cough    ALLERGIES:  Allergies  No Known Allergies    OBJECTIVE:  ICU Vital Signs Last 24 Hrs  T(C): 36.9 (29 Mar 2024 10:00), Max: 37.8 (29 Mar 2024 07:55)  T(F): 98.5 (29 Mar 2024 10:00), Max: 100 (29 Mar 2024 07:55)  HR: 118 (29 Mar 2024 10:00) (110 - 135)  BP: 155/82 (29 Mar 2024 10:00) (151/88 - 161/85)  RR: 20 (29 Mar 2024 10:00) (18 - 20)  SpO2: 99% (29 Mar 2024 10:00) (95% - 100%)    O2 Parameters below as of 29 Mar 2024 10:00  Patient On (Oxygen Delivery Method): nasal cannula    POCT Blood Glucose.: 229 mg/dL (29 Mar 2024 06:47)  POCT Blood Glucose.: 192 mg/dL (29 Mar 2024 00:23)  POCT Blood Glucose.: 212 mg/dL (28 Mar 2024 17:07)  POCT Blood Glucose.: 229 mg/dL (29 Mar 2024 06:47)    I&O's Summary  28 Mar 2024 07:01  -  29 Mar 2024 07:00  --------------------------------------------------------  IN: 400 mL / OUT: 575 mL / NET: -175 mL    PHYSICAL EXAMINATION:  General: WN/WD NAD  HEENT: EOMI, moist mucous membranes  Neurology: A&Ox3, nonfocal, TIMMONS x 4  Respiratory: rhonchi  CV: RRR, S1S2, no murmurs, rubs or gallops  Abdominal: Soft, NT, ND +BS  Extremities: No edema, + peripheral pulses      LABS:  ABG - ( 28 Mar 2024 11:15 )  pH, Arterial: 7.51  pH, Blood: x     /  pCO2: 30    /  pO2: 87    / HCO3: 24    / Base Excess: 1.4   /  SaO2: 98.4                          10.4   15.92 )-----------( 787      ( 29 Mar 2024 09:02 )             33.1     03-29    150<H>  |  114<H>  |  26<H>  ----------------------------<  281<H>  3.9   |  23  |  0.68    Ca    9.6      29 Mar 2024 09:02  Phos  2.3     03-29  Mg     2.50     03-29    TPro  7.9  /  Alb  3.3  /  TBili  0.4  /  DBili  x   /  AST  25  /  ALT  24  /  AlkPhos  154<H>  03-28    LIVER FUNCTIONS - ( 28 Mar 2024 06:20 )  Alb: 3.3 g/dL / Pro: 7.9 g/dL / ALK PHOS: 154 U/L / ALT: 24 U/L / AST: 25 U/L / GGT: x           Urinalysis Basic - ( 29 Mar 2024 09:02 )    Color: x / Appearance: x / SG: x / pH: x  Gluc: 281 mg/dL / Ketone: x  / Bili: x / Urobili: x   Blood: x / Protein: x / Nitrite: x   Leuk Esterase: x / RBC: x / WBC x   Sq Epi: x / Non Sq Epi: x / Bacteria: x

## 2024-03-29 NOTE — CHART NOTE - NSCHARTNOTEFT_GEN_A_CORE
Spoke with primary team, Kitty. Given patient's tenuous respiratory status, the risks of decompensation while undergoing MRI head as part of seizure workup would outweight the benefits. OK to discontinue MRI order and continue the patient on Keppra 1g BID. Spoke with primary team, Kitty. Given patient's tenuous respiratory status, the risks of decompensation while undergoing MRI head as part of seizure workup would outweight the benefits. OK to discontinue MRI order and continue the patient on Keppra 1g BID.  Thank youi

## 2024-03-29 NOTE — SWALLOW BEDSIDE ASSESSMENT ADULT - SWALLOW EVAL: DIAGNOSIS
1. Mild oral dysphagia for puree and moderately-thick liquids marked by adequate acceptance and containment, adequate bolus manipulation, delayed oral transit and mildly reduced oral clearance of puree>moderately-thick liquids with stasis in lateral sulci. 2. Moderate-severe pharyngeal dysphagia for aforementioned consistencies marked by a suspected delay in initiation of pharyngeal swallow, hyolaryngeal excursion present upon palpation ~2 swallows per bolus which may be indicative of reduced pharyngeal clearance and presence of overt s/s of penetration/aspiration evidenced by coughing across trials.
1. Mild-moderate oral dysphagia for puree and moderately-thick liquids marked by adequate acceptance and containment, adequate bolus manipulation, delayed oral transit and reduced oral clearance of puree>moderately-thick liquids with mild stasis in lateral sulci/on lingual surface, cleared with liquid wash. 2. Moderate pharyngeal dysphagia for aforementioned consistencies marked by a suspected delay in initiation of pharyngeal swallow, hyolaryngeal excursion present upon palpation and presence of overt s/s of penetration/aspiration evidenced by change in vocal quality to "wet" and throat clearing. Of note, patient with reduced activity tolerance evidenced by increase in overt s/s of reduced airway protection with progression of PO trials.
1. Mild-moderate oral dysphagia for pureed and moderately-thick consistencies as evidenced by adequate bolus acceptance and containment, reduced anterior-posterior transfer, delayed oral transit, and reduced oral clearance of bolus presentations (mild-moderate stasis on lingual and palatal surfaces and within bilateral buccal cavities). Cleared with verbal cue for subsequent dry swallow. 2. Suspected moderate-severe pharyngeal dysphagia with pureed and moderately-thick consistencies marked by suspected delay in initiation of pharyngeal swallow trigger, limited hyolaryngeal elevation, and piecemeal deglutition (indicative of potential reduced pharyngeal clearance), and presence of overt s/sx aspiration/penetration (wet vocal quality, immediate throat clear). Of note, patient denied complaints or pharyngeal stasis or stuck sensation.

## 2024-03-29 NOTE — PROGRESS NOTE ADULT - PROBLEM SELECTOR PLAN 5
- Failed bedside swallow eval multiple times, including on 3/29   - patient pulled NG tube on 3/27   - NG tube replaced on 3/28   - continue speech evals   - discussed PEG with son, who is hesitant to place PEG   - will need palliative care eval

## 2024-03-29 NOTE — SWALLOW BEDSIDE ASSESSMENT ADULT - ADDITIONAL RECOMMENDATIONS
2. Consider ENT consult given patient with hoarse vocal quality and low vocal volume 3. Medical team advised to reconsult this department with any change in medical status and/or as patient becomes medically optimized. 4. This service to follow-up as schedule permits to determine candidacy for oral intake.

## 2024-03-29 NOTE — SWALLOW BEDSIDE ASSESSMENT ADULT - PHARYNGEAL PHASE
Delayed pharyngeal swallow/Decreased laryngeal elevation/Wet vocal quality post oral intake/Throat clear post oral intake/Multiple swallows
Delayed pharyngeal swallow/Cough post oral intake/Multiple swallows
Delayed pharyngeal swallow/Wet vocal quality post oral intake/Throat clear post oral intake

## 2024-03-29 NOTE — PROGRESS NOTE ADULT - ATTENDING COMMENTS
88F with HFpEF, CAD (s/p multiple stents), DM2  admitted to MICU for acute hypoxemic respiratory failure insetting of flu and encephalopathy. Extubated and sent to floors.  Pulm consulted for hypoxemia.    Pt son at bedside translating- pt more alert and following commands but not back to baseline yet. Failed swallow eval earlier.  Pt currently titrated down to NC and appears comfortable. Rhonchorous and wheezing past two day. Son reports that no smoking in past but does have biomass exposure in [place]. Denies wheezing or asthma/copd in past.  Likely w/ exacerbation of obstructive airways dz now poss due to flu or aspiration event    - WBC increasing and febrile- starting abx for aspiration coverage  - cont NC, titrate down to maintain sat 90-95%  - cont steroids iv for now as npo  - cont BD neb tx  - can repeat swallow eval as mental status improves  - aspiration precaution  - dvt ppx  - f/u in pulmonary clinic.

## 2024-03-29 NOTE — PROGRESS NOTE ADULT - ASSESSMENT
88F with HFpEF, CAD (s/p multiple stents), DM2  admitted to MICU for acute hypoxemic respiratory failure insetting of flu and encephalopathy. Extubated and sent to floors.  Pulm consulted for hypoxemia    Pt currently titrated down to NC and appears comfortable. Son reports that no smoking in past but does have biomass exposure in [place]. Denies wheezing or asthma/copd in past.  Likely w/ exacerbation of obstructive airways dz now poss due to flu or aspiration event  NC today still, with NG in    - cont NC, titrate down to maintain sat 90-95%  - cont steroids iv for now as npo  - reculture 3/29, sputum, blood, urine and restart abx  - cont BD neb tx  - start 160/4.5 BID Symbicort  - can repeat swallow eval as mental status improves if not would place NG if amenable  - aspiration precaution  - dvt ppx  - f/u in pulmonary clinic    Please notify pulmonary prior to discharge and email [email or web address] to schedule an appointment; please provide appointment info to patient    Follow up at  [address], [address]  [place] [address]  tel: [phone]  fax: [phone]

## 2024-03-30 LAB
ANION GAP SERPL CALC-SCNC: 11 MMOL/L — SIGNIFICANT CHANGE UP (ref 7–14)
ANION GAP SERPL CALC-SCNC: 12 MMOL/L — SIGNIFICANT CHANGE UP (ref 7–14)
ANISOCYTOSIS BLD QL: SLIGHT — SIGNIFICANT CHANGE UP
BASOPHILS # BLD AUTO: 0.15 K/UL — SIGNIFICANT CHANGE UP (ref 0–0.2)
BASOPHILS NFR BLD AUTO: 0.9 % — SIGNIFICANT CHANGE UP (ref 0–2)
BUN SERPL-MCNC: 27 MG/DL — HIGH (ref 7–23)
BUN SERPL-MCNC: 28 MG/DL — HIGH (ref 7–23)
CALCIUM SERPL-MCNC: 8.8 MG/DL — SIGNIFICANT CHANGE UP (ref 8.4–10.5)
CALCIUM SERPL-MCNC: 9.2 MG/DL — SIGNIFICANT CHANGE UP (ref 8.4–10.5)
CHLORIDE SERPL-SCNC: 109 MMOL/L — HIGH (ref 98–107)
CHLORIDE SERPL-SCNC: 116 MMOL/L — HIGH (ref 98–107)
CO2 SERPL-SCNC: 23 MMOL/L — SIGNIFICANT CHANGE UP (ref 22–31)
CO2 SERPL-SCNC: 25 MMOL/L — SIGNIFICANT CHANGE UP (ref 22–31)
CREAT SERPL-MCNC: 0.67 MG/DL — SIGNIFICANT CHANGE UP (ref 0.5–1.3)
CREAT SERPL-MCNC: 0.67 MG/DL — SIGNIFICANT CHANGE UP (ref 0.5–1.3)
EGFR: 85 ML/MIN/1.73M2 — SIGNIFICANT CHANGE UP
EGFR: 85 ML/MIN/1.73M2 — SIGNIFICANT CHANGE UP
EOSINOPHIL # BLD AUTO: 0 K/UL — SIGNIFICANT CHANGE UP (ref 0–0.5)
EOSINOPHIL NFR BLD AUTO: 0 % — SIGNIFICANT CHANGE UP (ref 0–6)
GIANT PLATELETS BLD QL SMEAR: PRESENT — SIGNIFICANT CHANGE UP
GLUCOSE BLDC GLUCOMTR-MCNC: 176 MG/DL — HIGH (ref 70–99)
GLUCOSE BLDC GLUCOMTR-MCNC: 181 MG/DL — HIGH (ref 70–99)
GLUCOSE BLDC GLUCOMTR-MCNC: 296 MG/DL — HIGH (ref 70–99)
GLUCOSE BLDC GLUCOMTR-MCNC: 301 MG/DL — HIGH (ref 70–99)
GLUCOSE SERPL-MCNC: 201 MG/DL — HIGH (ref 70–99)
GLUCOSE SERPL-MCNC: 288 MG/DL — HIGH (ref 70–99)
HCT VFR BLD CALC: 35.2 % — SIGNIFICANT CHANGE UP (ref 34.5–45)
HGB BLD-MCNC: 10.7 G/DL — LOW (ref 11.5–15.5)
IANC: 11.53 K/UL — HIGH (ref 1.8–7.4)
LYMPHOCYTES # BLD AUTO: 14 % — SIGNIFICANT CHANGE UP (ref 13–44)
LYMPHOCYTES # BLD AUTO: 2.27 K/UL — SIGNIFICANT CHANGE UP (ref 1–3.3)
MAGNESIUM SERPL-MCNC: 2.6 MG/DL — SIGNIFICANT CHANGE UP (ref 1.6–2.6)
MCHC RBC-ENTMCNC: 28.8 PG — SIGNIFICANT CHANGE UP (ref 27–34)
MCHC RBC-ENTMCNC: 30.4 GM/DL — LOW (ref 32–36)
MCV RBC AUTO: 94.9 FL — SIGNIFICANT CHANGE UP (ref 80–100)
MONOCYTES # BLD AUTO: 0.86 K/UL — SIGNIFICANT CHANGE UP (ref 0–0.9)
MONOCYTES NFR BLD AUTO: 5.3 % — SIGNIFICANT CHANGE UP (ref 2–14)
NEUTROPHILS # BLD AUTO: 12.91 K/UL — HIGH (ref 1.8–7.4)
NEUTROPHILS NFR BLD AUTO: 78.9 % — HIGH (ref 43–77)
NEUTS BAND # BLD: 0.9 % — SIGNIFICANT CHANGE UP (ref 0–6)
PHOSPHATE SERPL-MCNC: 2.7 MG/DL — SIGNIFICANT CHANGE UP (ref 2.5–4.5)
PLAT MORPH BLD: NORMAL — SIGNIFICANT CHANGE UP
PLATELET # BLD AUTO: 728 K/UL — HIGH (ref 150–400)
PLATELET COUNT - ESTIMATE: ABNORMAL
POLYCHROMASIA BLD QL SMEAR: SLIGHT — SIGNIFICANT CHANGE UP
POTASSIUM SERPL-MCNC: 3.7 MMOL/L — SIGNIFICANT CHANGE UP (ref 3.5–5.3)
POTASSIUM SERPL-MCNC: 3.8 MMOL/L — SIGNIFICANT CHANGE UP (ref 3.5–5.3)
POTASSIUM SERPL-SCNC: 3.7 MMOL/L — SIGNIFICANT CHANGE UP (ref 3.5–5.3)
POTASSIUM SERPL-SCNC: 3.8 MMOL/L — SIGNIFICANT CHANGE UP (ref 3.5–5.3)
RBC # BLD: 3.71 M/UL — LOW (ref 3.8–5.2)
RBC # FLD: 15.5 % — HIGH (ref 10.3–14.5)
RBC BLD AUTO: NORMAL — SIGNIFICANT CHANGE UP
SODIUM SERPL-SCNC: 145 MMOL/L — SIGNIFICANT CHANGE UP (ref 135–145)
SODIUM SERPL-SCNC: 151 MMOL/L — HIGH (ref 135–145)
WBC # BLD: 16.18 K/UL — HIGH (ref 3.8–10.5)
WBC # FLD AUTO: 16.18 K/UL — HIGH (ref 3.8–10.5)

## 2024-03-30 PROCEDURE — 99233 SBSQ HOSP IP/OBS HIGH 50: CPT

## 2024-03-30 RX ORDER — SODIUM CHLORIDE 9 MG/ML
1000 INJECTION, SOLUTION INTRAVENOUS
Refills: 0 | Status: DISCONTINUED | OUTPATIENT
Start: 2024-03-30 | End: 2024-03-31

## 2024-03-30 RX ORDER — HUMAN INSULIN 100 [IU]/ML
12 INJECTION, SUSPENSION SUBCUTANEOUS EVERY 6 HOURS
Refills: 0 | Status: DISCONTINUED | OUTPATIENT
Start: 2024-03-30 | End: 2024-03-31

## 2024-03-30 RX ADMIN — Medication 20 MICROGRAM(S): at 22:33

## 2024-03-30 RX ADMIN — SODIUM CHLORIDE 4 MILLILITER(S): 9 INJECTION INTRAMUSCULAR; INTRAVENOUS; SUBCUTANEOUS at 11:12

## 2024-03-30 RX ADMIN — Medication 1: at 23:55

## 2024-03-30 RX ADMIN — SODIUM CHLORIDE 65 MILLILITER(S): 9 INJECTION, SOLUTION INTRAVENOUS at 23:43

## 2024-03-30 RX ADMIN — HEPARIN SODIUM 5000 UNIT(S): 5000 INJECTION INTRAVENOUS; SUBCUTANEOUS at 12:41

## 2024-03-30 RX ADMIN — Medication 40 MILLIGRAM(S): at 05:06

## 2024-03-30 RX ADMIN — Medication 15 MILLILITER(S): at 12:43

## 2024-03-30 RX ADMIN — HUMAN INSULIN 12 UNIT(S): 100 INJECTION, SUSPENSION SUBCUTANEOUS at 23:44

## 2024-03-30 RX ADMIN — LEVETIRACETAM 1000 MILLIGRAM(S): 250 TABLET, FILM COATED ORAL at 18:15

## 2024-03-30 RX ADMIN — HUMAN INSULIN 10 UNIT(S): 100 INJECTION, SUSPENSION SUBCUTANEOUS at 06:44

## 2024-03-30 RX ADMIN — FAMOTIDINE 20 MILLIGRAM(S): 10 INJECTION INTRAVENOUS at 12:41

## 2024-03-30 RX ADMIN — BUDESONIDE AND FORMOTEROL FUMARATE DIHYDRATE 2 PUFF(S): 160; 4.5 AEROSOL RESPIRATORY (INHALATION) at 08:40

## 2024-03-30 RX ADMIN — POLYETHYLENE GLYCOL 3350 17 GRAM(S): 17 POWDER, FOR SOLUTION ORAL at 18:13

## 2024-03-30 RX ADMIN — POLYETHYLENE GLYCOL 3350 17 GRAM(S): 17 POWDER, FOR SOLUTION ORAL at 05:09

## 2024-03-30 RX ADMIN — CHLORHEXIDINE GLUCONATE 1 APPLICATION(S): 213 SOLUTION TOPICAL at 12:47

## 2024-03-30 RX ADMIN — HUMAN INSULIN 12 UNIT(S): 100 INJECTION, SUSPENSION SUBCUTANEOUS at 18:13

## 2024-03-30 RX ADMIN — Medication 3 MILLILITER(S): at 11:11

## 2024-03-30 RX ADMIN — Medication 4: at 18:12

## 2024-03-30 RX ADMIN — HEPARIN SODIUM 5000 UNIT(S): 5000 INJECTION INTRAVENOUS; SUBCUTANEOUS at 21:47

## 2024-03-30 RX ADMIN — Medication 3 MILLILITER(S): at 21:04

## 2024-03-30 RX ADMIN — SENNA PLUS 2 TABLET(S): 8.6 TABLET ORAL at 21:46

## 2024-03-30 RX ADMIN — HUMAN INSULIN 12 UNIT(S): 100 INJECTION, SUSPENSION SUBCUTANEOUS at 12:47

## 2024-03-30 RX ADMIN — Medication 3 MILLILITER(S): at 16:13

## 2024-03-30 RX ADMIN — ATORVASTATIN CALCIUM 80 MILLIGRAM(S): 80 TABLET, FILM COATED ORAL at 21:46

## 2024-03-30 RX ADMIN — Medication 1: at 06:43

## 2024-03-30 RX ADMIN — Medication 81 MILLIGRAM(S): at 12:43

## 2024-03-30 RX ADMIN — Medication 3 MILLILITER(S): at 03:28

## 2024-03-30 RX ADMIN — LEVETIRACETAM 1000 MILLIGRAM(S): 250 TABLET, FILM COATED ORAL at 05:05

## 2024-03-30 RX ADMIN — SODIUM CHLORIDE 65 MILLILITER(S): 9 INJECTION, SOLUTION INTRAVENOUS at 08:40

## 2024-03-30 RX ADMIN — Medication 1 APPLICATION(S): at 12:47

## 2024-03-30 RX ADMIN — HEPARIN SODIUM 5000 UNIT(S): 5000 INJECTION INTRAVENOUS; SUBCUTANEOUS at 05:05

## 2024-03-30 RX ADMIN — Medication 3: at 12:42

## 2024-03-30 NOTE — PROGRESS NOTE ADULT - SUBJECTIVE AND OBJECTIVE BOX
Lionel Elliott MD  [facility] Division of Hospital Medicine  Pager: [phone]  Available via MS Teams    SUBJECTIVE / OVERNIGHT EVENTS:    looks calm this morning   discussed with son at bedside, will start abx empirically for now   keeps failing speech evals, will retest on monday. If fails again, will need to discuss PEG placement     MEDICATIONS  (STANDING):  albuterol/ipratropium for Nebulization 3 milliLiter(s) Nebulizer every 6 hours  aspirin  chewable 81 milliGRAM(s) Oral daily  atorvastatin 80 milliGRAM(s) Oral at bedtime  budesonide 160 MICROgram(s)/formoterol 4.5 MICROgram(s) Inhaler 2 Puff(s) Inhalation two times a day  chlorhexidine 2% Cloths 1 Application(s) Topical daily  dextrose 5%. 1000 milliLiter(s) (65 mL/Hr) IV Continuous <Continuous>  famotidine    Tablet 20 milliGRAM(s) Oral daily  heparin   Injectable 5000 Unit(s) SubCutaneous every 8 hours  influenza  Vaccine (HIGH DOSE) 0.7 milliLiter(s) IntraMuscular once  insulin lispro (ADMELOG) corrective regimen sliding scale   SubCutaneous every 6 hours  insulin NPH human recombinant 12 Unit(s) SubCutaneous every 6 hours  levETIRAcetam   Injectable 1000 milliGRAM(s) IV Push every 12 hours  levothyroxine Injectable 20 MICROGram(s) IV Push at bedtime  LORazepam     Tablet 1 milliGRAM(s) Oral once  methylPREDNISolone sodium succinate Injectable 40 milliGRAM(s) IV Push daily  multivitamin/minerals/iron Oral Solution (CENTRUM) 15 milliLiter(s) Oral daily  petrolatum Ophthalmic Ointment 1 Application(s) Both EYES daily  polyethylene glycol 3350 17 Gram(s) Oral two times a day  senna 2 Tablet(s) Oral at bedtime  sodium chloride 7% Inhalation 4 milliLiter(s) Inhalation every 12 hours    MEDICATIONS  (PRN):  acetaminophen   Oral Liquid .. 650 milliGRAM(s) Oral every 6 hours PRN Temp greater or equal to 38C (100.4F), Mild Pain (1 - 3), Moderate Pain (4 - 6)  guaiFENesin Oral Liquid (Sugar-Free) 200 milliGRAM(s) Oral every 6 hours PRN Cough      I&O's Summary    29 Mar 2024 07:01  -  30 Mar 2024 07:00  --------------------------------------------------------  IN: 1000 mL / OUT: 850 mL / NET: 150 mL        PHYSICAL EXAM:  Vital Signs Last 24 Hrs  T(C): 37.4 (30 Mar 2024 14:30), Max: 37.4 (30 Mar 2024 14:30)  T(F): 99.3 (30 Mar 2024 14:30), Max: 99.3 (30 Mar 2024 14:30)  HR: 103 (30 Mar 2024 14:30) (90 - 120)  BP: 150/77 (30 Mar 2024 14:30) (135/80 - 164/88)  BP(mean): --  RR: 20 (30 Mar 2024 14:30) (18 - 20)  SpO2: 100% (30 Mar 2024 14:30) (100% - 100%)    Parameters below as of 30 Mar 2024 14:30  Patient On (Oxygen Delivery Method): nasal cannula  O2 Flow (L/min): 6    CONSTITUTIONAL: NAD   EYES: conjunctiva and sclera clear  ENMT: Moist oral mucosa   NECK: Supple   RESPIRATORY: Normal respiratory effort; lungs are clear to auscultation bilaterally  CARDIOVASCULAR: Regular rate and rhythm, normal S1 and S2, no murmur/rub/gallop; Peripheral pulses are 2+ bilaterally  ABDOMEN: Nontender to palpation, normoactive bowel sounds, no rebound/guarding   MUSCULOSKELETAL: No lower extremity edema   PSYCH: Alert. Not O to person, place,or time; affect appropriate  NEUROLOGY: moves all extremities   SKIN: No rashes    LABS:                        10.7   16.18 )-----------( 728      ( 30 Mar 2024 05:13 )             35.2     03-30    151<H>  |  116<H>  |  28<H>  ----------------------------<  201<H>  3.7   |  23  |  0.67    Ca    9.2      30 Mar 2024 05:13  Phos  2.7     03-30  Mg     2.60     03-30            Urinalysis Basic - ( 30 Mar 2024 05:13 )    Color: x / Appearance: x / SG: x / pH: x  Gluc: 201 mg/dL / Ketone: x  / Bili: x / Urobili: x   Blood: x / Protein: x / Nitrite: x   Leuk Esterase: x / RBC: x / WBC x   Sq Epi: x / Non Sq Epi: x / Bacteria: x        Culture - Blood (collected 29 Mar 2024 08:55)  Source: .Blood Blood  Preliminary Report (30 Mar 2024 13:02):    No growth at 24 hours          RADIOLOGY & ADDITIONAL TESTS:  Other Results Reviewed Today: BMP with stable Cr, CBC with stable Hg     COORDINATION OF CARE:  Communication: discussed plan of care with ACP

## 2024-03-30 NOTE — PROGRESS NOTE ADULT - ASSESSMENT
Patient is an 89yo F with PMH significant for CAD s/p PCI x6 11/2019 at [facility], HTN, HLD, T2DM, and HFpEF who presented with fever and generalized weakness of 2 days’ duration, admitted with sepsis and acute hypoxic respiratory failure due to influenza. Patient required an upgrade in care to the MICU after an RRT on 3/17 during which patient was intubated for AMS. Patient completed a course of PNA treatment with Tamiflu and  Zosyn. Course c/b seizure like activity, LP unrevealing and EEG showing multifocal epileptiform potential. Although no acute seizures are documented, unable to completely rule out either. MRI brain now pending. Transferred to floors for further management.

## 2024-03-30 NOTE — PROGRESS NOTE ADULT - PROBLEM SELECTOR PLAN 1
- febrile, tachycardic, with leukocytosis on admission   - requiring supplemental O2  - Intubated for 8 days 3/17 - 3/25 for airway protection in the setting of altered mental status in the setting of PNA from bacterial and influenza sources   - s/p tamiflu and zosyn course in the ICU    - now on NC, trial off HFNC  - continue Duoneb, hypertonic saline, guaifenesin  - speech recs appreciated, keep NPO on 3/29. Ng tube placed and tube feeds started    - pulm consulted for further resp mgmt  - will reculture (blood, UA/urine culture) and start zosyn empirically

## 2024-03-31 ENCOUNTER — TRANSCRIPTION ENCOUNTER (OUTPATIENT)
Age: 89
End: 2024-03-31

## 2024-03-31 DIAGNOSIS — R33.8 OTHER RETENTION OF URINE: ICD-10-CM

## 2024-03-31 LAB
ANION GAP SERPL CALC-SCNC: 11 MMOL/L — SIGNIFICANT CHANGE UP (ref 7–14)
ANION GAP SERPL CALC-SCNC: 11 MMOL/L — SIGNIFICANT CHANGE UP (ref 7–14)
BASOPHILS # BLD AUTO: 0.01 K/UL — SIGNIFICANT CHANGE UP (ref 0–0.2)
BASOPHILS NFR BLD AUTO: 0.1 % — SIGNIFICANT CHANGE UP (ref 0–2)
BUN SERPL-MCNC: 20 MG/DL — SIGNIFICANT CHANGE UP (ref 7–23)
BUN SERPL-MCNC: 21 MG/DL — SIGNIFICANT CHANGE UP (ref 7–23)
CALCIUM SERPL-MCNC: 8.5 MG/DL — SIGNIFICANT CHANGE UP (ref 8.4–10.5)
CALCIUM SERPL-MCNC: 9 MG/DL — SIGNIFICANT CHANGE UP (ref 8.4–10.5)
CHLORIDE SERPL-SCNC: 102 MMOL/L — SIGNIFICANT CHANGE UP (ref 98–107)
CHLORIDE SERPL-SCNC: 104 MMOL/L — SIGNIFICANT CHANGE UP (ref 98–107)
CO2 SERPL-SCNC: 20 MMOL/L — LOW (ref 22–31)
CO2 SERPL-SCNC: 25 MMOL/L — SIGNIFICANT CHANGE UP (ref 22–31)
CREAT SERPL-MCNC: 0.51 MG/DL — SIGNIFICANT CHANGE UP (ref 0.5–1.3)
CREAT SERPL-MCNC: 0.56 MG/DL — SIGNIFICANT CHANGE UP (ref 0.5–1.3)
EGFR: 89 ML/MIN/1.73M2 — SIGNIFICANT CHANGE UP
EGFR: 91 ML/MIN/1.73M2 — SIGNIFICANT CHANGE UP
EOSINOPHIL # BLD AUTO: 0 K/UL — SIGNIFICANT CHANGE UP (ref 0–0.5)
EOSINOPHIL NFR BLD AUTO: 0 % — SIGNIFICANT CHANGE UP (ref 0–6)
GLUCOSE BLDC GLUCOMTR-MCNC: 112 MG/DL — HIGH (ref 70–99)
GLUCOSE BLDC GLUCOMTR-MCNC: 202 MG/DL — HIGH (ref 70–99)
GLUCOSE BLDC GLUCOMTR-MCNC: 300 MG/DL — HIGH (ref 70–99)
GLUCOSE BLDC GLUCOMTR-MCNC: 339 MG/DL — HIGH (ref 70–99)
GLUCOSE SERPL-MCNC: 199 MG/DL — HIGH (ref 70–99)
GLUCOSE SERPL-MCNC: 221 MG/DL — HIGH (ref 70–99)
HCT VFR BLD CALC: 29.7 % — LOW (ref 34.5–45)
HGB BLD-MCNC: 9.5 G/DL — LOW (ref 11.5–15.5)
IANC: 10.57 K/UL — HIGH (ref 1.8–7.4)
IMM GRANULOCYTES NFR BLD AUTO: 0.8 % — SIGNIFICANT CHANGE UP (ref 0–0.9)
LYMPHOCYTES # BLD AUTO: 0.79 K/UL — LOW (ref 1–3.3)
LYMPHOCYTES # BLD AUTO: 6.7 % — LOW (ref 13–44)
MAGNESIUM SERPL-MCNC: 2.4 MG/DL — SIGNIFICANT CHANGE UP (ref 1.6–2.6)
MAGNESIUM SERPL-MCNC: 2.5 MG/DL — SIGNIFICANT CHANGE UP (ref 1.6–2.6)
MCHC RBC-ENTMCNC: 29.7 PG — SIGNIFICANT CHANGE UP (ref 27–34)
MCHC RBC-ENTMCNC: 32 GM/DL — SIGNIFICANT CHANGE UP (ref 32–36)
MCV RBC AUTO: 92.8 FL — SIGNIFICANT CHANGE UP (ref 80–100)
MONOCYTES # BLD AUTO: 0.32 K/UL — SIGNIFICANT CHANGE UP (ref 0–0.9)
MONOCYTES NFR BLD AUTO: 2.7 % — SIGNIFICANT CHANGE UP (ref 2–14)
NEUTROPHILS # BLD AUTO: 10.57 K/UL — HIGH (ref 1.8–7.4)
NEUTROPHILS NFR BLD AUTO: 89.7 % — HIGH (ref 43–77)
NRBC # BLD: 0 /100 WBCS — SIGNIFICANT CHANGE UP (ref 0–0)
NRBC # FLD: 0 K/UL — SIGNIFICANT CHANGE UP (ref 0–0)
PHOSPHATE SERPL-MCNC: 2.7 MG/DL — SIGNIFICANT CHANGE UP (ref 2.5–4.5)
PHOSPHATE SERPL-MCNC: 2.8 MG/DL — SIGNIFICANT CHANGE UP (ref 2.5–4.5)
PLATELET # BLD AUTO: 651 K/UL — HIGH (ref 150–400)
POTASSIUM SERPL-MCNC: 5.1 MMOL/L — SIGNIFICANT CHANGE UP (ref 3.5–5.3)
POTASSIUM SERPL-MCNC: 5.2 MMOL/L — SIGNIFICANT CHANGE UP (ref 3.5–5.3)
POTASSIUM SERPL-SCNC: 5.1 MMOL/L — SIGNIFICANT CHANGE UP (ref 3.5–5.3)
POTASSIUM SERPL-SCNC: 5.2 MMOL/L — SIGNIFICANT CHANGE UP (ref 3.5–5.3)
RBC # BLD: 3.2 M/UL — LOW (ref 3.8–5.2)
RBC # FLD: 14.9 % — HIGH (ref 10.3–14.5)
SODIUM SERPL-SCNC: 135 MMOL/L — SIGNIFICANT CHANGE UP (ref 135–145)
SODIUM SERPL-SCNC: 138 MMOL/L — SIGNIFICANT CHANGE UP (ref 135–145)
WBC # BLD: 11.79 K/UL — HIGH (ref 3.8–10.5)
WBC # FLD AUTO: 11.79 K/UL — HIGH (ref 3.8–10.5)

## 2024-03-31 PROCEDURE — 99233 SBSQ HOSP IP/OBS HIGH 50: CPT

## 2024-03-31 RX ORDER — PIPERACILLIN AND TAZOBACTAM 4; .5 G/20ML; G/20ML
3.38 INJECTION, POWDER, LYOPHILIZED, FOR SOLUTION INTRAVENOUS EVERY 8 HOURS
Refills: 0 | Status: DISCONTINUED | OUTPATIENT
Start: 2024-03-31 | End: 2024-04-02

## 2024-03-31 RX ORDER — PIPERACILLIN AND TAZOBACTAM 4; .5 G/20ML; G/20ML
3.38 INJECTION, POWDER, LYOPHILIZED, FOR SOLUTION INTRAVENOUS EVERY 8 HOURS
Refills: 0 | Status: DISCONTINUED | OUTPATIENT
Start: 2024-03-31 | End: 2024-03-31

## 2024-03-31 RX ORDER — PIPERACILLIN AND TAZOBACTAM 4; .5 G/20ML; G/20ML
3.38 INJECTION, POWDER, LYOPHILIZED, FOR SOLUTION INTRAVENOUS ONCE
Refills: 0 | Status: COMPLETED | OUTPATIENT
Start: 2024-03-31 | End: 2024-03-31

## 2024-03-31 RX ORDER — LANOLIN ALCOHOL/MO/W.PET/CERES
6 CREAM (GRAM) TOPICAL AT BEDTIME
Refills: 0 | Status: DISCONTINUED | OUTPATIENT
Start: 2024-03-31 | End: 2024-04-05

## 2024-03-31 RX ORDER — HUMAN INSULIN 100 [IU]/ML
10 INJECTION, SUSPENSION SUBCUTANEOUS EVERY 6 HOURS
Refills: 0 | Status: DISCONTINUED | OUTPATIENT
Start: 2024-03-31 | End: 2024-03-31

## 2024-03-31 RX ORDER — HUMAN INSULIN 100 [IU]/ML
12 INJECTION, SUSPENSION SUBCUTANEOUS EVERY 6 HOURS
Refills: 0 | Status: DISCONTINUED | OUTPATIENT
Start: 2024-03-31 | End: 2024-04-01

## 2024-03-31 RX ORDER — PIPERACILLIN AND TAZOBACTAM 4; .5 G/20ML; G/20ML
3.38 INJECTION, POWDER, LYOPHILIZED, FOR SOLUTION INTRAVENOUS ONCE
Refills: 0 | Status: DISCONTINUED | OUTPATIENT
Start: 2024-03-31 | End: 2024-03-31

## 2024-03-31 RX ADMIN — Medication 3 MILLILITER(S): at 22:07

## 2024-03-31 RX ADMIN — Medication 81 MILLIGRAM(S): at 13:03

## 2024-03-31 RX ADMIN — Medication 4: at 13:01

## 2024-03-31 RX ADMIN — Medication 40 MILLIGRAM(S): at 05:21

## 2024-03-31 RX ADMIN — POLYETHYLENE GLYCOL 3350 17 GRAM(S): 17 POWDER, FOR SOLUTION ORAL at 17:37

## 2024-03-31 RX ADMIN — CHLORHEXIDINE GLUCONATE 1 APPLICATION(S): 213 SOLUTION TOPICAL at 13:02

## 2024-03-31 RX ADMIN — LEVETIRACETAM 1000 MILLIGRAM(S): 250 TABLET, FILM COATED ORAL at 05:21

## 2024-03-31 RX ADMIN — SODIUM CHLORIDE 4 MILLILITER(S): 9 INJECTION INTRAMUSCULAR; INTRAVENOUS; SUBCUTANEOUS at 22:07

## 2024-03-31 RX ADMIN — HEPARIN SODIUM 5000 UNIT(S): 5000 INJECTION INTRAVENOUS; SUBCUTANEOUS at 13:03

## 2024-03-31 RX ADMIN — Medication 6 MILLIGRAM(S): at 22:26

## 2024-03-31 RX ADMIN — Medication 3: at 17:37

## 2024-03-31 RX ADMIN — Medication 2: at 06:34

## 2024-03-31 RX ADMIN — BUDESONIDE AND FORMOTEROL FUMARATE DIHYDRATE 2 PUFF(S): 160; 4.5 AEROSOL RESPIRATORY (INHALATION) at 22:26

## 2024-03-31 RX ADMIN — PIPERACILLIN AND TAZOBACTAM 25 GRAM(S): 4; .5 INJECTION, POWDER, LYOPHILIZED, FOR SOLUTION INTRAVENOUS at 22:26

## 2024-03-31 RX ADMIN — PIPERACILLIN AND TAZOBACTAM 25 GRAM(S): 4; .5 INJECTION, POWDER, LYOPHILIZED, FOR SOLUTION INTRAVENOUS at 13:01

## 2024-03-31 RX ADMIN — HUMAN INSULIN 12 UNIT(S): 100 INJECTION, SUSPENSION SUBCUTANEOUS at 23:53

## 2024-03-31 RX ADMIN — HUMAN INSULIN 10 UNIT(S): 100 INJECTION, SUSPENSION SUBCUTANEOUS at 13:10

## 2024-03-31 RX ADMIN — Medication 3 MILLILITER(S): at 03:55

## 2024-03-31 RX ADMIN — Medication 1 APPLICATION(S): at 13:00

## 2024-03-31 RX ADMIN — HEPARIN SODIUM 5000 UNIT(S): 5000 INJECTION INTRAVENOUS; SUBCUTANEOUS at 22:26

## 2024-03-31 RX ADMIN — FAMOTIDINE 20 MILLIGRAM(S): 10 INJECTION INTRAVENOUS at 13:03

## 2024-03-31 RX ADMIN — HEPARIN SODIUM 5000 UNIT(S): 5000 INJECTION INTRAVENOUS; SUBCUTANEOUS at 05:21

## 2024-03-31 RX ADMIN — Medication 20 MICROGRAM(S): at 22:27

## 2024-03-31 RX ADMIN — HUMAN INSULIN 10 UNIT(S): 100 INJECTION, SUSPENSION SUBCUTANEOUS at 17:38

## 2024-03-31 RX ADMIN — POLYETHYLENE GLYCOL 3350 17 GRAM(S): 17 POWDER, FOR SOLUTION ORAL at 05:18

## 2024-03-31 RX ADMIN — HUMAN INSULIN 12 UNIT(S): 100 INJECTION, SUSPENSION SUBCUTANEOUS at 06:34

## 2024-03-31 RX ADMIN — ATORVASTATIN CALCIUM 80 MILLIGRAM(S): 80 TABLET, FILM COATED ORAL at 22:26

## 2024-03-31 RX ADMIN — Medication 3 MILLILITER(S): at 15:16

## 2024-03-31 RX ADMIN — LEVETIRACETAM 1000 MILLIGRAM(S): 250 TABLET, FILM COATED ORAL at 17:38

## 2024-03-31 RX ADMIN — Medication 3 MILLILITER(S): at 10:29

## 2024-03-31 RX ADMIN — SENNA PLUS 2 TABLET(S): 8.6 TABLET ORAL at 22:26

## 2024-03-31 RX ADMIN — Medication 15 MILLILITER(S): at 13:02

## 2024-03-31 NOTE — DISCHARGE NOTE PROVIDER - NSDCFUADDAPPT_GEN_ALL_CORE_FT
Follow up with your primary care physician for further monitoring within 1 week. Please call to arrange appointment.     Follow up with pulmonology upon discharge.     Follow up with neurology upon discharge.  Follow up with your primary care physician for further monitoring within 1 week. Please call to arrange appointment.     Follow up with urology at [facility] within 1 week.     Follow up with pulmonology upon discharge.     Follow up with neurology upon discharge.     APPTS ARE READY TO BE MADE: [X] YES    Best Family or Patient Contact (if needed): kevin Flynn [phone]    Additional Information about above appointments (if needed):    1: PCP   2: Urology   3: Pulmonology -- HOME email referral sent   4. Neurology     Other comments or requests:    Follow up with your primary care physician for further monitoring within 1 week. Please call to arrange appointment.     Follow up with urology at [facility] within 1 week.     Follow up with pulmonology upon discharge. You have a virtual appt on 4/8/24 at 10AM with Dr. Lisker.   Telehealth instructions: at the time of your appt, you will receive a text/email invite for your telehealth session. Please click on the link, enter the patient's name. You will then be redirected to a virtual waiting room, where you will wait until the doctor has connected with you.     Follow up with neurology upon discharge.     APPTS ARE READY TO BE MADE: [X] YES    Best Family or Patient Contact (if needed): son Pravin Flynn [phone]    Additional Information about above appointments (if needed):    1: PCP   2: Urology   3: Neurology     Other comments or requests:    Follow up with your primary care physician for further monitoring within 1 week. Please call to arrange appointment.     Follow up with urology at [facility] within 1 week.     Follow up with pulmonology upon discharge. You have a virtual appt on 4/8/24 at 10AM with Dr. Lisker.   Telehealth instructions: at the time of your appt, you will receive a text/email invite for your telehealth session. Please click on the link, enter the patient's name. You will then be redirected to a virtual waiting room, where you will wait until the doctor has connected with you.     Follow up with neurology upon discharge.     APPTS ARE READY TO BE MADE: [X] YES    Best Family or Patient Contact (if needed): son Pravin Flynn [phone]    Additional Information about above appointments (if needed):    1: PCP   2: Urology   3: Neurology     Prior to outreaching the patient, it was visible that the patient has secured a follow up appointment which was not scheduled by our team. Appointment on 4/16/24 with  at 3:30pm [address] [place], [place] [id number]        Other comments or requests:       Prior to outreaching the patient, it was visible that the patient has secured a follow up appointment which was not scheduled by our team. Appointment on 5/02/24 at 2:15 at [address] [place] [address] Dr Katelynn PEREZ  [phone]       Prior to outreaching the patient, it was visible that the patient has secured a follow up appointment which was not scheduled by our team. Appointment on 4/16/24 with  at 3:30pm [address] [place] [id number]

## 2024-03-31 NOTE — PROGRESS NOTE ADULT - PROBLEM SELECTOR PLAN 1
- febrile, tachycardic, with leukocytosis on admission   - requiring supplemental O2  - Intubated for 8 days 3/17 - 3/25 for airway protection in the setting of altered mental status in the setting of PNA from bacterial and influenza sources   - s/p tamiflu and zosyn course in the ICU    - now on NC, trial off HFNC  - continue Duoneb, hypertonic saline, guaifenesin  - speech recs appreciated, keep NPO on 3/29. Ng tube placed and tube feeds started    - pulm consulted for further resp mgmt  - repeated blood cultures on 3/29 NGTD  - obtain CT chest to r/o aspiration  - start zosyn empirically on 3/31. if blood cultures and CT chest remain negative, can opt for a brief 48 hr course of antibiotics

## 2024-03-31 NOTE — DISCHARGE NOTE PROVIDER - HOSPITAL COURSE
Patient is an 89yo F with PMH significant for CAD s/p PCI x6 11/2019 at [facility], HTN, HLD, T2DM, and HFpEF who presented with fever and generalized weakness of 2 days’ duration, admitted with sepsis and acute hypoxic respiratory failure due to influenza. Patient required an upgrade in care to the MICU after an RRT on 3/17 during which patient was intubated for AMS. Patient completed a course of PNA treatment with Tamiflu and  Zosyn. Course c/b seizure like activity, LP unrevealing and EEG showing multifocal epileptiform potential. Although no acute seizures are documented, unable to completely rule out either. Transferred to floors for further management.     Patient still requiring NG tube as keeps on failing speech evals. Will need repeat speech eval on 4/1. If fails will need GOC with palliative care with son. If amendable to PEG tube, will need placement. Also, empirically covering pt with zosyn, while we check for infection (blood cultures and CT scan). Patient is an 87yo F with PMH significant for CAD s/p PCI x6 11/2019 at [facility], HTN, HLD, T2DM, and HFpEF who presented with fever and generalized weakness of 2 days’ duration, admitted with sepsis and acute hypoxic respiratory failure due to influenza. Patient required an upgrade in care to the MICU after an RRT on 3/17 during which patient was intubated for AMS. Patient completed a course of PNA treatment with Tamiflu and  Zosyn. Course c/b seizure like activity, LP unrevealing and EEG showing multifocal epileptiform potential. Although no acute seizures are documented, unable to completely rule out either. Transferred to floors for further management.     87yo F with PMH significant for CAD s/p PCI x6 11/2019 at [facility], HTN, HLD, T2DM, and HFpEF who presented with fever and generalized weakness of 2 days’ duration, admitted with sepsis and acute hypoxic respiratory failure due to influenza. Patient required an upgrade in care to the MICU after an RRT on 3/17 during which patient was intubated for AMS. Patient completed a course of PNA treatment with Tamiflu and  Zosyn. Course c/b seizure like activity, LP unrevealing and EEG showing multifocal epileptiform potential. Although no acute seizures are documented, unable to completely rule out either. Transferred to floors for further management.   - Intubated for 8 days 3/17 - 3/25 for airway protection in the setting of altered mental status in the setting of PNA from bacterial and influenza sources   - s/p tamiflu and zosyn course in the ICU    - now on NC, trial off HFNC  - continue Duoneb, hypertonic saline, guaifenesin  pall care spoke with son, trial of puree diet   - repeated blood cultures on 3/29 NGTD  CT chest   treated w/zosyn empirically on 3/31. if blood cultures and CT chest remain negative, can opt for a brief 48 hr course of antibiotics.    Hypernatremia- improved    Seizures- In the MICU had episodes with left lateral gaze deviation and tonic clonic jerking of extremities when off sedation  - CT head negative of acute intracranial process or mass  - LP unrevealing  - EEG showing multifocal epileptiform potentials >> no acute seizures documented but unable to completely rule out either  - discussed with neurology, high risk to obtain MRI currently, will hold off   - continue Keppra 1g bid.    Acute urinary retention, failed TOV on 3/30   - fuchs replaced on 3/31   - will need OP urology follow up to remove fuchs.    CAD - s/p PCI x6 at [facility] 11/2019  - resume home aspirin 81 mg daily, atorvastatin 80 mg daily   - continue to hold Xarelto 2.5mg po BID, resume once able to brittany PO  - no acute evidence of ischemia.    Dysphagia, palliateive team d/w son about risk for aspiration - understands, would like to try PO   puree diet, careful hand feeding    Normocytic anemia- Hb 10.9 on presentation, prior known within normal range  - total iron blood levels low, but ferritin in 300s   - likely AOCD  - OP pcp follow up.    HTN - HOLD home atenolol 100 mg daily, imdur 30 mg daily, losartan 100 mg daily, torsemide 10 mg daily   - re-introduce home blood pressure medications PRN.    DM HbA1c 7.7  - diabetic diet     HLD c/w home statin.     CHF- no evidence of acute volume overload or decompensated heart failure.   Patient is an 87yo F with PMH significant for CAD s/p PCI x6 11/2019 at [facility], HTN, HLD, T2DM, and HFpEF who presented with fever and generalized weakness of 2 days’ duration, admitted with sepsis and acute hypoxic respiratory failure due to influenza. Patient required an upgrade in care to the MICU after an RRT on 3/17 during which patient was intubated for AMS. Patient completed a course of PNA treatment with Tamiflu and  Zosyn. Course c/b seizure like activity, LP unrevealing and EEG showing multifocal epileptiform potential. Although no acute seizures are documented, unable to completely rule out either. Transferred to floors for further management.   after d/w son and pall care, opt for pleasure feeds, pt doing well with puree food  PT eval for functional status  goal is home       Patient is an 87yo F with PMH significant for CAD s/p PCI x6 11/2019 at [facility], HTN, HLD, T2DM, and HFpEF who presented with fever and generalized weakness of 2 days’ duration, admitted with sepsis and acute hypoxic respiratory failure due to influenza. Patient required an upgrade in care to the MICU after an RRT on 3/17 during which patient was intubated for AMS. Patient completed a course of PNA treatment with Tamiflu and  Zosyn. Course c/b seizure like activity, LP unrevealing and EEG showing multifocal epileptiform potential. Although no acute seizures are documented, unable to completely rule out either. Transferred to floors for further management.     ·  Problem: Sepsis with acute hypoxic respiratory failure.   ·  Plan: - sepsis and acute hypoxic respiratory failure present on admission likely d/t Influenza/pneumonia. Pt febrile, tachycardic, with leukocytosis on admission. Sepsis resolved   - required supplemental O2, now on RA   - Intubated for 8 days 3/17 - 3/25 for airway protection in the setting of altered mental status in the setting of PNA from bacterial and influenza sources   - s/p tamiflu and zosyn course in the ICU    - continue Duoneb, hypertonic saline, guaifenesin  - pulm input apprec  - repeated blood cultures on 3/29 NGTD  - CT chest without infiltrate; mucus impaction    ·  Problem: Hypernatremia.   ·  Plan: improved  monitor closely  PO diet ordered.    ·  Problem: Seizures.   ·  Plan: - In the MICU had episodes with left lateral gaze deviation and tonic clonic jerking of extremities when off sedation  - CT head negative of acute intracranial process or mass  - LP unrevealing  - EEG showing multifocal epileptiform potentials >> no acute seizures documented but unable to completely rule out either  - discussed with neurology, high risk to obtain MRI currently, will hold off   - continue Keppra 1g bid, changed to PO.    ·  Problem: Acute urinary retention.   ·  Plan: - failed TOV on 3/30   - fuchs replaced on 3/31   TOV discontinued due to recent failed attempt, can f/u as outpt for TOV.    ·  Problem: CAD (coronary artery disease).   ·  Plan: - s/p PCI x6 at [facility] 11/2019  - resumed home aspirin 81 mg daily, atorvastatin 80 mg daily   - no acute evidence of ischemia.    ·  Problem: Dysphagia.   ·  Plan: pall care spoke with son  son understands, would like to try PO  c/w puree diet, careful hand feeding  asp precautions.    ·  Problem: Normocytic anemia.   ·  Plan: - Hb 10.9 on presentation, prior known within normal range  - total iron blood levels low, but ferritin in 300s   - likely AOCD  - OP pcp follow up.    ·  Problem: HTN (hypertension).   ·  Plan: - HOLD home atenolol 100 mg daily, imdur 30 mg daily, losartan 100 mg daily, torsemide 10 mg daily   - re-introduce home blood pressure medications PRN  - monitor BP.    ·  Problem: DM (diabetes mellitus).   ·  Plan: HbA1c 7.7    c/w ISS  c/w Lantus 10 units qhs   c/w Admelog 3 units TID   monitor fingersticks.    ·  Problem: HLD (hyperlipidemia).   ·  Plan; - c/w home statin.    ·  Problem: H/O CHF.   ·  Plan: - chronic HFpEF   - no evidence of acute volume overload or decompensated heart failure.         Patient is an 89yo F with PMH significant for CAD s/p PCI x6 11/2019 at [facility], HTN, HLD, T2DM, and HFpEF who presented with fever and generalized weakness of 2 days’ duration, admitted with sepsis and acute hypoxic respiratory failure due to influenza. Patient required an upgrade in care to the MICU after an RRT on 3/17 during which patient was intubated for AMS. Patient completed a course of PNA treatment with Tamiflu and  Zosyn. Course c/b seizure like activity, LP unrevealing and EEG showing multifocal epileptiform potential. Although no acute seizures are documented, unable to completely rule out either. Transferred to floors for further management.     ·  Problem: Sepsis with acute hypoxic respiratory failure.   ·  Plan: - sepsis and acute hypoxic respiratory failure present on admission likely d/t Influenza/pneumonia. Pt febrile, tachycardic, with leukocytosis on admission. Sepsis resolved   - required supplemental O2, now on RA   - Intubated for 8 days 3/17 - 3/25 for airway protection in the setting of altered mental status in the setting of PNA from bacterial and influenza sources   - s/p tamiflu and zosyn course in the ICU    - continue Duoneb, hypertonic saline, guaifenesin  - pulm input apprec  - repeated blood cultures on 3/29 NGTD  - CT chest without infiltrate; mucus impaction    ·  Problem: Hypernatremia.   ·  Plan: improved  monitor closely  PO diet ordered.    ·  Problem: Seizures.   ·  Plan: - In the MICU had episodes with left lateral gaze deviation and tonic clonic jerking of extremities when off sedation  - CT head negative of acute intracranial process or mass  - LP unrevealing  - EEG showing multifocal epileptiform potentials >> no acute seizures documented but unable to completely rule out either  - discussed with neurology, high risk to obtain MRI currently, will hold off   - continue Keppra 1g bid, changed to PO.    ·  Problem: Acute urinary retention.   ·  Plan: - failed TOV on 3/30   - fuchs replaced on 3/31   TOV discontinued due to recent failed attempt, can f/u as outpt for TOV.    ·  Problem: CAD (coronary artery disease).   ·  Plan: - s/p PCI x6 at [facility] 11/2019  - resumed home aspirin 81 mg daily, atorvastatin 80 mg daily   - resume Xarelto   - no acute evidence of ischemia.    ·  Problem: Dysphagia.   ·  Plan: pall care spoke with son  son understands, would like to try PO  c/w puree diet, careful hand feeding  asp precautions.    ·  Problem: Normocytic anemia.   ·  Plan: - Hb 10.9 on presentation, prior known within normal range  - total iron blood levels low, but ferritin in 300s   - likely AOCD  - OP pcp follow up.    ·  Problem: HTN (hypertension).   ·  Plan:  - HELD home atenolol 100 mg daily, imdur 30 mg daily, losartan 100 mg daily, torsemide 10 mg daily   - May resume atenolol, imdur, losartan on discharge   - continue to hold Torsemide   - monitor BP.    ·  Problem: DM (diabetes mellitus).   ·  Plan: HbA1c 7.7    c/w ISS  c/w Lantus 10 units qhs   c/w Admelog 3 units TID   monitor fingersticks.    ·  Problem: HLD (hyperlipidemia).   ·  Plan; - c/w home statin.    ·  Problem: H/O CHF.   ·  Plan: - chronic HFpEF   - no evidence of acute volume overload or decompensated heart failure  - holding Torsemide.         Patient is an 87yo F with PMH significant for CAD s/p PCI x6 11/2019 at [facility], HTN, HLD, T2DM, and HFpEF who presented with fever and generalized weakness of 2 days’ duration, admitted with sepsis and acute hypoxic respiratory failure due to influenza. Patient required an upgrade in care to the MICU after an RRT on 3/17 during which patient was intubated for AMS. Patient completed a course of PNA treatment with Tamiflu and  Zosyn. Course c/b seizure like activity, LP unrevealing and EEG showing multifocal epileptiform potential. Although no acute seizures are documented, unable to completely rule out either. Transferred to floors for further management.     ·  Problem: Sepsis with acute hypoxic respiratory failure.   ·  Plan: - sepsis and acute hypoxic respiratory failure present on admission likely d/t Influenza/pneumonia. Pt febrile, tachycardic, with leukocytosis on admission. Sepsis resolved   - required supplemental O2, now on RA   - Intubated for 8 days 3/17 - 3/25 for airway protection in the setting of altered mental status in the setting of PNA from bacterial and influenza sources   - s/p tamiflu and zosyn course in the ICU    - continue Duoneb, hypertonic saline, guaifenesin  - pulm input apprec  - repeated blood cultures on 3/29 NGTD  - CT chest without infiltrate; mucus impaction    ·  Problem: Hypernatremia.   ·  Plan: improved  monitor closely  PO diet ordered.    ·  Problem: Seizures.   ·  Plan: - In the MICU had episodes with left lateral gaze deviation and tonic clonic jerking of extremities when off sedation  - CT head negative of acute intracranial process or mass  - LP unrevealing  - EEG showing multifocal epileptiform potentials >> no acute seizures documented but unable to completely rule out either  - discussed with neurology, high risk to obtain MRI currently, will hold off   - continue Keppra 1g bid, changed to PO.    ·  Problem: Acute urinary retention.   ·  Plan: - failed TOV on 3/30   - fuchs replaced on 3/31   TOV discontinued due to recent failed attempt, can f/u as outpt for TOV.    ·  Problem: CAD (coronary artery disease).   ·  Plan: - s/p PCI x6 at [facility] 11/2019  - resumed home aspirin 81 mg daily, atorvastatin 80 mg daily   - resume Xarelto   - no acute evidence of ischemia.    ·  Problem: Dysphagia.   ·  Plan: pall care spoke with son  son understands, would like to try PO  c/w puree diet, careful hand feeding  asp precautions.    ·  Problem: Normocytic anemia.   ·  Plan: - Hb 10.9 on presentation, prior known within normal range  - total iron blood levels low, but ferritin in 300s   - likely AOCD  - OP pcp follow up.    ·  Problem: HTN (hypertension).   ·  Plan:  - HELD home atenolol 100 mg daily, imdur 30 mg daily, losartan 100 mg daily, torsemide 10 mg daily   - May resume atenolol, imdur, losartan on discharge   - continue to hold Torsemide   - monitor BP.    ·  Problem: DM (diabetes mellitus).   ·  Plan: HbA1c 7.7    c/w ISS  c/w Lantus 10 units qhs   c/w Admelog 3 units TID   monitor fingersticks.    ·  Problem: HLD (hyperlipidemia).   ·  Plan; - c/w home statin.    ·  Problem: H/O CHF.   ·  Plan: - chronic HFpEF   - no evidence of acute volume overload or decompensated heart failure  - holding Torsemide.    ·  Problem: Metabolic acidosis.  ·  Plan: - mildly elevated lactate likely d/t hypoperfusion in setting of poor PO intake  - encourage PO hydration   - holding Torsemide.

## 2024-03-31 NOTE — PROGRESS NOTE ADULT - TIME BILLING
Time-based billing (NON-critical care).     50 minutes spent on total encounter; more than 50% of the visit was spent counseling and / or coordinating care by the attending physician.  The necessity of the time spent during the encounter on this date of service was due to:     review of laboratory data, radiology results, consultants' recommendations, documentation in [place], discussion with patient/ACP and interdisciplinary staff (such as , social workers, etc). Interventions were performed as documented above.
review of laboratory data, radiology results, discussion with primary team\patient, and monitoring for potential decompensation. Interventions were performed as documented above
Time-based billing (NON-critical care).     50 minutes spent on total encounter; more than 50% of the visit was spent counseling and / or coordinating care by the attending physician.  The necessity of the time spent during the encounter on this date of service was due to:     review of laboratory data, radiology results, consultants' recommendations, documentation in [place], discussion with patient/ACP and interdisciplinary staff (such as , social workers, etc). Interventions were performed as documented above.
Time-based billing (NON-critical care).     50 minutes spent on total encounter; more than 50% of the visit was spent counseling and / or coordinating care by the attending physician.  The necessity of the time spent during the encounter on this date of service was due to:     review of laboratory data, radiology results, consultants' recommendations, documentation in [place], discussion with patient/ACP and interdisciplinary staff (such as , social workers, etc). Interventions were performed as documented above.

## 2024-03-31 NOTE — PROGRESS NOTE ADULT - ASSESSMENT
Patient is an 87yo F with PMH significant for CAD s/p PCI x6 11/2019 at [facility], HTN, HLD, T2DM, and HFpEF who presented with fever and generalized weakness of 2 days’ duration, admitted with sepsis and acute hypoxic respiratory failure due to influenza. Patient required an upgrade in care to the MICU after an RRT on 3/17 during which patient was intubated for AMS. Patient completed a course of PNA treatment with Tamiflu and  Zosyn. Course c/b seizure like activity, LP unrevealing and EEG showing multifocal epileptiform potential. Although no acute seizures are documented, unable to completely rule out either. MRI brain now pending. Transferred to floors for further management.  Patient is an 89yo F with PMH significant for CAD s/p PCI x6 11/2019 at [facility], HTN, HLD, T2DM, and HFpEF who presented with fever and generalized weakness of 2 days’ duration, admitted with sepsis and acute hypoxic respiratory failure due to influenza. Patient required an upgrade in care to the MICU after an RRT on 3/17 during which patient was intubated for AMS. Patient completed a course of PNA treatment with Tamiflu and  Zosyn. Course c/b seizure like activity, LP unrevealing and EEG showing multifocal epileptiform potential. Although no acute seizures are documented, unable to completely rule out either. Transferred to floors for further management.

## 2024-03-31 NOTE — DISCHARGE NOTE PROVIDER - NSDCFUSCHEDAPPT_GEN_ALL_CORE_FT
Lisker, Gita N  [facility]  PULMMED  [place]   Scheduled Appointment: 04/08/2024     Briseyda Robins  [facility]  PULMMED [address]  Scheduled Appointment: 04/16/2024    [facility]  NEUROLOGY  [address]  Scheduled Appointment: 05/02/2024

## 2024-03-31 NOTE — PROGRESS NOTE ADULT - SUBJECTIVE AND OBJECTIVE BOX
Lionel Elliott MD  [facility] Division of Hospital Medicine  Pager: [phone]  Available via MS Teams    SUBJECTIVE / OVERNIGHT EVENTS:    Still altered.  used, but patient was not able to be comprehended other than simple yes or no answers.   Denies pain, discomfort.     MEDICATIONS  (STANDING):  albuterol/ipratropium for Nebulization 3 milliLiter(s) Nebulizer every 6 hours  aspirin  chewable 81 milliGRAM(s) Oral daily  atorvastatin 80 milliGRAM(s) Oral at bedtime  budesonide 160 MICROgram(s)/formoterol 4.5 MICROgram(s) Inhaler 2 Puff(s) Inhalation two times a day  chlorhexidine 2% Cloths 1 Application(s) Topical daily  famotidine    Tablet 20 milliGRAM(s) Oral daily  heparin   Injectable 5000 Unit(s) SubCutaneous every 8 hours  influenza  Vaccine (HIGH DOSE) 0.7 milliLiter(s) IntraMuscular once  insulin lispro (ADMELOG) corrective regimen sliding scale   SubCutaneous every 6 hours  insulin NPH human recombinant 10 Unit(s) SubCutaneous every 6 hours  levETIRAcetam   Injectable 1000 milliGRAM(s) IV Push every 12 hours  levothyroxine Injectable 20 MICROGram(s) IV Push at bedtime  LORazepam     Tablet 1 milliGRAM(s) Oral once  methylPREDNISolone sodium succinate Injectable 40 milliGRAM(s) IV Push daily  multivitamin/minerals/iron Oral Solution (CENTRUM) 15 milliLiter(s) Oral daily  petrolatum Ophthalmic Ointment 1 Application(s) Both EYES daily  piperacillin/tazobactam IVPB.. 3.375 Gram(s) IV Intermittent every 8 hours  polyethylene glycol 3350 17 Gram(s) Oral two times a day  senna 2 Tablet(s) Oral at bedtime  sodium chloride 7% Inhalation 4 milliLiter(s) Inhalation every 12 hours    MEDICATIONS  (PRN):  acetaminophen   Oral Liquid .. 650 milliGRAM(s) Oral every 6 hours PRN Temp greater or equal to 38C (100.4F), Mild Pain (1 - 3), Moderate Pain (4 - 6)  guaiFENesin Oral Liquid (Sugar-Free) 200 milliGRAM(s) Oral every 6 hours PRN Cough      I&O's Summary    30 Mar 2024 07:01  -  31 Mar 2024 07:00  --------------------------------------------------------  IN: 2000 mL / OUT: 1387 mL / NET: 613 mL        PHYSICAL EXAM:  Vital Signs Last 24 Hrs  T(C): 36.5 (31 Mar 2024 18:25), Max: 37.2 (31 Mar 2024 05:33)  T(F): 97.7 (31 Mar 2024 18:25), Max: 98.9 (31 Mar 2024 05:33)  HR: 102 (31 Mar 2024 18:25) (99 - 115)  BP: 130/63 (31 Mar 2024 18:25) (123/74 - 159/85)  BP(mean): --  RR: 18 (31 Mar 2024 18:25) (18 - 18)  SpO2: 100% (31 Mar 2024 18:25) (97% - 100%)    Parameters below as of 31 Mar 2024 15:16  Patient On (Oxygen Delivery Method): nasal cannula      CONSTITUTIONAL: NAD, restless    EYES: conjunctiva and sclera clear  ENMT: Moist oral mucosa   NECK: Supple   RESPIRATORY: rhonchi  CARDIOVASCULAR: Regular rate and rhythm, normal S1 and S2, no murmur/rub/gallop; Peripheral pulses are 2+ bilaterally  ABDOMEN: Nontender to palpation, normoactive bowel sounds, no rebound/guarding   MUSCULOSKELETAL: No lower extremity edema   PSYCH: Alert, not O to person, place, or time; affect appropriate  NEUROLOGY: moves all extremities   SKIN: No rashes    LABS:                        9.5    11.79 )-----------( 651      ( 31 Mar 2024 13:45 )             29.7     03-31    138  |  102  |  20  ----------------------------<  221<H>  5.1   |  25  |  0.56    Ca    9.0      31 Mar 2024 10:10  Phos  2.8     03-31  Mg     2.50     03-31            Urinalysis Basic - ( 31 Mar 2024 10:10 )    Color: x / Appearance: x / SG: x / pH: x  Gluc: 221 mg/dL / Ketone: x  / Bili: x / Urobili: x   Blood: x / Protein: x / Nitrite: x   Leuk Esterase: x / RBC: x / WBC x   Sq Epi: x / Non Sq Epi: x / Bacteria: x        Culture - Blood (collected 29 Mar 2024 14:25)  Source: .Blood Blood-Peripheral  Preliminary Report (31 Mar 2024 17:01):    No growth at 48 Hours    Culture - Blood (collected 29 Mar 2024 08:55)  Source: .Blood Blood  Preliminary Report (31 Mar 2024 13:02):    No growth at 48 Hours          RADIOLOGY & ADDITIONAL TESTS:  Other Results Reviewed Today: BMP with stable Cr, CBC with stable Hg     COORDINATION OF CARE:  Communication: discussed plan of care with ACP

## 2024-03-31 NOTE — DISCHARGE NOTE PROVIDER - NSFOLLOWUPCLINICSTOKEN_GEN_ALL_ED_FT
[id number]: || ||00\01||False;[id number]: || ||00\01||False;[id number]: || ||00\01||False; [id number]: || ||00\01||False;[id number]: || ||00\01||False;[id number]: || ||00\01||False;[id number]: || ||00\01||False;

## 2024-03-31 NOTE — PROGRESS NOTE ADULT - PROBLEM SELECTOR PLAN 8
- HOLD home atenolol 100 mg daily, imdur 30 mg daily, losartan 100 mg daily, torsemide 10 mg daily   - re-introduce home blood pressure medications PRN

## 2024-03-31 NOTE — DISCHARGE NOTE PROVIDER - NSDCCPCAREPLAN_GEN_ALL_CORE_FT
PRINCIPAL DISCHARGE DIAGNOSIS  Diagnosis: PNA (pneumonia)  Assessment and Plan of Treatment: completed course of antibiotics      SECONDARY DISCHARGE DIAGNOSES  Diagnosis: DM (diabetes mellitus)  Assessment and Plan of Treatment: HbA1c 7.7 % goal below 7%    diabetic diet     PRINCIPAL DISCHARGE DIAGNOSIS  Diagnosis: Sepsis with acute hypoxic respiratory failure  Assessment and Plan of Treatment: You were found to be septic due to flu and pneumonia.  You required intubation and ventilator assistance for breathing but you were able to be removed from the breathing machine and breathing on your own      SECONDARY DISCHARGE DIAGNOSES  Diagnosis: Type A influenza  Assessment and Plan of Treatment: As above    Diagnosis: DM (diabetes mellitus)  Assessment and Plan of Treatment: resume your home diabetes medication    Diagnosis: Seizures  Assessment and Plan of Treatment: You had an episode of seizure in the hospital; this has resolved    Diagnosis: Dysphagia  Assessment and Plan of Treatment: You have a weak swallow; this is why you are having a puree diet; as you get better you may be able to tolerate other food consistancies     PRINCIPAL DISCHARGE DIAGNOSIS  Diagnosis: Sepsis with acute hypoxic respiratory failure  Assessment and Plan of Treatment: You were found to be septic due to flu and pneumonia.  You required intubation and ventilator assistance for breathing but you were able to be removed from the breathing machine and breathing on your own. You completed course of antibiotics and Tamiflu.      SECONDARY DISCHARGE DIAGNOSES  Diagnosis: DM (diabetes mellitus)  Assessment and Plan of Treatment: resume your home diabetes medication. Follow up with PCP.    Diagnosis: Type A influenza  Assessment and Plan of Treatment: You completed course of Tamiflu    Diagnosis: Dysphagia  Assessment and Plan of Treatment: You have a weak swallow; this is why you are having a puree diet; as you get better you may be able to tolerate other food consistancies    Diagnosis: Seizures  Assessment and Plan of Treatment: You had an episode of seizure in the hospital; this has resolved. Continue taking Keppra and follow up with neurology.    Diagnosis: CAD (coronary artery disease)  Assessment and Plan of Treatment: Continue taking Aspirin and Lipitor. May resume Atenolol, Imdur, Losartan.    Diagnosis: Urinary retention  Assessment and Plan of Treatment: You will be discharged with Taylor. Follow up with a urologist to have Taylor removed and have a trial of void.

## 2024-03-31 NOTE — DISCHARGE NOTE PROVIDER - NSFOLLOWUPCLINICS_GEN_ALL_ED_FT
[facility] Pulmonolgy and Sleep Medicine  Pulmonology  [address], [address]  [place] [address]  Phone: [phone]  Fax:     Neurology Autoimmune Encephalitis Clinic  Neurology Autoimmune Encephalitis  [address] [place] [address]  Phone: [phone]  Fax: [phone]    Neurology Epilepsy Clinic  Neurology Epilepsy  [address] [address]  Phone: [phone]  Fax: [phone]     Neurology Autoimmune Encephalitis Clinic  Neurology Autoimmune Encephalitis  [address] [place] [address]  Phone: [phone]  Fax: [phone]    Neurology Epilepsy Clinic  Neurology Epilepsy  [address] [address]  Phone: [phone]  Fax: [phone]    [facility] Pulmonolgy and Sleep Medicine  Pulmonology  [address]  [place] [address]  Phone: [phone]  Fax:     [facility]  Urology  [address], [place] [address]  Phone: [phone]  Fax:

## 2024-03-31 NOTE — DISCHARGE NOTE PROVIDER - NSDCMRMEDTOKEN_GEN_ALL_CORE_FT
amLODIPine 5 mg oral tablet: 1 tab(s) orally once a day  aspirin 81 mg oral delayed release tablet: 1 tab(s) orally once a day  atenolol 100 mg oral tablet: 1 tab(s) orally once a day  atorvastatin 80 mg oral tablet: 1 tab(s) orally once a day (at bedtime)  famotidine 40 mg oral tablet: 1 tab(s) orally once a day  isosorbide mononitrate 30 mg oral tablet, extended release: 1 tab(s) orally once a day  levothyroxine 25 mcg (0.025 mg) oral tablet: 1 tab(s) orally once a day  losartan 100 mg oral tablet: 1 tab(s) orally once a day  metFORMIN 500 mg oral tablet: 1 tab(s) orally 2 times a day  OYSCO 500/D TABLETS: TAKE 1 TABLET BY MOUTH TWICE DAILY WITH MEALS  ranolazine 500 mg oral tablet, extended release: 1 tab(s) orally 2 times a day  TORSEMIDE 10MG TABLETS: TAKE 1 TABLET BY MOUTH DAILY  Xarelto 2.5 mg oral tablet: 1 tab(s) orally 2 times a day   albuterol 90 mcg/inh inhalation aerosol: 2 puff(s) inhaled every 6 hours as needed for  shortness of breath and/or wheezing  amLODIPine 5 mg oral tablet: 1 tab(s) orally once a day  aspirin 81 mg oral delayed release tablet: 1 tab(s) orally once a day  atenolol 100 mg oral tablet: 1 tab(s) orally once a day  atorvastatin 80 mg oral tablet: 1 tab(s) orally once a day (at bedtime)  budesonide-formoterol 160 mcg-4.5 mcg/inh inhalation aerosol: 2 puff(s) inhaled 2 times a day  famotidine 40 mg oral tablet: 1 tab(s) orally once a day  levETIRAcetam 1000 mg oral tablet: 1 tab(s) orally 2 times a day  levothyroxine 25 mcg (0.025 mg) oral tablet: 1 tab(s) orally once a day  losartan 100 mg oral tablet: 1 tab(s) orally once a day  metFORMIN 500 mg oral tablet: 1 tab(s) orally 2 times a day  OYSCO 500/D TABLETS: TAKE 1 TABLET BY MOUTH TWICE DAILY WITH MEALS  senna leaf extract oral tablet: 2 tab(s) orally once a day (at bedtime)  Xarelto 2.5 mg oral tablet: 1 tab(s) orally 2 times a day   albuterol 90 mcg/inh inhalation aerosol: 2 puff(s) inhaled every 6 hours as needed for  shortness of breath and/or wheezing  amLODIPine 5 mg oral tablet: 1 tab(s) orally once a day  aspirin 81 mg oral delayed release tablet: 1 tab(s) orally once a day  atenolol 100 mg oral tablet: 1 tab(s) orally once a day  atorvastatin 80 mg oral tablet: 1 tab(s) orally once a day (at bedtime)  budesonide-formoterol 160 mcg-4.5 mcg/inh inhalation aerosol: 2 puff(s) inhaled 2 times a day  famotidine 40 mg oral tablet: 1 tab(s) orally once a day  levETIRAcetam 1000 mg oral tablet: 1 tab(s) orally 2 times a day  levothyroxine 25 mcg (0.025 mg) oral tablet: 1 tab(s) orally once a day  losartan 100 mg oral tablet: 1 tab(s) orally once a day  metFORMIN 500 mg oral tablet: 1 tab(s) orally 2 times a day  OYSCO 500/D TABLETS: TAKE 1 TABLET BY MOUTH TWICE DAILY WITH MEALS  Physical therapy: 3 times per week for 4 weeks  senna leaf extract oral tablet: 2 tab(s) orally once a day (at bedtime)  Xarelto 2.5 mg oral tablet: 1 tab(s) orally 2 times a day

## 2024-03-31 NOTE — PROGRESS NOTE ADULT - PROBLEM SELECTOR PLAN 5
- s/p PCI x6 at [facility] 11/2019  - resume home aspirin 81 mg daily, atorvastatin 80 mg daily   - continue to hold Xarelto 2.5mg po BID given unclear reasoning and may need PEG placement   - will need to discuss DOAC with OP cardiologist   - no acute evidence of ischemia

## 2024-04-01 DIAGNOSIS — Z51.5 ENCOUNTER FOR PALLIATIVE CARE: ICD-10-CM

## 2024-04-01 DIAGNOSIS — R41.0 DISORIENTATION, UNSPECIFIED: ICD-10-CM

## 2024-04-01 DIAGNOSIS — Z71.89 OTHER SPECIFIED COUNSELING: ICD-10-CM

## 2024-04-01 LAB
ANION GAP SERPL CALC-SCNC: 11 MMOL/L — SIGNIFICANT CHANGE UP (ref 7–14)
BUN SERPL-MCNC: 24 MG/DL — HIGH (ref 7–23)
CALCIUM SERPL-MCNC: 8.8 MG/DL — SIGNIFICANT CHANGE UP (ref 8.4–10.5)
CHLORIDE SERPL-SCNC: 105 MMOL/L — SIGNIFICANT CHANGE UP (ref 98–107)
CO2 SERPL-SCNC: 24 MMOL/L — SIGNIFICANT CHANGE UP (ref 22–31)
CREAT SERPL-MCNC: 0.7 MG/DL — SIGNIFICANT CHANGE UP (ref 0.5–1.3)
EGFR: 84 ML/MIN/1.73M2 — SIGNIFICANT CHANGE UP
GLUCOSE BLDC GLUCOMTR-MCNC: 123 MG/DL — HIGH (ref 70–99)
GLUCOSE BLDC GLUCOMTR-MCNC: 130 MG/DL — HIGH (ref 70–99)
GLUCOSE BLDC GLUCOMTR-MCNC: 275 MG/DL — HIGH (ref 70–99)
GLUCOSE BLDC GLUCOMTR-MCNC: 276 MG/DL — HIGH (ref 70–99)
GLUCOSE SERPL-MCNC: 111 MG/DL — HIGH (ref 70–99)
HCT VFR BLD CALC: 30.8 % — LOW (ref 34.5–45)
HGB BLD-MCNC: 9.9 G/DL — LOW (ref 11.5–15.5)
MAGNESIUM SERPL-MCNC: 2.5 MG/DL — SIGNIFICANT CHANGE UP (ref 1.6–2.6)
MCHC RBC-ENTMCNC: 29.9 PG — SIGNIFICANT CHANGE UP (ref 27–34)
MCHC RBC-ENTMCNC: 32.1 GM/DL — SIGNIFICANT CHANGE UP (ref 32–36)
MCV RBC AUTO: 93.1 FL — SIGNIFICANT CHANGE UP (ref 80–100)
NRBC # BLD: 0 /100 WBCS — SIGNIFICANT CHANGE UP (ref 0–0)
NRBC # FLD: 0 K/UL — SIGNIFICANT CHANGE UP (ref 0–0)
PHOSPHATE SERPL-MCNC: 2.6 MG/DL — SIGNIFICANT CHANGE UP (ref 2.5–4.5)
PLATELET # BLD AUTO: 637 K/UL — HIGH (ref 150–400)
POTASSIUM SERPL-MCNC: 3.9 MMOL/L — SIGNIFICANT CHANGE UP (ref 3.5–5.3)
POTASSIUM SERPL-SCNC: 3.9 MMOL/L — SIGNIFICANT CHANGE UP (ref 3.5–5.3)
RBC # BLD: 3.31 M/UL — LOW (ref 3.8–5.2)
RBC # FLD: 15.3 % — HIGH (ref 10.3–14.5)
SODIUM SERPL-SCNC: 140 MMOL/L — SIGNIFICANT CHANGE UP (ref 135–145)
WBC # BLD: 10.9 K/UL — HIGH (ref 3.8–10.5)
WBC # FLD AUTO: 10.9 K/UL — HIGH (ref 3.8–10.5)

## 2024-04-01 PROCEDURE — 71250 CT THORAX DX C-: CPT | Mod: 26

## 2024-04-01 PROCEDURE — 99497 ADVNCD CARE PLAN 30 MIN: CPT | Mod: 25

## 2024-04-01 PROCEDURE — 99233 SBSQ HOSP IP/OBS HIGH 50: CPT | Mod: GC

## 2024-04-01 PROCEDURE — 99223 1ST HOSP IP/OBS HIGH 75: CPT

## 2024-04-01 PROCEDURE — 99233 SBSQ HOSP IP/OBS HIGH 50: CPT

## 2024-04-01 RX ORDER — INSULIN GLARGINE 100 [IU]/ML
10 INJECTION, SOLUTION SUBCUTANEOUS AT BEDTIME
Refills: 0 | Status: DISCONTINUED | OUTPATIENT
Start: 2024-04-01 | End: 2024-04-05

## 2024-04-01 RX ORDER — GLUCAGON INJECTION, SOLUTION 0.5 MG/.1ML
1 INJECTION, SOLUTION SUBCUTANEOUS ONCE
Refills: 0 | Status: DISCONTINUED | OUTPATIENT
Start: 2024-04-01 | End: 2024-04-05

## 2024-04-01 RX ORDER — INSULIN LISPRO 100/ML
VIAL (ML) SUBCUTANEOUS
Refills: 0 | Status: DISCONTINUED | OUTPATIENT
Start: 2024-04-01 | End: 2024-04-05

## 2024-04-01 RX ORDER — SODIUM CHLORIDE 9 MG/ML
1000 INJECTION, SOLUTION INTRAVENOUS
Refills: 0 | Status: DISCONTINUED | OUTPATIENT
Start: 2024-04-01 | End: 2024-04-05

## 2024-04-01 RX ADMIN — ATORVASTATIN CALCIUM 80 MILLIGRAM(S): 80 TABLET, FILM COATED ORAL at 22:19

## 2024-04-01 RX ADMIN — PIPERACILLIN AND TAZOBACTAM 25 GRAM(S): 4; .5 INJECTION, POWDER, LYOPHILIZED, FOR SOLUTION INTRAVENOUS at 22:19

## 2024-04-01 RX ADMIN — Medication 3 MILLILITER(S): at 15:09

## 2024-04-01 RX ADMIN — POLYETHYLENE GLYCOL 3350 17 GRAM(S): 17 POWDER, FOR SOLUTION ORAL at 18:13

## 2024-04-01 RX ADMIN — SODIUM CHLORIDE 4 MILLILITER(S): 9 INJECTION INTRAMUSCULAR; INTRAVENOUS; SUBCUTANEOUS at 10:20

## 2024-04-01 RX ADMIN — Medication 20 MICROGRAM(S): at 22:20

## 2024-04-01 RX ADMIN — LEVETIRACETAM 1000 MILLIGRAM(S): 250 TABLET, FILM COATED ORAL at 18:06

## 2024-04-01 RX ADMIN — Medication 6 MILLIGRAM(S): at 22:22

## 2024-04-01 RX ADMIN — INSULIN GLARGINE 10 UNIT(S): 100 INJECTION, SOLUTION SUBCUTANEOUS at 22:22

## 2024-04-01 RX ADMIN — POLYETHYLENE GLYCOL 3350 17 GRAM(S): 17 POWDER, FOR SOLUTION ORAL at 06:23

## 2024-04-01 RX ADMIN — HEPARIN SODIUM 5000 UNIT(S): 5000 INJECTION INTRAVENOUS; SUBCUTANEOUS at 06:23

## 2024-04-01 RX ADMIN — Medication 3 MILLILITER(S): at 04:17

## 2024-04-01 RX ADMIN — LEVETIRACETAM 1000 MILLIGRAM(S): 250 TABLET, FILM COATED ORAL at 06:24

## 2024-04-01 RX ADMIN — Medication 3: at 13:19

## 2024-04-01 RX ADMIN — Medication 81 MILLIGRAM(S): at 13:11

## 2024-04-01 RX ADMIN — BUDESONIDE AND FORMOTEROL FUMARATE DIHYDRATE 2 PUFF(S): 160; 4.5 AEROSOL RESPIRATORY (INHALATION) at 22:19

## 2024-04-01 RX ADMIN — SODIUM CHLORIDE 4 MILLILITER(S): 9 INJECTION INTRAMUSCULAR; INTRAVENOUS; SUBCUTANEOUS at 21:17

## 2024-04-01 RX ADMIN — PIPERACILLIN AND TAZOBACTAM 25 GRAM(S): 4; .5 INJECTION, POWDER, LYOPHILIZED, FOR SOLUTION INTRAVENOUS at 06:19

## 2024-04-01 RX ADMIN — HUMAN INSULIN 12 UNIT(S): 100 INJECTION, SUSPENSION SUBCUTANEOUS at 06:24

## 2024-04-01 RX ADMIN — Medication 3 MILLILITER(S): at 10:20

## 2024-04-01 RX ADMIN — Medication 1 APPLICATION(S): at 19:12

## 2024-04-01 RX ADMIN — FAMOTIDINE 20 MILLIGRAM(S): 10 INJECTION INTRAVENOUS at 13:11

## 2024-04-01 RX ADMIN — HEPARIN SODIUM 5000 UNIT(S): 5000 INJECTION INTRAVENOUS; SUBCUTANEOUS at 22:22

## 2024-04-01 RX ADMIN — CHLORHEXIDINE GLUCONATE 1 APPLICATION(S): 213 SOLUTION TOPICAL at 13:10

## 2024-04-01 RX ADMIN — HUMAN INSULIN 12 UNIT(S): 100 INJECTION, SUSPENSION SUBCUTANEOUS at 13:29

## 2024-04-01 RX ADMIN — PIPERACILLIN AND TAZOBACTAM 25 GRAM(S): 4; .5 INJECTION, POWDER, LYOPHILIZED, FOR SOLUTION INTRAVENOUS at 13:18

## 2024-04-01 RX ADMIN — BUDESONIDE AND FORMOTEROL FUMARATE DIHYDRATE 2 PUFF(S): 160; 4.5 AEROSOL RESPIRATORY (INHALATION) at 08:49

## 2024-04-01 RX ADMIN — HEPARIN SODIUM 5000 UNIT(S): 5000 INJECTION INTRAVENOUS; SUBCUTANEOUS at 13:14

## 2024-04-01 RX ADMIN — Medication 3: at 18:01

## 2024-04-01 RX ADMIN — Medication 40 MILLIGRAM(S): at 06:23

## 2024-04-01 RX ADMIN — SENNA PLUS 2 TABLET(S): 8.6 TABLET ORAL at 22:19

## 2024-04-01 RX ADMIN — Medication 15 MILLILITER(S): at 13:12

## 2024-04-01 RX ADMIN — Medication 3 MILLILITER(S): at 21:36

## 2024-04-01 NOTE — PROGRESS NOTE ADULT - ASSESSMENT
88F with HFpEF, CAD (s/p multiple stents), DM2  admitted to MICU for acute hypoxemic respiratory failure insetting of flu and encephalopathy. Extubated and sent to floors.  Pulm consulted for hypoxemia    Pt currently titrated down to NC and appears comfortable. Son reports that no smoking in past but does have biomass exposure in [place]. Denies wheezing or asthma/copd in past.  Likely w/ exacerbation of obstructive airways dz now poss due to flu or aspiration event  NC today still, with NG in    - cont NC, titrate down to maintain sat 90-95%  - likely sleep apnea, observed hypopneas when sleeping  - O2 weaned to RA maintaining saturatin 94-98%  - complete 5 days of glucocorticoids  - short abx course  - cont BD neb tx  - start 160/4.5 BID Symbicort  - aspiration precaution  - dvt ppx  - f/u in pulmonary clinic    Please notify pulmonary prior to discharge and email [email or web address] to schedule an appointment; please provide appointment info to patient    Follow up at  [address], [address]  [place] [address]  tel: [phone]  fax: [phone]    pulmonary will sign off, reconsult prn

## 2024-04-01 NOTE — PROGRESS NOTE ADULT - SUBJECTIVE AND OBJECTIVE BOX
[facility] Division of Hospital Medicine  Malgorzata Clark MD  Pager [phone]    Patient is a 88y old  Female who presents with a chief complaint of influenza, sepsis, hypoxia       SUBJECTIVE / OVERNIGHT EVENTS: son at bedside; pall care met with son/pt, spoke Nepali with them; pt was functional prior to admission; son would prefer to allow pt to eat orally, will trial puree with thick liquids      MEDICATIONS  (STANDING):  albuterol/ipratropium for Nebulization 3 milliLiter(s) Nebulizer every 6 hours  aspirin  chewable 81 milliGRAM(s) Oral daily  atorvastatin 80 milliGRAM(s) Oral at bedtime  budesonide 160 MICROgram(s)/formoterol 4.5 MICROgram(s) Inhaler 2 Puff(s) Inhalation two times a day  chlorhexidine 2% Cloths 1 Application(s) Topical daily  famotidine    Tablet 20 milliGRAM(s) Oral daily  heparin   Injectable 5000 Unit(s) SubCutaneous every 8 hours  influenza  Vaccine (HIGH DOSE) 0.7 milliLiter(s) IntraMuscular once  insulin lispro (ADMELOG) corrective regimen sliding scale   SubCutaneous every 6 hours  insulin NPH human recombinant 12 Unit(s) SubCutaneous every 6 hours  levETIRAcetam   Injectable 1000 milliGRAM(s) IV Push every 12 hours  levothyroxine Injectable 20 MICROGram(s) IV Push at bedtime  LORazepam     Tablet 1 milliGRAM(s) Oral once  melatonin 6 milliGRAM(s) Oral at bedtime  methylPREDNISolone sodium succinate Injectable 40 milliGRAM(s) IV Push daily  multivitamin/minerals/iron Oral Solution (CENTRUM) 15 milliLiter(s) Oral daily  petrolatum Ophthalmic Ointment 1 Application(s) Both EYES daily  piperacillin/tazobactam IVPB.. 3.375 Gram(s) IV Intermittent every 8 hours  polyethylene glycol 3350 17 Gram(s) Oral two times a day  senna 2 Tablet(s) Oral at bedtime  sodium chloride 7% Inhalation 4 milliLiter(s) Inhalation every 12 hours    MEDICATIONS  (PRN):  acetaminophen   Oral Liquid .. 650 milliGRAM(s) Oral every 6 hours PRN Temp greater or equal to 38C (100.4F), Mild Pain (1 - 3), Moderate Pain (4 - 6)  guaiFENesin Oral Liquid (Sugar-Free) 200 milliGRAM(s) Oral every 6 hours PRN Cough      CAPILLARY BLOOD GLUCOSE  POCT Blood Glucose.: 123 mg/dL (01 Apr 2024 06:21)  POCT Blood Glucose.: 112 mg/dL (31 Mar 2024 23:52)  POCT Blood Glucose.: 300 mg/dL (31 Mar 2024 17:07)      PHYSICAL EXAM:  Vital Signs Last 24 Hrs  T(F): 98.4 (01 Apr 2024 06:12), Max: 98.5 (31 Mar 2024 14:40)  HR: 102 (01 Apr 2024 10:43) (88 - 115)  BP: 122/69 (01 Apr 2024 06:12) (122/69 - 135/86)  RR: 18 (01 Apr 2024 06:12) (18 - 20)  SpO2: 96% (01 Apr 2024 10:43) (96% - 100%)    Parameters below as of 01 Apr 2024 10:43  Patient On (Oxygen Delivery Method): nasal cannula, 5 l/m        CONSTITUTIONAL: NAD, appears comfortable  EYES: PERRLA; conjunctiva and sclera clear  ENMT: Moist oral mucosa; NGT in place  RESPIRATORY: Normal respiratory effort; lungs are clear to auscultation bilaterally  CARDIOVASCULAR: Regular rate and rhythm; No lower extremity edema  ABDOMEN: Nontender to palpation, normoactive bowel sounds  MUSCULOSKELETAL:  no clubbing or cyanosis of digits; no joint swelling or tenderness to palpation  PSYCH: calm, coop; affect appropriate  NEUROLOGY: CN 2-12 are intact and symmetric; no gross sensory deficits   SKIN: No rashes; no palpable lesions    LABS:                        9.9    10.90 )-----------( 637      ( 01 Apr 2024 06:00 )             30.8     04-01    140  |  105  |  24<H>  ----------------------------<  111<H>  3.9   |  24  |  0.70    Ca    8.8      01 Apr 2024 06:00  Phos  2.6     04-01  Mg     2.50     04-01        Culture - Blood (collected 29 Mar 2024 14:25)  Source: .Blood Blood-Peripheral  Preliminary Report (31 Mar 2024 17:01):    No growth at 48 Hours

## 2024-04-01 NOTE — CONSULT NOTE ADULT - PROBLEM SELECTOR RECOMMENDATION 5
Episode of left lateral gaze deviation and tonic clonic jerking of extremities when off sedation in MICU  CTH, LP negative  EEG showing multifocal epileptiform potentials > no acute seizures documented but unable to completely rule out either  >>Currently on Keppra 1g bid

## 2024-04-01 NOTE — CONSULT NOTE ADULT - PROBLEM SELECTOR RECOMMENDATION 6
GOC as documented above (4/1): FULL code; medical management as appropriate; family defers PEG tube, wishes to pursue pleasure/hand feeding as they share pt would be further agitated with placing PEG tube, and may pull it out.     Family makes decision together. Pt defers to her son Jennifer who is the surrogate decision maker.

## 2024-04-01 NOTE — CONSULT NOTE ADULT - PROBLEM SELECTOR RECOMMENDATION 9
Presented from home with fever, family with Flu  Intubated for 8 days 3/17 - 3/25 for airway protection in the setting of PNA from bacterial and influenza sources   S/p tamiflu and zosyn course in the ICU    >>Now on supplemental oxygen, weaned off HFNC

## 2024-04-01 NOTE — PROGRESS NOTE ADULT - ASSESSMENT
Patient is an 87yo F with PMH significant for CAD s/p PCI x6 11/2019 at [facility], HTN, HLD, T2DM, and HFpEF who presented with fever and generalized weakness of 2 days’ duration, admitted with sepsis and acute hypoxic respiratory failure due to influenza. Patient required an upgrade in care to the MICU after an RRT on 3/17 during which patient was intubated for AMS. Patient completed a course of PNA treatment with Tamiflu and  Zosyn. Course c/b seizure like activity, LP unrevealing and EEG showing multifocal epileptiform potential. Although no acute seizures are documented, unable to completely rule out either. Transferred to floors for further management.

## 2024-04-01 NOTE — PROGRESS NOTE ADULT - SUBJECTIVE AND OBJECTIVE BOX
CHIEF COMPLAINT:Patient is a 88y old  Female who presents with a chief complaint of influenza, sepsis, hypoxia (01 Apr 2024 15:59)      Interval Events:  overall feeling improved, sleeping comfortably, wakes up to touch, O2 weaned off    REVIEW OF SYSTEMS:  [x] All other systems negative except per HPI   [ ] Unable to assess ROS because ________    OBJECTIVE:  ICU Vital Signs Last 24 Hrs  T(C): 36.5 (01 Apr 2024 18:00), Max: 36.9 (01 Apr 2024 06:12)  T(F): 97.7 (01 Apr 2024 18:00), Max: 98.4 (01 Apr 2024 06:12)  HR: 88 (01 Apr 2024 18:00) (77 - 103)  BP: 129/67 (01 Apr 2024 18:00) (122/61 - 135/86)  BP(mean): --  ABP: --  ABP(mean): --  RR: 17 (01 Apr 2024 18:00) (16 - 20)  SpO2: 97% (01 Apr 2024 18:00) (96% - 100%)    O2 Parameters below as of 01 Apr 2024 16:10  Patient On (Oxygen Delivery Method): nasal cannula, 3 l/m              03-31 @ 07:01  -  04-01 @ 07:00  --------------------------------------------------------  IN: 420 mL / OUT: 1800 mL / NET: -1380 mL        PHYSICAL EXAM:  General: WN/WD NAD  HEENT: EOMI, moist mucous membranes  Neurology: A&Ox3, nonfocal, TIMMONS x 4  Respiratory: rhonchi  CV: RRR, S1S2, no murmurs, rubs or gallops  Abdominal: Soft, NT, ND +BS  Extremities: No edema, + peripheral pulses    HOSPITAL MEDICATIONS:  MEDICATIONS  (STANDING):  albuterol/ipratropium for Nebulization 3 milliLiter(s) Nebulizer every 6 hours  aspirin  chewable 81 milliGRAM(s) Oral daily  atorvastatin 80 milliGRAM(s) Oral at bedtime  budesonide 160 MICROgram(s)/formoterol 4.5 MICROgram(s) Inhaler 2 Puff(s) Inhalation two times a day  chlorhexidine 2% Cloths 1 Application(s) Topical daily  dextrose 5%. 1000 milliLiter(s) (100 mL/Hr) IV Continuous <Continuous>  famotidine    Tablet 20 milliGRAM(s) Oral daily  glucagon  Injectable 1 milliGRAM(s) IntraMuscular once  heparin   Injectable 5000 Unit(s) SubCutaneous every 8 hours  influenza  Vaccine (HIGH DOSE) 0.7 milliLiter(s) IntraMuscular once  insulin glargine Injectable (LANTUS) 10 Unit(s) SubCutaneous at bedtime  insulin lispro (ADMELOG) corrective regimen sliding scale   SubCutaneous three times a day before meals  levETIRAcetam   Injectable 1000 milliGRAM(s) IV Push every 12 hours  levothyroxine Injectable 20 MICROGram(s) IV Push at bedtime  LORazepam     Tablet 1 milliGRAM(s) Oral once  melatonin 6 milliGRAM(s) Oral at bedtime  methylPREDNISolone sodium succinate Injectable 40 milliGRAM(s) IV Push daily  multivitamin/minerals/iron Oral Solution (CENTRUM) 15 milliLiter(s) Oral daily  petrolatum Ophthalmic Ointment 1 Application(s) Both EYES daily  piperacillin/tazobactam IVPB.. 3.375 Gram(s) IV Intermittent every 8 hours  polyethylene glycol 3350 17 Gram(s) Oral two times a day  senna 2 Tablet(s) Oral at bedtime  sodium chloride 7% Inhalation 4 milliLiter(s) Inhalation every 12 hours    MEDICATIONS  (PRN):  acetaminophen   Oral Liquid .. 650 milliGRAM(s) Oral every 6 hours PRN Temp greater or equal to 38C (100.4F), Mild Pain (1 - 3), Moderate Pain (4 - 6)  guaiFENesin Oral Liquid (Sugar-Free) 200 milliGRAM(s) Oral every 6 hours PRN Cough      LABS:    The Labs were reviewed by me   The Radiology was reviewed by me    EKG tracing reviewed by me    04-01    140  |  105  |  24<H>  ----------------------------<  111<H>  3.9   |  24  |  0.70  03-31    138  |  102  |  20  ----------------------------<  221<H>  5.1   |  25  |  0.56  03-31    135  |  104  |  21  ----------------------------<  199<H>  5.2   |  20<L>  |  0.51    Ca    8.8      01 Apr 2024 06:00  Ca    9.0      31 Mar 2024 10:10  Ca    8.5      31 Mar 2024 07:00  Phos  2.6     04-01  Mg     2.50     04-01      Magnesium: 2.50 mg/dL (04-01-24 @ 06:00)  Magnesium: 2.50 mg/dL (03-31-24 @ 10:10)  Magnesium: 2.40 mg/dL (03-31-24 @ 07:00)  Magnesium: 2.60 mg/dL (03-30-24 @ 05:13)    Phosphorus: 2.6 mg/dL (04-01-24 @ 06:00)  Phosphorus: 2.8 mg/dL (03-31-24 @ 10:10)  Phosphorus: 2.7 mg/dL (03-31-24 @ 07:00)  Phosphorus: 2.7 mg/dL (03-30-24 @ 05:13)                    Urinalysis Basic - ( 01 Apr 2024 06:00 )    Color: x / Appearance: x / SG: x / pH: x  Gluc: 111 mg/dL / Ketone: x  / Bili: x / Urobili: x   Blood: x / Protein: x / Nitrite: x   Leuk Esterase: x / RBC: x / WBC x   Sq Epi: x / Non Sq Epi: x / Bacteria: x                              9.9    10.90 )-----------( 637      ( 01 Apr 2024 06:00 )             30.8                         9.5    11.79 )-----------( 651      ( 31 Mar 2024 13:45 )             29.7                         10.7   16.18 )-----------( 728      ( 30 Mar 2024 05:13 )             35.2     CAPILLARY BLOOD GLUCOSE      POCT Blood Glucose.: 275 mg/dL (01 Apr 2024 17:22)  POCT Blood Glucose.: 276 mg/dL (01 Apr 2024 12:49)  POCT Blood Glucose.: 123 mg/dL (01 Apr 2024 06:21)  POCT Blood Glucose.: 112 mg/dL (31 Mar 2024 23:52)        MICROBIOLOGY:     RADIOLOGY:  [ ] Reviewed and interpreted by me    Point of Care Ultrasound Findings:    PFT:    EKG:

## 2024-04-01 NOTE — CONSULT NOTE ADULT - PROBLEM SELECTOR RECOMMENDATION 7
Thank you for allowing us to participate in your patient's care. We will continue to follow with you. Please page [phone] for any q's or c's. The Geriatric and Palliative Medicine service has coverage 24 hours a day/ 7 days a week to provide medical recommendations regarding symptom management needs via telephone.    Juliana Lucas MD  Palliative Medicine Thank you for allowing us to participate in your patient's care. We will sign off at this time as goals are clear. Please reconsult as needed.    Juliana Lucas MD  Palliative Medicine

## 2024-04-01 NOTE — CONSULT NOTE ADULT - TIME BILLING
review of laboratory data, radiology results, discussion with primary team\patient, and monitoring for potential decompensation. Interventions were performed as documented above
Time spent for extensive review of the physical chart, electronic medical record, and documentation to obtain collateral information including but not limited to:  [x ] Inpatient records (ED, H&P, primary team, and consultants if applicable, care coordination)  [x] Inpatient values/results (biomarkers, immunoassays, imaging, and microbiology results)  [x] Current or proposed treatment plans  [x] Discussion with the primary team  [x] Discussion with the patient, surrogate decision maker, or family    Time spent: >75 mins (separate from ACP)

## 2024-04-01 NOTE — CONSULT NOTE ADULT - ASSESSMENT
Ms. Vaz is a 89yo F with PMH significant for CAD s/p PCI x6 11/2019 at [facility], HTN, HLD, T2DM, and HFpEF who presented with fever and generalized weakness of 2 days’ duration, admitted with sepsis and acute hypoxic respiratory failure due to influenza. Pt was admitted to the ICU, intubated for 8 days, extubated and downgraded to the floors. Course c/b seizure. Palliative care c/s for nutritional goals of care. Pt currently on NGT feeding

## 2024-04-01 NOTE — CONSULT NOTE ADULT - PROBLEM SELECTOR RECOMMENDATION 2
Failed bedside swallow eval x2, last 3/29  Currently on  NGT  Per family no known hx of dementia and at home has a chronic cough with feeding without overt aspiration even  Likely secondary to acute illness and component of delirium  Family defers artificial nutrition/PEG tube and prefers hand feeding/pleasure feeding

## 2024-04-01 NOTE — PROGRESS NOTE ADULT - PROBLEM SELECTOR PLAN 1
- febrile, tachycardic, with leukocytosis on admission   - requiring supplemental O2  - Intubated for 8 days 3/17 - 3/25 for airway protection in the setting of altered mental status in the setting of PNA from bacterial and influenza sources   - s/p tamiflu and zosyn course in the ICU    - now on NC, trial off HFNC  - continue Duoneb, hypertonic saline, guaifenesin  pall care spoke with son, trial of puree diet   - pulm input apprec  - repeated blood cultures on 3/29 NGTD  CT chest to r/o aspiration, done, await report  - start zosyn empirically on 3/31. if blood cultures and CT chest remain negative, can opt for a brief 48 hr course of antibiotics

## 2024-04-01 NOTE — PROGRESS NOTE ADULT - PROBLEM SELECTOR PLAN 5
- s/p PCI x6 at [facility] 11/2019  - resume home aspirin 81 mg daily, atorvastatin 80 mg daily   - continue to hold Xarelto 2.5mg po BID, resume once able to brittany PO  - no acute evidence of ischemia

## 2024-04-01 NOTE — PROGRESS NOTE ADULT - ATTENDING COMMENTS
88F admitted to MICU for acute hypoxemic respiratory failure insetting of flu, now extubated and sent to floors.  Pulmonary consulted for hypoxemia.  Patient was weaned to room air with saturations 94 - 98%.   Intermittent desaturation observed during sleep likely in setting of NATHALIA and will need outpatient sleep study.  Pulmonary team to sign off.  Please email [email or web address] to arrange for follow up on discharge.

## 2024-04-01 NOTE — PROGRESS NOTE ADULT - ATTENDING SUPERVISION STATEMENT
Fellow
Resident
Fellow
Fellow
Resident
Fellow
Resident

## 2024-04-01 NOTE — CONSULT NOTE ADULT - REASON FOR ADMISSION
influenza, sepsis, hypoxia
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
## 2024-04-01 NOTE — CONSULT NOTE ADULT - CONVERSATION DETAILS
Palliative consulted for complex medical decision making  in the setting of serious illness. Spoke to patient and son in shared common language of Yoruba.  Pt is awake and alert, on supplemental oxygen, knows she's in the hospital, able to identify her son and share that she has [facility].     Discussed patient's medical course -- son shared that she was admitted for the Flu as multiple family members had it and ended up in the ICU on the ventilator for multiple days and was sucessfully extubated. He shared that currently pt has failed her speech and swallow evaluation twice. He shared that it was complicated by her acute delirium/confusion and he feels today she's much more closer to her baseline and it's causing her stress and anxiety that she's not able to eat by mouth. He shared that at this time he does not wish to pursue PEG tube because he feels that it would agitate her further and she could potentially have infections from it. He shared that at She's able to tolerate regular food with some cough at home. She does not have any hx of dementia and at baseline she is very functional and able to take care of herself. She lives with her son and his family and spends time with her young grandchildren. She also has not had frequent hospitalization.  I agreed that given pt's age and medical comorbidities, PEG tube likely will have more harm than benefits. Discussed pleasure feeds and son shared that he's in agreement to pursue oral feeding if she fails her swallow evaluation again.     Discussed that pt is at risk of aspiration event if she resumes po food. Discussed advanced directives, specifically if she has respiratory failure again, on whether family would want her to be reintubated again. The pt deferred to her son when I ask her about her wishes for cpr/intubation. Reviewed the risks and benefits of these interventions especially given her extensive cardiac hx and recent intubation, sharing that these interventions might pose more burden than benefit. Son shared that at this time family wishes to trial all attempts at life sustaining measures including CPR and intubation as needed.

## 2024-04-01 NOTE — CONSULT NOTE ADULT - PROBLEM SELECTOR RECOMMENDATION 3
In the setting of acute infection/ICU course and seizure  Currently on wrist restraints; pt calm on my exam, oriented to self and location  Son reports mental status improving  Recommend:   Frequently reorient the patient and involve her in care (give simple explanations of procedures, tests and medications)  Avoid unnecessary interruptions to sleep during nighttime hours  Avoid medications that exacerbate delirium such as narcotics, benzodiazepines, barbiturates, ambien, lunesta, and medications with excessive anticholinergic properties  If needed consider low dose antipsychotics such as Quetiapine for severe agitation

## 2024-04-01 NOTE — CONSULT NOTE ADULT - PROBLEM SELECTOR RECOMMENDATION 4
s/p PCI x6 at [facility] 11/2019  Son shared she's not had any recent hospitalizations associated with it  >>Currently on ASA and statin

## 2024-04-02 LAB
ANION GAP SERPL CALC-SCNC: 12 MMOL/L — SIGNIFICANT CHANGE UP (ref 7–14)
BUN SERPL-MCNC: 23 MG/DL — SIGNIFICANT CHANGE UP (ref 7–23)
CALCIUM SERPL-MCNC: 9.1 MG/DL — SIGNIFICANT CHANGE UP (ref 8.4–10.5)
CHLORIDE SERPL-SCNC: 106 MMOL/L — SIGNIFICANT CHANGE UP (ref 98–107)
CO2 SERPL-SCNC: 24 MMOL/L — SIGNIFICANT CHANGE UP (ref 22–31)
CREAT SERPL-MCNC: 0.96 MG/DL — SIGNIFICANT CHANGE UP (ref 0.5–1.3)
EGFR: 58 ML/MIN/1.73M2 — LOW
GLUCOSE BLDC GLUCOMTR-MCNC: 103 MG/DL — HIGH (ref 70–99)
GLUCOSE BLDC GLUCOMTR-MCNC: 118 MG/DL — HIGH (ref 70–99)
GLUCOSE BLDC GLUCOMTR-MCNC: 310 MG/DL — HIGH (ref 70–99)
GLUCOSE BLDC GLUCOMTR-MCNC: 319 MG/DL — HIGH (ref 70–99)
GLUCOSE BLDC GLUCOMTR-MCNC: 403 MG/DL — HIGH (ref 70–99)
GLUCOSE BLDC GLUCOMTR-MCNC: 405 MG/DL — HIGH (ref 70–99)
GLUCOSE BLDC GLUCOMTR-MCNC: 414 MG/DL — HIGH (ref 70–99)
GLUCOSE BLDC GLUCOMTR-MCNC: 444 MG/DL — HIGH (ref 70–99)
GLUCOSE BLDC GLUCOMTR-MCNC: 459 MG/DL — CRITICAL HIGH (ref 70–99)
GLUCOSE BLDC GLUCOMTR-MCNC: 476 MG/DL — CRITICAL HIGH (ref 70–99)
GLUCOSE SERPL-MCNC: 70 MG/DL — SIGNIFICANT CHANGE UP (ref 70–99)
MAGNESIUM SERPL-MCNC: 2.6 MG/DL — SIGNIFICANT CHANGE UP (ref 1.6–2.6)
PHOSPHATE SERPL-MCNC: 3.7 MG/DL — SIGNIFICANT CHANGE UP (ref 2.5–4.5)
POTASSIUM SERPL-MCNC: 4.2 MMOL/L — SIGNIFICANT CHANGE UP (ref 3.5–5.3)
POTASSIUM SERPL-SCNC: 4.2 MMOL/L — SIGNIFICANT CHANGE UP (ref 3.5–5.3)
SODIUM SERPL-SCNC: 142 MMOL/L — SIGNIFICANT CHANGE UP (ref 135–145)

## 2024-04-02 PROCEDURE — 99232 SBSQ HOSP IP/OBS MODERATE 35: CPT

## 2024-04-02 RX ORDER — INSULIN LISPRO 100/ML
6 VIAL (ML) SUBCUTANEOUS ONCE
Refills: 0 | Status: COMPLETED | OUTPATIENT
Start: 2024-04-02 | End: 2024-04-02

## 2024-04-02 RX ORDER — INSULIN LISPRO 100/ML
3 VIAL (ML) SUBCUTANEOUS
Refills: 0 | Status: DISCONTINUED | OUTPATIENT
Start: 2024-04-02 | End: 2024-04-05

## 2024-04-02 RX ORDER — INSULIN LISPRO 100/ML
3 VIAL (ML) SUBCUTANEOUS
Refills: 0 | Status: DISCONTINUED | OUTPATIENT
Start: 2024-04-03 | End: 2024-04-05

## 2024-04-02 RX ADMIN — Medication 3 MILLILITER(S): at 09:53

## 2024-04-02 RX ADMIN — PIPERACILLIN AND TAZOBACTAM 25 GRAM(S): 4; .5 INJECTION, POWDER, LYOPHILIZED, FOR SOLUTION INTRAVENOUS at 12:34

## 2024-04-02 RX ADMIN — POLYETHYLENE GLYCOL 3350 17 GRAM(S): 17 POWDER, FOR SOLUTION ORAL at 17:45

## 2024-04-02 RX ADMIN — INSULIN GLARGINE 10 UNIT(S): 100 INJECTION, SOLUTION SUBCUTANEOUS at 22:46

## 2024-04-02 RX ADMIN — HEPARIN SODIUM 5000 UNIT(S): 5000 INJECTION INTRAVENOUS; SUBCUTANEOUS at 06:28

## 2024-04-02 RX ADMIN — Medication 3 MILLILITER(S): at 15:30

## 2024-04-02 RX ADMIN — Medication 3 UNIT(S): at 17:42

## 2024-04-02 RX ADMIN — Medication 15 MILLILITER(S): at 12:35

## 2024-04-02 RX ADMIN — BUDESONIDE AND FORMOTEROL FUMARATE DIHYDRATE 2 PUFF(S): 160; 4.5 AEROSOL RESPIRATORY (INHALATION) at 22:49

## 2024-04-02 RX ADMIN — SENNA PLUS 2 TABLET(S): 8.6 TABLET ORAL at 22:35

## 2024-04-02 RX ADMIN — Medication 40 MILLIGRAM(S): at 06:27

## 2024-04-02 RX ADMIN — PIPERACILLIN AND TAZOBACTAM 25 GRAM(S): 4; .5 INJECTION, POWDER, LYOPHILIZED, FOR SOLUTION INTRAVENOUS at 06:27

## 2024-04-02 RX ADMIN — HEPARIN SODIUM 5000 UNIT(S): 5000 INJECTION INTRAVENOUS; SUBCUTANEOUS at 22:47

## 2024-04-02 RX ADMIN — SODIUM CHLORIDE 4 MILLILITER(S): 9 INJECTION INTRAMUSCULAR; INTRAVENOUS; SUBCUTANEOUS at 20:50

## 2024-04-02 RX ADMIN — Medication 4: at 17:42

## 2024-04-02 RX ADMIN — CHLORHEXIDINE GLUCONATE 1 APPLICATION(S): 213 SOLUTION TOPICAL at 12:35

## 2024-04-02 RX ADMIN — POLYETHYLENE GLYCOL 3350 17 GRAM(S): 17 POWDER, FOR SOLUTION ORAL at 06:27

## 2024-04-02 RX ADMIN — Medication 6 MILLIGRAM(S): at 22:35

## 2024-04-02 RX ADMIN — Medication 20 MICROGRAM(S): at 22:41

## 2024-04-02 RX ADMIN — BUDESONIDE AND FORMOTEROL FUMARATE DIHYDRATE 2 PUFF(S): 160; 4.5 AEROSOL RESPIRATORY (INHALATION) at 08:58

## 2024-04-02 RX ADMIN — FAMOTIDINE 20 MILLIGRAM(S): 10 INJECTION INTRAVENOUS at 12:30

## 2024-04-02 RX ADMIN — ATORVASTATIN CALCIUM 80 MILLIGRAM(S): 80 TABLET, FILM COATED ORAL at 22:34

## 2024-04-02 RX ADMIN — Medication 6: at 12:30

## 2024-04-02 RX ADMIN — Medication 3 MILLILITER(S): at 20:34

## 2024-04-02 RX ADMIN — Medication 6 UNIT(S): at 13:52

## 2024-04-02 RX ADMIN — LEVETIRACETAM 1000 MILLIGRAM(S): 250 TABLET, FILM COATED ORAL at 17:46

## 2024-04-02 RX ADMIN — Medication 3 MILLILITER(S): at 03:40

## 2024-04-02 RX ADMIN — HEPARIN SODIUM 5000 UNIT(S): 5000 INJECTION INTRAVENOUS; SUBCUTANEOUS at 13:53

## 2024-04-02 RX ADMIN — SODIUM CHLORIDE 4 MILLILITER(S): 9 INJECTION INTRAMUSCULAR; INTRAVENOUS; SUBCUTANEOUS at 09:53

## 2024-04-02 RX ADMIN — Medication 1 APPLICATION(S): at 22:50

## 2024-04-02 RX ADMIN — LEVETIRACETAM 1000 MILLIGRAM(S): 250 TABLET, FILM COATED ORAL at 06:31

## 2024-04-02 RX ADMIN — Medication 81 MILLIGRAM(S): at 12:31

## 2024-04-02 NOTE — PROGRESS NOTE ADULT - SUBJECTIVE AND OBJECTIVE BOX
[facility] Division of Hospital Medicine  Malgorzata Clark MD  Pager [phone]    Patient is a 88y old  Female who presents with a chief complaint of influenza, sepsis, hypoxia       SUBJECTIVE / OVERNIGHT EVENTS: resting comfortably; per PCA, pt did well with puree diet with assistance      MEDICATIONS  (STANDING):  albuterol/ipratropium for Nebulization 3 milliLiter(s) Nebulizer every 6 hours  aspirin  chewable 81 milliGRAM(s) Oral daily  atorvastatin 80 milliGRAM(s) Oral at bedtime  budesonide 160 MICROgram(s)/formoterol 4.5 MICROgram(s) Inhaler 2 Puff(s) Inhalation two times a day  chlorhexidine 2% Cloths 1 Application(s) Topical daily  dextrose 5%. 1000 milliLiter(s) (100 mL/Hr) IV Continuous <Continuous>  famotidine    Tablet 20 milliGRAM(s) Oral daily  glucagon  Injectable 1 milliGRAM(s) IntraMuscular once  heparin   Injectable 5000 Unit(s) SubCutaneous every 8 hours  influenza  Vaccine (HIGH DOSE) 0.7 milliLiter(s) IntraMuscular once  insulin glargine Injectable (LANTUS) 10 Unit(s) SubCutaneous at bedtime  insulin lispro (ADMELOG) corrective regimen sliding scale   SubCutaneous three times a day before meals  levETIRAcetam   Injectable 1000 milliGRAM(s) IV Push every 12 hours  levothyroxine Injectable 20 MICROGram(s) IV Push at bedtime  LORazepam     Tablet 1 milliGRAM(s) Oral once  melatonin 6 milliGRAM(s) Oral at bedtime  multivitamin/minerals/iron Oral Solution (CENTRUM) 15 milliLiter(s) Oral daily  petrolatum Ophthalmic Ointment 1 Application(s) Both EYES daily  piperacillin/tazobactam IVPB.. 3.375 Gram(s) IV Intermittent every 8 hours  polyethylene glycol 3350 17 Gram(s) Oral two times a day  senna 2 Tablet(s) Oral at bedtime  sodium chloride 7% Inhalation 4 milliLiter(s) Inhalation every 12 hours    MEDICATIONS  (PRN):  acetaminophen   Oral Liquid .. 650 milliGRAM(s) Oral every 6 hours PRN Temp greater or equal to 38C (100.4F), Mild Pain (1 - 3), Moderate Pain (4 - 6)  guaiFENesin Oral Liquid (Sugar-Free) 200 milliGRAM(s) Oral every 6 hours PRN Cough      CAPILLARY BLOOD GLUCOSE  POCT Blood Glucose.: 414 mg/dL (02 Apr 2024 12:17)  POCT Blood Glucose.: 476 mg/dL (02 Apr 2024 12:13)  POCT Blood Glucose.: 103 mg/dL (02 Apr 2024 08:45)  POCT Blood Glucose.: 130 mg/dL (01 Apr 2024 22:20)  POCT Blood Glucose.: 275 mg/dL (01 Apr 2024 17:22)      PHYSICAL EXAM:  Vital Signs Last 24 Hrs  T(F): 98 (02 Apr 2024 11:27), Max: 98.4 (01 Apr 2024 14:00)  HR: 110 (02 Apr 2024 11:27) (78 - 110)  BP: 112/74 (02 Apr 2024 11:27) (112/74 - 129/75)  RR: 18 (02 Apr 2024 11:27) (17 - 18)  SpO2: 99% (02 Apr 2024 11:27) (96% - 100%)    Parameters below as of 02 Apr 2024 11:27  Patient On (Oxygen Delivery Method): room air        CONSTITUTIONAL: NAD, appears comfortable  EYES: PERRLA; conjunctiva and sclera clear  ENMT: Moist oral mucosa; normal dentition  RESPIRATORY: Normal respiratory effort; grossly b/l AE  CARDIOVASCULAR: Regular rate and rhythm; No lower extremity edema  ABDOMEN: Nontender to palpation, normoactive bowel sounds  MUSCULOSKELETAL:  no clubbing or cyanosis of digits; no joint swelling or tenderness to palpation  PSYCH: calm, coop; affect appropriate  NEUROLOGY: moves all ext  SKIN: No rashes; no palpable lesions    LABS:               04-02    142  |  106  |  23  ----------------------------<  70  4.2   |  24  |  0.96    Ca    9.1      02 Apr 2024 05:26  Phos  3.7     04-02  Mg     2.60     04-02

## 2024-04-02 NOTE — PROVIDER CONTACT NOTE (OTHER) - ASSESSMENT
patient sitting restless with mitten restraints due to attempt to pull out NG tube & IV line
pt is pt is A&Ox1, vital signs stable. incontinent. previously with fuchs. maintained on bilateral secure mittens for pulling out NG tubes.
Awake, oriented x2. Son at side translating; pt states she was walking and got tired and sat herself on floor next to bed. Pt also urinated on floor.
Pt AxoX4. pt's nightime FS is 310. Pt has no sliding scale ordered
Awake, pt c/o difficulty breathing, wheezing noted. SPO2 95-98%.
All other vitals are stable at this time. pt has no s/s of distress at this time
Pt AxOx2-3. pt previously on room air, but satting in the high 80s. Pt restless. Pt placed on oxygen, but still decreasing, so placed on a nonrebreather. Pt w/ wheezes on auscultation
pt fs 79. asymptomatic. vitals signs stable.
pt is A&Ox0, awaked and alert, no s/s of distress
pt is A&ox1, confused. pt on 5L of oxygen on nasal cannula. pt has a NG tube placed. pt attempted to pull out the NG tube multiple times.
Pt A&Ox1; confused and restless.  Currently on b/l secured mittens.  Pulled out NG tube.
patient sitting restless with mitten restraint due to risk of pulling out NG tube and iv line
pt is A&Ox1,confused, agitated with bilateral secured mitten restraint for pulling out her NG tube.  vital signs stable. pt on high flow nasal cannula for oxygen. pt remain NPO.
No changes at this time.
patient is sitting restless in mitten restraints due to risk of pulling NG tube and IV line

## 2024-04-02 NOTE — PROVIDER CONTACT NOTE (OTHER) - RECOMMENDATIONS
no new order
order for oxygen
Administer humulin N 3units
order for sliding scale
providers made aware no further action at this time
recheck glucose, if above 400 give additional 6 unit of insulin and recheck after 1 hour.
withhold insulin NPH till pt back on tube feed.
EKG
Reassess for a new NG tube?
continue to bladder scan q8h. notify provider as needed
preform EKG

## 2024-04-02 NOTE — PROVIDER CONTACT NOTE (OTHER) - NAME OF MD/NP/PA/DO NOTIFIED:
Alma Pizarro
Blanca Santamaria
Leia Tierney
Lala Watters
Kitty Denise, NP
Leia Tierney
shilpa wooten
Brittany Beckwith
Ramon ACP
shilpa wooten
Blanca Santamaria
All Simmons
Brittany Beckwith
Melanie J
shilpa wooten

## 2024-04-02 NOTE — PROVIDER CONTACT NOTE (OTHER) - DATE AND TIME:
29-Mar-2024 14:30
15-Mar-2024 14:05
26-Mar-2024 20:52
27-Mar-2024 02:20
29-Mar-2024 05:03
29-Mar-2024 05:03
31-Mar-2024 02:00
27-Mar-2024 20:30
30-Mar-2024 01:30
02-Apr-2024 12:26
15-Mar-2024 11:50
17-Mar-2024 01:05
15-Mar-2024 22:31
27-Mar-2024 10:00
29-Mar-2024 06:05

## 2024-04-02 NOTE — PROVIDER CONTACT NOTE (OTHER) - SITUATION
Pt c/o difficulty breathing, wheezing noted; SPO2 95-98% on O2 via nc at 1L. Pt on IV fluids for previous episode of hypotension; fluids stopped.
pt need order for bilateral secure mitten restraints
AM vitals taken bp 155/96 hr 135bpm o2 98%
patient repeat vs 151/94 hr 129 o2 99
pt blood glucose is 476 and repeat glucose is 414. 6 unit of insulin given as order
pt was not on tube feed for 5 hours. FS Q6H was 79.
pt's nightime FS is 310
Pt got out of restraints and pulled out NG tube
patient bp 155/96 hr 135bpm o2 98
pt HR between 116-120 throughout shift. pt has been tachy as normal on this admission
1st bladder scan on shit performed at 22:00 as schedule q8h. showed 280ml retaining. 2nd BS showed 407ml retained. straight intermittent catheterization performed with 250ml of urine.
Pt found sitting on floor with son and PCA at side; as per son, pt was walking back from btm with PCA and got tired and sat herself on floor.
pt pulled out NG tube during day shift. status NPO. asked if/when pt will have the NG tube reinserted.
Patients blood glucose 431 and on repeat 418.
pt previously on room air, but satting in the high 80s

## 2024-04-02 NOTE — PROGRESS NOTE ADULT - ASSESSMENT
Patient is an 89yo F with PMH significant for CAD s/p PCI x6 11/2019 at [facility], HTN, HLD, T2DM, and HFpEF who presented with fever and generalized weakness of 2 days’ duration, admitted with sepsis and acute hypoxic respiratory failure due to influenza. Patient required an upgrade in care to the MICU after an RRT on 3/17 during which patient was intubated for AMS. Patient completed a course of PNA treatment with Tamiflu and  Zosyn. Course c/b seizure like activity, LP unrevealing and EEG showing multifocal epileptiform potential. Although no acute seizures are documented, unable to completely rule out either. Transferred to floors for further management.

## 2024-04-02 NOTE — PROVIDER CONTACT NOTE (OTHER) - ACTION/TREATMENT ORDERED:
Will f/u
glucose recheck and additional 6 units given. the glucose rechecked after 1 hour of insulin administration. plan of care continues.
bilateral secure mitten hand restraint.
notify provider for next bladder scan result. continue to monitor for any changes.
EKG ordered
acp waiting for speech and swallow test to be performed during the day. pending NG tube reinsertion.
EKG ordered
Insulin administered as per order. Dose to be increased to [id number]. Will continue to monitor blood glucose q6hrs.
no new order. will pass report to day RN
sliding scale order placed
IV fluids stopped, Respiratory therapist called to admin ordered nebulizer. O2 increased to 2L/min via nc for comfort, SPO2 100%. Care plan ongoing.
Pt assisted back to bed with assist, examined by PA. Pt hypotensive; IV fluids ordered and infusing. Safety precautions reinforced. Care plan ongoing.
will come to the bedside to assess the patient. give the duonebs. give the 40mg of lasix
no further action, HR will be reassessed

## 2024-04-02 NOTE — PROGRESS NOTE ADULT - PROBLEM SELECTOR PLAN 1
- febrile, tachycardic, with leukocytosis on admission   - requiring supplemental O2  - Intubated for 8 days 3/17 - 3/25 for airway protection in the setting of altered mental status in the setting of PNA from bacterial and influenza sources   - s/p tamiflu and zosyn course in the ICU    - now on NC, trial off HFNC  - continue Duoneb, hypertonic saline, guaifenesin  pall care spoke with son, trial of puree diet   - pulm input apprec  - repeated blood cultures on 3/29 NGTD  CT chest without infiltrate; mucus impaction; will stop abx

## 2024-04-02 NOTE — PROGRESS NOTE ADULT - PROBLEM SELECTOR PLAN 5
- s/p PCI x6 at [facility] 11/2019  - resume home aspirin 81 mg daily, atorvastatin 80 mg daily   - continue to hold Xarelto 2.5mg po BID, resume once able to brittany PO  - no acute evidence of ischemia

## 2024-04-02 NOTE — PROVIDER CONTACT NOTE (OTHER) - BACKGROUND
pt admitted for fever/weakness for 2 days for sepsis/acute respiratory failure due to influenza.
pt admitted for fever/weakness for 2 days. Pt is A&Ox1 confused.
Admitted for hypoxia 2/2 flu and pna s/p MICU. Hx of CAD, CHF, DM & HTN
History of DM. Pt currently on enteral feeding
Pt admitted for pneumonia
Pt admitted with Flu, weakness and fevers at home
patient admitted for generalized weakness x2 days acute hypoxic respiratory failure due to influenza diagnosed with pneumonia
Admitted with fevers & weakness at home, dx: influenza
pt admitted for pneumonia
88 y.o female admitted for respiratory distress/fever/weakness for 2 days. PMHx of CHF, HLD, DM, CAD.
patient 88yr female admitted with sepsis and acute hypoxic respiratory failure diagnosed with pneumonia
pt admitted for Pneumonia due to infectious organism
patient 88yr female admitted for generalized weakness x2days admitted with sepsis and acute hypoxia respiratory failure due to influenza. diagnosed with pneumonia
pt admitted with Pneumonia
pt failed trial of void on 3/29. bladder scan q8h ordered. straight catherization ordered when urine retained over 300ml.

## 2024-04-02 NOTE — PROVIDER CONTACT NOTE (OTHER) - REASON
patient tachycardic and hypertensive
pt straight cath with 250ml of urine output, bladder scan showed 350ml remains
pt previously on room air, but satting in the high 80s
pt NG tube status
pt blood glucose is 476 and repeat glucose is 414. 6 unit of insulin given as order
repeat vital signs 565a
Pt found sitting on floor with son and PCA at side; as per son, pt was walking back from btm with PCA and got tired and sat herself on floor.
Pt pulled out NG tube
hyperglycemia
patient vital signs irregular 565a
Pt c/o difficulty breathing, wheezing noted; SPO2 95-98% on O2 via nc at 1L. Pt on IV fluids for previous episode of hypotension.
pt need restraint order put in
pt's NG tube feed clogged/ not on feed for 5 hours
pt's nightime FS is 310
Pt tachy -120 . Pt has been tachy on previous vitals

## 2024-04-03 LAB
ANION GAP SERPL CALC-SCNC: 11 MMOL/L — SIGNIFICANT CHANGE UP (ref 7–14)
BUN SERPL-MCNC: 23 MG/DL — SIGNIFICANT CHANGE UP (ref 7–23)
CALCIUM SERPL-MCNC: 9.2 MG/DL — SIGNIFICANT CHANGE UP (ref 8.4–10.5)
CHLORIDE SERPL-SCNC: 105 MMOL/L — SIGNIFICANT CHANGE UP (ref 98–107)
CO2 SERPL-SCNC: 23 MMOL/L — SIGNIFICANT CHANGE UP (ref 22–31)
CREAT SERPL-MCNC: 0.79 MG/DL — SIGNIFICANT CHANGE UP (ref 0.5–1.3)
CULTURE RESULTS: SIGNIFICANT CHANGE UP
CULTURE RESULTS: SIGNIFICANT CHANGE UP
EGFR: 73 ML/MIN/1.73M2 — SIGNIFICANT CHANGE UP
GLUCOSE BLDC GLUCOMTR-MCNC: 117 MG/DL — HIGH (ref 70–99)
GLUCOSE BLDC GLUCOMTR-MCNC: 135 MG/DL — HIGH (ref 70–99)
GLUCOSE BLDC GLUCOMTR-MCNC: 142 MG/DL — HIGH (ref 70–99)
GLUCOSE BLDC GLUCOMTR-MCNC: 160 MG/DL — HIGH (ref 70–99)
GLUCOSE SERPL-MCNC: 111 MG/DL — HIGH (ref 70–99)
HCT VFR BLD CALC: 30.9 % — LOW (ref 34.5–45)
HGB BLD-MCNC: 9.6 G/DL — LOW (ref 11.5–15.5)
MAGNESIUM SERPL-MCNC: 2.4 MG/DL — SIGNIFICANT CHANGE UP (ref 1.6–2.6)
MCHC RBC-ENTMCNC: 29.2 PG — SIGNIFICANT CHANGE UP (ref 27–34)
MCHC RBC-ENTMCNC: 31.1 GM/DL — LOW (ref 32–36)
MCV RBC AUTO: 93.9 FL — SIGNIFICANT CHANGE UP (ref 80–100)
NRBC # BLD: 0 /100 WBCS — SIGNIFICANT CHANGE UP (ref 0–0)
NRBC # FLD: 0 K/UL — SIGNIFICANT CHANGE UP (ref 0–0)
PHOSPHATE SERPL-MCNC: 3.7 MG/DL — SIGNIFICANT CHANGE UP (ref 2.5–4.5)
PLATELET # BLD AUTO: 535 K/UL — HIGH (ref 150–400)
POTASSIUM SERPL-MCNC: 3.8 MMOL/L — SIGNIFICANT CHANGE UP (ref 3.5–5.3)
POTASSIUM SERPL-SCNC: 3.8 MMOL/L — SIGNIFICANT CHANGE UP (ref 3.5–5.3)
RBC # BLD: 3.29 M/UL — LOW (ref 3.8–5.2)
RBC # FLD: 15.6 % — HIGH (ref 10.3–14.5)
SODIUM SERPL-SCNC: 139 MMOL/L — SIGNIFICANT CHANGE UP (ref 135–145)
SPECIMEN SOURCE: SIGNIFICANT CHANGE UP
SPECIMEN SOURCE: SIGNIFICANT CHANGE UP
WBC # BLD: 14.48 K/UL — HIGH (ref 3.8–10.5)
WBC # FLD AUTO: 14.48 K/UL — HIGH (ref 3.8–10.5)

## 2024-04-03 PROCEDURE — 99232 SBSQ HOSP IP/OBS MODERATE 35: CPT

## 2024-04-03 RX ADMIN — Medication 1 APPLICATION(S): at 12:36

## 2024-04-03 RX ADMIN — Medication 81 MILLIGRAM(S): at 12:36

## 2024-04-03 RX ADMIN — LEVETIRACETAM 1000 MILLIGRAM(S): 250 TABLET, FILM COATED ORAL at 05:52

## 2024-04-03 RX ADMIN — Medication 650 MILLIGRAM(S): at 13:10

## 2024-04-03 RX ADMIN — SENNA PLUS 2 TABLET(S): 8.6 TABLET ORAL at 23:15

## 2024-04-03 RX ADMIN — SODIUM CHLORIDE 4 MILLILITER(S): 9 INJECTION INTRAMUSCULAR; INTRAVENOUS; SUBCUTANEOUS at 10:02

## 2024-04-03 RX ADMIN — HEPARIN SODIUM 5000 UNIT(S): 5000 INJECTION INTRAVENOUS; SUBCUTANEOUS at 23:15

## 2024-04-03 RX ADMIN — CHLORHEXIDINE GLUCONATE 1 APPLICATION(S): 213 SOLUTION TOPICAL at 12:44

## 2024-04-03 RX ADMIN — Medication 3 MILLILITER(S): at 19:15

## 2024-04-03 RX ADMIN — SODIUM CHLORIDE 4 MILLILITER(S): 9 INJECTION INTRAMUSCULAR; INTRAVENOUS; SUBCUTANEOUS at 19:16

## 2024-04-03 RX ADMIN — BUDESONIDE AND FORMOTEROL FUMARATE DIHYDRATE 2 PUFF(S): 160; 4.5 AEROSOL RESPIRATORY (INHALATION) at 08:41

## 2024-04-03 RX ADMIN — Medication 3 UNIT(S): at 12:37

## 2024-04-03 RX ADMIN — LEVETIRACETAM 1000 MILLIGRAM(S): 250 TABLET, FILM COATED ORAL at 18:17

## 2024-04-03 RX ADMIN — Medication 3 MILLILITER(S): at 16:32

## 2024-04-03 RX ADMIN — FAMOTIDINE 20 MILLIGRAM(S): 10 INJECTION INTRAVENOUS at 12:36

## 2024-04-03 RX ADMIN — HEPARIN SODIUM 5000 UNIT(S): 5000 INJECTION INTRAVENOUS; SUBCUTANEOUS at 05:57

## 2024-04-03 RX ADMIN — POLYETHYLENE GLYCOL 3350 17 GRAM(S): 17 POWDER, FOR SOLUTION ORAL at 18:16

## 2024-04-03 RX ADMIN — Medication 3 UNIT(S): at 18:14

## 2024-04-03 RX ADMIN — HEPARIN SODIUM 5000 UNIT(S): 5000 INJECTION INTRAVENOUS; SUBCUTANEOUS at 14:17

## 2024-04-03 RX ADMIN — Medication 3 MILLILITER(S): at 10:02

## 2024-04-03 RX ADMIN — ATORVASTATIN CALCIUM 80 MILLIGRAM(S): 80 TABLET, FILM COATED ORAL at 23:16

## 2024-04-03 RX ADMIN — BUDESONIDE AND FORMOTEROL FUMARATE DIHYDRATE 2 PUFF(S): 160; 4.5 AEROSOL RESPIRATORY (INHALATION) at 23:15

## 2024-04-03 RX ADMIN — INSULIN GLARGINE 10 UNIT(S): 100 INJECTION, SOLUTION SUBCUTANEOUS at 22:58

## 2024-04-03 RX ADMIN — Medication 20 MICROGRAM(S): at 23:14

## 2024-04-03 RX ADMIN — Medication 6 MILLIGRAM(S): at 23:16

## 2024-04-03 RX ADMIN — Medication 650 MILLIGRAM(S): at 12:40

## 2024-04-03 RX ADMIN — Medication 3 MILLILITER(S): at 02:51

## 2024-04-03 RX ADMIN — Medication 15 MILLILITER(S): at 12:36

## 2024-04-03 RX ADMIN — Medication 3 UNIT(S): at 08:40

## 2024-04-03 RX ADMIN — POLYETHYLENE GLYCOL 3350 17 GRAM(S): 17 POWDER, FOR SOLUTION ORAL at 06:00

## 2024-04-03 NOTE — PHYSICAL THERAPY INITIAL EVALUATION ADULT - GAIT DEVIATIONS NOTED, PT EVAL
decreased kim/decreased step length/decreased stride length
decreased kim/decreased step length/decreased stride length

## 2024-04-03 NOTE — PHYSICAL THERAPY INITIAL EVALUATION ADULT - CRITERIA FOR SKILLED THERAPEUTIC INTERVENTIONS
impairments found/functional limitations in following categories/risk reduction/prevention
impairments found/functional limitations in following categories/risk reduction/prevention/rehab potential

## 2024-04-03 NOTE — PHYSICAL THERAPY INITIAL EVALUATION ADULT - PRECAUTIONS/LIMITATIONS, REHAB EVAL
aspiration precautions/cardiac precautions/fall precautions/seizure precautions
DROPLET PRECAUTIONS/fall precautions/isolation precautions

## 2024-04-03 NOTE — PROGRESS NOTE ADULT - PROBLEM SELECTOR PLAN 5
- s/p PCI x6 at [facility] 11/2019  - resume home aspirin 81 mg daily, atorvastatin 80 mg daily   - continue to hold Xarelto 2.5mg po BID, resume once able to brittany PO  - no acute evidence of ischemia

## 2024-04-03 NOTE — CHART NOTE - NSCHARTNOTEFT_GEN_A_CORE
Nutrition Follow-Up/Chart Note         Source: Patient A&Ox    Family [ x ]     RN [  ]    Chart [x ]    Hospital Course:    Per 4/3/24 hospitalist attending chart review, Patient is an 87yo F with PMH significant for CAD s/p PCI x6 11/2019 at [facility], HTN, HLD, T2DM, and HFpEF who presented with fever and generalized weakness of 2 days’ duration, admitted with sepsis and acute hypoxic respiratory failure due to influenza. Patient required an upgrade in care to the MICU after an RRT on 3/17 during which patient was intubated for AMS. Patient completed a course of PNA treatment with Tamiflu and  Zosyn. Course c/b seizure like activity, LP unrevealing and EEG showing multifocal epileptiform potential. Although no acute seizures are documented, unable to completely rule out either. Transferred to floors for further management.     Diet, Pureed:   Moderately Thick Liquids (MODTHICKLIQS) (04-01-24 @ 12:51)    Nutrition Course:  Patient is being followed 2/2 receiving NGT at risk for further nutritional decline, per protocol.  Patient observed in the room with her son at bedside.  Patient was talking on the phone, not in distress per observation, son assisted in nutrition interview today, no  needed.  Patient was receiving Glucerna 1.5 @ 35ml/hr x 24 hrs to provide total volume 909ml/day (3/29-4/1).  TF provide total 1363kcal, 75gm pro, and 690 ml of free water.  Patient to resume PO diet 4/1 as pleasure feeds 2/ failed 3/28 speech and swallow evaluation.  Patient tolerating pureed and moderately thick liquids with small and frequent meals.  Son is amenable for Halal foods and supplement.  Recommend to initiate CSTCHOSN diet due to elevated POCT 103-479 (4/1-4/3), and provide Glucerna 1x/day (220 kcal, 10gm protein) to assist in oral intake.  Kitchen to provide thickeners.  No reported GI issues such as nausea/vomiting/diarrhea/constipation, on bowel regimen (senna/polyethylene glycol).  Last BM reported 3/30 per RN flowsheet.  Noted on MVI for micronutrient repletion.  Please continue to observe PO tolerate as pt at high risk for aspiration.  RDN to remain available for further nutrition intervention as indicated.      Anthropometrics:   Height (cm): 152.4 (03-26)  Weight (kg): 57 (03-26)  BMI (kg/m2): 24.5 (03-26)  IBW: 100 lbs/45.4kg +/-10%    Weight Assessment:  No new weight to assess.     Edema: No edema per RN flowsheets     Skin: noted sacral/gluteal erythema per RN flowsheet.    __________________ Pertinent Medications__________________   MEDICATIONS  (STANDING):  albuterol/ipratropium for Nebulization 3 milliLiter(s) Nebulizer every 6 hours  aspirin  chewable 81 milliGRAM(s) Oral daily  atorvastatin 80 milliGRAM(s) Oral at bedtime  budesonide 160 MICROgram(s)/formoterol 4.5 MICROgram(s) Inhaler 2 Puff(s) Inhalation two times a day  chlorhexidine 2% Cloths 1 Application(s) Topical daily  dextrose 5%. 1000 milliLiter(s) (100 mL/Hr) IV Continuous <Continuous>  famotidine    Tablet 20 milliGRAM(s) Oral daily  glucagon  Injectable 1 milliGRAM(s) IntraMuscular once  heparin   Injectable 5000 Unit(s) SubCutaneous every 8 hours  influenza  Vaccine (HIGH DOSE) 0.7 milliLiter(s) IntraMuscular once  insulin glargine Injectable (LANTUS) 10 Unit(s) SubCutaneous at bedtime  insulin lispro (ADMELOG) corrective regimen sliding scale   SubCutaneous three times a day before meals  insulin lispro Injectable (ADMELOG) 3 Unit(s) SubCutaneous before dinner  insulin lispro Injectable (ADMELOG) 3 Unit(s) SubCutaneous before lunch  insulin lispro Injectable (ADMELOG) 3 Unit(s) SubCutaneous before breakfast  levETIRAcetam   Injectable 1000 milliGRAM(s) IV Push every 12 hours  levothyroxine Injectable 20 MICROGram(s) IV Push at bedtime  LORazepam     Tablet 1 milliGRAM(s) Oral once  melatonin 6 milliGRAM(s) Oral at bedtime  multivitamin/minerals/iron Oral Solution (CENTRUM) 15 milliLiter(s) Oral daily  petrolatum Ophthalmic Ointment 1 Application(s) Both EYES daily  polyethylene glycol 3350 17 Gram(s) Oral two times a day  senna 2 Tablet(s) Oral at bedtime  sodium chloride 7% Inhalation 4 milliLiter(s) Inhalation every 12 hours    MEDICATIONS  (PRN):  acetaminophen   Oral Liquid .. 650 milliGRAM(s) Oral every 6 hours PRN Temp greater or equal to 38C (100.4F), Mild Pain (1 - 3), Moderate Pain (4 - 6)  guaiFENesin Oral Liquid (Sugar-Free) 200 milliGRAM(s) Oral every 6 hours PRN Cough      __________________ Pertinent Labs__________________   04-03 Na139 mmol/L Glu 111 mg/dL<H> K+ 3.8 mmol/L Cr  0.79 mg/dL BUN 23 mg/dL 04-03 Phos 3.7 mg/dL 03-28 Alb 3.3 g/dL      POCT Blood Glucose.: 135 mg/dL (04-03-24 @ 12:09)  POCT Blood Glucose.: 117 mg/dL (04-03-24 @ 08:35)  POCT Blood Glucose.: 118 mg/dL (04-02-24 @ 21:48)  POCT Blood Glucose.: 310 mg/dL (04-02-24 @ 17:13)  POCT Blood Glucose.: 319 mg/dL (04-02-24 @ 16:01)  POCT Blood Glucose.: 403 mg/dL (04-02-24 @ 15:04)  POCT Blood Glucose.: 405 mg/dL (04-02-24 @ 14:52)  POCT Blood Glucose.: 444 mg/dL (04-02-24 @ 13:32)  POCT Blood Glucose.: 459 mg/dL (04-02-24 @ 13:29)  POCT Blood Glucose.: 414 mg/dL (04-02-24 @ 12:17)  POCT Blood Glucose.: 476 mg/dL (04-02-24 @ 12:13)  POCT Blood Glucose.: 103 mg/dL (04-02-24 @ 08:45)  POCT Blood Glucose.: 130 mg/dL (04-01-24 @ 22:20)  POCT Blood Glucose.: 275 mg/dL (04-01-24 @ 17:22)  POCT Blood Glucose.: 276 mg/dL (04-01-24 @ 12:49)  POCT Blood Glucose.: 123 mg/dL (04-01-24 @ 06:21)      Estimated Needs Assessment: [  ] No change in need assessment  (Weight Used: recalculate via dosing 57kg)  Energy: 4803-4842 Kcal/kg/day ( 25-30kcal/kg)  Protein: 68-74 gm/kg/day  (1.2-1.3 gm/kg)  Fluid : Fluid per MD and team discretion.    Previous Nutrition Diagnosis:   Inadequate Protein Energy Intake    Excessive Energy Intake    Nutrition Diagnosis is : [ x ] ongoing     New Nutrition Diagnosis :  [x ] not applicable     Education:  [ x ] Not applicable 2/2 current prognosis    Recommendations:  1. Please initiate CSTCHOSN diet, add halal diet, and provide Glucerna 1x/day to assist in oral intake.  2. Honor food and fluid preferences as able.  3. Monitor PO intake, skin integrity, bowel regimen, weight trends, and nutrition pertinent labs.  2. RD to remain available for further nutritional intervention.

## 2024-04-03 NOTE — PHYSICAL THERAPY INITIAL EVALUATION ADULT - ACTIVE RANGE OF MOTION EXAMINATION, REHAB EVAL
lanre. upper extremity Active ROM was WNL (within normal limits)/bilateral lower extremity Active ROM was WNL (within normal limits)
bilateral upper extremity Active ROM was WFL (within functional limits)/bilateral  lower extremity Active ROM was WFL (within functional limits)

## 2024-04-03 NOTE — PHYSICAL THERAPY INITIAL EVALUATION ADULT - NSPTDISCHREC_GEN_A_CORE
to optimize safety in the home environment and promote improvement of strength and functional deficits/Home PT
Rehabilitation to improve strength and balance for return to prior level of function.

## 2024-04-03 NOTE — PHYSICAL THERAPY INITIAL EVALUATION ADULT - ADDITIONAL COMMENTS
Pt lives in a private house with her son with 3 steps to enter; bedroom/bathroom is on the first floor. Prior to hospital admission, pt was completely independent and used a single axis cane PRN. Pt denies any recent falls.    Pt left comfortable in bed, HOB elevated, NAD, all lines intact, all precautions maintained, with call bell in reach, bed alarm on, PCA in room, and RN and PA aware of PT evaluation.
Pt left seated in chair in NAD in the presence of PCA and son at bedside, all lines intact, call bell in reach,  +NC at 1L, and SPO2 100%.

## 2024-04-03 NOTE — PHYSICAL THERAPY INITIAL EVALUATION ADULT - MANUAL MUSCLE TESTING RESULTS, REHAB EVAL
Bilateral UE: 3+/5; Bilateral LE: 3+/5
pt having difficulty following commands; bilateral upper extremities at least 3/5, bilateral lower extremities at least 3-/5/grossly assessed due to

## 2024-04-03 NOTE — PHYSICAL THERAPY INITIAL EVALUATION ADULT - PLANNED THERAPY INTERVENTIONS, PT EVAL
balance training/bed mobility training/gait training/postural re-education/ROM/strengthening/transfer training
balance training/bed mobility training/gait training/strengthening/transfer training

## 2024-04-03 NOTE — PHYSICAL THERAPY INITIAL EVALUATION ADULT - PATIENT PROFILE REVIEW, REHAB EVAL
Activity Order: ambulate with assistance/yes
ACTIVITY ORDER: Increase as Tolerated; Spoke with LUCIANO Navarro prior->Pt OK for PT. Vitals taken; /72mmHg, heart rate 100bpm/yes

## 2024-04-03 NOTE — PROGRESS NOTE ADULT - ASSESSMENT
Patient is an 89yo F with PMH significant for CAD s/p PCI x6 11/2019 at [facility], HTN, HLD, T2DM, and HFpEF who presented with fever and generalized weakness of 2 days’ duration, admitted with sepsis and acute hypoxic respiratory failure due to influenza. Patient required an upgrade in care to the MICU after an RRT on 3/17 during which patient was intubated for AMS. Patient completed a course of PNA treatment with Tamiflu and  Zosyn. Course c/b seizure like activity, LP unrevealing and EEG showing multifocal epileptiform potential. Although no acute seizures are documented, unable to completely rule out either. Transferred to floors for further management.

## 2024-04-03 NOTE — PHYSICAL THERAPY INITIAL EVALUATION ADULT - LEVEL OF INDEPENDENCE: SIT/SUPINE, REHAB EVAL
Attempted to return pt back to bed however PCA requested pt sit in chair to perform hygiene care.
minimum assist (75% patients effort)

## 2024-04-03 NOTE — PROGRESS NOTE ADULT - SUBJECTIVE AND OBJECTIVE BOX
[facility] Division of Hospital Medicine  Malgorzata Clark MD  Pager [phone]    Patient is a 88y old  Female who presents with a chief complaint of influenza, sepsis, hypoxia      SUBJECTIVE / OVERNIGHT EVENTS: assist with meals, eating; in no distress      MEDICATIONS  (STANDING):  albuterol/ipratropium for Nebulization 3 milliLiter(s) Nebulizer every 6 hours  aspirin  chewable 81 milliGRAM(s) Oral daily  atorvastatin 80 milliGRAM(s) Oral at bedtime  budesonide 160 MICROgram(s)/formoterol 4.5 MICROgram(s) Inhaler 2 Puff(s) Inhalation two times a day  chlorhexidine 2% Cloths 1 Application(s) Topical daily  dextrose 5%. 1000 milliLiter(s) (100 mL/Hr) IV Continuous <Continuous>  famotidine    Tablet 20 milliGRAM(s) Oral daily  glucagon  Injectable 1 milliGRAM(s) IntraMuscular once  heparin   Injectable 5000 Unit(s) SubCutaneous every 8 hours  influenza  Vaccine (HIGH DOSE) 0.7 milliLiter(s) IntraMuscular once  insulin glargine Injectable (LANTUS) 10 Unit(s) SubCutaneous at bedtime  insulin lispro (ADMELOG) corrective regimen sliding scale   SubCutaneous three times a day before meals  insulin lispro Injectable (ADMELOG) 3 Unit(s) SubCutaneous before dinner  insulin lispro Injectable (ADMELOG) 3 Unit(s) SubCutaneous before breakfast  insulin lispro Injectable (ADMELOG) 3 Unit(s) SubCutaneous before lunch  levETIRAcetam   Injectable 1000 milliGRAM(s) IV Push every 12 hours  levothyroxine Injectable 20 MICROGram(s) IV Push at bedtime  LORazepam     Tablet 1 milliGRAM(s) Oral once  melatonin 6 milliGRAM(s) Oral at bedtime  multivitamin/minerals/iron Oral Solution (CENTRUM) 15 milliLiter(s) Oral daily  petrolatum Ophthalmic Ointment 1 Application(s) Both EYES daily  polyethylene glycol 3350 17 Gram(s) Oral two times a day  senna 2 Tablet(s) Oral at bedtime  sodium chloride 7% Inhalation 4 milliLiter(s) Inhalation every 12 hours    MEDICATIONS  (PRN):  acetaminophen   Oral Liquid .. 650 milliGRAM(s) Oral every 6 hours PRN Temp greater or equal to 38C (100.4F), Mild Pain (1 - 3), Moderate Pain (4 - 6)  guaiFENesin Oral Liquid (Sugar-Free) 200 milliGRAM(s) Oral every 6 hours PRN Cough      CAPILLARY BLOOD GLUCOSE  POCT Blood Glucose.: 135 mg/dL (03 Apr 2024 12:09)  POCT Blood Glucose.: 117 mg/dL (03 Apr 2024 08:35)  POCT Blood Glucose.: 118 mg/dL (02 Apr 2024 21:48)  POCT Blood Glucose.: 310 mg/dL (02 Apr 2024 17:13)  POCT Blood Glucose.: 319 mg/dL (02 Apr 2024 16:01)  POCT Blood Glucose.: 403 mg/dL (02 Apr 2024 15:04)  POCT Blood Glucose.: 405 mg/dL (02 Apr 2024 14:52)      PHYSICAL EXAM:  Vital Signs Last 24 Hrs  T(F): 97.8 (03 Apr 2024 14:00), Max: 98.1 (02 Apr 2024 21:55)  HR: 89 (03 Apr 2024 14:00) (84 - 102)  BP: 115/55 (03 Apr 2024 14:00) (115/55 - 149/67)  RR: 19 (03 Apr 2024 14:00) (18 - 19)  SpO2: 97% (03 Apr 2024 14:00) (95% - 99%)    Parameters below as of 03 Apr 2024 14:00  Patient On (Oxygen Delivery Method): room air        CONSTITUTIONAL: NAD, appears comfortable  EYES: PERRLA; conjunctiva and sclera clear  ENMT: Moist oral mucosa; normal dentition  RESPIRATORY: Normal respiratory effort; grossly b/l AE  CARDIOVASCULAR: Regular rate and rhythm; No lower extremity edema  ABDOMEN: Nontender to palpation, normoactive bowel sounds  MUSCULOSKELETAL:  no clubbing or cyanosis of digits; no joint swelling or tenderness to palpation  PSYCH: calm, coop; affect appropriate  NEUROLOGY: CN 2-12 are intact and symmetric; no gross sensory deficits   SKIN: No rashes; no palpable lesions    LABS:                        9.6    14.48 )-----------( 535      ( 03 Apr 2024 06:06 )             30.9     04-03    139  |  105  |  23  ----------------------------<  111<H>  3.8   |  23  |  0.79    Ca    9.2      03 Apr 2024 06:06  Phos  3.7     04-03  Mg     2.40     04-03

## 2024-04-03 NOTE — PHYSICAL THERAPY INITIAL EVALUATION ADULT - GENERAL OBSERVATIONS, REHAB EVAL
Pt encountered in semi-supine position in NAD, all lines intact, a&ox3, SPO2 100%, +NC at 1L, and son at bedside.
Pt encountered in semisupine position, no distress, AxOx2, with +IV, +pulse oximeter, and +fuchs. Pt agreeable to participate in PT evaluation.  Shellie #[id number] used.

## 2024-04-03 NOTE — PHYSICAL THERAPY INITIAL EVALUATION ADULT - PERTINENT HX OF CURRENT PROBLEM, REHAB EVAL
Pt is an 87yo F with PMH significant for CAD s/p PCI x6 11/2019 at [facility], HTN, HLD, T2DM, and HFpEF who presented with fever and generalized weakness of 2 days’ duration, admitted with sepsis and acute hypoxic respiratory failure due to influenza. Patient required an upgrade in care to the MICU after an RRT on 3/17 during which patient was intubated for AMS. Patient completed a course of PNA treatment with Tamiflu and  Zosyn. Course c/b seizure like activity, LP unrevealing and EEG showing multifocal epileptiform potential. Although no acute seizures are documented, unable to completely rule out either. Transferred to floors for further management. CT HEAD IMPRESSION: No acute intracranial hemorrhage, mass effect, or shift of the midline structures.
Patient is a 88 year old Female, PMH stated below, presents with influenza, sepsis, and hypoxia.

## 2024-04-04 LAB
ANION GAP SERPL CALC-SCNC: 15 MMOL/L — HIGH (ref 7–14)
ANION GAP SERPL CALC-SCNC: 15 MMOL/L — HIGH (ref 7–14)
BUN SERPL-MCNC: 16 MG/DL — SIGNIFICANT CHANGE UP (ref 7–23)
BUN SERPL-MCNC: 19 MG/DL — SIGNIFICANT CHANGE UP (ref 7–23)
CALCIUM SERPL-MCNC: 9.5 MG/DL — SIGNIFICANT CHANGE UP (ref 8.4–10.5)
CALCIUM SERPL-MCNC: 9.5 MG/DL — SIGNIFICANT CHANGE UP (ref 8.4–10.5)
CHLORIDE SERPL-SCNC: 103 MMOL/L — SIGNIFICANT CHANGE UP (ref 98–107)
CHLORIDE SERPL-SCNC: 103 MMOL/L — SIGNIFICANT CHANGE UP (ref 98–107)
CO2 SERPL-SCNC: 20 MMOL/L — LOW (ref 22–31)
CO2 SERPL-SCNC: 20 MMOL/L — LOW (ref 22–31)
CREAT SERPL-MCNC: 0.72 MG/DL — SIGNIFICANT CHANGE UP (ref 0.5–1.3)
CREAT SERPL-MCNC: 0.73 MG/DL — SIGNIFICANT CHANGE UP (ref 0.5–1.3)
EGFR: 80 ML/MIN/1.73M2 — SIGNIFICANT CHANGE UP
EGFR: 81 ML/MIN/1.73M2 — SIGNIFICANT CHANGE UP
GLUCOSE BLDC GLUCOMTR-MCNC: 129 MG/DL — HIGH (ref 70–99)
GLUCOSE BLDC GLUCOMTR-MCNC: 139 MG/DL — HIGH (ref 70–99)
GLUCOSE BLDC GLUCOMTR-MCNC: 160 MG/DL — HIGH (ref 70–99)
GLUCOSE BLDC GLUCOMTR-MCNC: 173 MG/DL — HIGH (ref 70–99)
GLUCOSE SERPL-MCNC: 118 MG/DL — HIGH (ref 70–99)
GLUCOSE SERPL-MCNC: 148 MG/DL — HIGH (ref 70–99)
HCT VFR BLD CALC: 33.5 % — LOW (ref 34.5–45)
HGB BLD-MCNC: 10.7 G/DL — LOW (ref 11.5–15.5)
MAGNESIUM SERPL-MCNC: 2.2 MG/DL — SIGNIFICANT CHANGE UP (ref 1.6–2.6)
MAGNESIUM SERPL-MCNC: 2.3 MG/DL — SIGNIFICANT CHANGE UP (ref 1.6–2.6)
MCHC RBC-ENTMCNC: 29.3 PG — SIGNIFICANT CHANGE UP (ref 27–34)
MCHC RBC-ENTMCNC: 31.9 GM/DL — LOW (ref 32–36)
MCV RBC AUTO: 91.8 FL — SIGNIFICANT CHANGE UP (ref 80–100)
NRBC # BLD: 0 /100 WBCS — SIGNIFICANT CHANGE UP (ref 0–0)
NRBC # FLD: 0 K/UL — SIGNIFICANT CHANGE UP (ref 0–0)
PHOSPHATE SERPL-MCNC: 3.6 MG/DL — SIGNIFICANT CHANGE UP (ref 2.5–4.5)
PHOSPHATE SERPL-MCNC: 4.2 MG/DL — SIGNIFICANT CHANGE UP (ref 2.5–4.5)
PLATELET # BLD AUTO: 546 K/UL — HIGH (ref 150–400)
POTASSIUM SERPL-MCNC: 4 MMOL/L — SIGNIFICANT CHANGE UP (ref 3.5–5.3)
POTASSIUM SERPL-MCNC: 4.1 MMOL/L — SIGNIFICANT CHANGE UP (ref 3.5–5.3)
POTASSIUM SERPL-SCNC: 4 MMOL/L — SIGNIFICANT CHANGE UP (ref 3.5–5.3)
POTASSIUM SERPL-SCNC: 4.1 MMOL/L — SIGNIFICANT CHANGE UP (ref 3.5–5.3)
RBC # BLD: 3.65 M/UL — LOW (ref 3.8–5.2)
RBC # FLD: 15.9 % — HIGH (ref 10.3–14.5)
SODIUM SERPL-SCNC: 138 MMOL/L — SIGNIFICANT CHANGE UP (ref 135–145)
SODIUM SERPL-SCNC: 138 MMOL/L — SIGNIFICANT CHANGE UP (ref 135–145)
WBC # BLD: 14.27 K/UL — HIGH (ref 3.8–10.5)
WBC # FLD AUTO: 14.27 K/UL — HIGH (ref 3.8–10.5)

## 2024-04-04 PROCEDURE — 99232 SBSQ HOSP IP/OBS MODERATE 35: CPT

## 2024-04-04 RX ORDER — ATORVASTATIN CALCIUM 80 MG/1
80 TABLET, FILM COATED ORAL AT BEDTIME
Refills: 0 | Status: DISCONTINUED | OUTPATIENT
Start: 2024-04-04 | End: 2024-04-05

## 2024-04-04 RX ORDER — SODIUM CHLORIDE 9 MG/ML
1000 INJECTION INTRAMUSCULAR; INTRAVENOUS; SUBCUTANEOUS
Refills: 0 | Status: DISCONTINUED | OUTPATIENT
Start: 2024-04-04 | End: 2024-04-05

## 2024-04-04 RX ORDER — LEVETIRACETAM 250 MG/1
1000 TABLET, FILM COATED ORAL
Refills: 0 | Status: DISCONTINUED | OUTPATIENT
Start: 2024-04-04 | End: 2024-04-05

## 2024-04-04 RX ORDER — SENNA PLUS 8.6 MG/1
2 TABLET ORAL AT BEDTIME
Refills: 0 | Status: DISCONTINUED | OUTPATIENT
Start: 2024-04-04 | End: 2024-04-05

## 2024-04-04 RX ADMIN — Medication 3 MILLILITER(S): at 10:34

## 2024-04-04 RX ADMIN — Medication 1: at 12:48

## 2024-04-04 RX ADMIN — Medication 3 MILLILITER(S): at 20:48

## 2024-04-04 RX ADMIN — Medication 3 UNIT(S): at 17:37

## 2024-04-04 RX ADMIN — CHLORHEXIDINE GLUCONATE 1 APPLICATION(S): 213 SOLUTION TOPICAL at 12:51

## 2024-04-04 RX ADMIN — Medication 30 MILLILITER(S): at 15:53

## 2024-04-04 RX ADMIN — POLYETHYLENE GLYCOL 3350 17 GRAM(S): 17 POWDER, FOR SOLUTION ORAL at 17:37

## 2024-04-04 RX ADMIN — FAMOTIDINE 20 MILLIGRAM(S): 10 INJECTION INTRAVENOUS at 12:51

## 2024-04-04 RX ADMIN — Medication 3 UNIT(S): at 12:49

## 2024-04-04 RX ADMIN — LEVETIRACETAM 1000 MILLIGRAM(S): 250 TABLET, FILM COATED ORAL at 05:00

## 2024-04-04 RX ADMIN — HEPARIN SODIUM 5000 UNIT(S): 5000 INJECTION INTRAVENOUS; SUBCUTANEOUS at 22:14

## 2024-04-04 RX ADMIN — SODIUM CHLORIDE 4 MILLILITER(S): 9 INJECTION INTRAMUSCULAR; INTRAVENOUS; SUBCUTANEOUS at 20:57

## 2024-04-04 RX ADMIN — Medication 3 UNIT(S): at 09:19

## 2024-04-04 RX ADMIN — Medication 6 MILLIGRAM(S): at 22:08

## 2024-04-04 RX ADMIN — BUDESONIDE AND FORMOTEROL FUMARATE DIHYDRATE 2 PUFF(S): 160; 4.5 AEROSOL RESPIRATORY (INHALATION) at 22:20

## 2024-04-04 RX ADMIN — Medication 81 MILLIGRAM(S): at 12:51

## 2024-04-04 RX ADMIN — INSULIN GLARGINE 10 UNIT(S): 100 INJECTION, SOLUTION SUBCUTANEOUS at 22:14

## 2024-04-04 RX ADMIN — ATORVASTATIN CALCIUM 80 MILLIGRAM(S): 80 TABLET, FILM COATED ORAL at 22:07

## 2024-04-04 RX ADMIN — SODIUM CHLORIDE 4 MILLILITER(S): 9 INJECTION INTRAMUSCULAR; INTRAVENOUS; SUBCUTANEOUS at 10:34

## 2024-04-04 RX ADMIN — HEPARIN SODIUM 5000 UNIT(S): 5000 INJECTION INTRAVENOUS; SUBCUTANEOUS at 05:10

## 2024-04-04 RX ADMIN — SODIUM CHLORIDE 75 MILLILITER(S): 9 INJECTION INTRAMUSCULAR; INTRAVENOUS; SUBCUTANEOUS at 09:57

## 2024-04-04 RX ADMIN — Medication 1 APPLICATION(S): at 12:52

## 2024-04-04 RX ADMIN — Medication 15 MILLILITER(S): at 12:51

## 2024-04-04 RX ADMIN — Medication 1: at 17:36

## 2024-04-04 RX ADMIN — Medication 3 MILLILITER(S): at 02:16

## 2024-04-04 RX ADMIN — BUDESONIDE AND FORMOTEROL FUMARATE DIHYDRATE 2 PUFF(S): 160; 4.5 AEROSOL RESPIRATORY (INHALATION) at 12:53

## 2024-04-04 RX ADMIN — Medication 20 MICROGRAM(S): at 22:17

## 2024-04-04 RX ADMIN — SENNA PLUS 2 TABLET(S): 8.6 TABLET ORAL at 22:07

## 2024-04-04 RX ADMIN — LEVETIRACETAM 1000 MILLIGRAM(S): 250 TABLET, FILM COATED ORAL at 17:44

## 2024-04-04 RX ADMIN — Medication 3 MILLILITER(S): at 15:43

## 2024-04-04 NOTE — PROGRESS NOTE ADULT - PROBLEM SELECTOR PLAN 5
- s/p PCI x6 at [facility] 11/2019  - resume home aspirin 81 mg daily, atorvastatin 80 mg daily   - continue to hold Xarelto 2.5mg po BID, resume once able to brittany PO  - no acute evidence of ischemia

## 2024-04-04 NOTE — PROGRESS NOTE ADULT - ASSESSMENT
Patient is an 87yo F with PMH significant for CAD s/p PCI x6 11/2019 at [facility], HTN, HLD, T2DM, and HFpEF who presented with fever and generalized weakness of 2 days’ duration, admitted with sepsis and acute hypoxic respiratory failure due to influenza. Patient required an upgrade in care to the MICU after an RRT on 3/17 during which patient was intubated for AMS. Patient completed a course of PNA treatment with Tamiflu and  Zosyn. Course c/b seizure like activity, LP unrevealing and EEG showing multifocal epileptiform potential. Although no acute seizures are documented, unable to completely rule out either. Transferred to floors for further management.

## 2024-04-04 NOTE — PROGRESS NOTE ADULT - SUBJECTIVE AND OBJECTIVE BOX
[facility] Division of Hospital Medicine  Malgorzata Clark MD  Pager [phone]    Patient is a 88y old  Female who presents with a chief complaint of influenza, sepsis, hypoxia       SUBJECTIVE / OVERNIGHT EVENTS: restless last night per staff; now resting comfortably      MEDICATIONS  (STANDING):  albuterol/ipratropium for Nebulization 3 milliLiter(s) Nebulizer every 6 hours  aspirin  chewable 81 milliGRAM(s) Oral daily  atorvastatin 80 milliGRAM(s) Oral at bedtime  budesonide 160 MICROgram(s)/formoterol 4.5 MICROgram(s) Inhaler 2 Puff(s) Inhalation two times a day  chlorhexidine 2% Cloths 1 Application(s) Topical daily  dextrose 5%. 1000 milliLiter(s) (100 mL/Hr) IV Continuous <Continuous>  famotidine    Tablet 20 milliGRAM(s) Oral daily  glucagon  Injectable 1 milliGRAM(s) IntraMuscular once  heparin   Injectable 5000 Unit(s) SubCutaneous every 8 hours  influenza  Vaccine (HIGH DOSE) 0.7 milliLiter(s) IntraMuscular once  insulin glargine Injectable (LANTUS) 10 Unit(s) SubCutaneous at bedtime  insulin lispro (ADMELOG) corrective regimen sliding scale   SubCutaneous three times a day before meals  insulin lispro Injectable (ADMELOG) 3 Unit(s) SubCutaneous before breakfast  insulin lispro Injectable (ADMELOG) 3 Unit(s) SubCutaneous before lunch  insulin lispro Injectable (ADMELOG) 3 Unit(s) SubCutaneous before dinner  levETIRAcetam   Injectable 1000 milliGRAM(s) IV Push every 12 hours  levothyroxine Injectable 20 MICROGram(s) IV Push at bedtime  LORazepam     Tablet 1 milliGRAM(s) Oral once  melatonin 6 milliGRAM(s) Oral at bedtime  multivitamin/minerals/iron Oral Solution (CENTRUM) 15 milliLiter(s) Oral daily  petrolatum Ophthalmic Ointment 1 Application(s) Both EYES daily  polyethylene glycol 3350 17 Gram(s) Oral two times a day  senna 2 Tablet(s) Oral at bedtime  sodium chloride 0.9%. 1000 milliLiter(s) (75 mL/Hr) IV Continuous <Continuous>  sodium chloride 7% Inhalation 4 milliLiter(s) Inhalation every 12 hours    MEDICATIONS  (PRN):  acetaminophen   Oral Liquid .. 650 milliGRAM(s) Oral every 6 hours PRN Temp greater or equal to 38C (100.4F), Mild Pain (1 - 3), Moderate Pain (4 - 6)  guaiFENesin Oral Liquid (Sugar-Free) 200 milliGRAM(s) Oral every 6 hours PRN Cough      CAPILLARY BLOOD GLUCOSE  POCT Blood Glucose.: 139 mg/dL (04 Apr 2024 08:44)  POCT Blood Glucose.: 160 mg/dL (03 Apr 2024 22:09)  POCT Blood Glucose.: 142 mg/dL (03 Apr 2024 17:19)  POCT Blood Glucose.: 135 mg/dL (03 Apr 2024 12:09)        PHYSICAL EXAM:  Vital Signs Last 24 Hrs  T(F): 98 (04 Apr 2024 05:04), Max: 98.1 (03 Apr 2024 18:00)  HR: 102 (04 Apr 2024 05:04) (85 - 105)  BP: 133/75 (04 Apr 2024 05:04) (115/55 - 134/84)  RR: 18 (04 Apr 2024 05:04) (17 - 19)  SpO2: 97% (04 Apr 2024 05:04) (95% - 99%)    Parameters below as of 04 Apr 2024 05:04  Patient On (Oxygen Delivery Method): room air      exam limited due to pt position; she is curled up at the foot of the bed  CONSTITUTIONAL: NAD, appears comfortable  EYES: PERRLA; conjunctiva and sclera clear  ENMT: Moist oral mucosa; normal dentition  RESPIRATORY: Normal respiratory effort; grossly b/l AE  CARDIOVASCULAR: Regular rate and rhythm; No lower extremity edema  ABDOMEN: Nontender to palpation, normoactive bowel sounds  MUSCULOSKELETAL:  no clubbing or cyanosis of digits; no joint swelling or tenderness to palpation  PSYCH: A+O to person, place, and time; affect appropriate  NEUROLOGY: CN 2-12 are intact and symmetric; no gross sensory deficits   SKIN: No rashes; no palpable lesions  : fuchs in place, draining clear yellow urine    LABS:                        10.7   14.27 )-----------( 546      ( 04 Apr 2024 05:12 )             33.5     04-04    138  |  103  |  16  ----------------------------<  118<H>  4.0   |  20<L>  |  0.72    Ca    9.5      04 Apr 2024 05:12  Phos  3.6     04-04  Mg     2.20     04-04

## 2024-04-05 ENCOUNTER — TRANSCRIPTION ENCOUNTER (OUTPATIENT)
Age: 89
End: 2024-04-05

## 2024-04-05 VITALS
DIASTOLIC BLOOD PRESSURE: 88 MMHG | HEART RATE: 109 BPM | SYSTOLIC BLOOD PRESSURE: 150 MMHG | OXYGEN SATURATION: 98 % | RESPIRATION RATE: 17 BRPM | TEMPERATURE: 98 F

## 2024-04-05 DIAGNOSIS — E87.20 ACIDOSIS, UNSPECIFIED: ICD-10-CM

## 2024-04-05 LAB
ANION GAP SERPL CALC-SCNC: 14 MMOL/L — SIGNIFICANT CHANGE UP (ref 7–14)
BUN SERPL-MCNC: 16 MG/DL — SIGNIFICANT CHANGE UP (ref 7–23)
CALCIUM SERPL-MCNC: 9.4 MG/DL — SIGNIFICANT CHANGE UP (ref 8.4–10.5)
CHLORIDE SERPL-SCNC: 104 MMOL/L — SIGNIFICANT CHANGE UP (ref 98–107)
CO2 SERPL-SCNC: 19 MMOL/L — LOW (ref 22–31)
CREAT SERPL-MCNC: 0.8 MG/DL — SIGNIFICANT CHANGE UP (ref 0.5–1.3)
EGFR: 72 ML/MIN/1.73M2 — SIGNIFICANT CHANGE UP
GLUCOSE BLDC GLUCOMTR-MCNC: 136 MG/DL — HIGH (ref 70–99)
GLUCOSE BLDC GLUCOMTR-MCNC: 153 MG/DL — HIGH (ref 70–99)
GLUCOSE SERPL-MCNC: 131 MG/DL — HIGH (ref 70–99)
LACTATE SERPL-SCNC: 2.1 MMOL/L — HIGH (ref 0.5–2)
MAGNESIUM SERPL-MCNC: 2.5 MG/DL — SIGNIFICANT CHANGE UP (ref 1.6–2.6)
PHOSPHATE SERPL-MCNC: 3.5 MG/DL — SIGNIFICANT CHANGE UP (ref 2.5–4.5)
POTASSIUM SERPL-MCNC: 4.4 MMOL/L — SIGNIFICANT CHANGE UP (ref 3.5–5.3)
POTASSIUM SERPL-SCNC: 4.4 MMOL/L — SIGNIFICANT CHANGE UP (ref 3.5–5.3)
SODIUM SERPL-SCNC: 137 MMOL/L — SIGNIFICANT CHANGE UP (ref 135–145)

## 2024-04-05 PROCEDURE — 99239 HOSP IP/OBS DSCHRG MGMT >30: CPT

## 2024-04-05 RX ORDER — BUDESONIDE AND FORMOTEROL FUMARATE DIHYDRATE 160; 4.5 UG/1; UG/1
2 AEROSOL RESPIRATORY (INHALATION)
Qty: 1 | Refills: 0
Start: 2024-04-05 | End: 2024-05-04

## 2024-04-05 RX ORDER — ALBUTEROL 90 UG/1
2 AEROSOL, METERED ORAL
Qty: 1 | Refills: 0
Start: 2024-04-05 | End: 2024-05-04

## 2024-04-05 RX ORDER — SENNA PLUS 8.6 MG/1
2 TABLET ORAL
Qty: 0 | Refills: 0 | DISCHARGE
Start: 2024-04-05

## 2024-04-05 RX ORDER — LEVETIRACETAM 250 MG/1
1 TABLET, FILM COATED ORAL
Qty: 60 | Refills: 0
Start: 2024-04-05 | End: 2024-05-04

## 2024-04-05 RX ADMIN — FAMOTIDINE 20 MILLIGRAM(S): 10 INJECTION INTRAVENOUS at 12:07

## 2024-04-05 RX ADMIN — POLYETHYLENE GLYCOL 3350 17 GRAM(S): 17 POWDER, FOR SOLUTION ORAL at 06:16

## 2024-04-05 RX ADMIN — BUDESONIDE AND FORMOTEROL FUMARATE DIHYDRATE 2 PUFF(S): 160; 4.5 AEROSOL RESPIRATORY (INHALATION) at 08:27

## 2024-04-05 RX ADMIN — Medication 15 MILLILITER(S): at 12:07

## 2024-04-05 RX ADMIN — Medication 81 MILLIGRAM(S): at 12:07

## 2024-04-05 RX ADMIN — Medication 3 UNIT(S): at 08:24

## 2024-04-05 RX ADMIN — Medication 3 MILLILITER(S): at 02:42

## 2024-04-05 RX ADMIN — LEVETIRACETAM 1000 MILLIGRAM(S): 250 TABLET, FILM COATED ORAL at 06:16

## 2024-04-05 RX ADMIN — SODIUM CHLORIDE 4 MILLILITER(S): 9 INJECTION INTRAMUSCULAR; INTRAVENOUS; SUBCUTANEOUS at 09:36

## 2024-04-05 RX ADMIN — Medication 3 MILLILITER(S): at 09:35

## 2024-04-05 RX ADMIN — HEPARIN SODIUM 5000 UNIT(S): 5000 INJECTION INTRAVENOUS; SUBCUTANEOUS at 13:45

## 2024-04-05 RX ADMIN — CHLORHEXIDINE GLUCONATE 1 APPLICATION(S): 213 SOLUTION TOPICAL at 12:08

## 2024-04-05 RX ADMIN — Medication 1: at 12:09

## 2024-04-05 RX ADMIN — HEPARIN SODIUM 5000 UNIT(S): 5000 INJECTION INTRAVENOUS; SUBCUTANEOUS at 06:16

## 2024-04-05 RX ADMIN — Medication 3 MILLILITER(S): at 14:32

## 2024-04-05 RX ADMIN — Medication 3 UNIT(S): at 12:09

## 2024-04-05 RX ADMIN — Medication 1 APPLICATION(S): at 12:08

## 2024-04-05 NOTE — PROGRESS NOTE ADULT - PROBLEM SELECTOR PLAN 6
- Hb 10.9 on presentation, prior known within normal range  - total iron blood levels low, but ferritin in 300s   - likelt AOCD  - OP pcp follow up
- c/w home statin
pall care spoke with son  son understands, would like to try PO  will order puree diet, careful hand feeding  asp precautions
pall care spoke with son  son understands, would like to try PO  c/w puree diet, careful hand feeding  asp precautions
- c/w home statin
- Failed bedside swallow eval multiple times, including on 3/29   - patient pulled NG tube on 3/27   - NG tube replaced on 3/28   - continue speech evals   - discussed PEG with son, who is hesitant to place PEG   - will need palliative care eval
pall care spoke with son  son understands, would like to try PO  will order puree diet, careful hand feeding  asp precautions
- Hb 10.9 on presentation, prior known within normal range  - total iron blood levels low, but ferritin in 300s   - likelt AOCD  - OP pcp follow up
- Hb 10.9 on presentation, prior known within normal range  - total iron blood levels low, but ferritin in 300s   - likelt AOCD  - OP pcp follow up
- HOLD home atenolol 100 mg daily, imdur 30 mg daily, losartan 100 mg daily, torsemide 10 mg daily   - re-introduce home blood pressure medications PRN
pall care spoke with son  son understands, would like to try PO  will order puree diet, careful hand feeding  asp precautions
pall care spoke with son  son understands, would like to try PO  will order puree diet, careful hand feeding  asp precautions

## 2024-04-05 NOTE — PROGRESS NOTE ADULT - PROBLEM SELECTOR PROBLEM 4
Acute urinary retention
Acute urinary retention
Dysphagia
Acute urinary retention
HTN (hypertension)
CAD (coronary artery disease)
Acute urinary retention
CAD (coronary artery disease)
CAD (coronary artery disease)
HTN (hypertension)
Acute urinary retention
Acute urinary retention

## 2024-04-05 NOTE — DISCHARGE NOTE NURSING/CASE MANAGEMENT/SOCIAL WORK - PATIENT PORTAL LINK FT
You can access the FollowMyHealth Patient Portal offered by [facility] by registering at the following website: [email or web address]. By joining [facility] FollowMyHealth portal, you will also be able to view your health information using other applications (apps) compatible with our system.

## 2024-04-05 NOTE — PROGRESS NOTE ADULT - REASON FOR ADMISSION
influenza, sepsis, hypoxia

## 2024-04-05 NOTE — PROGRESS NOTE ADULT - PROBLEM SELECTOR PLAN 3
- In the MICU had episodes with left lateral gaze deviation and tonic clonic jerking of extremities when off sedation  - CT head negative of acute intracranial process or mass  - LP unrevealing  - EEG showing multifocal epileptiform potentials >> no acute seizures documented but unable to completely rule out either  - f/u MR brain with and without gadolinium pending  - continue Keppra 1g bid  - neurology consult appreciated
- s/p PCI x6 at [facility] 11/2019  - resume home aspirin, statin, Xarelto 2.5mg po BID  - no acute evidence of ischemia  - continue Ranolazine
- In the MICU had episodes with left lateral gaze deviation and tonic clonic jerking of extremities when off sedation  - CT head negative of acute intracranial process or mass  - LP unrevealing  - EEG showing multifocal epileptiform potentials >> no acute seizures documented but unable to completely rule out either  - discussed with neurology, high risk to obtain MRI currently, will hold off   - continue Keppra 1g bid
- In the MICU had episodes with left lateral gaze deviation and tonic clonic jerking of extremities when off sedation  - CT head negative of acute intracranial process or mass  - LP unrevealing  - EEG showing multifocal epileptiform potentials >> no acute seizures documented but unable to completely rule out either  - discussed with neurology, high risk to obtain MRI currently, will hold off   - continue Keppra 1g bid
- In the MICU had episodes with left lateral gaze deviation and tonic clonic jerking of extremities when off sedation  - CT head negative of acute intracranial process or mass  - LP unrevealing  - EEG showing multifocal epileptiform potentials >> no acute seizures documented but unable to completely rule out either  - discussed with neurology, high risk to obtain MRI currently, will hold off   - continue Keppra 1g bid, changed to PO
- In the MICU had episodes with left lateral gaze deviation and tonic clonic jerking of extremities when off sedation  - CT head negative of acute intracranial process or mass  - LP unrevealing  - EEG showing multifocal epileptiform potentials >> no acute seizures documented but unable to completely rule out either  - discussed with neurology, high risk to obtain MRI currently, will hold off   - continue Keppra 1g bid
- In the MICU had episodes with left lateral gaze deviation and tonic clonic jerking of extremities when off sedation  - CT head negative of acute intracranial process or mass  - LP unrevealing  - EEG showing multifocal epileptiform potentials >> no acute seizures documented but unable to completely rule out either  - discussed with neurology, high risk to obtain MRI currently, will hold off   - continue Keppra 1g bid
- In the MICU had episodes with left lateral gaze deviation and tonic clonic jerking of extremities when off sedation  - CT head negative of acute intracranial process or mass  - LP unrevealing  - EEG showing multifocal epileptiform potentials >> no acute seizures documented but unable to completely rule out either  - discussed with neurology, high risk to obtain MRI currently, will hold off   - continue Keppra 1g bid, changed to PO
- s/p PCI x6 at [facility] 11/2019  - resume home aspirin, statin, Xarelto 2.5mg po BID  - no acute evidence of ischemia  - continue Ranolazine
- s/p PCI x6 at [facility] 11/2019  - resume home aspirin 81 mg daily, statin  - holding Xarelto 2.5mg po BID, Ranolazine - unclear reasoning but prescribed by her cardiologist  - no acute evidence of ischemia  - resume DOAC after MRI head completed
- In the MICU had episodes with left lateral gaze deviation and tonic clonic jerking of extremities when off sedation  - CT head negative of acute intracranial process or mass  - LP unrevealing  - EEG showing multifocal epileptiform potentials >> no acute seizures documented but unable to completely rule out either  - discussed with neurology, high risk to obtain MRI currently, will hold off   - continue Keppra 1g bid
- In the MICU had episodes with left lateral gaze deviation and tonic clonic jerking of extremities when off sedation  - CT head negative of acute intracranial process or mass  - LP unrevealing  - EEG showing multifocal epileptiform potentials >> no acute seizures documented but unable to completely rule out either  - discussed with neurology, high risk to obtain MRI currently, will hold off   - continue Keppra 1g bid

## 2024-04-05 NOTE — PROGRESS NOTE ADULT - NSPROGADDITIONALINFOA_GEN_ALL_CORE
PT rec rehab, to d/w family
·  #VTE PPx: hep  #Diet: erica FREIRE home, d/c time 40 minutes.    POC d/w son

## 2024-04-05 NOTE — PROGRESS NOTE ADULT - PROBLEM SELECTOR PLAN 4
- continue atenolol 100 mg daily   - hold imdur 30 mg daily, losartan 100 mg daily, torsemide 10 mg daily   - noted to be hypotensive on 3/15 - restart meds slowly as able
- failed TOV on 3/30   - fuchs replaced on 3/31   TOV discontinued due to recent failed attempt, can f/u as outpt for TOV
- failed TOV on 3/30   - fuchs replaced on 3/31   - will need OP urology follow up to remove fuchs
- failed TOV on 3/30   - fuchs replaced on 3/31   will attempt another TOV tonight
- failed TOV on 3/30   - fuchs replaced on 3/31   - will need OP urology follow up to remove fuchs
- s/p PCI x6 at [facility] 11/2019  - resume home aspirin 81 mg daily, statin  - holding Xarelto 2.5mg po BID, Ranolazine - unclear reasoning but prescribed by her cardiologist  - no acute evidence of ischemia  - resume DOAC after MRI head completed
- s/p PCI x6 at [facility] 11/2019  - resume home aspirin 81 mg daily, atorvastatin 80 mg daily   - continue to hold Xarelto 2.5mg po BID given unclear reasoning and may need PEG placement   - will need to discuss DOAC with OP cardiologist   - no acute evidence of ischemia
- failed TOV on 3/30   - fuchs replaced on 3/31   - will need OP urology follow up to remove fuchs
- s/p PCI x6 at [facility] 11/2019  - resume home aspirin 81 mg daily, atorvastatin 80 mg daily   - continue to hold Xarelto 2.5mg po BID given unclear reasoning  - will need to discuss DOAC with OP cardiologist   - no acute evidence of ischemia
- continue atenolol 100 mg daily   - hold imdur 30 mg daily, losartan 100 mg daily, torsemide 10 mg daily   - noted to be hypotensive on 3/15 - restart meds slowly as able
- Failed bedside swallow eval  - formal S&S eval pending   - patient pulled NG tube on 3/27   - converted meds to IV   - if fails speech eval, need to replace NG tube   - maintenance fluids while NPO
- failed TOV on 3/30   - fuchs replaced on 3/31   TOV discontinued due to recent failed attempt, can f/u as outpt for TOV

## 2024-04-05 NOTE — PROGRESS NOTE ADULT - PROBLEM SELECTOR PLAN 7
- no evidence of acute volume overload or decompensated heart failure
- Hb 10.9 on presentation, prior known within normal range  - total iron blood levels low, but ferritin in 300s   - likely AOCD  - OP pcp follow up
- HOLD home atenolol 100 mg daily, imdur 30 mg daily, losartan 100 mg daily, torsemide 10 mg daily   - re-introduce home blood pressure medications PRN
- Hb 10.9 on presentation, prior known within normal range  - total iron blood levels low, but ferritin in 300s   - likely AOCD  - OP pcp follow up
- no evidence of acute volume overload or decompensated heart failure
- Hb 10.9 on presentation, prior known within normal range  - total iron blood levels low, but ferritin in 300s   - likely AOCD  - OP pcp follow up
HbA1c 7.7   given NPO status, held NPH q6 20 units   q6 SS for now
- HOLD home atenolol 100 mg daily, imdur 30 mg daily, losartan 100 mg daily, torsemide 10 mg daily   - re-introduce home blood pressure medications PRN
- Hb 10.9 on presentation, prior known within normal range  - total iron blood levels low, but ferritin in 300s   - likely AOCD  - OP pcp follow up
- HOLD home atenolol 100 mg daily, imdur 30 mg daily, losartan 100 mg daily, torsemide 10 mg daily   - re-introduce home blood pressure medications PRN
- Hb 10.9 on presentation, prior known within normal range  - total iron blood levels low, but ferritin in 300s   - likely AOCD  - OP pcp follow up
- Hb 10.9 on presentation, prior known within normal range  - total iron blood levels low, but ferritin in 300s   - likely AOCD  - OP pcp follow up

## 2024-04-05 NOTE — PROGRESS NOTE ADULT - PROBLEM SELECTOR PLAN 2
- noted to have Na uptrended to 151 on 3/29   - FWD of 1L   - start FWF at 400 q6  - add D5FW at 65 cc /hr   - recheck BMP at 5 pm
- Hb 10.9 on presentation, prior known within normal range  - total iron blood levels low, but ferritin in 300s   - likelt AOCD
improved  monitor closely  PO diet ordered
- noted to have Na uptrended to 151 on 3/18   - FWD of 1L   - start d5w 50cc/hr for 10 hrs   - place NG and start tube feeds with FWF
- noted to have Na uptrended to 150 on 3/29   - FWD of 1L   - start FWF at 400 q6
- Hb 10.9 on presentation, prior known within normal range  - total iron blood levels low, but ferritin in 300s   - likelt AOCD  - OP pcp follow up
- In the MICU had episodes with left lateral gaze deviation and tonic clonic jerking of extremities when off sedation  - CT head negative of acute intracranial process or mass  - LP unrevealing  - EEG showing multifocal epileptiform potentials >> no acute seizures documented but unable to completely rule out either  - f/u MR brain with and without gadolinium pending  - continue Keppra 1g bid  - neurology consult appreciated
- noted to have Na uptrended to 151 on 3/29   - treated with FWF which were titrated to 400 q6  - subsequently added D5FW at 65 cc /hr   - recheck BMP on 3/31 with Na 138   - given reduction of 13, asked nephrology about further recs, who stated NTD   - holding further FWF and IVF   - will need to restart FWF of 400 q6 on 4/1
improved  monitor closely  PO diet ordered

## 2024-04-05 NOTE — PROGRESS NOTE ADULT - PROBLEM SELECTOR PROBLEM 3
Seizures
CAD (coronary artery disease)
Seizures
CAD (coronary artery disease)
Seizures
CAD (coronary artery disease)
Seizures

## 2024-04-05 NOTE — PROGRESS NOTE ADULT - PROVIDER SPECIALTY LIST ADULT
Hospitalist
MICU
Neurology
MICU
Neurology
Pulmonology
MICU
MICU
Neurology
MICU
Pulmonology
Hospitalist

## 2024-04-05 NOTE — PROGRESS NOTE ADULT - PROBLEM SELECTOR PROBLEM 7
Normocytic anemia
DM (diabetes mellitus)
HTN (hypertension)
Normocytic anemia
Normocytic anemia
HTN (hypertension)
H/O CHF
H/O CHF
Normocytic anemia
HTN (hypertension)

## 2024-04-05 NOTE — PROGRESS NOTE ADULT - PROBLEM SELECTOR PROBLEM 8
HTN (hypertension)
HLD (hyperlipidemia)
HTN (hypertension)
Prophylactic measure
DM (diabetes mellitus)
HTN (hypertension)
HTN (hypertension)
DM (diabetes mellitus)
Prophylactic measure
HTN (hypertension)
DM (diabetes mellitus)
HTN (hypertension)

## 2024-04-05 NOTE — PROGRESS NOTE ADULT - PROBLEM SELECTOR PLAN 1
- sepsis present on admission likely d/t pneumonia. Pt febrile, tachycardic, with leukocytosis on admission. Sepsis resolved   - requiring supplemental O2  - Intubated for 8 days 3/17 - 3/25 for airway protection in the setting of altered mental status in the setting of PNA from bacterial and influenza sources   - s/p tamiflu and zosyn course in the ICU    - now on RA  - continue Duoneb, hypertonic saline, guaifenesin  - pulm input apprec  - repeated blood cultures on 3/29 NGTD  - CT chest without infiltrate; mucus impaction; will stop abx - sepsis present on admission likely d/t pneumonia. Pt febrile, tachycardic, with leukocytosis on admission. Sepsis resolved   - required supplemental O2, now on RA   - Intubated for 8 days 3/17 - 3/25 for airway protection in the setting of altered mental status in the setting of PNA from bacterial and influenza sources   - s/p tamiflu and zosyn course in the ICU    - continue Duoneb  - pulm input apprec  - repeated blood cultures on 3/29 NGTD  - CT chest without infiltrate; mucus impaction

## 2024-04-05 NOTE — PROGRESS NOTE ADULT - PROBLEM SELECTOR PLAN 12
#VTE PPx: hep  #Diet: puree  #Dispo: pending stabilization    DC home pending repeat labs, d/c time 40 minutes - mildly elevated lactate likely d/t hypoperfusion in setting of poor PO intake  - encourage PO hydration   - holding Torsemide

## 2024-04-05 NOTE — CHART NOTE - NSCHARTNOTESELECT_GEN_ALL_CORE
ABG/Event Note
EEG prelim
Event Note
Event Note
MAR Accept
MICU acceptance note/Event Note
Neurology
POCUS
POCUS
Transfer Note
Consult- Tube Feeding/Nutrition Services
Event Note
Event Note
Follow Up/Nutrition Services
Nutrition Services
POCUS
Seizure/Event Note

## 2024-04-05 NOTE — PROGRESS NOTE ADULT - PROBLEM SELECTOR PROBLEM 5
CAD (coronary artery disease)
CAD (coronary artery disease)
Dysphagia
CAD (coronary artery disease)
DM (diabetes mellitus)
Dysphagia
CAD (coronary artery disease)
DM (diabetes mellitus)
CAD (coronary artery disease)
CAD (coronary artery disease)
Normocytic anemia
Dysphagia

## 2024-04-05 NOTE — PROGRESS NOTE ADULT - PROBLEM SELECTOR PLAN 9
HbA1c 7.7    q6 SS for now until consistent PO intake
HbA1c 7.7    FS 400s now before lunch  will add 3U pre meals and monitor
- c/w home statin
HbA1c 7.7   given NPO status, held NPH q6 20 units   q6 SS for now
HbA1c 7.7    FS 400s now before lunch  will add 3U pre meals and monitor  mild AG this AM, gentle IVF and recheck labs; gluc acceptable
HbA1c 7.7    c/w ISS  c/w Lantus 10 units qhs   c/w Admelog 3 units TID   monitor fingersticks
HbA1c 7.7    FS 400s now before lunch  will add 3U pre meals and monitor
- c/w home statin
- no evidence of acute volume overload or decompensated heart failure
- c/w home statin

## 2024-04-05 NOTE — PROGRESS NOTE ADULT - PROBLEM SELECTOR PROBLEM 6
HLD (hyperlipidemia)
Dysphagia
HLD (hyperlipidemia)
HTN (hypertension)
Normocytic anemia
Dysphagia
Normocytic anemia
Dysphagia
Normocytic anemia

## 2024-04-05 NOTE — DISCHARGE NOTE NURSING/CASE MANAGEMENT/SOCIAL WORK - NSDCFUADDAPPT_GEN_ALL_CORE_FT
Follow up with your primary care physician for further monitoring within 1 week. Please call to arrange appointment.     Follow up with urology at [facility] within 1 week.     Follow up with pulmonology upon discharge. You have a virtual appt on 4/8/24 at 10AM with Dr. Lisker.   Telehealth instructions: at the time of your appt, you will receive a text/email invite for your telehealth session. Please click on the link, enter the patient's name. You will then be redirected to a virtual waiting room, where you will wait until the doctor has connected with you.     Follow up with neurology upon discharge.     APPTS ARE READY TO BE MADE: [X] YES    Best Family or Patient Contact (if needed): son Pravin Flynn [phone]    Additional Information about above appointments (if needed):    1: PCP   2: Urology   3: Neurology     Other comments or requests:

## 2024-04-05 NOTE — PROGRESS NOTE ADULT - PROBLEM SELECTOR PROBLEM 2
Hypernatremia
Normocytic anemia
Hypernatremia
Normocytic anemia
Hypernatremia
Seizures
Hypernatremia

## 2024-04-05 NOTE — PROGRESS NOTE ADULT - PROBLEM SELECTOR PROBLEM 9
HLD (hyperlipidemia)
DM (diabetes mellitus)
DM (diabetes mellitus)
HLD (hyperlipidemia)
H/O CHF
DM (diabetes mellitus)
DM (diabetes mellitus)
HLD (hyperlipidemia)
DM (diabetes mellitus)
DM (diabetes mellitus)

## 2024-04-05 NOTE — PROGRESS NOTE ADULT - ASSESSMENT
Patient is an 87yo F with PMH significant for CAD s/p PCI x6 11/2019 at [facility], HTN, HLD, T2DM, and HFpEF who presented with fever and generalized weakness of 2 days’ duration, admitted with sepsis and acute hypoxic respiratory failure due to influenza. Patient required an upgrade in care to the MICU after an RRT on 3/17 during which patient was intubated for AMS. Patient completed a course of PNA treatment with Tamiflu and  Zosyn. Course c/b seizure like activity, LP unrevealing and EEG showing multifocal epileptiform potential. Although no acute seizures are documented, unable to completely rule out either. Transferred to floors for further management.

## 2024-04-05 NOTE — PROGRESS NOTE ADULT - PROBLEM SELECTOR PLAN 5
- s/p PCI x6 at [facility] 11/2019  - resumed home aspirin 81 mg daily, atorvastatin 80 mg daily   - no acute evidence of ischemia - s/p PCI x6 at [facility] 11/2019  - resumed home aspirin 81 mg daily, atorvastatin 80 mg daily   - resume Xarelto   - no acute evidence of ischemia

## 2024-04-05 NOTE — PROGRESS NOTE ADULT - PROBLEM SELECTOR PLAN 11
- chronic HFpEF   - no evidence of acute volume overload or decompensated heart failure - chronic HFpEF   - no evidence of acute volume overload or decompensated heart failure  - holding Torsemide

## 2024-04-05 NOTE — PROGRESS NOTE ADULT - PROBLEM SELECTOR PROBLEM 11
Prophylactic measure
H/O CHF
H/O CHF
Prophylactic measure
Prophylactic measure
H/O CHF

## 2024-04-05 NOTE — PROGRESS NOTE ADULT - SUBJECTIVE AND OBJECTIVE BOX
PROGRESS NOTE:     Patient is a 88y old  Female who presents with a chief complaint of influenza, sepsis, hypoxia (04 Apr 2024 09:06)      SUBJECTIVE / OVERNIGHT EVENTS: No acute events.     ADDITIONAL REVIEW OF SYSTEMS:    MEDICATIONS  (STANDING):  albuterol/ipratropium for Nebulization 3 milliLiter(s) Nebulizer every 6 hours  aspirin  chewable 81 milliGRAM(s) Oral daily  atorvastatin 80 milliGRAM(s) Oral at bedtime  budesonide 160 MICROgram(s)/formoterol 4.5 MICROgram(s) Inhaler 2 Puff(s) Inhalation two times a day  chlorhexidine 2% Cloths 1 Application(s) Topical daily  dextrose 5%. 1000 milliLiter(s) (100 mL/Hr) IV Continuous <Continuous>  famotidine    Tablet 20 milliGRAM(s) Oral daily  glucagon  Injectable 1 milliGRAM(s) IntraMuscular once  heparin   Injectable 5000 Unit(s) SubCutaneous every 8 hours  influenza  Vaccine (HIGH DOSE) 0.7 milliLiter(s) IntraMuscular once  insulin glargine Injectable (LANTUS) 10 Unit(s) SubCutaneous at bedtime  insulin lispro (ADMELOG) corrective regimen sliding scale   SubCutaneous three times a day before meals  insulin lispro Injectable (ADMELOG) 3 Unit(s) SubCutaneous before breakfast  insulin lispro Injectable (ADMELOG) 3 Unit(s) SubCutaneous before dinner  insulin lispro Injectable (ADMELOG) 3 Unit(s) SubCutaneous before lunch  levETIRAcetam 1000 milliGRAM(s) Oral two times a day  levothyroxine Injectable 20 MICROGram(s) IV Push at bedtime  LORazepam     Tablet 1 milliGRAM(s) Oral once  melatonin 6 milliGRAM(s) Oral at bedtime  multivitamin/minerals/iron Oral Solution (CENTRUM) 15 milliLiter(s) Oral daily  petrolatum Ophthalmic Ointment 1 Application(s) Both EYES daily  polyethylene glycol 3350 17 Gram(s) Oral two times a day  senna 2 Tablet(s) Oral at bedtime  sodium chloride 0.9%. 1000 milliLiter(s) (75 mL/Hr) IV Continuous <Continuous>  sodium chloride 7% Inhalation 4 milliLiter(s) Inhalation every 12 hours    MEDICATIONS  (PRN):  acetaminophen   Oral Liquid .. 650 milliGRAM(s) Oral every 6 hours PRN Temp greater or equal to 38C (100.4F), Mild Pain (1 - 3), Moderate Pain (4 - 6)  aluminum hydroxide/magnesium hydroxide/simethicone Suspension 30 milliLiter(s) Oral every 4 hours PRN Dyspepsia  guaiFENesin Oral Liquid (Sugar-Free) 200 milliGRAM(s) Oral every 6 hours PRN Cough      CAPILLARY BLOOD GLUCOSE      POCT Blood Glucose.: 136 mg/dL (05 Apr 2024 08:16)  POCT Blood Glucose.: 129 mg/dL (04 Apr 2024 22:04)  POCT Blood Glucose.: 173 mg/dL (04 Apr 2024 17:26)  POCT Blood Glucose.: 160 mg/dL (04 Apr 2024 12:19)    I&O's Summary    04 Apr 2024 07:01  -  05 Apr 2024 07:00  --------------------------------------------------------  IN: 0 mL / OUT: 400 mL / NET: -400 mL        PHYSICAL EXAM:  Vital Signs Last 24 Hrs  T(C): 36.4 (05 Apr 2024 06:29), Max: 36.7 (04 Apr 2024 21:21)  T(F): 97.5 (05 Apr 2024 06:29), Max: 98.1 (05 Apr 2024 01:54)  HR: 67 (05 Apr 2024 09:15) (67 - 109)  BP: 140/77 (05 Apr 2024 06:29) (134/68 - 152/90)  BP(mean): --  RR: 18 (05 Apr 2024 06:29) (18 - 18)  SpO2: 98% (05 Apr 2024 09:15) (95% - 100%)    Parameters below as of 05 Apr 2024 09:15  Patient On (Oxygen Delivery Method): room air      CONSTITUTIONAL: NAD, appears comfortable  EYES: PERRLA; conjunctiva and sclera clear  ENMT: Moist oral mucosa; normal dentition  RESPIRATORY: Normal respiratory effort; grossly b/l AE  CARDIOVASCULAR: Regular rate and rhythm; No lower extremity edema  ABDOMEN: Nontender to palpation, normoactive bowel sounds  MUSCULOSKELETAL:  no clubbing or cyanosis of digits; no joint swelling or tenderness to palpation  PSYCH: A+O to person, place, and time; affect appropriate  NEUROLOGY: CN 2-12 are intact and symmetric; no gross sensory deficits   SKIN: No rashes; no palpable lesions  : fuchs in place, draining clear yellow urine    LABS:                        10.7   14.27 )-----------( 546      ( 04 Apr 2024 05:12 )             33.5     04-04    138  |  103  |  19  ----------------------------<  148<H>  4.1   |  20<L>  |  0.73    Ca    9.5      04 Apr 2024 18:30  Phos  4.2     04-04  Mg     2.30     04-04            Urinalysis Basic - ( 04 Apr 2024 18:30 )    Color: x / Appearance: x / SG: x / pH: x  Gluc: 148 mg/dL / Ketone: x  / Bili: x / Urobili: x   Blood: x / Protein: x / Nitrite: x   Leuk Esterase: x / RBC: x / WBC x   Sq Epi: x / Non Sq Epi: x / Bacteria: x          RADIOLOGY & ADDITIONAL TESTS:  Results Reviewed:   Imaging Personally Reviewed:  Electrocardiogram Personally Reviewed:    COORDINATION OF CARE:  Care Discussed with Consultants/Other Providers [Y/N]:  Prior or Outpatient Records Reviewed [Y/N]:

## 2024-04-05 NOTE — PROGRESS NOTE ADULT - PROBLEM SELECTOR PLAN 10
- no evidence of acute volume overload or decompensated heart failure
- c/w home statin
#VTE PPx: hep  #Diet: NPO until speech eval   #Dispo: pending stabilization    Of note, patient with fuchs and rectal tube. Will opt for early discontinuation
- no evidence of acute volume overload or decompensated heart failure
- c/w home statin
- c/w home statin
- no evidence of acute volume overload or decompensated heart failure
- c/w home statin

## 2024-04-05 NOTE — PROGRESS NOTE ADULT - PROBLEM SELECTOR PLAN 8
- HOLD home atenolol 100 mg daily, imdur 30 mg daily, losartan 100 mg daily, torsemide 10 mg daily   - re-introduce home blood pressure medications PRN  - monitor BP - HELD home atenolol 100 mg daily, imdur 30 mg daily, losartan 100 mg daily, torsemide 10 mg daily   - May resume atenolol, imdur, losartan on discharge   - continue to hold Torsemide   - monitor BP

## 2024-04-08 ENCOUNTER — APPOINTMENT (OUTPATIENT)
Dept: PULMONOLOGY | Facility: CLINIC | Age: 89
End: 2024-04-08
Payer: MEDICAID

## 2024-04-08 DIAGNOSIS — R33.9 RETENTION OF URINE, UNSPECIFIED: ICD-10-CM

## 2024-04-08 PROBLEM — Z00.00 ENCOUNTER FOR PREVENTIVE HEALTH EXAMINATION: Status: ACTIVE | Noted: 2024-04-08

## 2024-04-08 PROCEDURE — 99441: CPT | Mod: 93

## 2024-04-08 NOTE — HISTORY OF PRESENT ILLNESS
[Home] : at home, [unfilled] , at the time of the visit. [Medical Office: ([facility])___] : at the medical office located in  [Family Member] : family member [Verbal consent obtained from patient] : the patient, [unfilled] [[facility]] : Post-hospitalization from [facility] [Pneumonia] : Pneumonia [Respiratory failure with hypoxia] : Respiratory failure with hypoxia [Other: _____] : [unfilled] [Yes] : Yes [Admitted on: ___] : The patient was admitted on [unfilled] [Discharged on ___] : discharged on [unfilled] [Discharge Summary] : discharge summary [Pertinent Labs] : pertinent labs [Radiology Findings] : radiology findings [Discharge Med List] : discharge medication list [Med Reconciliation] : medication reconciliation has been completed [Patient Contacted By: ____] : and contacted by [unfilled] [FreeTextEntry2] : post hospital d/c televisit pt's son present as well  87 y/o woman, extensive pmh include cad with pci, htn, DM, HLD, chronic HFpEF Admitted with sepsis, acute resp failure wiht hypoxia, influenza infection and pneumonia Intubated Tx with tamiflu, zosyn, airway clearance poss seizure like activity, EEG with mf epilepfiform activity. eval by neuro and started on keppra successfully extubated and weaned to RA failed multiple tov, d/c with jef  pt lives with son and daughter in law, who are her caretakers  Doing well since discharge Eating well, drinking well. Still weak but improving. Denies sig resp sx Has the jef, kevin and DIL trained  Meds reviewed -  Taking the Keppra And symbicort bid, which is new. Albuterol prn Other meds -- home meds from prior, except torsemide held amlodipine, asa, atenolol, lipitor, synthroid, losaratn, famotidine, metformin, xarelto 2.5 bid  Son is interested in homecare RN - said ?some insurance issue?

## 2024-04-08 NOTE — ASSESSMENT
[FreeTextEntry1] : Complex case, as above Doing well after long hospital stay, sepsis, acute resp failure, intubation, influenza and pna, poss sz, and urinary retention Resp status is stable Continue bid symbicort. prn alb Taking keppra Taylor in place  son has info to call to schedule urology and neuro f/u for next week advised to lmk if trouble getting appts  will arrange pulm f/u for next week as well - in person but if they are unable to bring her out of the house, will change to teb  will message Homecare about what the issue is, she would benefit from homecare RN

## 2024-04-08 NOTE — PHYSICAL EXAM
[No Respiratory Distress] : no respiratory distress  [No Accessory Muscle Use] : no accessory muscle use [Normal] : affect was normal and insight and judgment were intact [47817 - High Complexity requires an extensive number of possible diagnoses and/or the management options, extensive complexity of the medical data (tests, etc.) to be reviewed, and a high risk of significant complications, morbidity, and/or mortality as w] : High Complexity  [de-identified] : lying in bed, awake and alert, oriented no distress

## 2024-04-12 ENCOUNTER — APPOINTMENT (OUTPATIENT)
Dept: UROLOGY | Facility: CLINIC | Age: 89
End: 2024-04-12

## 2024-04-12 ENCOUNTER — OUTPATIENT (OUTPATIENT)
Dept: OUTPATIENT SERVICES | Facility: HOSPITAL | Age: 89
LOS: 1 days | End: 2024-04-12
Payer: MEDICAID

## 2024-04-12 DIAGNOSIS — R35.0 FREQUENCY OF MICTURITION: ICD-10-CM

## 2024-04-12 PROCEDURE — G0463: CPT

## 2024-04-12 NOTE — ASSESSMENT
[FreeTextEntry1] : 87 y/o female here for catheter removal.  Fuchs removed, due to inability to walk, fill/pull unable to be performed.   Instructed pt family to monitor for signs of retention and to call clinic or go to ED if unable to void.  reviewed labs/imaging  Plan; fuchs removed follow as needed.

## 2024-04-12 NOTE — HISTORY OF PRESENT ILLNESS
[FreeTextEntry1] : 87 y/o female with history of CAD, HTN, HLD, T2DM, HFpEF who was admitted to [facility] recently for generalized weakness and dyspnea 2/2 influenza, during this visit pt had urinary retention.  Presenting today for TOV.  Pt has been doing well since, is wheelchair bound. no history of urinary retention in the past.

## 2024-04-15 DIAGNOSIS — R33.9 RETENTION OF URINE, UNSPECIFIED: ICD-10-CM

## 2024-04-16 ENCOUNTER — APPOINTMENT (OUTPATIENT)
Dept: PULMONOLOGY | Facility: CLINIC | Age: 89
End: 2024-04-16
Payer: MEDICAID

## 2024-04-16 VITALS
OXYGEN SATURATION: 97 % | SYSTOLIC BLOOD PRESSURE: 115 MMHG | TEMPERATURE: 97.8 F | HEART RATE: 72 BPM | WEIGHT: 115.5 LBS | RESPIRATION RATE: 14 BRPM | DIASTOLIC BLOOD PRESSURE: 76 MMHG

## 2024-04-16 DIAGNOSIS — J11.1 INFLUENZA DUE TO UNIDENTIFIED INFLUENZA VIRUS WITH OTHER RESPIRATORY MANIFESTATIONS: ICD-10-CM

## 2024-04-16 DIAGNOSIS — J96.01 ACUTE RESPIRATORY FAILURE WITH HYPOXIA: ICD-10-CM

## 2024-04-16 PROCEDURE — 99214 OFFICE O/P EST MOD 30 MIN: CPT

## 2024-04-16 RX ORDER — SODIUM CHLORIDE FOR INHALATION 0.9 %
0.9 VIAL, NEBULIZER (ML) INHALATION TWICE DAILY
Qty: 1 | Refills: 2 | Status: ACTIVE | COMMUNITY
Start: 2024-04-16 | End: 1900-01-01

## 2024-04-16 RX ORDER — ALBUTEROL SULFATE 90 UG/1
108 (90 BASE) INHALANT RESPIRATORY (INHALATION) EVERY 4 HOURS
Qty: 1 | Refills: 5 | Status: ACTIVE | COMMUNITY
Start: 2024-04-16 | End: 1900-01-01

## 2024-04-17 PROBLEM — J96.01 ACUTE RESPIRATORY FAILURE WITH HYPOXIA: Status: ACTIVE | Noted: 2024-04-08

## 2024-04-17 PROBLEM — J11.1 INFLUENZA: Status: ACTIVE | Noted: 2024-04-08 | Resolved: 2024-05-08

## 2024-04-17 NOTE — HISTORY OF PRESENT ILLNESS
[TextBox_4] : SOO Flynn - (Radu) Son - lives with mother - providing translation   89 yo F w/ PMH of CAD sp PCI, HTN, DM, HLD, HFpEF who was admitted March 14 to April 5 2024 with acute hypoxic respiratory failure secondary to influenza and superimposed pneumonia. Patient required intubation and mechanical ventilation . Course complicated by sepsis, seizures, newly started on Keppra. Able to be weaned to room air and discharged.   She presents for in person pulmonary follow up.   This hospitalization as the first time she ever had respiratory issues. No prior hx of asthma, emphysema, no known NATHALIA, PHS first time - breathing problems    still fatigued, prior to hospitalization - ambulated around housenow walks with assistance - limited by balance.  More confused than prior.  However she has otherwise been well.  No fevers no cough, no shortness of breath.  No respiratory distress.  CT chest 4/2024  LUNGS AND AIRWAYS: Mild mucus impacted bronchi within the lower lobes. Mild right upper lobe tree-in-bud nodularity (7:52). Bibasilar dependent atelectasis. Right upper lobe calcified granuloma. PLEURA: No pleural effusion. MEDIASTINUM AND BOO: No lymphadenopathy. VESSELS: Coronary artery atherosclerotic calcifications etc. stenting. 4 cm ascending aorta. HEART: Cardiomegaly. No pericardial effusion. CHEST WALL AND LOWER NECK: Within normal limits. VISUALIZED UPPER ABDOMEN: Gastric tube with tip in the stomach. Calcified granulomas in the posterior right lobe of the liver. BONES: Degenerative changes.  IMPRESSION:  Mild bronchiolar impaction in the right upper lobe and mucous impacted bronchi in both lower lobes.

## 2024-04-17 NOTE — PHYSICAL EXAM
[No Acute Distress] : no acute distress [Normal Oropharynx] : normal oropharynx [Normal Rate/Rhythm] : normal rate/rhythm [No Resp Distress] : no resp distress [Clear to Auscultation Bilaterally] : clear to auscultation bilaterally [No Edema] : no edema [TextBox_140] : Awake and alert conversant with her son

## 2024-04-17 NOTE — ASSESSMENT
[FreeTextEntry1] : 89 yo F w/ PMH of CAD sp PCI, HTN, DM, HLD, HFpEF who was admitted March 14 to April 5 2024 with acute hypoxic respiratory failure secondary to influenza and superimposed pneumonia. Patient required intubation and mechanical ventilation . Course complicated by sepsis, seizures, newly started on Keppra. Able to be weaned to room air and discharged.   Overall doing well.  When feels stronger can obtain PFTs -Begin mucus clearance regimen with albuterol and saline nebulizers

## 2024-04-20 LAB
CULTURE RESULTS: SIGNIFICANT CHANGE UP
SPECIMEN SOURCE: SIGNIFICANT CHANGE UP

## 2024-05-02 ENCOUNTER — OUTPATIENT (OUTPATIENT)
Dept: OUTPATIENT SERVICES | Facility: HOSPITAL | Age: 89
LOS: 1 days | End: 2024-05-02
Payer: MEDICAID

## 2024-05-02 ENCOUNTER — APPOINTMENT (OUTPATIENT)
Dept: NEUROLOGY | Facility: HOSPITAL | Age: 89
End: 2024-05-02
Payer: MEDICAID

## 2024-05-02 VITALS
TEMPERATURE: 97.8 F | SYSTOLIC BLOOD PRESSURE: 121 MMHG | DIASTOLIC BLOOD PRESSURE: 75 MMHG | HEART RATE: 69 BPM | OXYGEN SATURATION: 98 % | RESPIRATION RATE: 17 BRPM

## 2024-05-02 DIAGNOSIS — R56.9 UNSPECIFIED CONVULSIONS: ICD-10-CM

## 2024-05-02 PROCEDURE — 99214 OFFICE O/P EST MOD 30 MIN: CPT

## 2024-05-02 PROCEDURE — G0463: CPT

## 2024-05-02 RX ORDER — LEVETIRACETAM 500 MG/1
500 TABLET, FILM COATED ORAL TWICE DAILY
Qty: 60 | Refills: 2 | Status: ACTIVE | COMMUNITY
Start: 2024-05-02 | End: 1900-01-01

## 2024-05-02 RX ORDER — LEVETIRACETAM 1000 MG/1
1000 TABLET, FILM COATED ORAL TWICE DAILY
Refills: 0 | Status: ACTIVE | COMMUNITY
Start: 2024-05-02

## 2024-05-02 NOTE — PHYSICAL EXAM
[FreeTextEntry1] : GEN: NAD, pleasant, cooperative CHEST: No signs of  distress, on room air ABD: Soft, NTTP NEURO:    MENTAL STATUS: AAOx3   LANG/SPEECH: Fluent, intact comprehension   CRANIAL NERVES:   II: Pupils equal and reactive, no RAPD, normal visual field    III, IV, VI: EOM intact, no gaze preference or deviation   V: normal   VII: no facial asymmetry   VIII: normal hearing to speech   MOTOR: 5/5 in both upper and lower extremities   REFLEXES: 2+ throughout UE. difficult to obtain LE due to positioning   SENSORY: Normal to touch  in all extremiteis   COORD: Normal finger to nose , no dysmetria

## 2024-05-02 NOTE — HISTORY OF PRESENT ILLNESS
[FreeTextEntry1] : 88F, hx HTN, HLD, DM, 6 cardiac stents, no prior hx of stroke or seizure, who presents to epilepsy clinic for initial evaluation after a hospital admission. Patient is Estonian speaking, accompanied by family at bedside. Family interpreting for patient,  services declined. Patient presented to the ED in mid March for fever and cough, and was hospitalized for pneumonia, sepsis, acute hypoxic respiratory failure. Patient had to be intubated due to hypoxia. While intubated, patient had one episode of gaze deviation and shaking activity that made primary team concerned for seizure. Patient was started on Keppra and continued on propofol drip. Patient had no other seizure-like events during hospital stay and was discharged home beginning of April on Keppra 1000mg BID. The patient has no prior hisotyr of seizures or seizure like events. Never had febrile seizures as a child, no hx of head trauma, no hx of meningitis, no family history of seizures. Patient doing well since hospital discharge.  PMH: HTN, HLD, DM, no hx of stroke, no hx of seizures. 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  Allergies: Beef and eggplant Meds: Amlodipine 5, ASA 81, Atenolol, Atorvastatin 80, Levothyroxine, Xarelto 2.5 BID for cardiac stents, famotidine, losartan, metformin, senna. keppra 1000 bid.  Travel: none recent Surgery:  cardiac stents  FH: no family hx of seizures SH: no alcohol or smoking  Lives with son and rest of family. Needs help with ADLs. Needs assistance with using bathroom, assistance with walking. Sometimes uses a walker.

## 2024-05-02 NOTE — DISCUSSION/SUMMARY
[FreeTextEntry1] : 88F, hx HTN, HLD, DM, 6 cardiac stents, no prior hx of stroke or seizure, who presents to epilepsy clinic for initial evaluation after a hospital admission. Patient is Slovenian speaking, accompanied by family at bedside. Family interpreting for patient,  services declined. Patient presented to the ED in mid March for fever and cough, and was hospitalized for pneumonia, sepsis, acute hypoxic respiratory failure. Patient had to be intubated due to hypoxia. While intubated, patient had one episode of gaze deviation and shaking activity that made primary team concerned for seizure. Patient was started on Keppra and continued on propofol drip. Patient had no other seizure-like events during hospital stay and was discharged home beginning of April on Keppra 1000mg BID. The patient has no prior hisotyr of seizures or seizure like events. Never had febrile seizures as a child, no hx of head trauma, no hx of meningitis, no family history of seizures. Patient doing well since hospital discharge.  Impression: Single episode of possible seizure-like episode in the context of medically ill patient, no prior seizure hx  Recommend: [] Reduce Keppra to 500mg BID x 2 months [] ambulatory EEG  [] RTC in 2 months. [] will consider taking of Keppra if no further events Family in agreement with plan.  Case and plan discussed with epilepsy attending, Dr. Rinaldi.

## 2024-05-04 LAB
CULTURE RESULTS: SIGNIFICANT CHANGE UP
SPECIMEN SOURCE: SIGNIFICANT CHANGE UP

## 2024-05-07 PROBLEM — R56.9 SEIZURE: Status: ACTIVE | Noted: 2024-04-08

## 2024-05-13 ENCOUNTER — APPOINTMENT (OUTPATIENT)
Dept: PULMONOLOGY | Facility: CLINIC | Age: 89
End: 2024-05-13
Payer: MEDICAID

## 2024-05-13 VITALS
BODY MASS INDEX: 26.1 KG/M2 | SYSTOLIC BLOOD PRESSURE: 126 MMHG | HEIGHT: 57 IN | OXYGEN SATURATION: 99 % | DIASTOLIC BLOOD PRESSURE: 80 MMHG | WEIGHT: 121 LBS

## 2024-05-13 DIAGNOSIS — J45.901 UNSPECIFIED ASTHMA WITH (ACUTE) EXACERBATION: ICD-10-CM

## 2024-05-13 PROCEDURE — 94726 PLETHYSMOGRAPHY LUNG VOLUMES: CPT

## 2024-05-13 PROCEDURE — 94729 DIFFUSING CAPACITY: CPT

## 2024-05-13 PROCEDURE — 94010 BREATHING CAPACITY TEST: CPT

## 2024-05-14 ENCOUNTER — APPOINTMENT (OUTPATIENT)
Dept: CARDIOLOGY | Facility: CLINIC | Age: 89
End: 2024-05-14
Payer: MEDICAID

## 2024-05-14 VITALS
SYSTOLIC BLOOD PRESSURE: 150 MMHG | WEIGHT: 121 LBS | BODY MASS INDEX: 26.18 KG/M2 | HEART RATE: 58 BPM | DIASTOLIC BLOOD PRESSURE: 74 MMHG | OXYGEN SATURATION: 98 %

## 2024-05-14 VITALS — SYSTOLIC BLOOD PRESSURE: 140 MMHG | DIASTOLIC BLOOD PRESSURE: 70 MMHG

## 2024-05-14 DIAGNOSIS — I05.9 RHEUMATIC MITRAL VALVE DISEASE, UNSPECIFIED: ICD-10-CM

## 2024-05-14 PROCEDURE — 99204 OFFICE O/P NEW MOD 45 MIN: CPT

## 2024-05-14 PROCEDURE — 93000 ELECTROCARDIOGRAM COMPLETE: CPT

## 2024-05-14 RX ORDER — BUDESONIDE AND FORMOTEROL FUMARATE DIHYDRATE 160; 4.5 UG/1; UG/1
160-4.5 AEROSOL RESPIRATORY (INHALATION)
Refills: 0 | Status: ACTIVE | COMMUNITY

## 2024-05-14 RX ORDER — LEVOTHYROXINE SODIUM 0.03 MG/1
25 TABLET ORAL
Refills: 0 | Status: ACTIVE | COMMUNITY

## 2024-05-14 RX ORDER — ATORVASTATIN CALCIUM 80 MG/1
80 TABLET, FILM COATED ORAL
Refills: 0 | Status: ACTIVE | COMMUNITY

## 2024-05-14 RX ORDER — FAMOTIDINE 40 MG/1
40 TABLET, FILM COATED ORAL
Refills: 0 | Status: ACTIVE | COMMUNITY

## 2024-05-14 RX ORDER — CALCIUM CARBONATE 500(1250)
TABLET ORAL
Refills: 0 | Status: ACTIVE | COMMUNITY

## 2024-05-14 RX ORDER — RIVAROXABAN 2.5 MG/1
2.5 TABLET, FILM COATED ORAL
Refills: 0 | Status: ACTIVE | COMMUNITY

## 2024-05-14 RX ORDER — METFORMIN HYDROCHLORIDE 500 MG/1
500 TABLET, COATED ORAL
Refills: 0 | Status: ACTIVE | COMMUNITY

## 2024-05-14 RX ORDER — LOSARTAN POTASSIUM 100 MG/1
100 TABLET, FILM COATED ORAL
Refills: 0 | Status: ACTIVE | COMMUNITY

## 2024-05-14 RX ORDER — ATENOLOL 100 MG/1
100 TABLET ORAL
Refills: 0 | Status: ACTIVE | COMMUNITY

## 2024-05-14 NOTE — PHYSICAL EXAM
[Well Developed] : well developed [Well Nourished] : well nourished [Normal Conjunctiva] : normal conjunctiva [Normal Venous Pressure] : normal venous pressure [Normal S1, S2] : normal S1, S2 [Soft] : abdomen soft [No Edema] : no edema [No Rash] : no rash [Moves all extremities] : moves all extremities [Alert and Oriented] : alert and oriented [de-identified] : 6.  Holosystolic murmur at the apex. [de-identified] : She walks with the assistance of her son holding her hand.

## 2024-05-14 NOTE — HISTORY OF PRESENT ILLNESS
[FreeTextEntry1] : Thank you for referring her for cardiovascular management. She is accompanied by her son who serves as . She is an 88-year-old with a history of coronary artery disease status post coronary stenting in 2019 (6/10), diabetes mellitus, hyperlipidemia, who was recently admitted with acute respiratory failure with hypoxia due to influenza and pneumonia. During her hospital stay she also possibly had a seizure. Cardiac evaluation during her acute illness showed moderate to severe mitral regurgitation with normal systolic function and moderate diastolic dysfunction. No recent angina and she had previously been on Ranexa. She had been followed by cardiologist but sought my evaluation because she now lives closer to this area. She denies any history of myocardial infarction. She has no history of smoking. She is taking medications as directed and has no orthopnea, PND or leg edema.

## 2024-05-14 NOTE — DISCUSSION/SUMMARY
[FreeTextEntry1] : She is an 88-year-old with known coronary artery disease status post coronary stenting in 2019 who presented with recent acute respiratory failure due to pulmonary infection, but has since recovered.  Echocardiography done at the time of respiratory failure showed Moderate to severe mitral regurgitation. She has no sign of heart failure on examination today and no anginal symptoms reported. Her blood pressure control is borderline, but was normal yesterday.  I have encouraged her to continue her current dose of medications. I have suggested that she repeat her echocardiogram to see if the mitral regurgitation was related to her acute illness. CAD: No anginal symptoms.  Continue aspirin and Xarelto 2.5 twice daily.  Continue statin as well. Goal LDL less than 70 mg/dL. Up in 3 months for routine monitoring. [EKG obtained to assist in diagnosis and management of assessed problem(s)] : EKG obtained to assist in diagnosis and management of assessed problem(s)

## 2024-05-31 ENCOUNTER — APPOINTMENT (OUTPATIENT)
Dept: CARDIOLOGY | Facility: CLINIC | Age: 89
End: 2024-05-31

## 2024-06-20 ENCOUNTER — APPOINTMENT (OUTPATIENT)
Dept: CARDIOLOGY | Facility: CLINIC | Age: 89
End: 2024-06-20
Payer: MEDICAID

## 2024-06-20 PROCEDURE — 93306 TTE W/DOPPLER COMPLETE: CPT

## 2024-07-18 ENCOUNTER — APPOINTMENT (OUTPATIENT)
Dept: NEUROLOGY | Facility: HOSPITAL | Age: 89
End: 2024-07-18

## 2024-08-26 ENCOUNTER — APPOINTMENT (OUTPATIENT)
Dept: CARDIOLOGY | Facility: CLINIC | Age: 89
End: 2024-08-26

## 2024-11-27 ENCOUNTER — APPOINTMENT (OUTPATIENT)
Dept: CARDIOLOGY | Facility: CLINIC | Age: 89
End: 2024-11-27
Payer: MEDICAID

## 2024-11-27 ENCOUNTER — NON-APPOINTMENT (OUTPATIENT)
Age: 89
End: 2024-11-27

## 2024-11-27 VITALS
WEIGHT: 124 LBS | BODY MASS INDEX: 26.83 KG/M2 | DIASTOLIC BLOOD PRESSURE: 75 MMHG | HEART RATE: 57 BPM | OXYGEN SATURATION: 98 % | SYSTOLIC BLOOD PRESSURE: 160 MMHG

## 2024-11-27 DIAGNOSIS — I05.9 RHEUMATIC MITRAL VALVE DISEASE, UNSPECIFIED: ICD-10-CM

## 2024-11-27 PROCEDURE — 93000 ELECTROCARDIOGRAM COMPLETE: CPT

## 2024-11-27 PROCEDURE — G2211 COMPLEX E/M VISIT ADD ON: CPT | Mod: NC

## 2024-11-27 PROCEDURE — 99214 OFFICE O/P EST MOD 30 MIN: CPT

## 2024-12-11 NOTE — PROGRESS NOTE ADULT - ASSESSMENT
CC:  Isha Hernandez is here today for Office Visit (Needs to be fitted for compression stockings and Depends under garments)       Medications: medications verified and updated  Refills needed today?  NO  Patient would like communication of their results via:   Home Phone: [phone] (home).  Okay to leave a detailed message containing results? Yes  Advanced directives: Yes       Health Maintenance       DTaP/Tdap/Td Vaccine (1 - Tdap)  Never done    Shingles Vaccine (1 of 2)  Overdue since 5/25/2017    Influenza Vaccine (1)  Overdue since 9/1/2024    COVID-19 Vaccine (4 - 2024-25 season)  Overdue since 9/1/2024           Following review of the above:  Patient is not proceeding with: COVID-19, Dtap/Tdap/Td, Influenza, and Shingles    Note: Refer to final orders and clinician documentation.          Review Flowsheet  More data exists         12/11/2024   PHQ 2/9 Score   Adult PHQ 2 Score 2   Adult PHQ 2 Interpretation No further screening needed   Little interest or pleasure in activity? Several days   Feeling down, depressed or hopeless? Several days      Details                   87 y/o female with PMH of CAD s/p 6 stents in 11/2019 at [facility], HLD, HTN, DM2 and CHF presents to ED c/o chest pain. Patient admitted for management of NSTEMI.     In ED, EKG x 2 showed SR 66 and then SB 53, ST-T wave abnormalities in I, AVL, V4-6. Troponin 1911,

## 2025-01-10 ENCOUNTER — RX RENEWAL (OUTPATIENT)
Age: 89
End: 2025-01-10

## 2025-02-10 ENCOUNTER — RX RENEWAL (OUTPATIENT)
Age: 89
End: 2025-02-10

## 2025-09-15 ENCOUNTER — NON-APPOINTMENT (OUTPATIENT)
Age: 89
End: 2025-09-15

## 2025-09-15 ENCOUNTER — APPOINTMENT (OUTPATIENT)
Dept: CARDIOLOGY | Facility: CLINIC | Age: 89
End: 2025-09-15
Payer: MEDICAID

## 2025-09-15 VITALS
WEIGHT: 120.5 LBS | OXYGEN SATURATION: 99 % | DIASTOLIC BLOOD PRESSURE: 80 MMHG | BODY MASS INDEX: 26.08 KG/M2 | HEART RATE: 63 BPM | SYSTOLIC BLOOD PRESSURE: 137 MMHG

## 2025-09-15 DIAGNOSIS — I05.9 RHEUMATIC MITRAL VALVE DISEASE, UNSPECIFIED: ICD-10-CM

## 2025-09-15 PROCEDURE — 93000 ELECTROCARDIOGRAM COMPLETE: CPT

## 2025-09-15 PROCEDURE — G2211 COMPLEX E/M VISIT ADD ON: CPT | Mod: NC

## 2025-09-15 PROCEDURE — 99214 OFFICE O/P EST MOD 30 MIN: CPT
